# Patient Record
Sex: FEMALE | Race: WHITE | NOT HISPANIC OR LATINO | Employment: OTHER | URBAN - METROPOLITAN AREA
[De-identification: names, ages, dates, MRNs, and addresses within clinical notes are randomized per-mention and may not be internally consistent; named-entity substitution may affect disease eponyms.]

---

## 2018-09-17 ENCOUNTER — TELEPHONE (OUTPATIENT)
Dept: OBSTETRICS AND GYNECOLOGY | Facility: CLINIC | Age: 78
End: 2018-09-17

## 2018-09-17 NOTE — TELEPHONE ENCOUNTER
----- Message from Ana Britt sent at 9/17/2018  8:08 AM CDT -----  Contact: Raquel marks daughter            Name of Who is Calling:Raquel pt daughter      What is the request in detail: patient would like to est care for irregular bleeding    [facility] the [facility]:no    [facility] [phone]

## 2018-09-20 ENCOUNTER — OFFICE VISIT (OUTPATIENT)
Dept: OBSTETRICS AND GYNECOLOGY | Facility: CLINIC | Age: 78
End: 2018-09-20
Payer: MEDICARE

## 2018-09-20 ENCOUNTER — TELEPHONE (OUTPATIENT)
Dept: OBSTETRICS AND GYNECOLOGY | Facility: CLINIC | Age: 78
End: 2018-09-20

## 2018-09-20 ENCOUNTER — HOSPITAL ENCOUNTER (OUTPATIENT)
Dept: RADIOLOGY | Facility: OTHER | Age: 78
Discharge: HOME | End: 2018-09-20
Attending: OBSTETRICS & GYNECOLOGY
Payer: MEDICARE

## 2018-09-20 VITALS — DIASTOLIC BLOOD PRESSURE: 80 MMHG | SYSTOLIC BLOOD PRESSURE: 130 MMHG | WEIGHT: 198.88 LBS

## 2018-09-20 VITALS — BODY MASS INDEX: 33.8 KG/M2 | HEIGHT: 64 IN | WEIGHT: 198 LBS

## 2018-09-20 DIAGNOSIS — Z12.31 ENCOUNTER FOR SCREENING MAMMOGRAM FOR MALIGNANT NEOPLASM OF BREAST: ICD-10-CM

## 2018-09-20 DIAGNOSIS — N95.0 PMB (POSTMENOPAUSAL BLEEDING): ICD-10-CM

## 2018-09-20 DIAGNOSIS — N89.8 VAGINAL DISCHARGE: ICD-10-CM

## 2018-09-20 DIAGNOSIS — N95.0 PMB (POSTMENOPAUSAL BLEEDING): Primary | ICD-10-CM

## 2018-09-20 DIAGNOSIS — Z01.419 ENCOUNTER FOR WELL WOMAN EXAM WITH ROUTINE GYNECOLOGICAL EXAM: ICD-10-CM

## 2018-09-20 LAB
CANDIDA RRNA VAG QL PROBE: NEGATIVE
G VAGINALIS RRNA GENITAL QL PROBE: POSITIVE
T VAGINALIS RRNA GENITAL QL PROBE: NEGATIVE

## 2018-09-20 PROCEDURE — 87660 TRICHOMONAS VAGIN DIR PROBE: CPT

## 2018-09-20 PROCEDURE — 99213 OFFICE O/P EST LOW 20 MIN: CPT | Mod: PBBFAC | Performed by: OBSTETRICS & GYNECOLOGY

## 2018-09-20 PROCEDURE — 99387 INIT PM E/M NEW PAT 65+ YRS: CPT | Mod: S$PBB,,, | Performed by: OBSTETRICS & GYNECOLOGY

## 2018-09-20 PROCEDURE — 77067 SCR MAMMO BI INCL CAD: CPT | Mod: 26,,, | Performed by: RADIOLOGY

## 2018-09-20 PROCEDURE — 88141 CYTOPATH C/V INTERPRET: CPT | Mod: ,,, | Performed by: PATHOLOGY

## 2018-09-20 PROCEDURE — 88175 CYTOPATH C/V AUTO FLUID REDO: CPT | Performed by: PATHOLOGY

## 2018-09-20 PROCEDURE — 77063 BREAST TOMOSYNTHESIS BI: CPT | Mod: 26,,, | Performed by: RADIOLOGY

## 2018-09-20 PROCEDURE — 77067 SCR MAMMO BI INCL CAD: CPT | Mod: TC

## 2018-09-20 PROCEDURE — 99999 PR PBB SHADOW E&M-EST. PATIENT-LVL III: CPT | Mod: PBBFAC,,, | Performed by: OBSTETRICS & GYNECOLOGY

## 2018-09-20 RX ORDER — PEN NEEDLE, DIABETIC 31 GX5/16"
NEEDLE, DISPOSABLE MISCELLANEOUS
COMMUNITY
Start: 2018-09-12 | End: 2019-07-08 | Stop reason: SDUPTHER

## 2018-09-20 RX ORDER — NEOMYCIN SULFATE, POLYMYXIN B SULFATE, AND DEXAMETHASONE 3.5; 10000; 1 MG/G; [USP'U]/G; MG/G
OINTMENT OPHTHALMIC
COMMUNITY
Start: 2018-07-19 | End: 2019-10-24 | Stop reason: CLARIF

## 2018-09-20 RX ORDER — INSULIN GLARGINE 100 [IU]/ML
24 INJECTION, SOLUTION SUBCUTANEOUS
Refills: 6 | COMMUNITY
Start: 2018-07-25 | End: 2019-05-01

## 2018-09-20 RX ORDER — LEVOTHYROXINE SODIUM 112 UG/1
TABLET ORAL
COMMUNITY
Start: 2018-08-24 | End: 2019-06-25

## 2018-09-20 RX ORDER — LOTEPREDNOL ETABONATE 5 MG/ML
SUSPENSION/ DROPS OPHTHALMIC
COMMUNITY
Start: 2018-09-14 | End: 2019-09-05 | Stop reason: SDUPTHER

## 2018-09-20 RX ORDER — BROMFENAC SODIUM 0.7 MG/ML
1 SOLUTION/ DROPS OPHTHALMIC 2 TIMES DAILY
Refills: 5 | COMMUNITY
Start: 2018-08-28 | End: 2020-01-07 | Stop reason: CLARIF

## 2018-09-20 RX ORDER — ERGOCALCIFEROL 1.25 MG/1
50000 CAPSULE ORAL
Refills: 0 | COMMUNITY
Start: 2018-07-10 | End: 2019-12-16 | Stop reason: SDUPTHER

## 2018-09-20 RX ORDER — ROSUVASTATIN CALCIUM 10 MG/1
TABLET, COATED ORAL
COMMUNITY
Start: 2018-08-21 | End: 2019-05-01

## 2018-09-20 RX ORDER — DULOXETIN HYDROCHLORIDE 60 MG/1
60 CAPSULE, DELAYED RELEASE ORAL 2 TIMES DAILY
Refills: 1 | COMMUNITY
Start: 2018-07-25 | End: 2019-05-01

## 2018-09-20 RX ORDER — CYCLOSPORINE 0.5 MG/ML
1 EMULSION OPHTHALMIC 2 TIMES DAILY
Refills: 3 | COMMUNITY
Start: 2018-08-28

## 2018-09-20 RX ORDER — CIPROFLOXACIN HYDROCHLORIDE 3 MG/ML
SOLUTION/ DROPS OPHTHALMIC
COMMUNITY
Start: 2018-07-23 | End: 2019-05-01 | Stop reason: SDUPTHER

## 2018-09-20 RX ORDER — BLOOD SUGAR DIAGNOSTIC
STRIP MISCELLANEOUS
COMMUNITY
Start: 2018-09-14

## 2018-09-20 RX ORDER — BRIMONIDINE TARTRATE, TIMOLOL MALEATE 2; 5 MG/ML; MG/ML
SOLUTION/ DROPS OPHTHALMIC
COMMUNITY
Start: 2018-09-14 | End: 2019-10-24 | Stop reason: CLARIF

## 2018-09-20 RX ORDER — DIAZEPAM 5 MG/1
5 TABLET ORAL EVERY 6 HOURS PRN
COMMUNITY
Start: 2018-07-30 | End: 2023-08-11

## 2018-09-20 RX ORDER — OXYCODONE AND ACETAMINOPHEN 7.5; 325 MG/1; MG/1
TABLET ORAL
Refills: 0 | COMMUNITY
Start: 2018-06-28 | End: 2019-10-09 | Stop reason: ALTCHOICE

## 2018-09-20 RX ORDER — METRONIDAZOLE 500 MG/1
500 TABLET ORAL 2 TIMES DAILY
Qty: 14 TABLET | Refills: 0 | Status: SHIPPED | OUTPATIENT
Start: 2018-09-20 | End: 2018-09-27

## 2018-09-20 RX ORDER — NATEGLINIDE 120 MG/1
TABLET ORAL
COMMUNITY
Start: 2018-08-21 | End: 2019-05-01

## 2018-09-20 NOTE — TELEPHONE ENCOUNTER
----- Message from Nilsa Olmso sent at 9/20/2018  1:45 PM CDT -----  Contact: Raquel/ daughter [phone]  Raquel is needing a call back, regarding her mom's Rx, she can be reached at [phone].

## 2018-09-20 NOTE — PROGRESS NOTES
"History & Physical  Gynecology      SUBJECTIVE:     Chief Complaint: Vaginal Bleeding       History of Present Illness:  Pt is a 77 y/o  who presents with complaints of vaginal bleeding x1 day.  Pt reports that she noticed the bleeding while wiping after her  attempted to insert his finger into her vagina.  Pt reports that he has ED and therefore, they dont have intercourse.  Reports same symptoms a few months back when he attempted to do the same thing.  Has not had bleeding since.  Has not been to the GYN in 36 years.  Has not had pap in that long.  Denies hx of STDs.   used KY jelly.  Not taking HRT.  Pt also reports that she has a vagina odor, which is why she has never wanted to go to the GYN.  Denies vaginal discharge.  Denies itching/irritation of vagina.  No hx of strokes, breast cancer, GYN cancer.  No weight changes.  Other medical problems include diabetes and HTN     ObHX   -  x 4- 3 were breech extractions      Review of patient's allergies indicates:  No Known Allergies    Past Medical History:   Diagnosis Date    Concern about heart attack without diagnosis     silent heart attack    Diabetes mellitus     Eye problems      Past Surgical History:   Procedure Laterality Date    EYE SURGERY      TUBAL LIGATION       OB History      Para Term  AB Living    4         4    SAB TAB Ectopic Multiple Live Births                     Family History   Problem Relation Age of Onset    Breast cancer Daughter     Colon cancer Neg Hx      Social History     Tobacco Use    Smoking status: Never Smoker    Smokeless tobacco: Never Used   Substance Use Topics    Alcohol use: No     Frequency: Never    Drug use: No       Current Outpatient Medications   Medication Sig    BD ULTRA-FINE MINI PEN NEEDLE 31 gauge x 3/16" Ndle     ciprofloxacin HCl (CILOXAN) 0.3 % ophthalmic solution     COMBIGAN 0.2-0.5 % Drop     diazePAM (VALIUM) 5 MG tablet     DULoxetine (CYMBALTA) 60 " MG capsule Take 60 mg by mouth 2 (two) times daily.    LANTUS SOLOSTAR U-100 INSULIN 100 unit/mL (3 mL) InPn pen Inject 30 Units into the skin once daily.    LOTEMAX 0.5 % ophthalmic suspension     oxyCODONE-acetaminophen (PERCOCET) 7.5-325 mg per tablet TAKE 1/2 TABLET BY MOUTH EVERY 8 TO 12 HOURS AS NEEDED    PRODIGY NO CODING Strp     PROLENSA 0.07 % Drop Place 1 drop into both eyes 2 (two) times daily.    RESTASIS 0.05 % ophthalmic emulsion Place 1 drop into both eyes 2 (two) times daily.    SYNTHROID 112 mcg tablet     VITAMIN D2 50,000 unit capsule Take 50,000 Units by mouth every 7 days.    metroNIDAZOLE (FLAGYL) 500 MG tablet Take 1 tablet (500 mg total) by mouth 2 (two) times daily. for 7 days    nateglinide (STARLIX) 120 MG tablet     neomycin-polymyxin-dexamethasone (DEXACINE) 3.5 mg/g-10,000 unit/g-0.1 % Oint     rosuvastatin (CRESTOR) 10 MG tablet      No current facility-administered medications for this visit.          Review of Systems:  Review of Systems   Constitutional: Negative for activity change, appetite change, fatigue, fever and unexpected weight change.   Respiratory: Negative for cough and shortness of breath.    Cardiovascular: Negative for chest pain and leg swelling.   Gastrointestinal: Negative for abdominal pain, blood in stool, constipation, diarrhea, nausea and vomiting.   Endocrine: Negative for diabetes, hair loss and hot flashes.   Genitourinary: Positive for postmenopausal bleeding and vaginal odor. Negative for dysuria, flank pain, pelvic pain, vaginal discharge, vaginal pain and postcoital bleeding.   Musculoskeletal: Negative for back pain.   Skin:  Negative for hair changes.   Psychiatric/Behavioral: Negative for sleep disturbance. The patient is not nervous/anxious.    Breast: Negative for breast mass, breast pain, nipple discharge and skin changes       OBJECTIVE:     Physical Exam:  Physical Exam   Constitutional: She is oriented to person, place, and time.  She appears well-developed and well-nourished.   HENT:   Head: Normocephalic and atraumatic.   Eyes: Conjunctivae are normal. Right eye exhibits no discharge. Left eye exhibits no discharge. No scleral icterus.   Pulmonary/Chest: Effort normal. No stridor. She exhibits no mass, no tenderness and no bony tenderness. Right breast exhibits no inverted nipple, no mass, no nipple discharge, no skin change and no tenderness. Left breast exhibits no inverted nipple, no mass, no nipple discharge, no skin change and no tenderness. Breasts are symmetrical. There is no breast swelling.   Abdominal: Soft. She exhibits no distension. There is no tenderness.   Genitourinary: Vagina normal and uterus normal. No breast tenderness, discharge or bleeding. No labial fusion. There is no rash, tenderness, lesion or injury on the right labia. There is no rash, tenderness, lesion or injury on the left labia. Cervix exhibits no motion tenderness, no discharge and no friability. Right adnexum displays no mass, no tenderness and no fullness. Left adnexum displays no mass, no tenderness and no fullness.   Genitourinary Comments: Normal external genitalia.  Normal hair distribution.  Urethral meatus normal. No cervical lesions or masses.  Vaginal atrophy noted throughout.  Mild spotting noted when performing pap.  No adnexal or uterine tenderness.  Exam limited by habitus but uterus feels small- 6-8 week size.  No palpable adnexal masses.      Musculoskeletal: Normal range of motion.   Neurological: She is alert and oriented to person, place, and time.   Skin: Skin is warm and dry.   Psychiatric: She has a normal mood and affect. Her behavior is normal. Judgment and thought content normal.         ASSESSMENT:       ICD-10-CM ICD-9-CM    1. PMB (postmenopausal bleeding) N95.0 627.1 Mammo Digital Screening Bilat With CAD      Vaginosis Screen by DNA Probe      Liquid-based pap smear, screening   2. Encounter for screening mammogram for malignant  neoplasm of breast  Z12.31 V76.12 Mammo Digital Screening Bilat With CAD   3. Vaginal discharge N89.8 623.5 metroNIDAZOLE (FLAGYL) 500 MG tablet          Plan:      Chelsea was seen today for vaginal bleeding.    Diagnoses and all orders for this visit:    Encounter for well woman exam with routine gynecological exam   - Pt is past the age of recommended pap smears.  However, given that patient hasnt had pap in 36 years and having PMB, will do pap today  - Has not had MMG in over 30 years.  Has daughter with breast cancer.  MMG ordered  - Cscope- pt declined  -     Mammo Digital Screening Bilat With CAD; Future    PMB (postmenopausal bleeding)  - Given history and exam, likely bleeding is 2/2 vaginal atrophy. Not bleeding now.  Do not feel that patient needs EMBx or US  - Pt offered premarin to help with vaginal atrophy.  Counseled about the risks/benefits.  Pt unsure if she wants to try it.  Will let us know because not usually sexually active and vagina is not bothering her  -     Liquid-based pap smear, screening\      Encounter for screening mammogram for malignant neoplasm of breast   -     Mammo Digital Screening Bilat With CAD; Future    Vaginal discharge  -     Vaginosis Screen by DNA Probe  -     metroNIDAZOLE (FLAGYL) 500 MG tablet; Take 1 tablet (500 mg total) by mouth 2 (two) times daily. for 7 days        Orders Placed This Encounter   Procedures    Vaginosis Screen by DNA Probe    Mammo Digital Screening Bilat With CAD       Follow-up in about 1 year (around 9/20/2019).      Marcia Canseco

## 2019-03-15 LAB
CHOLESTEROL, TOTAL: 126
CHOLESTEROL, TOTAL: 126
CHOLESTEROL/HDL RATIO SCREEN: 2.8
CHOLESTEROL/HDL RATIO SCREEN: 2.8
HDLC SERPL-MCNC: 45 MG/DL
HDLC SERPL-MCNC: 45 MG/DL
HEMOGLOBIN A1: 8.9
LDLC SERPL CALC-MCNC: 62 MG/DL
LDLC SERPL CALC-MCNC: 62 MG/DL
NONHDLC SERPL-MCNC: 81 MG/DL
NONHDLC SERPL-MCNC: 81 MG/DL
TRIGL SERPL-MCNC: 111 MG/DL (ref 40–160)
TRIGL SERPL-MCNC: 111 MG/DL (ref 40–160)

## 2019-04-08 ENCOUNTER — CLINICAL SUPPORT (OUTPATIENT)
Dept: DIABETES | Facility: CLINIC | Age: 79
End: 2019-04-08
Payer: MEDICARE

## 2019-04-08 VITALS — WEIGHT: 202.81 LBS | BODY MASS INDEX: 34.81 KG/M2

## 2019-04-08 DIAGNOSIS — E11.3213 TYPE 2 DIABETES MELLITUS WITH BOTH EYES AFFECTED BY MILD NONPROLIFERATIVE RETINOPATHY AND MACULAR EDEMA, WITH LONG-TERM CURRENT USE OF INSULIN: Primary | ICD-10-CM

## 2019-04-08 DIAGNOSIS — Z79.4 TYPE 2 DIABETES MELLITUS WITH BOTH EYES AFFECTED BY MILD NONPROLIFERATIVE RETINOPATHY AND MACULAR EDEMA, WITH LONG-TERM CURRENT USE OF INSULIN: Primary | ICD-10-CM

## 2019-04-08 PROCEDURE — G0108 PR DIAB MANAGE TRN  PER INDIV: ICD-10-PCS | Mod: S$GLB,,, | Performed by: DIETITIAN, REGISTERED

## 2019-04-08 PROCEDURE — G0108 DIAB MANAGE TRN  PER INDIV: HCPCS | Mod: S$GLB,,, | Performed by: DIETITIAN, REGISTERED

## 2019-04-08 NOTE — PROGRESS NOTES
Diabetes Education  Author: Paz James RD  Date: 4/8/2019    Diabetes Care Management Summary  Pt presents with only copy of recent lab work ([facility] labs 3/15/19), a hand written medication list and oral history from her and daughter as to current DM status and treatment; no other information scanned in Media at the time of this visit.  Both ladies voiced displeasure w past DM care at other health care facility and were seeking to establish care with [facility]. Pt was a difficult historian and was not open to any suggestions for change in diet or treating low BG. Provided pt with Diabetes management booklet and attempted to review the information on carb counting, medications, acute care and correct food choices; pt was asked to  return once established w ENDO to continue the diabetes education.  Diabetes Education Record Assessment/Progress: Initial  Current Diabetes Risk Level: Moderate     Last A1c: No results found for: HGBA1C  Last visit with Diabetes Educator: Last Education Visit: Not Found    Diabetes Type  Diabetes Type : Type II    Diabetes History  Diabetes Diagnosis: >10 years  Current Treatment: Diet, Insulin, Oral Medication  Reviewed Problem List with Patient: Yes    Health Maintenance was reviewed today with patient. Discussed with patient importance of routine eye exams, foot exams/foot care, blood work (i.e.: A1c, microalbumin, and lipid), dental visits, yearly flu vaccine, and pneumonia vaccine as indicated by PCP. Patient verbalized understanding.     The patient has no Health Maintenance topics of status Not Due  Health Maintenance Due   Topic Date Due    Lipid Panel  1940    Hemoglobin A1c  1940    Foot Exam  07/23/1952    Eye Exam  07/23/1952    Urine Microalbumin  07/23/1952    TETANUS VACCINE  07/23/1960    DEXA SCAN  07/23/1982    Zoster Vaccine  07/23/2002    Pneumococcal Vaccine (65+ Low/Medium Risk) (1 of 2 - PCV13) 07/23/2007    Influenza Vaccine  08/01/2018        Nutrition  Meal Planning: water, 3 meals per day, diet drinks, artificial sweeteners, eats out often, snacks between meal  What type of sweetener do you use?: Splenda  What type of beverages do you drink?: diet soda/tea, water  Meal Plan 24 Hour Recall - Breakfast: 8-10am: pancake sandwich w sauasge, coffee w splenda and powdered cream  Meal Plan 24 Hour Recall - Lunch: noon-1; [facility] to prepare or  meals; steak, mixed veg, baked potato, broccoli, crystal light iced tea  Meal Plan 24 Hour Recall - Dinner: 5-7pm: hamburger on 1/2 bun, some ffries, diet coke  Meal Plan 24 Hour Recall - Snack: SF ice cream of SF cookies    Monitoring   Monitoring: Other  Self Monitoring : 3x/day  Blood Glucose Logs: Yes  Do you use a personal continuous glucose monitor?: Yes  What kind of glucose monitor do you use?: Freestyle Yoav Flash(used it for 2 weeks but not provided w prescription to purchase)  In the last month, how often have you had a low blood sugar reaction?: more than once a week  What are your symptoms of low blood sugar?: shaky but pt will treat even w BG at 91 due to fear of lower BG  How do you treat low blood sugar?: juice, candy  Can you tell when your blood sugar is too high?: no    Exercise   Exercise Type: (limited due to poor vision)    Current Diabetes Treatment   Current Treatment: Diet, Insulin, Oral Medication    Social History  Preferred Learning Method: Face to Face, Group Education  Primary Support: Self, Daughter  Smoking Status: Never a Smoker  Alcohol Use: Never  DDS-2 Score  ( > 3 = SIGNIFICANT DISTRESS): 2  Barriers to Change  Learning Challenges : Vision  Vision - further explanation: visually impaired    Readiness to Learn   Readiness to Learn : Non Acceptance    Cultural Influences  Cultural Influences: No    Diabetes Education Assessment/Progress  Diabetes Disease Process (diabetes disease process and treatment options): Individual Session, Written Materials Provided,  Instructed, Discussion, Needs Review  Nutrition (Incorporating nutritional management into one's lifestyle): Individual Session, Written Materials Provided, Instructed, Discussion, Needs Review  -Very difficult to get clear understanding of eating habits. Pt states she must depend on  who she states she despises to assist to prepare/ purchade meals due to poor vision thus stating most meals are not the most DM friendly. Pt very combative w her daughter who was present regarding food choices and timing of meals or treatment of low BG.   -pt given copy of Diabetes management guide and some instruction provided re carbs ad meal planning however Careful adherence to diet not anticipated at this time..     Physical Activity (incorporating physical activity into one's lifestyle): Individual Session, Written Materials Provided, Instructed, Discussion, Needs Review  Medications (states correct name, dose, onset, peak, duration, side effects & timing of meds): Individual Session, Written Materials Provided, Instructed, Discussion, Needs Review, Needs Instruction   -pt takes 20 units Novolog prior to meals using sliding scale (150-200: +2 U, 201-250:+4 U, 251-300: +6 U, 301-350: + 8 U, over 350: +10 U), 24 u Lantus at night and 120mg Starlix at meals  -pt states she does not skip insulin unless BG below 90.    Monitoring (monitoring blood glucose/other parameters & using results): Individual Session, Written Materials Provided, Instructed, Discussion, Needs Review  -pt stated she had been given Free Style hair but states she stopped using when BG not same as w glucometer. States no instruction provided to pt on placement of sensors and how to use the data in DM care nor the differences in BG readings. Thus pt returned to checking BG (uses Prodigy) and keeps log ()    Acute Complications (preventing, detecting, and treating acute complications): Individual Session, Written Materials Provided, Instructed,  Discussion, Comprehends Key Points  -pt states she gets scared when BG gets to 90s and start treating for low BG though not yet experiencing any s/s  -discussed s/s of hypo and rule of 15 to follow when Bg less than 70. Rec'd treating BG of  with a 15 g complex carb snack. Pt verbally resisted this suggestion and stated she would continue to treat as she has done in past    Chronic Complications (preventing, detecting, and treating chronic complications): Individual Session, Written Materials Provided, Instructed, Discussion, Needs Review  Clinical (diabetes, other pertinent medical history, and relevant comorbidities reviewed during visit): Written Materials Provided, Discussion, Individual Session, Needs Review  Cognitive (knowledge of self-management skills, functional health literacy): Individual Session, Written Materials Provided, Needs Review, Discussion  Psychosocial (emotional response to diabetes): Discussion  Diabetes Distress and Support Systems: Discussion  Behavioral (readiness for change, lifestyle practices, self-care behaviors): Discussion    Goals  Patient has selected/evaluated goals during today's session: No(pt too upset with prior medical care and martial situation to discuss seting goals for DM control)    Diabetes Care Plan/Intervention  Education Plan/Intervention: Individual Follow-Up DSMT(contact information provided; pt asked to return for diabetes education once she has a new ENDO provider)    Diabetes Meal Plan  Restrictions: Restricted Carbohydrate  Carbohydrate Per Meal: 30-45g  Carbohydrate Per Snack : 15-20g    Today's Self-Management Care Plan was developed with the patient's input and is based on barriers identified during today's assessment.    The long and short-term goals in the care plan were written with the patient/caregiver's input. The patient has agreed to work toward these goals to improve her overall diabetes control.      The patient received a copy of today's  self-management plan and verbalized understanding of the care plan, goals, and all of today's instructions.      The patient was encouraged to communicate with her physician and care team regarding her condition(s) and treatment.  I provided the patient with my contact information today and encouraged her to contact me via phone or patient portal as needed.     Education Units of Time   Time Spent: 60 min

## 2019-04-11 ENCOUNTER — TELEPHONE (OUTPATIENT)
Dept: ENDOCRINOLOGY | Facility: CLINIC | Age: 79
End: 2019-04-11

## 2019-04-11 NOTE — TELEPHONE ENCOUNTER
Spoke to patient daughter and let her know that we don't have any appointments today our fist available is in Aug. She said that was to far out for her mother, I asked if her mother had a PCP to see until she can be seen her in Aug. She said no she has only been seeing the min clinics to do her diabetes when her endo was unable to see her. I told her it is very important for her mother to have a PCP due to they can manage her diabetes while she is waiting to see her Endo. She did not want to schedule any appointment with us today for Aug.

## 2019-04-11 NOTE — TELEPHONE ENCOUNTER
----- Message from Aneta Quintanilla sent at 4/11/2019  1:57 PM CDT -----  Contact: Raquel  / Daughter /  tel:   [phone]  Needs Advice    Reason for call:  Daughter wants pt. Seen asap .     OUT of MEdication.     Needs to be seen asap.     Faxed notes and referral  to your office.           Communication Preference:  Phone     Additional Information:  Wants to be seen TODAY or tomorrow.    Pls call asap.

## 2019-05-01 ENCOUNTER — TELEPHONE (OUTPATIENT)
Dept: FAMILY MEDICINE | Facility: CLINIC | Age: 79
End: 2019-05-01

## 2019-05-01 ENCOUNTER — OFFICE VISIT (OUTPATIENT)
Dept: FAMILY MEDICINE | Facility: CLINIC | Age: 79
End: 2019-05-01
Payer: MEDICARE

## 2019-05-01 VITALS
HEART RATE: 66 BPM | TEMPERATURE: 98 F | SYSTOLIC BLOOD PRESSURE: 136 MMHG | OXYGEN SATURATION: 96 % | BODY MASS INDEX: 35.16 KG/M2 | DIASTOLIC BLOOD PRESSURE: 70 MMHG | WEIGHT: 205.94 LBS | RESPIRATION RATE: 18 BRPM | HEIGHT: 64 IN

## 2019-05-01 DIAGNOSIS — F41.9 ANXIETY AND DEPRESSION: ICD-10-CM

## 2019-05-01 DIAGNOSIS — E78.5 HYPERLIPIDEMIA ASSOCIATED WITH TYPE 2 DIABETES MELLITUS: ICD-10-CM

## 2019-05-01 DIAGNOSIS — Z79.4 TYPE 2 DIABETES MELLITUS WITH HYPERGLYCEMIA, WITH LONG-TERM CURRENT USE OF INSULIN: ICD-10-CM

## 2019-05-01 DIAGNOSIS — E03.9 HYPOTHYROIDISM, UNSPECIFIED TYPE: ICD-10-CM

## 2019-05-01 DIAGNOSIS — F32.A ANXIETY AND DEPRESSION: ICD-10-CM

## 2019-05-01 DIAGNOSIS — E11.65 TYPE 2 DIABETES MELLITUS WITH HYPERGLYCEMIA, WITH LONG-TERM CURRENT USE OF INSULIN: ICD-10-CM

## 2019-05-01 DIAGNOSIS — M89.9 BONE DISORDER: ICD-10-CM

## 2019-05-01 DIAGNOSIS — E11.69 HYPERLIPIDEMIA ASSOCIATED WITH TYPE 2 DIABETES MELLITUS: ICD-10-CM

## 2019-05-01 DIAGNOSIS — E66.01 SEVERE OBESITY (BMI 35.0-35.9 WITH COMORBIDITY): ICD-10-CM

## 2019-05-01 DIAGNOSIS — L29.9 PRURITUS: Primary | ICD-10-CM

## 2019-05-01 PROCEDURE — 99999 PR PBB SHADOW E&M-EST. PATIENT-LVL III: ICD-10-PCS | Mod: PBBFAC,,, | Performed by: INTERNAL MEDICINE

## 2019-05-01 PROCEDURE — 1101F PT FALLS ASSESS-DOCD LE1/YR: CPT | Mod: CPTII,S$GLB,, | Performed by: INTERNAL MEDICINE

## 2019-05-01 PROCEDURE — 1101F PR PT FALLS ASSESS DOC 0-1 FALLS W/OUT INJ PAST YR: ICD-10-PCS | Mod: CPTII,S$GLB,, | Performed by: INTERNAL MEDICINE

## 2019-05-01 PROCEDURE — 82043 UR ALBUMIN QUANTITATIVE: CPT

## 2019-05-01 PROCEDURE — 99204 PR OFFICE/OUTPT VISIT, NEW, LEVL IV, 45-59 MIN: ICD-10-PCS | Mod: S$GLB,,, | Performed by: INTERNAL MEDICINE

## 2019-05-01 PROCEDURE — 99204 OFFICE O/P NEW MOD 45 MIN: CPT | Mod: S$GLB,,, | Performed by: INTERNAL MEDICINE

## 2019-05-01 PROCEDURE — 99999 PR PBB SHADOW E&M-EST. PATIENT-LVL III: CPT | Mod: PBBFAC,,, | Performed by: INTERNAL MEDICINE

## 2019-05-01 RX ORDER — BLOOD-GLUCOSE METER
KIT MISCELLANEOUS
Status: ON HOLD | COMMUNITY
Start: 2019-03-08 | End: 2020-01-18 | Stop reason: SDUPTHER

## 2019-05-01 RX ORDER — BROMFENAC SODIUM 0.81 MG/ML
1 SOLUTION/ DROPS OPHTHALMIC
COMMUNITY
Start: 2018-08-28

## 2019-05-01 RX ORDER — NATEGLINIDE 120 MG/1
120 TABLET ORAL
COMMUNITY
Start: 2018-08-21 | End: 2019-05-01

## 2019-05-01 RX ORDER — CYCLOSPORINE 0.5 MG/ML
1 EMULSION OPHTHALMIC
COMMUNITY
Start: 2018-08-28

## 2019-05-01 RX ORDER — CIPROFLOXACIN HYDROCHLORIDE 3 MG/ML
SOLUTION/ DROPS OPHTHALMIC
COMMUNITY
Start: 2018-07-23 | End: 2019-09-05

## 2019-05-01 RX ORDER — ASCORBIC ACID 1000 MG
175 TABLET ORAL DAILY
COMMUNITY
End: 2019-09-05

## 2019-05-01 RX ORDER — ROSUVASTATIN CALCIUM 10 MG/1
10 TABLET, COATED ORAL
COMMUNITY
Start: 2018-08-21 | End: 2021-01-19 | Stop reason: SDUPTHER

## 2019-05-01 RX ORDER — ASPIRIN 81 MG/1
81 TABLET ORAL
COMMUNITY
End: 2020-05-04

## 2019-05-01 RX ORDER — LEVOTHYROXINE SODIUM 112 UG/1
112 TABLET ORAL
COMMUNITY
Start: 2018-08-24 | End: 2019-06-25 | Stop reason: SDUPTHER

## 2019-05-01 RX ORDER — DULOXETIN HYDROCHLORIDE 60 MG/1
60 CAPSULE, DELAYED RELEASE ORAL 2 TIMES DAILY
COMMUNITY
Start: 2018-07-25 | End: 2023-08-11

## 2019-05-01 RX ORDER — HYDROXYZINE PAMOATE 25 MG/1
25 CAPSULE ORAL
COMMUNITY
Start: 2019-04-15 | End: 2023-08-11

## 2019-05-01 RX ORDER — ASPIRIN 81 MG/1
81 TABLET ORAL DAILY
COMMUNITY
End: 2019-05-01

## 2019-05-01 RX ORDER — BRIMONIDINE TARTRATE AND TIMOLOL MALEATE 2; 5 MG/ML; MG/ML
SOLUTION OPHTHALMIC
COMMUNITY
Start: 2018-09-14 | End: 2019-10-24 | Stop reason: CLARIF

## 2019-05-01 RX ORDER — LOTEPREDNOL ETABONATE 5 MG/ML
SUSPENSION/ DROPS OPHTHALMIC
COMMUNITY
Start: 2018-09-14

## 2019-05-01 RX ORDER — HYDROXYZINE PAMOATE 25 MG/1
25 CAPSULE ORAL EVERY 8 HOURS PRN
COMMUNITY
End: 2019-05-01

## 2019-05-01 RX ORDER — INSULIN ASPART 100 [IU]/ML
15 INJECTION, SOLUTION INTRAVENOUS; SUBCUTANEOUS
COMMUNITY
End: 2019-07-18

## 2019-05-01 RX ORDER — OXYCODONE HYDROCHLORIDE 10 MG/1
10 TABLET ORAL EVERY 8 HOURS PRN
Refills: 0 | Status: ON HOLD | COMMUNITY
Start: 2019-04-04 | End: 2019-11-26 | Stop reason: HOSPADM

## 2019-05-01 RX ORDER — INSULIN ASPART 100 [IU]/ML
INJECTION, SOLUTION INTRAVENOUS; SUBCUTANEOUS
COMMUNITY
End: 2019-05-01

## 2019-05-01 RX ORDER — INSULIN GLARGINE 100 [IU]/ML
30 INJECTION, SOLUTION SUBCUTANEOUS
COMMUNITY
Start: 2018-07-25 | End: 2019-07-18 | Stop reason: SDUPTHER

## 2019-05-01 NOTE — LETTER
May 7, 2019    DR ANNY Lema - [facility]   May 7, 2019     Patient: Chelsea Gould    YOB: 1940   Date of Visit:        To Whom It May Concern:    We are seeing Chelsea Gould in the clinic at [facility].  Sari Gupta MD is their PCP.  She/He has an outstanding lab/procedure at the time we reviewed their chart.  To help with our Health Maintenance records will you please supply the following report(s)                                                 (XX)  EYE EXAM (DIABETIC)                 Please Fax to [facility] at [phone]     If any questions please contact Bhakti Reynolds at [phone]

## 2019-05-01 NOTE — LETTER
May 1, 2019    DR ANNY Lema - [facility]   May 1, 2019     Patient: Chelsea Gould    YOB: 1940   Date of Visit: 5/1/2019       To Whom It May Concern:    We are seeing Chelsea Gould in the clinic at [facility].  Sari Gupta MD is their PCP.  She/He has an outstanding lab/procedure at the time we reviewed their chart.  To help with our Health Maintenance records will you please supply the following report(s)                                                 (XX)  EYE EXAM (DIABETIC)                 Please Fax to [facility] at [phone]     If any questions please contact Bhakti Reynolds at [phone]

## 2019-05-01 NOTE — PROGRESS NOTES
SUBJECTIVE     Chief Complaint   Patient presents with    itching all over       HPI  Chelsea Gould is a 78 y.o. female with multiple medical diagnoses as listed in the medical history and problem list that presents for evaluation of pruritis x 1 month. Pt reports a full body itch from head to toe. Her only new medication is Nateglinide, which she started in maybe 12/2018 or 1/2018. Pt also recently changed her detergent from Gain to Tide to see if that was the cause. Denies any foods/beverages, soaps, body washes, or lotions. Pt denies any rash, but reports wherever she itches it also hurts. Pt has been taking an apple cidar pill, Benadryl, and Vistaril prn with some improvement in the itch.     PAST MEDICAL HISTORY:  Past Medical History:   Diagnosis Date    Concern about heart attack without diagnosis     silent heart attack    Diabetes mellitus     Eye problems        PAST SURGICAL HISTORY:  Past Surgical History:   Procedure Laterality Date    EYE SURGERY      TUBAL LIGATION         SOCIAL HISTORY:  Social History     Socioeconomic History    Marital status:      Spouse name: Not on file    Number of children: Not on file    Years of education: Not on file    Highest education level: Not on file   Occupational History    Not on file   Social Needs    Financial resource strain: Somewhat hard    Food insecurity:     Worry: Never true     Inability: Never true    Transportation needs:     Medical: Yes     Non-medical: Yes   Tobacco Use    Smoking status: Never Smoker    Smokeless tobacco: Never Used   Substance and Sexual Activity    Alcohol use: No     Frequency: Never     Drinks per session: Patient refused     Binge frequency: Never    Drug use: No    Sexual activity: Yes     Partners: Male   Lifestyle    Physical activity:     Days per week: 1 day     Minutes per session: 20 min    Stress: To some extent   Relationships    Social connections:     Talks on phone: More than three  times a week     Gets together: Three times a week     Attends Mu-ism service: Not on file     Active member of club or organization: No     Attends meetings of clubs or organizations: Patient refused     Relationship status:    Other Topics Concern    Not on file   Social History Narrative    Not on file       FAMILY HISTORY:  Family History   Problem Relation Age of Onset    Breast cancer Daughter 44    Colon cancer Neg Hx        ALLERGIES AND MEDICATIONS: updated and reviewed.  Review of patient's allergies indicates:  No Known Allergies  Current Outpatient Medications   Medication Sig Dispense Refill    brimonidine-timolol (COMBIGAN) 0.2-0.5 % Drop       bromfenac (PROLENSA) 0.07 % Drop Apply 1 drop to eye.      ciprofloxacin HCl (CILOXAN) 0.3 % ophthalmic solution       COMBIGAN 0.2-0.5 % Drop       cycloSPORINE (RESTASIS) 0.05 % ophthalmic emulsion Apply 1 drop to eye.      diazePAM (VALIUM) 5 MG tablet       DULoxetine (CYMBALTA) 60 MG capsule Take 60 mg by mouth 2 (two) times daily.      hydrOXYzine pamoate (VISTARIL) 25 MG Cap Take 25 mg by mouth.      insulin aspart U-100 (NOVOLOG) 100 unit/mL injection Inject 20 Units into the skin with lunch.      insulin glargine 100 units/mL (3mL) SubQ pen Inject 24 Units into the skin.      levothyroxine (SYNTHROID) 112 MCG tablet Take 112 mcg by mouth.      LOTEMAX 0.5 % ophthalmic suspension       loteprednol (LOTEMAX) 0.5 % ophthalmic suspension       milk thistle 175 mg tablet Take 175 mg by mouth once daily.      oxyCODONE-acetaminophen (PERCOCET) 7.5-325 mg per tablet TAKE 1/2 TABLET BY MOUTH EVERY 8 TO 12 HOURS AS NEEDED  0    PROLENSA 0.07 % Drop Place 1 drop into both eyes 2 (two) times daily.  5    RESTASIS 0.05 % ophthalmic emulsion Place 1 drop into both eyes 2 (two) times daily.  3    rosuvastatin (CRESTOR) 10 MG tablet Take 10 mg by mouth.      SYNTHROID 112 mcg tablet       VITAMIN D2 50,000 unit capsule Take 50,000  "Units by mouth every 7 days.  0    aspirin (ECOTRIN) 81 MG EC tablet Take 81 mg by mouth.      BD ULTRA-FINE MINI PEN NEEDLE 31 gauge x 3/16" Nddavid       LANTUS ROWELLAR U-100 INSULIN 100 unit/mL (3 mL) InPn pen Inject 24 Units into the skin before meals as needed.   6    neomycin-polymyxin-dexamethasone (DEXACINE) 3.5 mg/g-10,000 unit/g-0.1 % Oint       oxyCODONE (ROXICODONE) 10 mg Tab immediate release tablet Take 10 mg by mouth every 8 (eight) hours as needed.  0    PRODIGY AUTOCODE METER kit       PRODIGY NO CODING Strp        No current facility-administered medications for this visit.        ROS  Review of Systems   Constitutional: Negative for chills, fatigue and fever.   HENT: Negative for congestion, hearing loss, rhinorrhea and sore throat.    Eyes: Negative for pain and visual disturbance.   Respiratory: Negative for cough and shortness of breath.    Cardiovascular: Negative for chest pain, palpitations and leg swelling.   Gastrointestinal: Negative for abdominal pain, constipation, diarrhea, nausea and vomiting.   Genitourinary: Negative for dysuria, frequency and urgency.   Musculoskeletal: Negative for arthralgias, joint swelling and myalgias.   Skin: Negative for rash and wound.        Full body itch   Neurological: Negative for headaches.   Psychiatric/Behavioral: Negative for agitation and confusion. The patient is not nervous/anxious.          OBJECTIVE     Physical Exam  Vitals:    05/01/19 1002   BP: 136/70   Pulse: 66   Resp: 18   Temp: 98.3 °F (36.8 °C)    Body mass index is 35.34 kg/m².  Weight: 93.4 kg (205 lb 14.6 oz)   Height: 5' 4" (162.6 cm)     Physical Exam   Constitutional: She is oriented to person, place, and time. She appears well-developed and well-nourished. No distress.   HENT:   Head: Normocephalic and atraumatic.   Right Ear: Hearing, tympanic membrane and external ear normal.   Left Ear: Hearing, tympanic membrane and external ear normal.   Nose: Nose normal. No " rhinorrhea.   Mouth/Throat: Oropharynx is clear and moist. No uvula swelling. No posterior oropharyngeal edema or posterior oropharyngeal erythema.   Eyes: Conjunctivae and EOM are normal. Right eye exhibits no discharge. Left eye exhibits no discharge. No scleral icterus.   Neck: Normal range of motion. Neck supple. No JVD present. No tracheal deviation present.   Cardiovascular: Normal rate, regular rhythm and intact distal pulses. Exam reveals no gallop and no friction rub.   No murmur heard.  Pulmonary/Chest: Effort normal and breath sounds normal. No respiratory distress. She has no wheezes.   Abdominal: Soft. Bowel sounds are normal. She exhibits no distension and no mass. There is no tenderness. There is no rebound and no guarding.   Musculoskeletal: Normal range of motion. She exhibits no edema, tenderness or deformity.   Neurological: She is alert and oriented to person, place, and time. She exhibits normal muscle tone. Coordination normal.   Skin: Skin is warm and dry. No rash noted. No erythema.   Psychiatric: She has a normal mood and affect. Her behavior is normal. Judgment and thought content normal.         Health Maintenance       Date Due Completion Date    Lipid Panel 1942 ---    Hemoglobin A1c 1942 ---    Foot Exam 07/23/1952 ---    Eye Exam 07/23/1952 ---    Urine Microalbumin 07/23/1952 ---    TETANUS VACCINE 07/23/1960 ---    DEXA SCAN 07/23/1982 ---    Zoster Vaccine 07/23/2002 ---    Pneumococcal Vaccine (65+ Low/Medium Risk) (1 of 2 - PCV13) 07/23/2007 ---    Influenza Vaccine 08/01/2019 ---            ASSESSMENT     78 y.o. female with     1. Pruritus    2. Hypothyroidism, unspecified type    3. Type 2 diabetes mellitus with hyperglycemia, with long-term current use of insulin    4. Hyperlipidemia associated with type 2 diabetes mellitus    5. Anxiety and depression    6. Severe obesity (BMI 35.0-35.9 with comorbidity)    7. Bone disorder        PLAN:     1. Pruritus  - Likely  2/2 Starlix, which pt has been advised to discontinue    2. Hypothyroidism, unspecified type  - Stable; no acute issues  - The current medical regimen is effective;  continue present plan and medications.    3. Type 2 diabetes mellitus with hyperglycemia, with long-term current use of insulin  - Not well controlled; HgbA1C 8.9 in 3/2019  - Discontinue Starlixa and continue other meds for now  - MICROALBUMIN / CREATININE RATIO URINE    4. Hyperlipidemia associated with type 2 diabetes mellitus  - Stable; no acute issues  - The current medical regimen is effective;  continue present plan and medications.  - Outside labs to be scanned into chart    5. Anxiety and depression  - Stable; no acute issues  - The current medical regimen is effective;  continue present plan and medications.    6. Severe obesity (BMI 35.0-35.9 with comorbidity)  - Pt aware of importance of eating a prudent diet and exercising    7. Bone disorder  - DXA Bone Density Spine And Hip; Future        RTC in 3 months     Sari Gupta MD  05/01/2019 10:30 AM        No follow-ups on file.

## 2019-05-02 LAB
ALBUMIN/CREAT UR: 9.1 UG/MG (ref 0–30)
CREAT UR-MCNC: 187 MG/DL (ref 15–325)
MICROALBUMIN UR DL<=1MG/L-MCNC: 17 UG/ML

## 2019-06-11 NOTE — PROGRESS NOTES
Subjective:      Patient ID: Chelsea Gould is a 78 y.o. female.    Chief Complaint:  Consult    History of Present Illness  Chelsea Gould is here for evaluation and management of hypothyroidism and T2DM.  This is their first visit with me.      Outside Labs 3/15/19  GFR 47  A1c 8.9  TSH 2.55  Free T4 1.2    With regards to hypothyroidism:  Unsure when she was diagnosed.    Current Medication:  Levothyroxine 112mcg     Takes thyroid medication properly without food first thing in the morning.    Current Symptoms:   + Weight gain  + Fatigue   - Constipation   - Hair loss  - Brittle nails  - Mental fog   - cp, palpations or sob  - Heat intolerance  - Cold intolerance    Last BMD dated: Upcoming July 2019.     With regards to diabetes:  Followed with Dr. Davidson and Joleen.   Diagnosed around 50y.o.    Current Regimen:  Novolog 15units TIDWM - decreased a month ago from 26units   Lantus 26units at night     Does not miss any doses.     Other med's tried:  Metformin - GI side effects     Glucose Monitor: Freestyle Yoav  4-6 times a day testing  Log reviewed: yes logs provided and scanned into chart, reviewed.     Eats 3 meals a day.  Snacks: after dinner  Drinks: water, tea, sugar free crystal light, diet soda     Hypoglycemia:  None   Knows how to correct with 15 grams of carbs- juice, coke, or a peppermint.      Diabetes Management Status  Statin: Taking  ACE/ARB: Not taking  Screening or Prevention Patient's value Goal Complete/Controlled?   HgA1C Testing and Control   No results found for: HGBA1C   Annually/Less than 8% No   Lipid profile : 03/15/2019 Annually Yes   LDL control Lab Results   Component Value Date    LDLCALC 62 03/15/2019    LDLCALC 62 03/15/2019    Annually/Less than 100 mg/dl  Yes   Nephropathy screening Lab Results   Component Value Date    LABMICR 17.0 05/01/2019     No results found for: PROTEINUA Annually Yes   Blood pressure BP Readings from Last 1 Encounters:   06/13/19 138/80    Less than  140/90 Yes   Dilated retinal exam Most Recent Eye Exam Date: Not Found Annually No   Foot exam   Most Recent Foot Exam Date: Not Found Annually No     Review of Systems   Constitutional: Positive for fatigue and unexpected weight change (weight gain).   Eyes: Negative for visual disturbance.   Respiratory: Negative for cough and shortness of breath.    Cardiovascular: Negative for chest pain.   Gastrointestinal: Negative for abdominal pain.   Endocrine: Negative for cold intolerance, heat intolerance, polydipsia, polyphagia and polyuria.   Musculoskeletal: Negative for arthralgias.   Skin: Negative for wound.   Neurological: Negative for headaches.   Hematological: Does not bruise/bleed easily.   Psychiatric/Behavioral: Negative for sleep disturbance.     Objective:   Physical Exam   Constitutional: She appears well-developed.   HENT:   Right Ear: External ear normal.   Left Ear: External ear normal.   Nose: Nose normal.   Hearing Normal     Neck: No tracheal deviation present. No thyromegaly present.   Cardiovascular: Normal rate.   No murmur heard.  No edema present   Pulmonary/Chest: Effort normal and breath sounds normal.   Abdominal: Soft. She exhibits no mass. No hernia.   Neurological: She is alert. No cranial nerve deficit or sensory deficit.   Skin: No rash noted.   No nodules.   Psychiatric: She has a normal mood and affect. Judgment normal.   Vitals reviewed.    -- Feet have no wounds or ulcers.  -- Shoes are appropriate  -- Sensation is intact to vibration and monofilament in the left foot  -- Decreased sensation in right foot.  Injection sites are okay. No lipo hypertropthy or atrophy.    Body mass index is 35.19 kg/m².    Lab Review:   No results found for: HGBA1C  Lab Results   Component Value Date    HDL 45 03/15/2019    HDL 45 03/15/2019    LDLCALC 62 03/15/2019    LDLCALC 62 03/15/2019    TRIG 111 03/15/2019    TRIG 111 03/15/2019     No results found for: NA, K, CL, CO2, GLU, BUN, CREATININE,  CALCIUM, PROT, ALBUMIN, BILITOT, ALKPHOS, AST, ALT, ANIONGAP, ESTGFRAFRICA, EGFRNONAA, TSH    Assessment and Plan     1. Hypothyroidism, unspecified type  TSH   2. Type 2 diabetes mellitus with hyperglycemia, with long-term current use of insulin  Comprehensive metabolic panel    Hemoglobin A1c    CBC auto differential    Ambulatory referral to Podiatry    Ambulatory Referral to Diabetes Education   3. Severe obesity (BMI 35.0-35.9 with comorbidity)     4. Hyperlipidemia associated with type 2 diabetes mellitus       Hypothyroidism  -- Labs prior to next appt  -- Medication Changes:   Continue LT4 112 mcg daily  -- Clinically and biochemically euthyroid  -- Goal is a normal TSH  -- Avoid exogenous hyperthyroidism as this can accelerate bone loss and increase risk of CV complications.  -- Advised to take LT4 on an empty stomach with water and to wait 30-45 minutes before eating or taking other medications   -- Reviewed usual times of thyroid hormone changes  -- Reviewed that symptoms of hypothyroidism may not correlate with tsh, and a normal TSH is the goal of therapy. Symptoms are not a justification for over treatment.    Type 2 diabetes mellitus with hyperglycemia, with long-term current use of insulin  -- Labs prior to next appt  -- Medication Changes:   Continue novolog 15units TIDWM  Increase lantus 30units nightly  Start Ozempic 0.25 mg weekly for 4 weeks & then 0.5 mg weekly thereafter. Discussed MOA & SE.   -- Diabetes education referral  -- Podiatry referral  -- Discussed starting Vgo once blood sugars under control.    -- Continue with Freestyle Yoav  -- Reviewed goals of therapy are to get the best control we can without hypoglycemia  -- Reviewed patient's current insulin regimen. Clarified proper insulin dose and timing in relation to meals, etc. Insulin injection sites and proper rotation instructed.    -- Advised frequent self blood glucose monitoring.  Patient encouraged to document glucose results  and bring them to every clinic visit.  -- Hypoglycemia precautions discussed. Instructed on precautions before driving.    -- Call for Bg repeatedly < 90 or > 180.   -- Close adherence to lifestyle changes recommended.   -- Periodic follow ups for eye evaluations, foot care and dental care suggested.     Severe obesity (BMI 35.0-35.9 with comorbidity)  -- Encouraged dietary and lifestyle modifications.  -- Emphasized weight loss goals.    Hyperlipidemia associated with type 2 diabetes mellitus  -- Controlled.  -- On statin per ADA recommendations.    Follow up in about 3 months (around 9/13/2019).     I have reviewed and concur with the NP's history, physical, assessment, and plan.  I have personally interviewed the patient and all questions were answered.     Will increase Lantus to 30 units qhs, continue Novolog 15 units AC and add Ozempic. Once glucoses are controlled she is interested in starting Vgo in place of MDI.     Rajan Bustamante M.D. Staff Endocrinology

## 2019-06-13 ENCOUNTER — OFFICE VISIT (OUTPATIENT)
Dept: ENDOCRINOLOGY | Facility: CLINIC | Age: 79
End: 2019-06-13
Payer: MEDICARE

## 2019-06-13 ENCOUNTER — TELEPHONE (OUTPATIENT)
Dept: ENDOCRINOLOGY | Facility: CLINIC | Age: 79
End: 2019-06-13

## 2019-06-13 VITALS
DIASTOLIC BLOOD PRESSURE: 80 MMHG | BODY MASS INDEX: 35 KG/M2 | SYSTOLIC BLOOD PRESSURE: 138 MMHG | HEART RATE: 84 BPM | WEIGHT: 205 LBS | HEIGHT: 64 IN

## 2019-06-13 DIAGNOSIS — E78.5 HYPERLIPIDEMIA ASSOCIATED WITH TYPE 2 DIABETES MELLITUS: ICD-10-CM

## 2019-06-13 DIAGNOSIS — E66.01 SEVERE OBESITY (BMI 35.0-35.9 WITH COMORBIDITY): ICD-10-CM

## 2019-06-13 DIAGNOSIS — E11.69 HYPERLIPIDEMIA ASSOCIATED WITH TYPE 2 DIABETES MELLITUS: ICD-10-CM

## 2019-06-13 DIAGNOSIS — Z79.4 TYPE 2 DIABETES MELLITUS WITH HYPERGLYCEMIA, WITH LONG-TERM CURRENT USE OF INSULIN: ICD-10-CM

## 2019-06-13 DIAGNOSIS — E11.65 TYPE 2 DIABETES MELLITUS WITH HYPERGLYCEMIA, WITH LONG-TERM CURRENT USE OF INSULIN: ICD-10-CM

## 2019-06-13 DIAGNOSIS — E03.9 HYPOTHYROIDISM, UNSPECIFIED TYPE: Primary | ICD-10-CM

## 2019-06-13 PROCEDURE — 1101F PR PT FALLS ASSESS DOC 0-1 FALLS W/OUT INJ PAST YR: ICD-10-PCS | Mod: HCNC,CPTII,S$GLB, | Performed by: INTERNAL MEDICINE

## 2019-06-13 PROCEDURE — 99999 PR PBB SHADOW E&M-EST. PATIENT-LVL IV: ICD-10-PCS | Mod: PBBFAC,HCNC,, | Performed by: INTERNAL MEDICINE

## 2019-06-13 PROCEDURE — 1101F PT FALLS ASSESS-DOCD LE1/YR: CPT | Mod: HCNC,CPTII,S$GLB, | Performed by: INTERNAL MEDICINE

## 2019-06-13 PROCEDURE — 99204 PR OFFICE/OUTPT VISIT, NEW, LEVL IV, 45-59 MIN: ICD-10-PCS | Mod: HCNC,S$GLB,, | Performed by: INTERNAL MEDICINE

## 2019-06-13 PROCEDURE — 99999 PR PBB SHADOW E&M-EST. PATIENT-LVL IV: CPT | Mod: PBBFAC,HCNC,, | Performed by: INTERNAL MEDICINE

## 2019-06-13 PROCEDURE — 99204 OFFICE O/P NEW MOD 45 MIN: CPT | Mod: HCNC,S$GLB,, | Performed by: INTERNAL MEDICINE

## 2019-06-13 NOTE — TELEPHONE ENCOUNTER
----- Message from Matt Pulido sent at 6/13/2019  1:25 PM CDT -----  Contact: pt   Pt would like a call back regarding medication directions pt needs clarity on novalog       Pt daughter  can be reached at [phone]

## 2019-06-13 NOTE — ASSESSMENT & PLAN NOTE
-- Labs prior to next appt  -- Medication Changes:   Continue novolog 15units TIDWM  Increase lantus 30units nightly  Start Ozempic 0.25 mg weekly for 4 weeks & then 0.5 mg weekly thereafter. Discussed MOA & SE.   -- Diabetes education referral  -- Podiatry referral  -- Discussed starting Vgo once blood sugars under control.    -- Continue with Freestyle Yoav  -- Reviewed goals of therapy are to get the best control we can without hypoglycemia  -- Reviewed patient's current insulin regimen. Clarified proper insulin dose and timing in relation to meals, etc. Insulin injection sites and proper rotation instructed.    -- Advised frequent self blood glucose monitoring.  Patient encouraged to document glucose results and bring them to every clinic visit.  -- Hypoglycemia precautions discussed. Instructed on precautions before driving.    -- Call for Bg repeatedly < 90 or > 180.   -- Close adherence to lifestyle changes recommended.   -- Periodic follow ups for eye evaluations, foot care and dental care suggested.

## 2019-06-13 NOTE — TELEPHONE ENCOUNTER
Spoke to the patient and her daughter to clarify medication regimen.  Both verbalized understanding and all questions were answered.

## 2019-06-13 NOTE — PATIENT INSTRUCTIONS
Ozempic 0.25 mg weekly for 4 weeks & then 0.5 mg weekly thereafter. Discussed MOA & SE.     INCREASE lantus to 30units nightly  CONTINUE novolog 15units with meals

## 2019-06-13 NOTE — ASSESSMENT & PLAN NOTE
-- Labs prior to next appt  -- Medication Changes:   Continue LT4 112 mcg daily  -- Clinically and biochemically euthyroid  -- Goal is a normal TSH  -- Avoid exogenous hyperthyroidism as this can accelerate bone loss and increase risk of CV complications.  -- Advised to take LT4 on an empty stomach with water and to wait 30-45 minutes before eating or taking other medications   -- Reviewed usual times of thyroid hormone changes  -- Reviewed that symptoms of hypothyroidism may not correlate with tsh, and a normal TSH is the goal of therapy. Symptoms are not a justification for over treatment.

## 2019-06-14 ENCOUNTER — TELEPHONE (OUTPATIENT)
Dept: ENDOCRINOLOGY | Facility: CLINIC | Age: 79
End: 2019-06-14

## 2019-06-14 NOTE — TELEPHONE ENCOUNTER
----- Message from Aneta Quintanilla sent at 6/14/2019  1:16 PM CDT -----  Contact: Raquel / daughter / tel: [phone]  Needs Advice    Reason for call:  Caller says she wants you to go ahead and see if pt. Can get the AFREEBA inhaler to see if this will be approved thru the insurance.   If it is too expensive then, they will try the other medication.    Pls send to [facility].          Communication Preference:  Phone     Additional Information: wants her options instead of using the other medication.   Pls call to discuss this.

## 2019-06-14 NOTE — TELEPHONE ENCOUNTER
Spoke to the patients daughter as she was inquiring about Afrezza.  Discussed with her that at this time, we believe making insulin adjustments and moving forward with a Vgo in the near future is most appropriate at this time.    Afrezza is an option, but we would need to have pulmonary function tests performed first. Patient and her daughter would like to move forward with our plan of medication adjustments made during her last visit and transition to a Vgo once sugars are stable.  All of the patients questions were answered.

## 2019-06-17 ENCOUNTER — PATIENT MESSAGE (OUTPATIENT)
Dept: ENDOCRINOLOGY | Facility: CLINIC | Age: 79
End: 2019-06-17

## 2019-06-25 DIAGNOSIS — E03.9 HYPOTHYROIDISM, UNSPECIFIED TYPE: Primary | ICD-10-CM

## 2019-06-25 RX ORDER — LEVOTHYROXINE SODIUM 112 UG/1
112 TABLET ORAL
Qty: 30 TABLET | Refills: 11 | Status: SHIPPED | OUTPATIENT
Start: 2019-06-25 | End: 2020-07-01 | Stop reason: SDUPTHER

## 2019-06-25 RX ORDER — LEVOTHYROXINE SODIUM 112 UG/1
112 TABLET ORAL
Qty: 30 TABLET | Refills: 0 | Status: SHIPPED | OUTPATIENT
Start: 2019-06-25 | End: 2019-06-25

## 2019-06-25 NOTE — TELEPHONE ENCOUNTER
----- Message from Yani Martins sent at 6/25/2019 11:09 AM CDT -----  Contact: Daughter-Upton  Type: RX Refill Request    Who Called: Fidencio  Refill or New Rx: refill    RX Name and Strength: levothyroxine (SYNTHROID) 112 MCG tablet    Preferred Pharmacy with phone number: [facility] - [address] [phone] (Phone)  [phone] (Fax)        Local or Mail Order: local    Ordering Provider: Dr. Gupta    Would the patient rather a call back or a response via [facility]? Call    Best Call Back Number: [phone]

## 2019-06-25 NOTE — TELEPHONE ENCOUNTER
----- Message from Myla Chow sent at 6/25/2019 10:54 AM CDT -----  Contact:        Alexsandra       Needs Advice  /  Patient  Daughter     Reason for call:        Communication Preference:  Phone  [phone]    Additional Information:     Pt  Needs an refill  On levothyroxine (SYNTHROID) 112 MCG tablet  Thyroid medication  ..

## 2019-07-08 ENCOUNTER — HOSPITAL ENCOUNTER (OUTPATIENT)
Dept: RADIOLOGY | Facility: CLINIC | Age: 79
Discharge: HOME | End: 2019-07-08
Attending: INTERNAL MEDICINE
Payer: MEDICARE

## 2019-07-08 DIAGNOSIS — M89.9 BONE DISORDER: ICD-10-CM

## 2019-07-08 PROCEDURE — 77080 DEXA BONE DENSITY SPINE HIP: ICD-10-PCS | Mod: 26,HCNC,, | Performed by: INTERNAL MEDICINE

## 2019-07-08 PROCEDURE — 77080 DXA BONE DENSITY AXIAL: CPT | Mod: 26,HCNC,, | Performed by: INTERNAL MEDICINE

## 2019-07-08 PROCEDURE — 77080 DXA BONE DENSITY AXIAL: CPT | Mod: TC,HCNC,PO

## 2019-07-08 RX ORDER — PEN NEEDLE, DIABETIC 31 GX5/16"
1 NEEDLE, DISPOSABLE MISCELLANEOUS
Qty: 100 EACH | Refills: 11 | Status: SHIPPED | OUTPATIENT
Start: 2019-07-08 | End: 2019-07-08 | Stop reason: SDUPTHER

## 2019-07-08 RX ORDER — PEN NEEDLE, DIABETIC 31 GX5/16"
1 NEEDLE, DISPOSABLE MISCELLANEOUS
Qty: 100 EACH | Refills: 11 | Status: SHIPPED | OUTPATIENT
Start: 2019-07-08 | End: 2019-07-09 | Stop reason: SDUPTHER

## 2019-07-09 ENCOUNTER — PATIENT MESSAGE (OUTPATIENT)
Dept: ENDOCRINOLOGY | Facility: CLINIC | Age: 79
End: 2019-07-09

## 2019-07-09 DIAGNOSIS — E11.65 TYPE 2 DIABETES MELLITUS WITH HYPERGLYCEMIA, WITH LONG-TERM CURRENT USE OF INSULIN: Primary | ICD-10-CM

## 2019-07-09 DIAGNOSIS — M81.0 AGE-RELATED OSTEOPOROSIS WITHOUT CURRENT PATHOLOGICAL FRACTURE: Primary | ICD-10-CM

## 2019-07-09 DIAGNOSIS — Z79.4 TYPE 2 DIABETES MELLITUS WITH HYPERGLYCEMIA, WITH LONG-TERM CURRENT USE OF INSULIN: Primary | ICD-10-CM

## 2019-07-09 RX ORDER — PEN NEEDLE, DIABETIC 31 GX5/16"
1 NEEDLE, DISPOSABLE MISCELLANEOUS
Qty: 100 EACH | Refills: 11 | Status: SHIPPED | OUTPATIENT
Start: 2019-07-09 | End: 2020-07-29 | Stop reason: SDUPTHER

## 2019-07-09 RX ORDER — ALENDRONATE SODIUM 70 MG/1
70 TABLET ORAL
Qty: 4 TABLET | Refills: 11 | Status: SHIPPED | OUTPATIENT
Start: 2019-07-09 | End: 2020-06-01

## 2019-07-17 ENCOUNTER — CLINICAL SUPPORT (OUTPATIENT)
Dept: DIABETES | Facility: CLINIC | Age: 79
End: 2019-07-17
Payer: MEDICARE

## 2019-07-17 DIAGNOSIS — E11.65 TYPE 2 DIABETES MELLITUS WITH HYPERGLYCEMIA, WITH LONG-TERM CURRENT USE OF INSULIN: ICD-10-CM

## 2019-07-17 DIAGNOSIS — Z79.4 TYPE 2 DIABETES MELLITUS WITH HYPERGLYCEMIA, WITH LONG-TERM CURRENT USE OF INSULIN: ICD-10-CM

## 2019-07-17 PROCEDURE — 99999 PR PBB SHADOW E&M-EST. PATIENT-LVL I: CPT | Mod: PBBFAC,HCNC,,

## 2019-07-17 PROCEDURE — G0108 DIAB MANAGE TRN  PER INDIV: HCPCS | Mod: HCNC,S$GLB,, | Performed by: INTERNAL MEDICINE

## 2019-07-17 PROCEDURE — G0108 PR DIAB MANAGE TRN  PER INDIV: ICD-10-PCS | Mod: HCNC,S$GLB,, | Performed by: INTERNAL MEDICINE

## 2019-07-17 PROCEDURE — 99999 PR PBB SHADOW E&M-EST. PATIENT-LVL I: ICD-10-PCS | Mod: PBBFAC,HCNC,,

## 2019-07-17 NOTE — PROGRESS NOTES
"Diabetes Education  Author: Liliam Chow RN, CDE  Date: 7/17/2019    Diabetes Care Management Summary  Diabetes Education Record Assessment/Progress: Post Program/Follow-up  Current Diabetes Risk Level: Moderate     Last A1c: No results found for: HGBA1C  Last visit with Diabetes Educator: : 07/17/2019      Diabetes Type  Diabetes Type : Type II    Diabetes History  Diabetes Diagnosis: >10 years  Current Treatment: Insulin, Injectable    Health Maintenance was reviewed today with patient. Discussed with patient importance of routine eye exams, foot exams/foot care, blood work (i.e.: A1c, microalbumin, and lipid), dental visits, yearly flu vaccine, and pneumonia vaccine as indicated by PCP. Patient verbalized understanding.     Health Maintenance Topics with due status: Not Due       Topic Last Completion Date    Lipid Panel 03/15/2019    Hemoglobin A1c 03/15/2019    Urine Microalbumin 05/01/2019    DEXA SCAN 07/08/2019    Influenza Vaccine Not Due     Health Maintenance Due   Topic Date Due    Foot Exam  07/23/1952    Eye Exam  07/23/1952    TETANUS VACCINE  07/23/1960     Current Diabetes Treatment   Current Treatment: Insulin, Injectable    Social History  Primary Support: Self, Daughter, Spouse    DDS-2 Score  ( > 3 = SIGNIFICANT DISTRESS): 2.5     Barriers to Change  Barriers to Change: Cognitive, Functional limitation(Visually impaired; neuropathy in hands; short term memory issues)  Learning Challenges : Vision  Vision - further explanation: (has difficulty with print)    Readiness to Learn   Readiness to Learn : Non Acceptance    Cultural Influences  Cultural Influences: None    Diabetes Education Assessment/Progress    Patient and her daughter are being seen today for V-go evaluation.  Explained and demonstrated how the V-go work.  Explained that it gives a small amount of insulin as basal and the user presses the button to give "clicks" for meal boluses.  Patient had a difficult time understanding how " the device works. She was given time to practice wearing a demo with saline.  Demonstrated the filling of the V-go and patient stated she does not know if she could do this.  Daughter stated she has a sibling who might be able to assist. Due to decreased sensation of her fingers, she had a difficult time finding and pressing the grey buttons. The V-go was placed with the buttons upside and she still had issues.  Explained that when she consumes food, insulin will nee to be given. Patient expressed reluctance to this device.  Patient was very argumentative with daughter who is insisting that patient can use the V-go. The patient does not have support from spouse and would be on her own since her grown children do not live with her.      Medications : Discussion, Needs Instruction, Written Materials Provided  Currently on glargine 30 units at night, Novolog 15 units with each meal.  Patient did not understand how her insulin works.     Discussed timing and mechanism of action of long vs rapid acting insulin. Reviewed need for rotation of injection sites, appropriate insulin storage, safe disposal of used sharps and insulin pen preparation and use. Discussed possible side effects and how to avoid these.  Emphasized need to take glargine at same time daily and need to take Novolog >4 hours apart. Emphasized need to take Novolog injections 5-10 min prior to eating meals.    Monitoring (monitoring blood glucose/other parameters & using results): Discussion, Comprehends Key Points, Written Materials Provided  Patient is using the Freestyle Yoav. Average glucose 169.  Her readings range from 130's to 290's. A family member applies the Yoav to her upper arm.  See  for full download.     Plan: Will discuss circumstances with Endo.  She may not be a good candidate due to the lack of sensation in her fingers which will make it difficult to push the buttons for bolusing, lack of home support, and confusion.    The  patient was encouraged to communicate with her physician and care team regarding her condition(s) and treatment.  I provided the patient with my contact information today and encouraged her to contact me via phone or patient portal as needed.     Education Units of Time   Time Spent: 75 min

## 2019-07-18 RX ORDER — INSULIN ASPART 100 [IU]/ML
15 INJECTION, SOLUTION INTRAVENOUS; SUBCUTANEOUS
Status: CANCELLED | OUTPATIENT
Start: 2019-07-18

## 2019-07-18 RX ORDER — INSULIN GLARGINE 100 [IU]/ML
30 INJECTION, SOLUTION SUBCUTANEOUS NIGHTLY
Qty: 15 ML | Refills: 11 | Status: SHIPPED | OUTPATIENT
Start: 2019-07-18 | End: 2019-07-23

## 2019-07-18 RX ORDER — INSULIN ASPART 100 [IU]/ML
15 INJECTION, SOLUTION INTRAVENOUS; SUBCUTANEOUS
Qty: 15 ML | Refills: 11 | Status: SHIPPED | OUTPATIENT
Start: 2019-07-18 | End: 2019-07-23

## 2019-07-18 NOTE — TELEPHONE ENCOUNTER
----- Message from Liliam Chow RN, CDE sent at 2019  7:43 AM CDT -----  Regarding: New RX  Pt new to Dr. Bustamante.  Needs new RX for Lantus solostar and Novolog flex pens sent to pharmacy on file.  RX are getting ready to .  Thanks, Liliam

## 2019-07-23 ENCOUNTER — PATIENT MESSAGE (OUTPATIENT)
Dept: ENDOCRINOLOGY | Facility: CLINIC | Age: 79
End: 2019-07-23

## 2019-07-23 RX ORDER — INSULIN ASPART 100 [IU]/ML
INJECTION, SOLUTION INTRAVENOUS; SUBCUTANEOUS
Qty: 15 ML | Refills: 11 | Status: SHIPPED | OUTPATIENT
Start: 2019-07-23 | End: 2019-09-18

## 2019-07-23 RX ORDER — INSULIN GLARGINE 100 [IU]/ML
32 INJECTION, SOLUTION SUBCUTANEOUS NIGHTLY
Qty: 15 ML | Refills: 11 | Status: SHIPPED | OUTPATIENT
Start: 2019-07-23 | End: 2020-01-07 | Stop reason: CLARIF

## 2019-09-04 ENCOUNTER — TELEPHONE (OUTPATIENT)
Dept: FAMILY MEDICINE | Facility: CLINIC | Age: 79
End: 2019-09-04

## 2019-09-04 NOTE — TELEPHONE ENCOUNTER
----- Message from Rachel Aguirre sent at 9/4/2019  9:03 AM CDT -----  Contact: Self   Type: Patient Call Back    What is the request in detail: Pt calling to speak to Doctor regarding her kidney.     Can the clinic reply by [facility]? No    Would the patient rather a call back or a response via [facility]? Call back     Best call back number: [phone] or [phone]

## 2019-09-05 ENCOUNTER — OFFICE VISIT (OUTPATIENT)
Dept: FAMILY MEDICINE | Facility: CLINIC | Age: 79
End: 2019-09-05
Payer: MEDICARE

## 2019-09-05 VITALS
WEIGHT: 199.5 LBS | BODY MASS INDEX: 34.06 KG/M2 | DIASTOLIC BLOOD PRESSURE: 60 MMHG | HEART RATE: 69 BPM | OXYGEN SATURATION: 97 % | SYSTOLIC BLOOD PRESSURE: 106 MMHG | TEMPERATURE: 98 F | HEIGHT: 64 IN | RESPIRATION RATE: 16 BRPM

## 2019-09-05 DIAGNOSIS — R21 RASH AND NONSPECIFIC SKIN ERUPTION: Primary | ICD-10-CM

## 2019-09-05 PROCEDURE — 99999 PR PBB SHADOW E&M-EST. PATIENT-LVL V: CPT | Mod: PBBFAC,HCNC,, | Performed by: PHYSICIAN ASSISTANT

## 2019-09-05 PROCEDURE — 1101F PT FALLS ASSESS-DOCD LE1/YR: CPT | Mod: HCNC,CPTII,S$GLB, | Performed by: PHYSICIAN ASSISTANT

## 2019-09-05 PROCEDURE — 1101F PR PT FALLS ASSESS DOC 0-1 FALLS W/OUT INJ PAST YR: ICD-10-PCS | Mod: HCNC,CPTII,S$GLB, | Performed by: PHYSICIAN ASSISTANT

## 2019-09-05 PROCEDURE — 99213 PR OFFICE/OUTPT VISIT, EST, LEVL III, 20-29 MIN: ICD-10-PCS | Mod: HCNC,S$GLB,, | Performed by: PHYSICIAN ASSISTANT

## 2019-09-05 PROCEDURE — 99999 PR PBB SHADOW E&M-EST. PATIENT-LVL V: ICD-10-PCS | Mod: PBBFAC,HCNC,, | Performed by: PHYSICIAN ASSISTANT

## 2019-09-05 PROCEDURE — 99213 OFFICE O/P EST LOW 20 MIN: CPT | Mod: HCNC,S$GLB,, | Performed by: PHYSICIAN ASSISTANT

## 2019-09-05 RX ORDER — TRIAMCINOLONE ACETONIDE 1 MG/G
CREAM TOPICAL
Refills: 3 | COMMUNITY
Start: 2019-08-16

## 2019-09-05 RX ORDER — FLASH GLUCOSE SENSOR
KIT MISCELLANEOUS
Refills: 3 | COMMUNITY
Start: 2019-08-16 | End: 2020-04-01 | Stop reason: SDUPTHER

## 2019-09-05 RX ORDER — OFLOXACIN 3 MG/ML
SOLUTION/ DROPS OPHTHALMIC
Refills: 4 | COMMUNITY
Start: 2019-07-29

## 2019-09-05 NOTE — PROGRESS NOTES
Subjective:       Patient ID: Chelsea Gould is a 79 y.o. female with multiple medical diagnoses as listed in the medical history and problem list that presents for Results  .    Chief Complaint: Results      HPI   Pt has been dealing with an intermittent rash for months. She was seen by Derm. Had CMP, CBC done. Took benadryl here and there for the rash. No rash currently.   She was told to see her PCP for a stool test. She was not given the direct name of the test.   She presents with her son today. She usually goes with her daughter who knows all of her medication history.     Review of Systems   Constitutional: Negative for chills, fatigue and fever.   HENT: Negative for congestion, mouth sores and nosebleeds.    Gastrointestinal: Negative for abdominal pain, blood in stool, constipation, diarrhea, nausea and vomiting.   Skin: Positive for rash.   Neurological: Negative for headaches.         PAST MEDICAL HISTORY:  Past Medical History:   Diagnosis Date    Concern about heart attack without diagnosis     silent heart attack    Diabetes mellitus     Eye problems        SOCIAL HISTORY:  Social History     Socioeconomic History    Marital status:      Spouse name: Not on file    Number of children: Not on file    Years of education: Not on file    Highest education level: Not on file   Occupational History    Not on file   Social Needs    Financial resource strain: Somewhat hard    Food insecurity:     Worry: Never true     Inability: Never true    Transportation needs:     Medical: Yes     Non-medical: Yes   Tobacco Use    Smoking status: Never Smoker    Smokeless tobacco: Never Used   Substance and Sexual Activity    Alcohol use: No     Frequency: Never     Drinks per session: Patient refused     Binge frequency: Never    Drug use: No    Sexual activity: Yes     Partners: Male   Lifestyle    Physical activity:     Days per week: 1 day     Minutes per session: 20 min    Stress: To some  "extent   Relationships    Social connections:     Talks on phone: More than three times a week     Gets together: Three times a week     Attends Yazidi service: Not on file     Active member of club or organization: No     Attends meetings of clubs or organizations: Patient refused     Relationship status:    Other Topics Concern    Not on file   Social History Narrative    Not on file       ALLERGIES AND MEDICATIONS: updated and reviewed.  Review of patient's allergies indicates:  No Known Allergies  Current Outpatient Medications   Medication Sig Dispense Refill    alendronate (FOSAMAX) 70 MG tablet Take 1 tablet (70 mg total) by mouth every 7 days. 4 tablet 11    aspirin (ECOTRIN) 81 MG EC tablet Take 81 mg by mouth.      BD ULTRA-FINE MINI PEN NEEDLE 31 gauge x 3/16" Ndle Inject 1 each into the skin 4 (four) times daily with meals and nightly. 100 each 11    brimonidine-timolol (COMBIGAN) 0.2-0.5 % Drop       bromfenac (PROLENSA) 0.07 % Drop Apply 1 drop to eye.      COMBIGAN 0.2-0.5 % Drop       cycloSPORINE (RESTASIS) 0.05 % ophthalmic emulsion Apply 1 drop to eye.      diazePAM (VALIUM) 5 MG tablet       DULoxetine (CYMBALTA) 60 MG capsule Take 60 mg by mouth 2 (two) times daily.      FREESTYLE OSMAN 14 DAY SENSOR Kit U UTD WITH READER FOR GLUCOSE  3    hydrOXYzine pamoate (VISTARIL) 25 MG Cap Take 25 mg by mouth.      insulin aspart U-100 (NOVOLOG FLEXPEN U-100 INSULIN) 100 unit/mL (3 mL) InPn pen Take 17 units with breakfast, and 15 units with lunch and dinner, Plus correction scale 15 mL 11    insulin glargine 100 units/mL (3mL) SubQ pen Inject 32 Units into the skin every evening. 15 mL 11    levothyroxine (SYNTHROID) 112 MCG tablet Take 1 tablet (112 mcg total) by mouth before breakfast. 30 tablet 11    loteprednol (LOTEMAX) 0.5 % ophthalmic suspension       neomycin-polymyxin-dexamethasone (DEXACINE) 3.5 mg/g-10,000 unit/g-0.1 % Oint       ofloxacin (OCUFLOX) 0.3 % " "ophthalmic solution INSTILL 1 DROP INTO AFFECTED EYE 4 TIMES A DAY  4    oxyCODONE-acetaminophen (PERCOCET) 7.5-325 mg per tablet TAKE 1/2 TABLET BY MOUTH EVERY 8 TO 12 HOURS AS NEEDED  0    PRODIGY AUTOCODE METER kit       PRODIGY NO CODING Strp       PROLENSA 0.07 % Drop Place 1 drop into both eyes 2 (two) times daily.  5    RESTASIS 0.05 % ophthalmic emulsion Place 1 drop into both eyes 2 (two) times daily.  3    rosuvastatin (CRESTOR) 10 MG tablet Take 10 mg by mouth.      triamcinolone acetonide 0.1% (KENALOG) 0.1 % cream APPLY TWICE DAILY TO ITCHY SKIN UP TO 2 WEEKS/MONTH AS NEEDED  3    VITAMIN D2 50,000 unit capsule Take 50,000 Units by mouth every 7 days.  0    oxyCODONE (ROXICODONE) 10 mg Tab immediate release tablet Take 10 mg by mouth every 8 (eight) hours as needed.  0     No current facility-administered medications for this visit.          Objective:   /60   Pulse 69   Temp 98.3 °F (36.8 °C) (Oral)   Resp 16   Ht 5' 4" (1.626 m)   Wt 90.5 kg (199 lb 8.3 oz)   SpO2 97%   BMI 34.25 kg/m²      Physical Exam   Psychiatric: Her mood appears anxious. Her speech is rapid and/or pressured. She is aggressive and hyperactive.           Assessment:       1. Rash and nonspecific skin eruption        Plan:       Rash and nonspecific skin eruption  -     H. pylori antigen, stool; Future; Expected date: 09/05/2019    -will contact with results               No follow-ups on file.  "

## 2019-09-05 NOTE — TELEPHONE ENCOUNTER
Spoke with patient/ states that she has some results that needs to be reviewed by provider for further tests needed and back pain/ Patient wanted to see Dr. Gupta but did not want to wait until Tuesday/ I scheduled her to see Destinee RANGEL today for 12:15pm

## 2019-09-06 ENCOUNTER — TELEPHONE (OUTPATIENT)
Dept: FAMILY MEDICINE | Facility: CLINIC | Age: 79
End: 2019-09-06

## 2019-09-06 ENCOUNTER — LAB VISIT (OUTPATIENT)
Dept: LAB | Facility: HOSPITAL | Age: 79
End: 2019-09-06
Attending: PHYSICIAN ASSISTANT
Payer: MEDICARE

## 2019-09-06 DIAGNOSIS — R21 RASH AND NONSPECIFIC SKIN ERUPTION: ICD-10-CM

## 2019-09-06 PROCEDURE — 87338 HPYLORI STOOL AG IA: CPT | Mod: HCNC

## 2019-09-06 PROCEDURE — 36415 COLL VENOUS BLD VENIPUNCTURE: CPT | Mod: HCNC

## 2019-09-06 NOTE — TELEPHONE ENCOUNTER
----- Message from Yani Martins sent at 9/6/2019  4:55 PM CDT -----  Contact: Self  Type: Patient Call Back    Who called:self    What is the request in detail: Patient trying to return her fitkit and would like to know what to do    Can the clinic reply by [facility]? no  Would the patient rather a call back or a response via [facility]? call    Best call back number: [phone]

## 2019-09-06 NOTE — TELEPHONE ENCOUNTER
Return call to Pt, and she was asking about her Stool Specimens. Informed her that she can bring it to [facility] on [facility] to the lab department. She acknowledged understanding.

## 2019-09-11 ENCOUNTER — TELEPHONE (OUTPATIENT)
Dept: FAMILY MEDICINE | Facility: CLINIC | Age: 79
End: 2019-09-11

## 2019-09-11 NOTE — TELEPHONE ENCOUNTER
Return call to Pt, and informed that her lab was still pending. Informed her to try back at the end of the week, and the results should be in. She acknowledged understanding.

## 2019-09-11 NOTE — TELEPHONE ENCOUNTER
----- Message from Cheri Dawson sent at 9/11/2019  1:55 PM CDT -----  Contact: Self  Type:  Test Results    Who Called: Self    Name of Test (Lab/Mammo/Etc): Specimen    Date of Test: 09/06/2019    Ordering Provider: Dr. SUSIE Flores    Where the test was performed: [facility]    Would the patient rather a call back or a response via [facility]? Callback    Best Call Back Number: [phone] or [phone]

## 2019-09-13 LAB — H PYLORI AG STL QL IA: NOT DETECTED

## 2019-09-16 ENCOUNTER — PATIENT OUTREACH (OUTPATIENT)
Dept: ADMINISTRATIVE | Facility: OTHER | Age: 79
End: 2019-09-16

## 2019-09-16 DIAGNOSIS — E11.65 TYPE 2 DIABETES MELLITUS WITH HYPERGLYCEMIA, WITH LONG-TERM CURRENT USE OF INSULIN: Primary | ICD-10-CM

## 2019-09-16 DIAGNOSIS — Z79.4 TYPE 2 DIABETES MELLITUS WITH HYPERGLYCEMIA, WITH LONG-TERM CURRENT USE OF INSULIN: Primary | ICD-10-CM

## 2019-09-18 ENCOUNTER — OFFICE VISIT (OUTPATIENT)
Dept: ENDOCRINOLOGY | Facility: CLINIC | Age: 79
End: 2019-09-18
Payer: MEDICARE

## 2019-09-18 ENCOUNTER — LAB VISIT (OUTPATIENT)
Dept: LAB | Facility: HOSPITAL | Age: 79
End: 2019-09-18
Attending: NURSE PRACTITIONER
Payer: MEDICARE

## 2019-09-18 VITALS
BODY MASS INDEX: 34.43 KG/M2 | WEIGHT: 200.63 LBS | DIASTOLIC BLOOD PRESSURE: 53 MMHG | SYSTOLIC BLOOD PRESSURE: 109 MMHG | HEART RATE: 67 BPM

## 2019-09-18 DIAGNOSIS — Z79.4 TYPE 2 DIABETES MELLITUS WITH HYPERGLYCEMIA, WITH LONG-TERM CURRENT USE OF INSULIN: ICD-10-CM

## 2019-09-18 DIAGNOSIS — E11.65 TYPE 2 DIABETES MELLITUS WITH HYPERGLYCEMIA, WITH LONG-TERM CURRENT USE OF INSULIN: Primary | ICD-10-CM

## 2019-09-18 DIAGNOSIS — E11.649 HYPOGLYCEMIA ASSOCIATED WITH DIABETES: ICD-10-CM

## 2019-09-18 DIAGNOSIS — Z79.4 TYPE 2 DIABETES MELLITUS WITH HYPERGLYCEMIA, WITH LONG-TERM CURRENT USE OF INSULIN: Primary | ICD-10-CM

## 2019-09-18 DIAGNOSIS — E11.65 TYPE 2 DIABETES MELLITUS WITH HYPERGLYCEMIA, WITH LONG-TERM CURRENT USE OF INSULIN: ICD-10-CM

## 2019-09-18 DIAGNOSIS — E11.69 HYPERLIPIDEMIA ASSOCIATED WITH TYPE 2 DIABETES MELLITUS: ICD-10-CM

## 2019-09-18 DIAGNOSIS — E78.5 HYPERLIPIDEMIA ASSOCIATED WITH TYPE 2 DIABETES MELLITUS: ICD-10-CM

## 2019-09-18 LAB
ANION GAP SERPL CALC-SCNC: 9 MMOL/L (ref 8–16)
BUN SERPL-MCNC: 26 MG/DL (ref 8–23)
CALCIUM SERPL-MCNC: 9.8 MG/DL (ref 8.7–10.5)
CHLORIDE SERPL-SCNC: 106 MMOL/L (ref 95–110)
CO2 SERPL-SCNC: 28 MMOL/L (ref 23–29)
CREAT SERPL-MCNC: 1.3 MG/DL (ref 0.5–1.4)
EST. GFR  (AFRICAN AMERICAN): 46 ML/MIN/1.73 M^2
EST. GFR  (NON AFRICAN AMERICAN): 40 ML/MIN/1.73 M^2
ESTIMATED AVG GLUCOSE: 171 MG/DL (ref 68–131)
GLUCOSE SERPL-MCNC: 106 MG/DL (ref 70–110)
HBA1C MFR BLD HPLC: 7.6 % (ref 4–5.6)
POTASSIUM SERPL-SCNC: 5.4 MMOL/L (ref 3.5–5.1)
SODIUM SERPL-SCNC: 143 MMOL/L (ref 136–145)

## 2019-09-18 PROCEDURE — 83036 HEMOGLOBIN GLYCOSYLATED A1C: CPT | Mod: HCNC

## 2019-09-18 PROCEDURE — 95251 PR GLUCOSE MONITOR, 72 HOUR, PHYS INTERP: ICD-10-PCS | Mod: HCNC,S$GLB,, | Performed by: NURSE PRACTITIONER

## 2019-09-18 PROCEDURE — 99499 UNLISTED E&M SERVICE: CPT | Mod: HCNC,S$GLB,, | Performed by: NURSE PRACTITIONER

## 2019-09-18 PROCEDURE — 99499 RISK ADDL DX/OHS AUDIT: ICD-10-PCS | Mod: HCNC,S$GLB,, | Performed by: NURSE PRACTITIONER

## 2019-09-18 PROCEDURE — 95251 CONT GLUC MNTR ANALYSIS I&R: CPT | Mod: HCNC,S$GLB,, | Performed by: NURSE PRACTITIONER

## 2019-09-18 PROCEDURE — 99215 OFFICE O/P EST HI 40 MIN: CPT | Mod: HCNC,S$GLB,, | Performed by: NURSE PRACTITIONER

## 2019-09-18 PROCEDURE — 1101F PR PT FALLS ASSESS DOC 0-1 FALLS W/OUT INJ PAST YR: ICD-10-PCS | Mod: HCNC,CPTII,S$GLB, | Performed by: NURSE PRACTITIONER

## 2019-09-18 PROCEDURE — 99215 PR OFFICE/OUTPT VISIT, EST, LEVL V, 40-54 MIN: ICD-10-PCS | Mod: HCNC,S$GLB,, | Performed by: NURSE PRACTITIONER

## 2019-09-18 PROCEDURE — 99999 PR PBB SHADOW E&M-EST. PATIENT-LVL V: CPT | Mod: PBBFAC,HCNC,, | Performed by: NURSE PRACTITIONER

## 2019-09-18 PROCEDURE — 99999 PR PBB SHADOW E&M-EST. PATIENT-LVL V: ICD-10-PCS | Mod: PBBFAC,HCNC,, | Performed by: NURSE PRACTITIONER

## 2019-09-18 PROCEDURE — 80048 BASIC METABOLIC PNL TOTAL CA: CPT | Mod: HCNC

## 2019-09-18 PROCEDURE — 36415 COLL VENOUS BLD VENIPUNCTURE: CPT | Mod: HCNC,PN

## 2019-09-18 PROCEDURE — 1101F PT FALLS ASSESS-DOCD LE1/YR: CPT | Mod: HCNC,CPTII,S$GLB, | Performed by: NURSE PRACTITIONER

## 2019-09-18 RX ORDER — INSULIN ASPART 100 [IU]/ML
INJECTION, SOLUTION INTRAVENOUS; SUBCUTANEOUS
Qty: 15 ML | Refills: 11
Start: 2019-09-18 | End: 2020-07-08

## 2019-09-18 NOTE — PROGRESS NOTES
"CC: This 79 y.o. White female  is here for evaluation of  T2DM along with comorbidities indicated in the Visit Diagnosis section of this encounter.    HPI: Chelsea Gould was diagnosed with T2DM at age 44.     Last seen by Dr. Bustamante in 6/2019. New to me.   She was advised by Dr. Bustamante to increase Lantus to 32 units and Novolog from 15 to 17 units before breakfast (so 17-15-15 units ac). She did not increase Lantus to 32 units because she has vaginal irritation when she urinates and believes this is because of Lantus. She stopped taking Ozempic after the 3rd dose of 0.5 mg because she was having generalized pruritis at night. However, she is still having itching. She has seen dermatologist and has been rx'd a cream. Has also been on Trulicity in the past but stopped this as well bc she believed it gave her a yeast infection. She does not want to try either Ozempic nor Trulicity again.     She is a difficult historian. Rambles. Very focused on pruritis and familial issues.     LAST DIABETES EDUCATION: 7/17/19 - Liliam Chow RN, CDE   It was determined that VGo was not a good option for the patient "due to the lack of sensation in her fingers which will make it difficult to push the buttons for bolusing, lack of home support, and confusion."       DIABETES MEDICATIONS: Lantus Solostar 30 units every night, Novolog Flexpen 17-15-15 units ac     Misses medication doses - No    DM COMPLICATIONS: nephropathy    SIGNIFICANT DIABETES MED HISTORY:   Diarrhea on metformin    SELF MONITORING BLOOD GLUCOSE: monitors glucose 4-10x/day. See Media for Freestyle Yoav report.   CGM interpretation: glucoses are best controlled overnight with no overnight lows. Postprandial excursions noted usually in the mid 200s during the day but she has been having BGs 50-70s daily for the last few days after lunch and dinner. Suspect hypoglycemia related to low carb diet but patient unable to recall meals or precipitating events on those days. "     Average glucose 169 SD 57  Above 180 - 37%   - 62%   < 70 - 1%     HYPOGLYCEMIC EPISODES: as above; patient corrects with soda.      CURRENT DIET: eats 3 meals/day.   Bedtime snack pancake with sausage, diet tea. Breakfast was biscuit with a little jelly and sausage. Dinner was chicken, Chilean onion soup, a piece of pineapple.           BP (!) 109/53 (BP Location: Left arm, Patient Position: Sitting, BP Method: X-Large (Automatic))   Pulse 67   Wt 91 kg (200 lb 9.6 oz)   BMI 34.43 kg/m²       ROS:   CONSTITUTIONAL: Appetite good, denies fatigue  SKIN: +  pruritis   : No dysuria       PHYSICAL EXAM:  GENERAL: Well developed, well nourished. No acute distress.   PSYCH: AAOx3, conversant, well-groomed. Judgement and insight good. + anxiety   NEURO: Cranial nerves grossly intact. Speech clear, no tremor.   CHEST: Respirations even and unlabored.     No results found for: HGBA1C        Chemistry    No results found for: NA, K, CL, CO2, BUN, CREATININE, GLU No results found for: CALCIUM, ALKPHOS, AST, ALT, BILITOT, ESTGFRAFRICA, EGFRNONAA       Lab Results   Component Value Date    LDLCALC 62 03/15/2019    LDLCALC 62 03/15/2019        Ref. Range 3/15/2019 00:00   Chol/HDL Ratio Unknown 2.8   Cholesterol, Total Unknown 126   HDL Latest Units: mg/dL 45   LDL Cholesterol External Latest Units: MG/DL 62   Non-HDL Cholesterol Unknown 81   Triglycerides Latest Ref Range: 40 - 160 mg/dL 111     Lab Results   Component Value Date    MICALBCREAT 9.1 05/01/2019           ASSESSMENT and PLAN:    A1C GOAL:       1. Type 2 diabetes mellitus with hyperglycemia, with long-term current use of insulin  Patient is paying about $80 a month for [employer]. Offered to send orders to mail order company to possibly reduce price to $50 but patient declined because she is nervous about mail delivery.     Continue Lantus 30 units every night.   Continue Novolog 17 units before breakfast . Reduce Novolog to 12 units before lunch  and dinner to avoid low blood sugars.   Start Tradjenta 5 mg once daily in the morning to help with high blood sugars.   Monitor blood sugar 4x/day before meals and bedtime with Freestyle hair.     Return to clinic in 6 weeks. Please call if still having low blood sugars less than 80.     Labs today - a1c and bmp           Hemoglobin A1c    Basic metabolic panel   2. Hyperlipidemia associated with type 2 diabetes mellitus  Continue crestor   3. Hypoglycemia associated with diabetes  Reduce insulin as above.        Orders Placed This Encounter   Procedures    Hemoglobin A1c     Standing Status:   Future     Number of Occurrences:   1     Standing Expiration Date:   11/16/2020    Basic metabolic panel     Standing Status:   Future     Number of Occurrences:   1     Standing Expiration Date:   11/16/2020        Follow up in about 6 weeks (around 10/30/2019).

## 2019-09-18 NOTE — PATIENT INSTRUCTIONS
Continue Lantus 30 units every night.   Continue Novolog 17 units before breakfast . Reduce Novolog to 12 units before lunch and dinner to avoid low blood sugars.   Start Tradjenta 5 mg once daily in the morning to help with high blood sugars.   Monitor blood sugar 4x/day before meals and bedtime with Freestyle hair.     Return to clinic in 6 weeks. Please call if still having low blood sugars less than 80.     Labs today - a1c and bmp

## 2019-09-27 ENCOUNTER — OFFICE VISIT (OUTPATIENT)
Dept: OBSTETRICS AND GYNECOLOGY | Facility: CLINIC | Age: 79
End: 2019-09-27
Payer: MEDICARE

## 2019-09-27 VITALS
DIASTOLIC BLOOD PRESSURE: 68 MMHG | SYSTOLIC BLOOD PRESSURE: 142 MMHG | BODY MASS INDEX: 34.62 KG/M2 | HEIGHT: 64 IN | WEIGHT: 202.81 LBS

## 2019-09-27 DIAGNOSIS — N95.0 POSTMENOPAUSAL BLEEDING: Primary | ICD-10-CM

## 2019-09-27 PROCEDURE — 88305 TISSUE SPECIMEN TO PATHOLOGY, OBSTETRICS/GYNECOLOGY: ICD-10-PCS | Mod: 26,HCNC,, | Performed by: PATHOLOGY

## 2019-09-27 PROCEDURE — 1101F PR PT FALLS ASSESS DOC 0-1 FALLS W/OUT INJ PAST YR: ICD-10-PCS | Mod: HCNC,CPTII,S$GLB, | Performed by: OBSTETRICS & GYNECOLOGY

## 2019-09-27 PROCEDURE — 88305 TISSUE EXAM BY PATHOLOGIST: CPT | Mod: HCNC | Performed by: PATHOLOGY

## 2019-09-27 PROCEDURE — 58100 PR BIOPSY OF UTERUS LINING: ICD-10-PCS | Mod: HCNC,S$GLB,, | Performed by: OBSTETRICS & GYNECOLOGY

## 2019-09-27 PROCEDURE — 99999 PR PBB SHADOW E&M-EST. PATIENT-LVL III: ICD-10-PCS | Mod: PBBFAC,HCNC,, | Performed by: OBSTETRICS & GYNECOLOGY

## 2019-09-27 PROCEDURE — 99214 OFFICE O/P EST MOD 30 MIN: CPT | Mod: 25,HCNC,S$GLB, | Performed by: OBSTETRICS & GYNECOLOGY

## 2019-09-27 PROCEDURE — 58100 BIOPSY OF UTERUS LINING: CPT | Mod: HCNC,S$GLB,, | Performed by: OBSTETRICS & GYNECOLOGY

## 2019-09-27 PROCEDURE — 99999 PR PBB SHADOW E&M-EST. PATIENT-LVL III: CPT | Mod: PBBFAC,HCNC,, | Performed by: OBSTETRICS & GYNECOLOGY

## 2019-09-27 PROCEDURE — 99214 PR OFFICE/OUTPT VISIT, EST, LEVL IV, 30-39 MIN: ICD-10-PCS | Mod: 25,HCNC,S$GLB, | Performed by: OBSTETRICS & GYNECOLOGY

## 2019-09-27 PROCEDURE — 1101F PT FALLS ASSESS-DOCD LE1/YR: CPT | Mod: HCNC,CPTII,S$GLB, | Performed by: OBSTETRICS & GYNECOLOGY

## 2019-09-27 RX ORDER — LOTEPREDNOL ETABONATE 5 MG/ML
SUSPENSION/ DROPS OPHTHALMIC
COMMUNITY
Start: 2018-09-14 | End: 2019-10-24 | Stop reason: CLARIF

## 2019-09-27 RX ORDER — INSULIN ASPART 100 [IU]/ML
INJECTION, SOLUTION INTRAVENOUS; SUBCUTANEOUS
COMMUNITY
End: 2019-10-24 | Stop reason: CLARIF

## 2019-09-27 RX ORDER — BRIMONIDINE TARTRATE AND TIMOLOL MALEATE 2; 5 MG/ML; MG/ML
SOLUTION OPHTHALMIC
COMMUNITY
Start: 2018-09-14

## 2019-09-30 NOTE — PROGRESS NOTES
"History & Physical  Gynecology      SUBJECTIVE:     Chief Complaint: Metrorrhagia ( bleeding)       History of Present Illness:  Pt is a 78 y/o who presents with complaints of PMB.  Pt was seen for a previous episode of PMB in 2018 and was assumed to have vaginal atrophy.  With the last event, bleeding was spotting after  attempted to insert his finger into her vagina.  After that episode, pt did not have any bleeding until 2 weeks ago.  Notes that since then, she has had light spotting whenever she wipes.  Notes a small amount of spotting as well.  Nothing heavy like a period.  Pt is concerned that she has cancer.      Review of patient's allergies indicates:  No Known Allergies    Past Medical History:   Diagnosis Date    Concern about heart attack without diagnosis     silent heart attack    Diabetes mellitus     Eye problems      Past Surgical History:   Procedure Laterality Date    EYE SURGERY      TUBAL LIGATION       OB History        5    Para   4    Term   4            AB   1    Living   4       SAB        TAB        Ectopic        Multiple        Live Births                   Family History   Problem Relation Age of Onset    Breast cancer Daughter 44    Colon cancer Neg Hx      Social History     Tobacco Use    Smoking status: Never Smoker    Smokeless tobacco: Never Used   Substance Use Topics    Alcohol use: No     Frequency: Never     Drinks per session: Patient refused     Binge frequency: Never    Drug use: No       Current Outpatient Medications   Medication Sig    alendronate (FOSAMAX) 70 MG tablet Take 1 tablet (70 mg total) by mouth every 7 days.    aspirin (ECOTRIN) 81 MG EC tablet Take 81 mg by mouth.    BD ULTRA-FINE MINI PEN NEEDLE 31 gauge x 3/16" Ndle Inject 1 each into the skin 4 (four) times daily with meals and nightly.    brimonidine-timolol (COMBIGAN) 0.2-0.5 % Drop     brimonidine-timolol (COMBIGAN) 0.2-0.5 % Drop     bromfenac (PROLENSA) 0.07 " % Drop Apply 1 drop to eye.    COMBIGAN 0.2-0.5 % Drop     cycloSPORINE (RESTASIS) 0.05 % ophthalmic emulsion Apply 1 drop to eye.    diazePAM (VALIUM) 5 MG tablet     DULoxetine (CYMBALTA) 60 MG capsule Take 60 mg by mouth 2 (two) times daily.    FREESTYLE OSMAN 14 DAY SENSOR Kit U UTD WITH READER FOR GLUCOSE    hydrOXYzine pamoate (VISTARIL) 25 MG Cap Take 25 mg by mouth.    insulin aspart U-100 (NOVOLOG FLEXPEN U-100 INSULIN) 100 unit/mL (3 mL) InPn pen Take 17 units with breakfast, and 12 units with lunch and dinner, Plus correction scale    insulin aspart U-100 (NOVOLOG) 100 unit/mL injection Inject into the skin.    insulin glargine 100 units/mL (3mL) SubQ pen Inject 32 Units into the skin every evening.    levothyroxine (SYNTHROID) 112 MCG tablet Take 1 tablet (112 mcg total) by mouth before breakfast.    linaGLIPtin (TRADJENTA) 5 mg Tab tablet Take 1 tablet (5 mg total) by mouth once daily.    loteprednol (LOTEMAX) 0.5 % ophthalmic suspension     loteprednol (LOTEMAX) 0.5 % ophthalmic suspension     neomycin-polymyxin-dexamethasone (DEXACINE) 3.5 mg/g-10,000 unit/g-0.1 % Oint     ofloxacin (OCUFLOX) 0.3 % ophthalmic solution INSTILL 1 DROP INTO AFFECTED EYE 4 TIMES A DAY    oxyCODONE (ROXICODONE) 10 mg Tab immediate release tablet Take 10 mg by mouth every 8 (eight) hours as needed.    PRODIGY AUTOCODE METER kit     PRODIGY NO CODING Strp     PROLENSA 0.07 % Drop Place 1 drop into both eyes 2 (two) times daily.    RESTASIS 0.05 % ophthalmic emulsion Place 1 drop into both eyes 2 (two) times daily.    rosuvastatin (CRESTOR) 10 MG tablet Take 10 mg by mouth.    triamcinolone acetonide 0.1% (KENALOG) 0.1 % cream APPLY TWICE DAILY TO ITCHY SKIN UP TO 2 WEEKS/MONTH AS NEEDED    VITAMIN D2 50,000 unit capsule Take 50,000 Units by mouth every 7 days.    oxyCODONE-acetaminophen (PERCOCET) 7.5-325 mg per tablet TAKE 1/2 TABLET BY MOUTH EVERY 8 TO 12 HOURS AS NEEDED     No current  facility-administered medications for this visit.          Review of Systems:  Review of Systems   Constitutional: Negative for activity change, appetite change, chills, fatigue, fever and unexpected weight change.   Respiratory: Negative for cough, shortness of breath and wheezing.    Cardiovascular: Negative for chest pain and leg swelling.   Gastrointestinal: Negative for abdominal pain, constipation, diarrhea, nausea and vomiting.   Endocrine: Negative for hair loss and hot flashes.   Genitourinary: Positive for postmenopausal bleeding. Negative for decreased libido, dyspareunia, dysuria, frequency, menstrual problem, pelvic pain, vaginal bleeding, vaginal discharge and vaginal pain.   Integumentary:  Negative for acne, hair changes, nipple discharge and breast skin changes.   Neurological: Negative for headaches.   Psychiatric/Behavioral: Negative for sleep disturbance.   Breast: Negative for mastodynia, nipple discharge and skin changes       OBJECTIVE:     Physical Exam:  Physical Exam   Constitutional: She is oriented to person, place, and time. She appears well-developed and well-nourished.   HENT:   Head: Normocephalic and atraumatic.   Eyes: Conjunctivae are normal. Right eye exhibits no discharge. Left eye exhibits no discharge. No scleral icterus.   Neck: No thyromegaly present.   Cardiovascular: Normal rate and intact distal pulses.   Pulmonary/Chest: Effort normal. No stridor.   Abdominal: Soft. She exhibits no distension. There is no tenderness.   Genitourinary: Vagina normal and uterus normal. No labial fusion. There is no rash, tenderness, lesion or injury on the right labia. There is no rash, tenderness, lesion or injury on the left labia. Uterus is not enlarged and not tender. Cervix exhibits no motion tenderness, no discharge and no friability. Right adnexum displays no mass, no tenderness and no fullness. Left adnexum displays no mass, no tenderness and no fullness. No bleeding in the vagina.  No vaginal discharge found.   Genitourinary Comments: Normal external genitalia.  Normal hair distribution.  Urethral meatus normal. No cervical lesions or masses.  No vaginal bleeding noted.  Vaginal tissues appears atrophic.  No abrasions noted.     Musculoskeletal: Normal range of motion.   Neurological: She is alert and oriented to person, place, and time.   Skin: Skin is warm and dry.   Psychiatric: She has a normal mood and affect. Her behavior is normal. Judgment and thought content normal.         ASSESSMENT:       ICD-10-CM ICD-9-CM    1. Postmenopausal bleeding N95.0 627.1 Tissue Specimen To Pathology, Obstetrics/Gynecology          Plan:      Chelsea was seen today for metrorrhagia.    Diagnoses and all orders for this visit:    Postmenopausal bleeding  -Patient was counseled today on the problem of PMB and the value of a transvaginal ultrasound to measure the thickness of the uterine endometrial lining, the value of obtaining endometrial tissue to rule out endometrial cancer.  The pros,cons, risks and benefits of an endometrial biopsy vs an office hysteroscopy vs a hospital hysteroscopy, D&C were discussed.  The potential of atypical endometrial hyperlasia or endometrial cancer and the need for a hysterectomy for this pathology was discussed.  During the counseling session, all of the patient's questions were answered.  - Despite this being light bleeding, given that patient has not had any vaginal penetration (like the last event), would recommend workup  - Discussed options of TVUS vs EMBx.  Pt would like to proceed with the EMBx  - EMBx done (see procedure note below)  -     Tissue Specimen To Pathology, Obstetrics/Gynecology        No orders of the defined types were placed in this encounter.      No follow-ups on file.     Counseling time: 45 minutes    Marcia Canseco           DATE: 9/27/19    TIME: 1500 PM    PROCEDURE: Endometrial biopsy    INDICATION: postmenopausal bleeding    PATIENT CONSENT:      PRE ENDOMETRIAL BIOPSY COUNSELING:  The patient was informed of the risk of bleeding, infection, uterine perforation and  pain and that the test will rule-out endometrial cancer with accuracy greater than 95%. She was counseled on the alternatives to endometrial biopsy.  All of her questions were answered.  Patient gives written consent    ANESTHESIA: None    Labs: UPT performed prior to the procedure was negative.    PROCEDURE NOTE:  The cervix was visualized with a speculum and swabbed with Betadinex3.  The anterior lip of the cervix was grasped with a single toothed tenaculum, and a sterile endometrial pipelle was inserted into the uterus to a sound length of 8 cm. 3 passes were made with the pipelle and scant amount of tissue was obtained. The specimen was placed in formalin and sent to Pathology for evaluation.     COMPLICATIONS: None    DISPOSITION: The patient tolerated the above procedure well.      POST ENDOMETRIAL BIOPSY COUNSELING:  - Manage post biopsy cramping with NSAIDs or Tylenol.  - Expect spotting or light bleeding for a few days.  - Report bleeding heavier than a period, fever > 101.0 F, worsening pain or a foul  smelling vaginal discharge.      Marcia Canseco MD 09/30/2019 3:18 PM

## 2019-10-08 ENCOUNTER — PATIENT OUTREACH (OUTPATIENT)
Dept: ADMINISTRATIVE | Facility: OTHER | Age: 79
End: 2019-10-08

## 2019-10-09 ENCOUNTER — HOSPITAL ENCOUNTER (OUTPATIENT)
Dept: RADIOLOGY | Facility: OTHER | Age: 79
Discharge: HOME | End: 2019-10-09
Attending: STUDENT IN AN ORGANIZED HEALTH CARE EDUCATION/TRAINING PROGRAM
Payer: MEDICARE

## 2019-10-09 ENCOUNTER — OFFICE VISIT (OUTPATIENT)
Dept: OBSTETRICS AND GYNECOLOGY | Facility: CLINIC | Age: 79
End: 2019-10-09
Payer: MEDICARE

## 2019-10-09 VITALS — WEIGHT: 197.56 LBS | HEIGHT: 64 IN | BODY MASS INDEX: 33.73 KG/M2

## 2019-10-09 DIAGNOSIS — N95.0 POSTMENOPAUSAL BLEEDING: Primary | ICD-10-CM

## 2019-10-09 DIAGNOSIS — N95.0 POSTMENOPAUSAL BLEEDING: ICD-10-CM

## 2019-10-09 PROCEDURE — 99999 PR PBB SHADOW E&M-EST. PATIENT-LVL IV: CPT | Mod: PBBFAC,HCNC,, | Performed by: STUDENT IN AN ORGANIZED HEALTH CARE EDUCATION/TRAINING PROGRAM

## 2019-10-09 PROCEDURE — 1101F PT FALLS ASSESS-DOCD LE1/YR: CPT | Mod: HCNC,CPTII,S$GLB, | Performed by: STUDENT IN AN ORGANIZED HEALTH CARE EDUCATION/TRAINING PROGRAM

## 2019-10-09 PROCEDURE — 1101F PR PT FALLS ASSESS DOC 0-1 FALLS W/OUT INJ PAST YR: ICD-10-PCS | Mod: HCNC,CPTII,S$GLB, | Performed by: STUDENT IN AN ORGANIZED HEALTH CARE EDUCATION/TRAINING PROGRAM

## 2019-10-09 PROCEDURE — 76830 TRANSVAGINAL US NON-OB: CPT | Mod: TC,HCNC

## 2019-10-09 PROCEDURE — 76830 TRANSVAGINAL US NON-OB: CPT | Mod: 26,HCNC,, | Performed by: RADIOLOGY

## 2019-10-09 PROCEDURE — 99213 OFFICE O/P EST LOW 20 MIN: CPT | Mod: HCNC,S$GLB,, | Performed by: STUDENT IN AN ORGANIZED HEALTH CARE EDUCATION/TRAINING PROGRAM

## 2019-10-09 PROCEDURE — 76856 US PELVIS COMP WITH TRANSVAG NON-OB (XPD): ICD-10-PCS | Mod: 26,HCNC,, | Performed by: RADIOLOGY

## 2019-10-09 PROCEDURE — 99999 PR PBB SHADOW E&M-EST. PATIENT-LVL IV: ICD-10-PCS | Mod: PBBFAC,HCNC,, | Performed by: STUDENT IN AN ORGANIZED HEALTH CARE EDUCATION/TRAINING PROGRAM

## 2019-10-09 PROCEDURE — 76856 US EXAM PELVIC COMPLETE: CPT | Mod: 26,HCNC,, | Performed by: RADIOLOGY

## 2019-10-09 PROCEDURE — 99213 PR OFFICE/OUTPT VISIT, EST, LEVL III, 20-29 MIN: ICD-10-PCS | Mod: HCNC,S$GLB,, | Performed by: STUDENT IN AN ORGANIZED HEALTH CARE EDUCATION/TRAINING PROGRAM

## 2019-10-09 PROCEDURE — 76830 US PELVIS COMP WITH TRANSVAG NON-OB (XPD): ICD-10-PCS | Mod: 26,HCNC,, | Performed by: RADIOLOGY

## 2019-10-09 RX ORDER — KETOROLAC TROMETHAMINE 5 MG/ML
SOLUTION OPHTHALMIC
COMMUNITY
Start: 2019-09-20

## 2019-10-09 RX ORDER — CIPROFLOXACIN HYDROCHLORIDE 3 MG/ML
SOLUTION/ DROPS OPHTHALMIC
COMMUNITY
Start: 2018-07-23 | End: 2023-08-11

## 2019-10-09 NOTE — PROGRESS NOTES
History & Physical  Gynecology      SUBJECTIVE:     Chief Complaint: Follow-up     Pt here today for embx results and to discuss management. See HPI below from her previous visit with Dr. Canseco. Since that time, patient underwent EmBx which showed scant cells, but no malignancy. Pt has not had any further bleeding issues, has not used vaginal estrogen, but is worried.    FINAL PATHOLOGIC DIAGNOSIS  Endometrium, curettage:  Scant strips of inactive endometrial glands and lower uterine segment glands in a background of mucus and blood.  Negative for atypia or malignancy.  Comment: No significant stromal component is present to evaluate for endometrial hyperplasia. The findings may  reflect an atrophic process. However, re-sampling may be considered in a highly suspicious clinical setting.      History of Present Illness from previous visit  Pt is a 78 y/o who presents with complaints of PMB.  Pt was seen for a previous episode of PMB in 2018 and was assumed to have vaginal atrophy.  With the last event, bleeding was spotting after  attempted to insert his finger into her vagina.  After that episode, pt did not have any bleeding until 2 weeks ago.  Notes that since then, she has had light spotting whenever she wipes.  Notes a small amount of spotting as well.  Nothing heavy like a period.  Pt is concerned that she has cancer.      Review of patient's allergies indicates:  No Known Allergies    Past Medical History:   Diagnosis Date    Concern about heart attack without diagnosis     silent heart attack    Diabetes mellitus     Eye problems      Past Surgical History:   Procedure Laterality Date    EYE SURGERY      TUBAL LIGATION       OB History        5    Para   4    Term   4            AB   1    Living   4       SAB        TAB        Ectopic        Multiple        Live Births                   Family History   Problem Relation Age of Onset    Breast cancer Daughter 44    Colon cancer  "Neg Hx      Social History     Tobacco Use    Smoking status: Never Smoker    Smokeless tobacco: Never Used   Substance Use Topics    Alcohol use: No     Frequency: Never     Drinks per session: Patient refused     Binge frequency: Never    Drug use: No       Current Outpatient Medications   Medication Sig    alendronate (FOSAMAX) 70 MG tablet Take 1 tablet (70 mg total) by mouth every 7 days.    aspirin (ECOTRIN) 81 MG EC tablet Take 81 mg by mouth.    BD ULTRA-FINE MINI PEN NEEDLE 31 gauge x 3/16" Ndle Inject 1 each into the skin 4 (four) times daily with meals and nightly.    brimonidine-timolol (COMBIGAN) 0.2-0.5 % Drop     brimonidine-timolol (COMBIGAN) 0.2-0.5 % Drop     bromfenac (PROLENSA) 0.07 % Drop Apply 1 drop to eye.    ciprofloxacin HCl (CILOXAN) 0.3 % ophthalmic solution     COMBIGAN 0.2-0.5 % Drop     cycloSPORINE (RESTASIS) 0.05 % ophthalmic emulsion Apply 1 drop to eye.    diazePAM (VALIUM) 5 MG tablet     DULoxetine (CYMBALTA) 60 MG capsule Take 60 mg by mouth 2 (two) times daily.    FREESTYLE OSMAN 14 DAY SENSOR Kit U UTD WITH READER FOR GLUCOSE    hydrOXYzine pamoate (VISTARIL) 25 MG Cap Take 25 mg by mouth.    insulin aspart U-100 (NOVOLOG FLEXPEN U-100 INSULIN) 100 unit/mL (3 mL) InPn pen Take 17 units with breakfast, and 12 units with lunch and dinner, Plus correction scale    insulin aspart U-100 (NOVOLOG) 100 unit/mL injection Inject into the skin.    insulin glargine 100 units/mL (3mL) SubQ pen Inject 32 Units into the skin every evening.    ketorolac 0.5% (ACULAR) 0.5 % Drop     levothyroxine (SYNTHROID) 112 MCG tablet Take 1 tablet (112 mcg total) by mouth before breakfast.    linaGLIPtin (TRADJENTA) 5 mg Tab tablet Take 1 tablet (5 mg total) by mouth once daily.    loteprednol (LOTEMAX) 0.5 % ophthalmic suspension     loteprednol (LOTEMAX) 0.5 % ophthalmic suspension     neomycin-polymyxin-dexamethasone (DEXACINE) 3.5 mg/g-10,000 unit/g-0.1 % Oint     " ofloxacin (OCUFLOX) 0.3 % ophthalmic solution INSTILL 1 DROP INTO AFFECTED EYE 4 TIMES A DAY    oxyCODONE (ROXICODONE) 10 mg Tab immediate release tablet Take 10 mg by mouth every 8 (eight) hours as needed.    PRODIGY AUTOCODE METER kit     PRODIGY NO CODING Strp     PROLENSA 0.07 % Drop Place 1 drop into both eyes 2 (two) times daily.    RESTASIS 0.05 % ophthalmic emulsion Place 1 drop into both eyes 2 (two) times daily.    rosuvastatin (CRESTOR) 10 MG tablet Take 10 mg by mouth.    triamcinolone acetonide 0.1% (KENALOG) 0.1 % cream APPLY TWICE DAILY TO ITCHY SKIN UP TO 2 WEEKS/MONTH AS NEEDED    VITAMIN D2 50,000 unit capsule Take 50,000 Units by mouth every 7 days.     No current facility-administered medications for this visit.          Review of Systems:  Review of Systems   Constitutional: Negative for activity change, appetite change, chills, fatigue, fever and unexpected weight change.   Respiratory: Negative for shortness of breath.    Cardiovascular: Negative for chest pain.   Gastrointestinal: Negative for abdominal pain, blood in stool, constipation, diarrhea, nausea and vomiting.   Endocrine: Negative for hot flashes.   Genitourinary: Positive for postmenopausal bleeding. Negative for dyspareunia, dysuria, frequency, menstrual problem, pelvic pain, vaginal bleeding, vaginal discharge and vaginal pain.        OBJECTIVE:     Physical Exam:  Physical Exam   Constitutional: She is oriented to person, place, and time. She appears well-developed and well-nourished.   HENT:   Head: Normocephalic and atraumatic.   Eyes: Conjunctivae are normal. Right eye exhibits no discharge. Left eye exhibits no discharge. No scleral icterus.   Cardiovascular: Normal rate.   Pulmonary/Chest: Effort normal.   Genitourinary:   Genitourinary Comments: Normal external genitalia.  Normal hair distribution.  Urethral meatus normal. No cervical lesions or masses.  No vaginal bleeding noted.  Vaginal tissues appears atrophic.   No abrasions noted.     Musculoskeletal: Normal range of motion.   Neurological: She is alert and oriented to person, place, and time.   Skin: Skin is warm and dry.   Psychiatric: She has a normal mood and affect. Her behavior is normal. Judgment and thought content normal.         ASSESSMENT:       ICD-10-CM ICD-9-CM    1. Postmenopausal bleeding N95.0 627.1 US Pelvis Comp with Transvag NON-OB (xpd          Plan:      Chelsea was seen today for metrorrhagia.    Diagnoses and all orders for this visit:    Postmenopausal bleeding  -Patient was counseled today on the problem of PMB and the value of a transvaginal ultrasound to measure the thickness of the uterine endometrial lining, the value of obtaining endometrial tissue to rule out endometrial cancer.  The pros,cons, risks and benefits of an endometrial biopsy vs an office hysteroscopy vs a hospital hysteroscopy, D&C were discussed.  The potential of atypical endometrial hyperlasia or endometrial cancer and the need for a hysterectomy for this pathology was discussed.  During the counseling session, all of the patient's questions were answered.  - EmBx negative, but minimal cells. Pt would like to have TVUS done to confirm thin stripe as suspected atrophy is etiology at this time. TVUS ordered.  - F/U with Dr. Cnaseco pending results.     Counseling time: 15 min    Louise Gonsalez

## 2019-10-21 ENCOUNTER — TELEPHONE (OUTPATIENT)
Dept: OBSTETRICS AND GYNECOLOGY | Facility: CLINIC | Age: 79
End: 2019-10-21

## 2019-10-21 DIAGNOSIS — N95.0 PMB (POSTMENOPAUSAL BLEEDING): Primary | ICD-10-CM

## 2019-10-21 NOTE — TELEPHONE ENCOUNTER
Called to discuss ultrasound report with patient.  Pts EMS is 5mm.  Discussed that this is thickened and patient needs D&C/hysteroscopy.  EMBx was insufficient so need better tissue sample.  Offered hysteroscopy/D&C as early as Friday.  Pt calling friend to see if has transportation on Friday.  Pt to call back clinic    Marcia Canseco MD  Obstetrics & Gynecology

## 2019-10-22 ENCOUNTER — PATIENT OUTREACH (OUTPATIENT)
Dept: ADMINISTRATIVE | Facility: OTHER | Age: 79
End: 2019-10-22

## 2019-10-22 NOTE — TELEPHONE ENCOUNTER
Called patient to discuss OR date for hysteroscopy.  Spoke with pt's friend, Becca, as requested by patient.  Situation and reasoning for the surgery discussed with Becca.  Discussed that this is not emergent and can wait if she is unable to do the procedure on Friday.  Pt would like to have procedure done Friday if possible, as she is concerned about waiting and is worried that she has cancer.  Attempted to alleviate patient's concern and discussed that this procedure does not mean that she definitely has cancer.  Discussed needing to come in to see anesthesia and to sign consents.  Pt is available Wednesday.  Will check preop slot times and get back with patient.     Marcia Cnaseco MD  Obstetrics & Gynecology

## 2019-10-24 ENCOUNTER — ANESTHESIA EVENT (OUTPATIENT)
Dept: SURGERY | Facility: OTHER | Age: 79
End: 2019-10-24
Payer: MEDICARE

## 2019-10-24 ENCOUNTER — OFFICE VISIT (OUTPATIENT)
Dept: OBSTETRICS AND GYNECOLOGY | Facility: CLINIC | Age: 79
End: 2019-10-24
Payer: MEDICARE

## 2019-10-24 ENCOUNTER — HOSPITAL ENCOUNTER (OUTPATIENT)
Dept: PREADMISSION TESTING | Facility: OTHER | Age: 79
Discharge: HOME | End: 2019-10-24
Attending: OBSTETRICS & GYNECOLOGY
Payer: MEDICARE

## 2019-10-24 VITALS
DIASTOLIC BLOOD PRESSURE: 66 MMHG | WEIGHT: 200 LBS | HEIGHT: 64 IN | HEART RATE: 80 BPM | TEMPERATURE: 98 F | OXYGEN SATURATION: 97 % | BODY MASS INDEX: 34.15 KG/M2 | SYSTOLIC BLOOD PRESSURE: 136 MMHG

## 2019-10-24 VITALS
DIASTOLIC BLOOD PRESSURE: 64 MMHG | HEIGHT: 64 IN | SYSTOLIC BLOOD PRESSURE: 124 MMHG | BODY MASS INDEX: 34.33 KG/M2 | WEIGHT: 201.06 LBS

## 2019-10-24 DIAGNOSIS — Z01.818 PRE-OP TESTING: Primary | ICD-10-CM

## 2019-10-24 DIAGNOSIS — N95.0 PMB (POSTMENOPAUSAL BLEEDING): Primary | ICD-10-CM

## 2019-10-24 LAB
BASOPHILS # BLD AUTO: 0.08 K/UL (ref 0–0.2)
BASOPHILS NFR BLD: 0.8 % (ref 0–1.9)
DIFFERENTIAL METHOD: ABNORMAL
EOSINOPHIL # BLD AUTO: 0.6 K/UL (ref 0–0.5)
EOSINOPHIL NFR BLD: 6 % (ref 0–8)
ERYTHROCYTE [DISTWIDTH] IN BLOOD BY AUTOMATED COUNT: 12.5 % (ref 11.5–14.5)
HCT VFR BLD AUTO: 42.3 % (ref 37–48.5)
HGB BLD-MCNC: 13.7 G/DL (ref 12–16)
IMM GRANULOCYTES # BLD AUTO: 0.02 K/UL (ref 0–0.04)
IMM GRANULOCYTES NFR BLD AUTO: 0.2 % (ref 0–0.5)
LYMPHOCYTES # BLD AUTO: 2.6 K/UL (ref 1–4.8)
LYMPHOCYTES NFR BLD: 26.2 % (ref 18–48)
MCH RBC QN AUTO: 29.7 PG (ref 27–31)
MCHC RBC AUTO-ENTMCNC: 32.4 G/DL (ref 32–36)
MCV RBC AUTO: 92 FL (ref 82–98)
MONOCYTES # BLD AUTO: 0.9 K/UL (ref 0.3–1)
MONOCYTES NFR BLD: 8.6 % (ref 4–15)
NEUTROPHILS # BLD AUTO: 5.8 K/UL (ref 1.8–7.7)
NEUTROPHILS NFR BLD: 58.2 % (ref 38–73)
NRBC BLD-RTO: 0 /100 WBC
PLATELET # BLD AUTO: 270 K/UL (ref 150–350)
PMV BLD AUTO: 10.6 FL (ref 9.2–12.9)
RBC # BLD AUTO: 4.62 M/UL (ref 4–5.4)
WBC # BLD AUTO: 9.96 K/UL (ref 3.9–12.7)

## 2019-10-24 PROCEDURE — 99999 PR PBB SHADOW E&M-EST. PATIENT-LVL II: CPT | Mod: PBBFAC,HCNC,, | Performed by: OBSTETRICS & GYNECOLOGY

## 2019-10-24 PROCEDURE — 99999 PR PBB SHADOW E&M-EST. PATIENT-LVL II: ICD-10-PCS | Mod: PBBFAC,HCNC,, | Performed by: OBSTETRICS & GYNECOLOGY

## 2019-10-24 PROCEDURE — 1101F PR PT FALLS ASSESS DOC 0-1 FALLS W/OUT INJ PAST YR: ICD-10-PCS | Mod: HCNC,CPTII,S$GLB, | Performed by: OBSTETRICS & GYNECOLOGY

## 2019-10-24 PROCEDURE — 99214 PR OFFICE/OUTPT VISIT, EST, LEVL IV, 30-39 MIN: ICD-10-PCS | Mod: HCNC,S$GLB,, | Performed by: OBSTETRICS & GYNECOLOGY

## 2019-10-24 PROCEDURE — 36415 COLL VENOUS BLD VENIPUNCTURE: CPT | Mod: HCNC

## 2019-10-24 PROCEDURE — 99214 OFFICE O/P EST MOD 30 MIN: CPT | Mod: HCNC,S$GLB,, | Performed by: OBSTETRICS & GYNECOLOGY

## 2019-10-24 PROCEDURE — 85025 COMPLETE CBC W/AUTO DIFF WBC: CPT | Mod: HCNC

## 2019-10-24 PROCEDURE — 1101F PT FALLS ASSESS-DOCD LE1/YR: CPT | Mod: HCNC,CPTII,S$GLB, | Performed by: OBSTETRICS & GYNECOLOGY

## 2019-10-24 RX ORDER — LIDOCAINE HYDROCHLORIDE 10 MG/ML
0.5 INJECTION, SOLUTION EPIDURAL; INFILTRATION; INTRACAUDAL; PERINEURAL ONCE
Status: CANCELLED | OUTPATIENT
Start: 2019-10-24 | End: 2019-10-24

## 2019-10-24 RX ORDER — MUPIROCIN 20 MG/G
OINTMENT TOPICAL
Status: CANCELLED | OUTPATIENT
Start: 2019-10-24

## 2019-10-24 RX ORDER — CELECOXIB 200 MG/1
400 CAPSULE ORAL
Status: CANCELLED | OUTPATIENT
Start: 2019-10-24 | End: 2019-10-24

## 2019-10-24 RX ORDER — FAMOTIDINE 20 MG/1
20 TABLET, FILM COATED ORAL
Status: CANCELLED | OUTPATIENT
Start: 2019-10-24 | End: 2019-10-24

## 2019-10-24 RX ORDER — PREGABALIN 75 MG/1
75 CAPSULE ORAL
Status: CANCELLED | OUTPATIENT
Start: 2019-10-24 | End: 2019-10-24

## 2019-10-24 RX ORDER — SODIUM CHLORIDE, SODIUM LACTATE, POTASSIUM CHLORIDE, CALCIUM CHLORIDE 600; 310; 30; 20 MG/100ML; MG/100ML; MG/100ML; MG/100ML
INJECTION, SOLUTION INTRAVENOUS CONTINUOUS
Status: CANCELLED | OUTPATIENT
Start: 2019-10-24

## 2019-10-24 RX ORDER — ACETAMINOPHEN 500 MG
1000 TABLET ORAL
Status: CANCELLED | OUTPATIENT
Start: 2019-10-24 | End: 2019-10-24

## 2019-10-24 NOTE — DISCHARGE INSTRUCTIONS
PRE-ADMIT TESTING -  [phone]    [address]  [place] [facility]          Your surgery has been scheduled at [facility]. We are pleased to have the opportunity to serve you. For Further Information please call [phone].    On the day of surgery please report to the Information Desk on the 1st floor.    · CONTACT YOUR PHYSICIAN'S OFFICE THE DAY PRIOR TO YOUR SURGERY TO OBTAIN YOUR ARRIVAL TIME.     · The evening before surgery do not eat anything after 9 p.m. ( this includes hard candy, chewing gum and mints).  You may only have GATORADE, POWERADE AND WATER  from 9 p.m. until you leave your home.   DO NOT DRINK ANY LIQUIDS ON THE WAY TO THE HOSPITAL.      SPECIAL MEDICATION INSTRUCTIONS: TAKE medications checked off by the Anesthesiologist on your Medication List.    Angiogram Patients: Take medications as instructed by your physician, including aspirin.     Surgery Patients:    If you take ASPIRIN - Your PHYSICIAN/SURGEON will need to inform you IF/OR when you need to stop taking aspirin prior to your surgery.     Do Not take any medications containing IBUPROFEN.  Do Not Wear any make-up or dark nail polish   (especially eye make-up) to surgery. If you come to surgery with makeup on you will be required to remove the makeup or nail polish.    Do not shave your surgical area at least 5 days prior to your surgery. The surgical prep will be performed at the hospital according to Infection Control regulations.    Leave all valuables at home.   Do Not wear any jewelry or watches, including any metal in body piercings. Jewelry must be removed prior to coming to the hospital.  There is a possibility that rings that are unable to be removed may be cut off if they are on the surgical extremity.    Contact Lens must be removed before surgery. Either do not wear the contact lens or bring a case and solution for storage.  Please bring a container for eyeglasses or dentures as required.  Bring  any paperwork your physician has provided, such as consent forms,  history and physicals, doctor's orders, etc.   Bring comfortable clothes that are loose fitting to wear upon discharge. Take into consideration the type of surgery being performed.  Maintain your diet as advised per your physician the day prior to surgery.      Adequate rest the night before surgery is advised.   Park in the Parking lot behind the hospital or in the [facility] across the street from the parking lot. Parking is complimentary.  If you will be discharged the same day as your procedure, please arrange for a responsible adult to drive you home or to accompany you if traveling by taxi.   YOU WILL NOT BE PERMITTED TO DRIVE OR TO LEAVE THE HOSPITAL ALONE AFTER SURGERY.   It is strongly recommended that you arrange for someone to remain with you for the first 24 hrs following your surgery.    The Surgeon will speak to your family/visitor after your surgery regarding the outcome of your surgery and post op care.  The Surgeon may speak to you after your surgery, but there is a possibility you may not remember the details.  Please check with your family members regarding the conversation with the Surgeon.    We strongly recommend whoever is bringing you home be present for discharge instructions.  This will ensure a thorough understanding for your post op home care.      Thank you for your cooperation.  The Staff of [facility].                Bathing Instructions with Hibiclens     Shower the evening before and morning of your procedure with Hibiclens:   Wash your face with water and your regular face wash/soap   Apply Hibiclens directly on your skin or on a wet washcloth and wash gently. When showering: Move away from the shower stream when applying Hibiclens to avoid rinsing off too soon.   Rinse thoroughly with warm water   Do not dilute Hibiclens         Dry off as usual, do not use any deodorant,  powder, body lotions, perfume, after shave or cologne.

## 2019-10-24 NOTE — ANESTHESIA PREPROCEDURE EVALUATION
10/24/2019  Chelsea Gould is a 79 y.o., female.    Anesthesia Evaluation    I have reviewed the Patient Summary Reports.    I have reviewed the Nursing Notes.   I have reviewed the Medications.     Review of Systems  Anesthesia Hx:  No problems with previous Anesthesia    Social:  Non-Smoker, No Alcohol Use    Hematology/Oncology:  Hematology Normal   Oncology Normal     EENT/Dental:EENT/Dental Normal   Cardiovascular:   Exercise tolerance: good hyperlipidemia    Pulmonary:  Pulmonary Normal    Renal/:  Renal/ Normal     Hepatic/GI:  Hepatic/GI Normal    Musculoskeletal:  Musculoskeletal Normal    Neurological:  Neurology Normal    Endocrine:   Diabetes, well controlled, type 2, using insulin Hypothyroidism    Psych:   Psychiatric History anxiety depression          Physical Exam  General:  Obesity    Airway/Jaw/Neck:  Airway Findings: Mouth Opening: Normal Tongue: Normal  General Airway Assessment: Adult, Average  Mallampati: II  TM Distance: Normal, at least 6 cm  Jaw/Neck Findings:  Neck ROM: Normal ROM      Dental:  Dental Findings: In tact        Mental Status:  Mental Status Findings:  Cooperative, Alert and Oriented         Anesthesia Plan  Type of Anesthesia, risks & benefits discussed:  Anesthesia Type:  general  Patient's Preference:   Intra-op Monitoring Plan: standard ASA monitors  Intra-op Monitoring Plan Comments:   Post Op Pain Control Plan: per primary service following discharge from PACU  Post Op Pain Control Plan Comments:   Induction:    Beta Blocker:         Informed Consent: Patient understands risks and agrees with Anesthesia plan.  Questions answered. Anesthesia consent signed with patient.  ASA Score: 2     Day of Surgery Review of History & Physical:    H&P update referred to the surgeon.     Anesthesia Plan Notes: CBC and BMP today.    Day of surgery update: K 5.4, eGFR  40        Ready For Surgery From Anesthesia Perspective.

## 2019-10-24 NOTE — H&P (VIEW-ONLY)
History & Physical  Gynecology      SUBJECTIVE:     Chief Complaint: Postmenopausal bleeding       History of Present Illness:  Pt is a 80 y/o here for preop visit for hysteroscopy/ D&C for PMB.  Previous HPI:   Pt is a 80 y/o who presents with complaints of PMB.  Pt was seen for a previous episode of PMB in 2018 and was assumed to have vaginal atrophy.  With the last event, bleeding was spotting after  attempted to insert his finger into her vagina.  After that episode, pt did not have any bleeding until 2 weeks ago.  Notes that since then, she has had light spotting whenever she wipes.  Notes a small amount of spotting as well.  Nothing heavy like a period.  Pt is concerned that she has cancer.    Pt has had EMBX that was insufficient and TVUS that showed EMS of 5mm.  Discussed results with patient and the need to proceed with hysteroscopy/ D&C for better tissue sample.      Review of patient's allergies indicates:  No Known Allergies    Past Medical History:   Diagnosis Date    Concern about heart attack without diagnosis     silent heart attack    Diabetes mellitus     Eye problems      Past Surgical History:   Procedure Laterality Date    EYE SURGERY      JOINT REPLACEMENT Right     hip    TUBAL LIGATION       OB History        5    Para   4    Term   4            AB   1    Living   4       SAB        TAB        Ectopic        Multiple        Live Births                   Family History   Problem Relation Age of Onset    Breast cancer Daughter 44    Colon cancer Neg Hx      Social History     Tobacco Use    Smoking status: Never Smoker    Smokeless tobacco: Never Used   Substance Use Topics    Alcohol use: No     Frequency: Never     Drinks per session: Patient refused     Binge frequency: Never    Drug use: No       Current Outpatient Medications   Medication Sig    alendronate (FOSAMAX) 70 MG tablet Take 1 tablet (70 mg total) by mouth every 7 days.    aspirin (ECOTRIN) 81  "MG EC tablet Take 81 mg by mouth.    BD ULTRA-FINE MINI PEN NEEDLE 31 gauge x 3/16" Ndle Inject 1 each into the skin 4 (four) times daily with meals and nightly.    brimonidine-timolol (COMBIGAN) 0.2-0.5 % Drop     bromfenac (PROLENSA) 0.07 % Drop Apply 1 drop to eye.    ciprofloxacin HCl (CILOXAN) 0.3 % ophthalmic solution     cycloSPORINE (RESTASIS) 0.05 % ophthalmic emulsion Apply 1 drop to eye.    diazePAM (VALIUM) 5 MG tablet     DULoxetine (CYMBALTA) 60 MG capsule Take 60 mg by mouth 2 (two) times daily.    FREESTYLE OSMAN 14 DAY SENSOR Kit U UTD WITH READER FOR GLUCOSE    hydrOXYzine pamoate (VISTARIL) 25 MG Cap Take 25 mg by mouth.    insulin aspart U-100 (NOVOLOG FLEXPEN U-100 INSULIN) 100 unit/mL (3 mL) InPn pen Take 17 units with breakfast, and 12 units with lunch and dinner, Plus correction scale    insulin glargine 100 units/mL (3mL) SubQ pen Inject 32 Units into the skin every evening.    ketorolac 0.5% (ACULAR) 0.5 % Drop     levothyroxine (SYNTHROID) 112 MCG tablet Take 1 tablet (112 mcg total) by mouth before breakfast.    linaGLIPtin (TRADJENTA) 5 mg Tab tablet Take 1 tablet (5 mg total) by mouth once daily.    loteprednol (LOTEMAX) 0.5 % ophthalmic suspension     ofloxacin (OCUFLOX) 0.3 % ophthalmic solution INSTILL 1 DROP INTO AFFECTED EYE 4 TIMES A DAY    oxyCODONE (ROXICODONE) 10 mg Tab immediate release tablet Take 10 mg by mouth every 8 (eight) hours as needed.    PRODIGY AUTOCODE METER kit     PRODIGY NO CODING Strp     PROLENSA 0.07 % Drop Place 1 drop into both eyes 2 (two) times daily.    RESTASIS 0.05 % ophthalmic emulsion Place 1 drop into both eyes 2 (two) times daily.    rosuvastatin (CRESTOR) 10 MG tablet Take 10 mg by mouth.    triamcinolone acetonide 0.1% (KENALOG) 0.1 % cream APPLY TWICE DAILY TO ITCHY SKIN UP TO 2 WEEKS/MONTH AS NEEDED    VITAMIN D2 50,000 unit capsule Take 50,000 Units by mouth every 7 days.     No current facility-administered " medications for this visit.          Review of Systems:  Review of Systems   Constitutional: Negative for activity change, appetite change, fatigue, fever and unexpected weight change.   Respiratory: Negative for cough and shortness of breath.    Cardiovascular: Negative for chest pain and leg swelling.   Gastrointestinal: Negative for abdominal pain, blood in stool, constipation, diarrhea, nausea and vomiting.   Endocrine: Negative for diabetes, hair loss and hot flashes.   Genitourinary: Positive for postmenopausal bleeding. Negative for pelvic pain and postcoital bleeding.   Musculoskeletal: Negative for back pain.   Integumentary:  Negative for hair changes, breast mass, nipple discharge and breast skin changes.   Psychiatric/Behavioral: Negative for sleep disturbance. The patient is not nervous/anxious.    Breast: Negative for mass, mastodynia, nipple discharge and skin changes       OBJECTIVE:     Physical Exam:  Physical Exam   Constitutional: She is oriented to person, place, and time. She appears well-developed and well-nourished.   HENT:   Head: Normocephalic and atraumatic.   Eyes: Conjunctivae are normal. Right eye exhibits no discharge. Left eye exhibits no discharge. No scleral icterus.   Pulmonary/Chest: Effort normal. No stridor.   Abdominal: Soft. She exhibits no distension. There is no tenderness.   Musculoskeletal: Normal range of motion.   Neurological: She is alert and oriented to person, place, and time.   Skin: Skin is warm and dry.   Psychiatric: She has a normal mood and affect. Her behavior is normal. Judgment and thought content normal.         ASSESSMENT:       ICD-10-CM ICD-9-CM    1. PMB (postmenopausal bleeding) N95.0 627.1 Vital signs      POCT glucose      Notify physician if BS > 180 for hysterectomy patients      Chlorhexidine (CHG) 2% Wipes      Notify Physician/Vital Signs Parameters Urine output less than 0.5mL/kg/hr (with indwelling catheter) or 30 mL/hr (without indwelling  catheter) or blood glucose greater than 200 mg/dL      Notify physician      Notify Physician - Potential Need of Opioid Reversal      Diet NPO      Chlorohexidine Gluconate Bath      Full code      Place in Outpatient      Place JAMSHID hose      Place sequential compression device      Notify Physician - Potential Need of Opioid Reversal      Diet NPO      Full code          Plan:      Patient is to have hysteroscopy/D&C on 10/24/19    - To preop appointment today  - Case request placed & pre-op orders completed  - Counseled about the risks and benefits of the procedure.  Steps of procedure were discussed in detail.  Discussed the reasoning for the procedure based on the results of her EMBx (insufficient) and TVUS (EMS 5mm)  - Pt NPO at midnight.  To preop area at 630 for 830 case    Counseling time: 45 minutes    Marcia Canseco

## 2019-10-25 ENCOUNTER — HOSPITAL ENCOUNTER (OUTPATIENT)
Facility: OTHER | Age: 79
Discharge: HOME | End: 2019-10-25
Attending: OBSTETRICS & GYNECOLOGY | Admitting: OBSTETRICS & GYNECOLOGY
Payer: MEDICARE

## 2019-10-25 ENCOUNTER — ANESTHESIA (OUTPATIENT)
Dept: SURGERY | Facility: OTHER | Age: 79
End: 2019-10-25
Payer: MEDICARE

## 2019-10-25 VITALS
HEART RATE: 73 BPM | OXYGEN SATURATION: 97 % | SYSTOLIC BLOOD PRESSURE: 179 MMHG | BODY MASS INDEX: 34.15 KG/M2 | RESPIRATION RATE: 16 BRPM | TEMPERATURE: 98 F | HEIGHT: 64 IN | WEIGHT: 200 LBS | DIASTOLIC BLOOD PRESSURE: 81 MMHG

## 2019-10-25 DIAGNOSIS — N95.0 PMB (POSTMENOPAUSAL BLEEDING): ICD-10-CM

## 2019-10-25 DIAGNOSIS — Z98.890 STATUS POST D&C: Primary | ICD-10-CM

## 2019-10-25 PROCEDURE — 63600175 PHARM REV CODE 636 W HCPCS: Mod: HCNC | Performed by: NURSE ANESTHETIST, CERTIFIED REGISTERED

## 2019-10-25 PROCEDURE — 00952 ANES VAG PX HYSTSC&/HSG: CPT | Mod: HCNC | Performed by: OBSTETRICS & GYNECOLOGY

## 2019-10-25 PROCEDURE — 63600175 PHARM REV CODE 636 W HCPCS: Mod: HCNC | Performed by: SPECIALIST

## 2019-10-25 PROCEDURE — 25000003 PHARM REV CODE 250: Mod: HCNC | Performed by: OBSTETRICS & GYNECOLOGY

## 2019-10-25 PROCEDURE — 37000009 HC ANESTHESIA EA ADD 15 MINS: Mod: HCNC | Performed by: OBSTETRICS & GYNECOLOGY

## 2019-10-25 PROCEDURE — 58558 HYSTEROSCOPY BIOPSY: CPT | Mod: HCNC,,, | Performed by: OBSTETRICS & GYNECOLOGY

## 2019-10-25 PROCEDURE — 36000706: Mod: HCNC | Performed by: OBSTETRICS & GYNECOLOGY

## 2019-10-25 PROCEDURE — 88305 TISSUE EXAM BY PATHOLOGIST: CPT | Mod: HCNC | Performed by: PATHOLOGY

## 2019-10-25 PROCEDURE — 71000016 HC POSTOP RECOV ADDL HR: Mod: HCNC | Performed by: OBSTETRICS & GYNECOLOGY

## 2019-10-25 PROCEDURE — 25000003 PHARM REV CODE 250: Mod: HCNC | Performed by: SPECIALIST

## 2019-10-25 PROCEDURE — 71000033 HC RECOVERY, INTIAL HOUR: Mod: HCNC | Performed by: OBSTETRICS & GYNECOLOGY

## 2019-10-25 PROCEDURE — 36000707: Mod: HCNC | Performed by: OBSTETRICS & GYNECOLOGY

## 2019-10-25 PROCEDURE — 88305 TISSUE EXAM BY PATHOLOGIST: CPT | Mod: 26,HCNC,, | Performed by: PATHOLOGY

## 2019-10-25 PROCEDURE — 37000008 HC ANESTHESIA 1ST 15 MINUTES: Mod: HCNC | Performed by: OBSTETRICS & GYNECOLOGY

## 2019-10-25 PROCEDURE — 88305 TISSUE SPECIMEN TO PATHOLOGY - SURGERY: ICD-10-PCS | Mod: 26,HCNC,, | Performed by: PATHOLOGY

## 2019-10-25 PROCEDURE — 58558 PR HYSTEROSCOPY,W/ENDO BX: ICD-10-PCS | Mod: HCNC,,, | Performed by: OBSTETRICS & GYNECOLOGY

## 2019-10-25 PROCEDURE — 71000015 HC POSTOP RECOV 1ST HR: Mod: HCNC | Performed by: OBSTETRICS & GYNECOLOGY

## 2019-10-25 PROCEDURE — 25000003 PHARM REV CODE 250: Mod: HCNC | Performed by: NURSE ANESTHETIST, CERTIFIED REGISTERED

## 2019-10-25 RX ORDER — MIDAZOLAM HYDROCHLORIDE 1 MG/ML
INJECTION, SOLUTION INTRAMUSCULAR; INTRAVENOUS
Status: DISCONTINUED | OUTPATIENT
Start: 2019-10-25 | End: 2019-10-25

## 2019-10-25 RX ORDER — FENTANYL CITRATE 50 UG/ML
INJECTION, SOLUTION INTRAMUSCULAR; INTRAVENOUS
Status: DISCONTINUED | OUTPATIENT
Start: 2019-10-25 | End: 2019-10-25

## 2019-10-25 RX ORDER — LIDOCAINE HCL/PF 100 MG/5ML
SYRINGE (ML) INTRAVENOUS
Status: DISCONTINUED | OUTPATIENT
Start: 2019-10-25 | End: 2019-10-25

## 2019-10-25 RX ORDER — MEPERIDINE HYDROCHLORIDE 25 MG/ML
12.5 INJECTION INTRAMUSCULAR; INTRAVENOUS; SUBCUTANEOUS ONCE AS NEEDED
Status: DISCONTINUED | OUTPATIENT
Start: 2019-10-25 | End: 2019-10-25 | Stop reason: HOSPADM

## 2019-10-25 RX ORDER — SODIUM CHLORIDE, SODIUM LACTATE, POTASSIUM CHLORIDE, CALCIUM CHLORIDE 600; 310; 30; 20 MG/100ML; MG/100ML; MG/100ML; MG/100ML
INJECTION, SOLUTION INTRAVENOUS CONTINUOUS
Status: DISCONTINUED | OUTPATIENT
Start: 2019-10-25 | End: 2019-10-25 | Stop reason: HOSPADM

## 2019-10-25 RX ORDER — GLYCOPYRROLATE 0.2 MG/ML
INJECTION INTRAMUSCULAR; INTRAVENOUS
Status: DISCONTINUED | OUTPATIENT
Start: 2019-10-25 | End: 2019-10-25

## 2019-10-25 RX ORDER — HYDROMORPHONE HYDROCHLORIDE 2 MG/ML
0.4 INJECTION, SOLUTION INTRAMUSCULAR; INTRAVENOUS; SUBCUTANEOUS EVERY 5 MIN PRN
Status: DISCONTINUED | OUTPATIENT
Start: 2019-10-25 | End: 2019-10-25 | Stop reason: HOSPADM

## 2019-10-25 RX ORDER — ONDANSETRON 2 MG/ML
INJECTION INTRAMUSCULAR; INTRAVENOUS
Status: DISCONTINUED | OUTPATIENT
Start: 2019-10-25 | End: 2019-10-25

## 2019-10-25 RX ORDER — DEXAMETHASONE SODIUM PHOSPHATE 4 MG/ML
INJECTION, SOLUTION INTRA-ARTICULAR; INTRALESIONAL; INTRAMUSCULAR; INTRAVENOUS; SOFT TISSUE
Status: DISCONTINUED | OUTPATIENT
Start: 2019-10-25 | End: 2019-10-25

## 2019-10-25 RX ORDER — SODIUM CHLORIDE 0.9 % (FLUSH) 0.9 %
3 SYRINGE (ML) INJECTION
Status: DISCONTINUED | OUTPATIENT
Start: 2019-10-25 | End: 2019-10-25 | Stop reason: HOSPADM

## 2019-10-25 RX ORDER — ONDANSETRON 2 MG/ML
4 INJECTION INTRAMUSCULAR; INTRAVENOUS DAILY PRN
Status: DISCONTINUED | OUTPATIENT
Start: 2019-10-25 | End: 2019-10-25 | Stop reason: HOSPADM

## 2019-10-25 RX ORDER — ACETAMINOPHEN 500 MG
1000 TABLET ORAL
Status: COMPLETED | OUTPATIENT
Start: 2019-10-25 | End: 2019-10-25

## 2019-10-25 RX ORDER — OXYCODONE HYDROCHLORIDE 5 MG/1
5 TABLET ORAL
Status: DISCONTINUED | OUTPATIENT
Start: 2019-10-25 | End: 2019-10-25 | Stop reason: HOSPADM

## 2019-10-25 RX ORDER — LIDOCAINE HYDROCHLORIDE 10 MG/ML
0.5 INJECTION, SOLUTION EPIDURAL; INFILTRATION; INTRACAUDAL; PERINEURAL ONCE
Status: DISCONTINUED | OUTPATIENT
Start: 2019-10-25 | End: 2019-10-25 | Stop reason: HOSPADM

## 2019-10-25 RX ORDER — IBUPROFEN 600 MG/1
600 TABLET ORAL EVERY 6 HOURS PRN
Qty: 40 TABLET | Refills: 0 | Status: SHIPPED | OUTPATIENT
Start: 2019-10-25 | End: 2020-10-18

## 2019-10-25 RX ORDER — FAMOTIDINE 20 MG/1
20 TABLET, FILM COATED ORAL
Status: COMPLETED | OUTPATIENT
Start: 2019-10-25 | End: 2019-10-25

## 2019-10-25 RX ORDER — CELECOXIB 200 MG/1
400 CAPSULE ORAL
Status: COMPLETED | OUTPATIENT
Start: 2019-10-25 | End: 2019-10-25

## 2019-10-25 RX ORDER — PREGABALIN 75 MG/1
75 CAPSULE ORAL
Status: COMPLETED | OUTPATIENT
Start: 2019-10-25 | End: 2019-10-25

## 2019-10-25 RX ORDER — PHENYLEPHRINE HYDROCHLORIDE 10 MG/ML
INJECTION INTRAVENOUS
Status: DISCONTINUED | OUTPATIENT
Start: 2019-10-25 | End: 2019-10-25

## 2019-10-25 RX ORDER — PROPOFOL 10 MG/ML
VIAL (ML) INTRAVENOUS
Status: DISCONTINUED | OUTPATIENT
Start: 2019-10-25 | End: 2019-10-25

## 2019-10-25 RX ORDER — MUPIROCIN 20 MG/G
OINTMENT TOPICAL
Status: DISCONTINUED | OUTPATIENT
Start: 2019-10-25 | End: 2019-10-25 | Stop reason: HOSPADM

## 2019-10-25 RX ADMIN — ONDANSETRON 4 MG: 2 INJECTION INTRAMUSCULAR; INTRAVENOUS at 08:10

## 2019-10-25 RX ADMIN — PROPOFOL 140 MG: 10 INJECTION, EMULSION INTRAVENOUS at 08:10

## 2019-10-25 RX ADMIN — CELECOXIB 400 MG: 200 CAPSULE ORAL at 07:10

## 2019-10-25 RX ADMIN — CARBOXYMETHYLCELLULOSE SODIUM 2 DROP: 2.5 SOLUTION/ DROPS OPHTHALMIC at 08:10

## 2019-10-25 RX ADMIN — FAMOTIDINE 20 MG: 20 TABLET ORAL at 07:10

## 2019-10-25 RX ADMIN — LIDOCAINE HYDROCHLORIDE 50 MG: 20 INJECTION, SOLUTION INTRAVENOUS at 08:10

## 2019-10-25 RX ADMIN — FENTANYL CITRATE 25 MCG: 50 INJECTION, SOLUTION INTRAMUSCULAR; INTRAVENOUS at 08:10

## 2019-10-25 RX ADMIN — MUPIROCIN: 20 OINTMENT TOPICAL at 07:10

## 2019-10-25 RX ADMIN — PHENYLEPHRINE HYDROCHLORIDE 100 MCG: 10 INJECTION INTRAVENOUS at 08:10

## 2019-10-25 RX ADMIN — GLYCOPYRROLATE 0.2 MG: 0.2 INJECTION, SOLUTION INTRAMUSCULAR; INTRAVENOUS at 08:10

## 2019-10-25 RX ADMIN — DEXAMETHASONE SODIUM PHOSPHATE 8 MG: 4 INJECTION, SOLUTION INTRAMUSCULAR; INTRAVENOUS at 08:10

## 2019-10-25 RX ADMIN — SODIUM CHLORIDE, SODIUM LACTATE, POTASSIUM CHLORIDE, AND CALCIUM CHLORIDE: 600; 310; 30; 20 INJECTION, SOLUTION INTRAVENOUS at 08:10

## 2019-10-25 RX ADMIN — FENTANYL CITRATE 25 MCG: 50 INJECTION, SOLUTION INTRAMUSCULAR; INTRAVENOUS at 09:10

## 2019-10-25 RX ADMIN — MIDAZOLAM 2 MG: 1 INJECTION INTRAMUSCULAR; INTRAVENOUS at 08:10

## 2019-10-25 RX ADMIN — PREGABALIN 75 MG: 75 CAPSULE ORAL at 07:10

## 2019-10-25 RX ADMIN — ACETAMINOPHEN 1000 MG: 500 TABLET, FILM COATED ORAL at 07:10

## 2019-10-25 NOTE — ANESTHESIA POSTPROCEDURE EVALUATION
Anesthesia Post Evaluation    Patient: Chelsea Gould    Procedure(s) Performed: Procedure(s) (LRB):  HYSTEROSCOPY, WITH DILATION AND CURETTAGE OF UTERUS (N/A)    Final Anesthesia Type: general  Patient location during evaluation: PACU  Patient participation: Yes- Able to Participate  Level of consciousness: awake and alert  Post-procedure vital signs: reviewed and stable  Pain management: adequate  Airway patency: patent  PONV status at discharge: No PONV  Anesthetic complications: no      Cardiovascular status: blood pressure returned to baseline  Respiratory status: unassisted, spontaneous ventilation and room air  Hydration status: euvolemic  Follow-up not needed.          Vitals Value Taken Time   /68 10/25/2019  9:57 AM   Temp 36.4 °C (97.6 °F) 10/25/2019  9:57 AM   Pulse 69 10/25/2019  9:57 AM   Resp 16 10/25/2019  9:57 AM   SpO2 95 % 10/25/2019  9:57 AM         Event Time     Out of Recovery 09:50:00          Pain/Karthikeyan Score: Pain Rating Prior to Med Admin: 0 (10/25/2019  7:20 AM)  Karthikeyan Score: 10 (10/25/2019  9:57 AM)

## 2019-10-25 NOTE — TRANSFER OF CARE
"Anesthesia Transfer of Care Note    Patient: Chelsea Gould    Procedure(s) Performed: Procedure(s) (LRB):  HYSTEROSCOPY, WITH DILATION AND CURETTAGE OF UTERUS (N/A)    Patient location: PACU    Anesthesia Type: general    Transport from OR: Transported from OR on 2-3 L/min O2 by NC with adequate spontaneous ventilation    Post pain: adequate analgesia    Post assessment: no apparent anesthetic complications    Post vital signs: stable    Level of consciousness: awake and alert    Nausea/Vomiting: no nausea/vomiting    Complications: none    Transfer of care protocol was followed      Last vitals:   Visit Vitals  /61 (BP Location: Left arm, Patient Position: Lying)   Pulse 75   Temp 36.7 °C (98.1 °F) (Oral)   Resp 16   Ht 5' 4" (1.626 m)   Wt 90.7 kg (200 lb)   SpO2 97%   Breastfeeding? No   BMI 34.33 kg/m²     "

## 2019-10-25 NOTE — ANESTHESIA POSTPROCEDURE EVALUATION
Anesthesia Post Evaluation    Patient: Chelsea Gould    Procedure(s) Performed: Procedure(s) (LRB):  HYSTEROSCOPY, WITH DILATION AND CURETTAGE OF UTERUS (N/A)    Final Anesthesia Type: general  Patient location during evaluation: PACU  Patient participation: Yes- Able to Participate  Level of consciousness: awake and alert  Post-procedure vital signs: reviewed and stable  Pain management: adequate  Airway patency: patent  PONV status at discharge: No PONV  Anesthetic complications: no      Cardiovascular status: blood pressure returned to baseline and stable  Respiratory status: unassisted, spontaneous ventilation and room air  Hydration status: euvolemic  Follow-up not needed.          Vitals Value Taken Time   /81 10/25/2019 10:27 AM   Temp 36.4 °C (97.6 °F) 10/25/2019  9:57 AM   Pulse 73 10/25/2019 10:27 AM   Resp 16 10/25/2019 10:27 AM   SpO2 97 % 10/25/2019 10:27 AM         Event Time     Out of Recovery 09:50:00          Pain/Karthikeyan Score: Pain Rating Prior to Med Admin: 0 (10/25/2019  7:20 AM)  Karthikeyan Score: 10 (10/25/2019 10:27 AM)

## 2019-10-25 NOTE — PLAN OF CARE
Chelsea Gould has met all discharge criteria from Phase II. Vital Signs are stable, ambulating  without difficulty. Discharge instructions given, patient verbalized understanding. Discharged from facility via wheelchair in stable condition.

## 2019-10-25 NOTE — DISCHARGE SUMMARY
[facility]  Brief Op Note/Discharge Note  Short Stay    Admit Date: 10/25/2019    Discharge Date: 10/25/2019    Attending Physician: Marcia Canseco MD     Surgery Date: 10/25/2019     Surgeon(s) and Role:     * Marcia Canseco MD - Primary    Assisting Surgeon: None    Pre-op Diagnosis:  PMB (postmenopausal bleeding) [N95.0]    Post-op Diagnosis:  Post-Op Diagnosis Codes:     * PMB (postmenopausal bleeding) [N95.0]    Procedure(s) (LRB):  HYSTEROSCOPY, WITH DILATION AND CURETTAGE OF UTERUS (N/A)    Anesthesia: Choice    Findings/Key Components:   1. Normal externalia genitalia and cervix   2. Uterus sounded to 7 cm  3. 6 mm hysteroscope inserted with no difficulty   4. Endometrial curettage performed with scrapings sent for pathology   5. Hysteroscopic deficit 60 cc    Estimated Blood Loss: < 50 cc       Specimens:   Specimen (12h ago, onward)     Start     Ordered    10/25/19 0901  Specimen to Pathology - Surgery  Once     Comments:  Pre-op Diagnosis: PMB (postmenopausal bleeding) [N95.0]Procedure(s):HYSTEROSCOPY, WITH DILATION AND CURETTAGE OF UTERUS Number of specimens: 1Name of specimens: 1. ENDOMETRIAL CURETTAGE      10/25/19 0900                Discharge Provider: María Gutierrez    Diagnoses:  Active Hospital Problems    Diagnosis  POA    *Status post D&C [Z98.890]  Not Applicable    PMB (postmenopausal bleeding) [N95.0]  Yes      Resolved Hospital Problems   No resolved problems to display.       Discharged Condition: good    Hospital Course:   Patient was admitted for outpatient procedure as above, and tolerated the procedure well with no complications. Please see operative report for further details. Following the procedure, the patient was awakened from anesthesia and transferred to the recovery area in stable condition. She was discharged to home once ambulating, voiding, tolerating PO intake, and pain was well-controlled. Patient was given routine post-op instructions and prescriptions  "for pain medication to take as needed. Patient instructed to follow up with Dr. Canseco in 6 weeks.    Final Diagnoses: Same as principal problem.    Disposition: Home or Self Care    Follow up/Patient Instructions:    Medications:  Reconciled Home Medications:      Medication List      START taking these medications    ibuprofen 600 MG tablet  Commonly known as:  ADVIL,MOTRIN  Take 1 tablet (600 mg total) by mouth every 6 (six) hours as needed for Pain.        CONTINUE taking these medications    alendronate 70 MG tablet  Commonly known as:  FOSAMAX  Take 1 tablet (70 mg total) by mouth every 7 days.     aspirin 81 MG EC tablet  Commonly known as:  ECOTRIN  Take 81 mg by mouth.     BD ULTRA-FINE MINI PEN NEEDLE 31 gauge x 3/16" Ndle  Generic drug:  pen needle, diabetic  Inject 1 each into the skin 4 (four) times daily with meals and nightly.     brimonidine-timolol 0.2-0.5 % Drop  Commonly known as:  COMBIGAN     ciprofloxacin HCl 0.3 % ophthalmic solution  Commonly known as:  CILOXAN     diazePAM 5 MG tablet  Commonly known as:  VALIUM     DULoxetine 60 MG capsule  Commonly known as:  CYMBALTA  Take 60 mg by mouth 2 (two) times daily.     FREESTYLE OSMAN 14 DAY SENSOR Kit  Generic drug:  flash glucose sensor  U UTD WITH READER FOR GLUCOSE     hydrOXYzine pamoate 25 MG Cap  Commonly known as:  VISTARIL  Take 25 mg by mouth.     insulin aspart U-100 100 unit/mL (3 mL) Inpn pen  Commonly known as:  NovoLOG Flexpen U-100 Insulin  Take 17 units with breakfast, and 12 units with lunch and dinner, Plus correction scale     insulin glargine 100 units/mL (3mL) SubQ pen  Inject 32 Units into the skin every evening.     ketorolac 0.5% 0.5 % Drop  Commonly known as:  ACULAR     levothyroxine 112 MCG tablet  Commonly known as:  SYNTHROID  Take 1 tablet (112 mcg total) by mouth before breakfast.     linaGLIPtin 5 mg Tab tablet  Commonly known as:  TRADJENTA  Take 1 tablet (5 mg total) by mouth once daily.     loteprednol 0.5 % " ophthalmic suspension  Commonly known as:  LOTEMAX     ofloxacin 0.3 % ophthalmic solution  Commonly known as:  OCUFLOX  INSTILL 1 DROP INTO AFFECTED EYE 4 TIMES A DAY     oxyCODONE 10 mg Tab immediate release tablet  Commonly known as:  ROXICODONE  Take 10 mg by mouth every 8 (eight) hours as needed.     PRODIGY AUTOCODE METER kit  Generic drug:  blood-glucose meter     PRODIGY NO CODING Strp  Generic drug:  blood sugar diagnostic     * PROLENSA 0.07 % Drop  Generic drug:  bromfenac  Place 1 drop into both eyes 2 (two) times daily.     * bromfenac 0.07 % Drop  Commonly known as:  PROLENSA  Apply 1 drop to eye.     * RESTASIS 0.05 % ophthalmic emulsion  Generic drug:  cycloSPORINE  Place 1 drop into both eyes 2 (two) times daily.     * cycloSPORINE 0.05 % ophthalmic emulsion  Commonly known as:  RESTASIS  Apply 1 drop to eye.     rosuvastatin 10 MG tablet  Commonly known as:  CRESTOR  Take 10 mg by mouth.     triamcinolone acetonide 0.1% 0.1 % cream  Commonly known as:  KENALOG  APPLY TWICE DAILY TO ITCHY SKIN UP TO 2 WEEKS/MONTH AS NEEDED     VITAMIN D2 50,000 unit Cap  Generic drug:  ergocalciferol  Take 50,000 Units by mouth every 7 days.         * This list has 4 medication(s) that are the same as other medications prescribed for you. Read the directions carefully, and ask your doctor or other care provider to review them with you.              Discharge Procedure Orders   Diet Adult Regular     Notify your health care provider if you experience any of the following:  temperature >100.4     Notify your health care provider if you experience any of the following:  persistent nausea and vomiting or diarrhea     Notify your health care provider if you experience any of the following:  severe uncontrolled pain     Notify your health care provider if you experience any of the following:  redness, tenderness, or signs of infection (pain, swelling, redness, odor or green/yellow discharge around incision site)      Notify your health care provider if you experience any of the following:  difficulty breathing or increased cough     Notify your health care provider if you experience any of the following:  severe persistent headache     Notify your health care provider if you experience any of the following:  worsening rash     Notify your health care provider if you experience any of the following:  persistent dizziness, light-headedness, or visual disturbances     Notify your health care provider if you experience any of the following:  increased confusion or weakness     Notify your health care provider if you experience any of the following:   Order Comments: If you have vaginal bleeding greater than 1 pad per hour for 2 hours     Activity as tolerated     Follow-up Information     Marcia Canseco MD. Schedule an appointment as soon as possible for a visit in 6 weeks.    Specialty:  Obstetrics and Gynecology  Why:  Post op  Contact information:  [address] [address]  [phone]                   Monet Gutierrez MD  OBGYN PGY-1

## 2019-10-25 NOTE — DISCHARGE INSTRUCTIONS
Anesthesia: After Your Surgery  Youve just had surgery. During surgery, you received medication called anesthesia to keep you comfortable and pain-free. After surgery, you may experience some pain or nausea. This is common. Here are some tips for feeling better and recovering after surgery.    Going home  Your doctor or nurse will show you how to take care of yourself when you go home. He or she will also answer your questions. Have an adult family member or friend drive you home. For the first 24 hours after your surgery:  · Do not drive or use heavy equipment.  · Do not make important decisions or sign legal documents.  · Avoid alcohol.  · Have someone stay with you, if needed. He or she can watch for problems and help keep you safe.  · Take your time getting up from a seated or lying position. You may experience dizziness for 24 hours  Be sure to keep all follow-up appointments with your doctor. And rest after your procedure for as long as your doctor tells you to.    Coping with pain  If you have pain after surgery, pain medication will help you feel better. Take it as directed, before pain becomes severe. Also, ask your doctor or pharmacist about other ways to control pain, such as with heat, ice, and relaxation. And follow any other instructions your surgeon or nurse gives you.    URINARY RETENTION  Should you experience a decrease in your urine output or are unable to urinate following surgery, this can be due to the medications given during surgery.  We recommend you going to the nearest Emergency Department.    Tips for taking pain medication  To get the best relief possible, remember these points:  · Pain medications can upset your stomach. Taking them with a little food may help.  · Most pain relievers taken by mouth need at least 20 to 30 minutes to take effect.  · Taking medication on a schedule can help you remember to take it. Try to time your medication so that you can take it before beginning an  activity, such as dressing, walking, or sitting down for dinner.  · Constipation is a common side effect of pain medications. Contact your doctor before taking any medications like laxatives or stool softeners to help relieve constipation. Also ask about any dietary restrictions, because drinking lots of fluids and eating foods like fruits and vegetables that are high in fiber can also help. Remember, dont take laxatives unless your surgeon has prescribed them.  · Mixing alcohol and pain medication can cause dizziness and slow your breathing. It can even be fatal. Dont drink alcohol while taking pain medication.  · Pain medication can slow your reflexes. Dont drive or operate machinery while taking pain medication.  If your health care provider tells you to take acetaminophen to help relieve your pain, ask him or her how much you are supposed to take each day. (Acetaminophen is the generic name for Tylenol and other brand-name pain relievers.) Acetaminophen or other pain relievers may interact with your prescription medicines or other over-the-counter (OTC) drugs. Some prescription medications contain acetaminophen along with other active ingredients. Using both prescription and OTC acetaminophen for pain can cause you to overdose. The FDA recommends that you read the labels on your OTC medications carefully. This will help you to clearly understand the list of active ingredients, dosing instructions, and any warnings. It may also help you avoid taking too much acetaminophen. If you have questions or don't understand the information, ask your pharmacist or health care provider to explain it to you before you take the OTC medication.    Managing nausea  Some people have an upset stomach after surgery. This is often due to anesthesia, pain, pain medications, or the stress of surgery. The following tips will help you manage nausea and get good nutrition as you recover. If you were on a special diet before surgery,  ask your doctor if you should follow it during recovery. These tips may help:  · Dont push yourself to eat. Your body will tell you when to eat and how much.  · Start off with clear liquids and soup. They are easier to digest.  · Progress to semi-solid foods (mashed potatoes, applesauce, and gelatin) as you feel ready.  · Slowly move to solid foods. Dont eat fatty, rich, or spicy foods at first.  · Dont force yourself to have three large meals a day. Instead, eat smaller amounts more often.  · Take pain medications with a small amount of solid food, such as crackers or toast to avoid nausea.      Call your surgeon if    · You feel too sleepy, dizzy, or groggy (medication may be too strong).  · You have side effects like nausea, vomiting, or skin changes (rash, itching, or hives).   © 5469-8429 [employer]. [address] [place] [address]. All rights reserved. This information is not intended as a substitute for professional medical care. Always follow your healthcare professional's instructions.      Home Care Instruction D&C Hysteroscopy             ACTIVITY LEVEL:  If you received sedation and/or an anesthetic, you may feel sleepy for several hours. Rest until you feel more  awake. Gradually resume your normal activities.    DIET:  At home, continue with liquids. If there is no nausea, you may eat a soft diet and gradually resume a regular diet.    BATHING:  You may shower  as desired in one day.  You should avoid tub baths, hot tubs and swimming pools until seen by your physician for a post-op follow up.    CARE:  You can expect watery or bloody vaginal discharge for several days. Do not use tampons, please only use pads. Sexual activity is restricted as advised by your doctor.    MEDICATIONS:  You will receive instructions for any pain and/or antibiotic prescriptions. Pain medication should be taken only if needed and as directed. Antibiotics, if ordered, should be taken as directed  until the entire prescription is completed.    ADDITIONAL INFORMATION:  __________________________________________________________________________________________  WHEN TO CALL THE DOCTOR:   For any heavy vaginal bleeding   Fever over 101°F (38.4°C)   Any lower abdominal pain not relieved by the pain mediation      FOLLOW ANY OTHER INSTRUCTIONS GIVEN TO YOU BY DR PAL

## 2019-10-25 NOTE — INTERVAL H&P NOTE
The patient has been examined and the H&P has been reviewed:    I concur with the findings and no changes have occurred since H&P was written.    Anesthesia/Surgery risks, benefits and alternative options discussed and understood by patient/family.    Monet Gutierrez MD  OBGYN PGY-1        Active Hospital Problems    Diagnosis  POA    PMB (postmenopausal bleeding) [N95.0]  Yes      Resolved Hospital Problems   No resolved problems to display.

## 2019-10-25 NOTE — OP NOTE
OPERATIVE REPORT    PREOPERATIVE DIAGNOSIS  1. Postmenopausal bleeding    POSTOPERATIVE DIAGNOSIS  1. Same  2. S/p hysteroscopy and D&C    PROCEDURE:  1. Hysteroscopy  2. Dilation and curettage     SURGEON: Marcia Canseco MD    ASSISTANT: Monet Gutierrez MD-PGY1    ANESTHESIA: General    COMPLICATIONS: None    EBL: <50 ml    IV FLUIDS: 30  cc    URINE OUTPUT: 25  Cc    Hysteroscopy fluid deficit: 60 cc    FINDINGS:   1. Normal externalia genitalia and cervix   2. Uterus sounded to 7 cm  3. 6 mm hysteroscope inserted with no difficulty   4. Endometrial curettage performed with scrapings sent for pathology   5. Hysteroscopic deficit 60 cc    PROCEDURE: Patient was taking to the operating room where general anesthesia was administered and found to be adequate.  She was prepped and draped in the dorsal lithotomy position.  A weighted sterile speculum was placed in the vagina.  The anterior lip of the cervix was grasped with a single tooth tenaculum.  The uterus was sounded to approximately 7 cm.  The 6 mm hysteroscope was advanced through the cervical os and the uterus was distended with normal saline.  The endometrium was inspected and found to be smooth.  The tubal ostia were identified without difficulty, and appeared normal. The hysteroscope was withdrawn without difficulty.     The uterus was curetted in a clockwise fashion until gritty feeling was noted in all aspects of the uterus. The endometrial scrapings were sent to pathology. The tenaculum was removed and hemostasis was noted at the puncture sites in the cervix.     Sponge, lap, needle counts were correct x 2. The patient was taken to the recovery room in stable condition.    Monet Gutierrez MD  OBGYN PGY-1

## 2019-10-29 ENCOUNTER — PATIENT OUTREACH (OUTPATIENT)
Dept: ADMINISTRATIVE | Facility: OTHER | Age: 79
End: 2019-10-29

## 2019-10-31 ENCOUNTER — OFFICE VISIT (OUTPATIENT)
Dept: ENDOCRINOLOGY | Facility: CLINIC | Age: 79
End: 2019-10-31
Payer: MEDICARE

## 2019-10-31 VITALS
BODY MASS INDEX: 34.66 KG/M2 | DIASTOLIC BLOOD PRESSURE: 65 MMHG | WEIGHT: 201.88 LBS | HEART RATE: 81 BPM | SYSTOLIC BLOOD PRESSURE: 120 MMHG

## 2019-10-31 DIAGNOSIS — Z79.4 TYPE 2 DIABETES MELLITUS WITH HYPERGLYCEMIA, WITH LONG-TERM CURRENT USE OF INSULIN: Primary | ICD-10-CM

## 2019-10-31 DIAGNOSIS — N18.30 CKD (CHRONIC KIDNEY DISEASE) STAGE 3, GFR 30-59 ML/MIN: ICD-10-CM

## 2019-10-31 DIAGNOSIS — E87.5 HYPERKALEMIA: ICD-10-CM

## 2019-10-31 DIAGNOSIS — E11.65 TYPE 2 DIABETES MELLITUS WITH HYPERGLYCEMIA, WITH LONG-TERM CURRENT USE OF INSULIN: Primary | ICD-10-CM

## 2019-10-31 PROCEDURE — 99499 RISK ADDL DX/OHS AUDIT: ICD-10-PCS | Mod: HCNC,S$GLB,, | Performed by: NURSE PRACTITIONER

## 2019-10-31 PROCEDURE — 99999 PR PBB SHADOW E&M-EST. PATIENT-LVL V: ICD-10-PCS | Mod: PBBFAC,HCNC,, | Performed by: NURSE PRACTITIONER

## 2019-10-31 PROCEDURE — 99214 OFFICE O/P EST MOD 30 MIN: CPT | Mod: HCNC,S$GLB,, | Performed by: NURSE PRACTITIONER

## 2019-10-31 PROCEDURE — 1101F PT FALLS ASSESS-DOCD LE1/YR: CPT | Mod: HCNC,CPTII,S$GLB, | Performed by: NURSE PRACTITIONER

## 2019-10-31 PROCEDURE — 99999 PR PBB SHADOW E&M-EST. PATIENT-LVL V: CPT | Mod: PBBFAC,HCNC,, | Performed by: NURSE PRACTITIONER

## 2019-10-31 PROCEDURE — 99499 UNLISTED E&M SERVICE: CPT | Mod: HCNC,S$GLB,, | Performed by: NURSE PRACTITIONER

## 2019-10-31 PROCEDURE — 99214 PR OFFICE/OUTPT VISIT, EST, LEVL IV, 30-39 MIN: ICD-10-PCS | Mod: HCNC,S$GLB,, | Performed by: NURSE PRACTITIONER

## 2019-10-31 PROCEDURE — 1101F PR PT FALLS ASSESS DOC 0-1 FALLS W/OUT INJ PAST YR: ICD-10-PCS | Mod: HCNC,CPTII,S$GLB, | Performed by: NURSE PRACTITIONER

## 2019-10-31 NOTE — PROGRESS NOTES
CC: This 79 y.o. White female  is here for evaluation of  T2DM along with comorbidities indicated in the Visit Diagnosis section of this encounter.    HPI: Chelsea Gould was diagnosed with T2DM at age 44.     Initial visit 9/18/19  Last seen by Dr. Bustamante in 6/2019. New to me.   She was advised by Dr. Bustamante to increase Lantus to 32 units and Novolog from 15 to 17 units before breakfast (so 17-15-15 units ac). She did not increase Lantus to 32 units because she has vaginal irritation when she urinates and believes this is because of Lantus. She stopped taking Ozempic after the 3rd dose of 0.5 mg because she was having generalized pruritis at night. However, she is still having itching. She has seen dermatologist and has been rx'd a cream. Has also been on Trulicity in the past but stopped this as well bc she believed it gave her a yeast infection. She does not want to try either Ozempic nor Trulicity again.   She is a difficult historian. Rambles. Very focused on pruritis and familial issues.   Plan Continue Lantus 30 units every night.   Continue Novolog 17 units before breakfast . Reduce Novolog to 12 units before lunch and dinner to avoid low blood sugars.   Start Tradjenta 5 mg once daily in the morning to help with high blood sugars.   Monitor blood sugar 4x/day before meals and bedtime with Freestyle yoav.   Return to clinic in 6 weeks. Please call if still having low blood sugars less than 80. Labs today - a1c and bmp       Interval history arrives with her friend Becca, who is very supportive.   She has adjusted her insulin doses advised and started Tradjenta. She is a difficult historian and unable to recall recent diet. Wants to do exercise but afraid that her BG will drop. Continues to test her BG > 10x/day with the Freestyle Yoav CGM.     CGM interpretation: Glucoses continues to fluctuate a lot but hypoglycemia much improved with only 1 episode in the last 2 weeks at BG of 76. BGs did sabina to 300-400s  "around the time she had her surgery, a D&C last week.         LAST DIABETES EDUCATION: 7/17/19 - Liliam Chow RN, CDE   It was determined that VGo was not a good option for the patient "due to the lack of sensation in her fingers which will make it difficult to push the buttons for bolusing, lack of home support, and confusion."       DIABETES MEDICATIONS: Lantus Solostar 30 units every night, Novolog Flexpen 17-12-12 units ac, Tradjenta 5 mg once daily      Misses medication doses - No    DM COMPLICATIONS: nephropathy    SIGNIFICANT DIABETES MED HISTORY:   Diarrhea on metformin    SELF MONITORING BLOOD GLUCOSE: monitors glucose at least 10x/day. See Media for Freestyle Yoav report.   As above.     Average glucose 209 SD 69  Above 180 - 61%   - 39%     HYPOGLYCEMIC EPISODES: as above; patient corrects with soda.      CURRENT DIET: eats 3 meals/day. She understands she must eat some type of starch with meals in order to avoid low BG from Novolog.           /65 (BP Location: Left arm, Patient Position: Sitting, BP Method: Large (Automatic))   Pulse 81   Wt 91.6 kg (201 lb 14.4 oz)   BMI 34.66 kg/m²       ROS:   CONSTITUTIONAL: Appetite good, denies fatigue  CV: No chest pain         PHYSICAL EXAM:  GENERAL: Well developed, well nourished. No acute distress.   PSYCH: AAOx3, conversant, well-groomed. Judgement and insight good. + anxiety   NEURO: Cranial nerves grossly intact. Speech clear, no tremor.   CHEST: Respirations even and unlabored.     Hemoglobin A1C   Date Value Ref Range Status   09/18/2019 7.6 (H) 4.0 - 5.6 % Final     Comment:     ADA Screening Guidelines:  5.7-6.4%  Consistent with prediabetes  >or=6.5%  Consistent with diabetes  High levels of fetal hemoglobin interfere with the HbA1C  assay. Heterozygous hemoglobin variants (HbS, HgC, etc)do  not significantly interfere with this assay.   However, presence of multiple variants may affect accuracy.             Chemistry      "   Component Value Date/Time     09/18/2019 0932    K 5.4 (H) 09/18/2019 0932     09/18/2019 0932    CO2 28 09/18/2019 0932    BUN 26 (H) 09/18/2019 0932    CREATININE 1.3 09/18/2019 0932     09/18/2019 0932        Component Value Date/Time    CALCIUM 9.8 09/18/2019 0932    ESTGFRAFRICA 46 (A) 09/18/2019 0932    EGFRNONAA 40 (A) 09/18/2019 0932          Lab Results   Component Value Date    LDLCALC 62 03/15/2019    LDLCALC 62 03/15/2019        Ref. Range 3/15/2019 00:00   Chol/HDL Ratio Unknown 2.8   Cholesterol, Total Unknown 126   HDL Latest Units: mg/dL 45   LDL Cholesterol External Latest Units: MG/DL 62   Non-HDL Cholesterol Unknown 81   Triglycerides Latest Ref Range: 40 - 160 mg/dL 111     Lab Results   Component Value Date    MICALBCREAT 9.1 05/01/2019           ASSESSMENT and PLAN:    A1C GOAL:     1. Type 2 diabetes mellitus with hyperglycemia, with long-term current use of insulin  Continue current treatment.   Monitor BG at least 5x/day.   rtc in 3 mo with labs prior.     Hemoglobin A1c   2. CKD (chronic kidney disease) stage 3, GFR 30-59 ml/min  Basic metabolic panel    Basic metabolic panel   3. Hyperkalemia  Bmp today.        Orders Placed This Encounter   Procedures    Hemoglobin A1c     Standing Status:   Future     Standing Expiration Date:   12/29/2020    Basic metabolic panel     Standing Status:   Future     Standing Expiration Date:   12/29/2020    Basic metabolic panel     Standing Status:   Future     Number of Occurrences:   1     Standing Expiration Date:   12/29/2020        Follow up in about 3 months (around 1/31/2020).

## 2019-11-05 ENCOUNTER — TELEPHONE (OUTPATIENT)
Dept: ENDOCRINOLOGY | Facility: CLINIC | Age: 79
End: 2019-11-05

## 2019-11-05 DIAGNOSIS — N18.30 CKD (CHRONIC KIDNEY DISEASE) STAGE 3, GFR 30-59 ML/MIN: Primary | ICD-10-CM

## 2019-11-05 NOTE — TELEPHONE ENCOUNTER
----- Message from Esperanza Gonzalez NP sent at 11/5/2019  8:49 AM CST -----  Kidney function continues to be low and potassium still high. Please call to schedule an appt with a nephrologist, kidney doctor, to make sure you are on the right medications to help preserve kidney function and monitor your labs.

## 2019-11-05 NOTE — TELEPHONE ENCOUNTER
----- Message from Bernarda Sellers sent at 11/5/2019 10:11 AM CST -----  Contact: becca   Name of Who is Calling:Becca pt caregiver       What is the request in detail: Per Becca the nephrologists she was referred to can't see her until march and she needs to know what should she do       Can the clinic reply by [facility]: no    What Number to Call Back if not in [facility]: [phone]

## 2019-11-05 NOTE — TELEPHONE ENCOUNTER
Called pt to speak with her about her kidney function and potassium lab results: kidney function continues to be low (44) and potassium still high (5.3). Pt advise to establish care with nephrologist per Esperanza. Pt asked about a referral for a nephrologist. Pt asked to speak with Esperanza. Pt had furthered questions. Call transferred to Esperanza.

## 2019-11-05 NOTE — TELEPHONE ENCOUNTER
Called Becca back but was unable to reach her. Called the pt to speak with her about establishing care with a nephrologist. Pt stated that her caregiver Becca called and was told that the earliest appt available was in March. Pt stated she and Becca talked to the 3 nephrologists referred to them by Esperanza. Pt advised to call and set up an appt and to ask to be put on the waiting list per Esperanza's advice. Pt stated she will call and set up an appointment and asked to be put on a waiting list. No further questions.

## 2019-11-05 NOTE — TELEPHONE ENCOUNTER
Provided patient with names of nephrologists and a phone number to call to schedule. She voiced understanding.

## 2019-11-06 ENCOUNTER — TELEPHONE (OUTPATIENT)
Dept: OBSTETRICS AND GYNECOLOGY | Facility: CLINIC | Age: 79
End: 2019-11-06

## 2019-11-06 NOTE — TELEPHONE ENCOUNTER
Called patient to discuss pathology report.  No answer.  Left voicemail for patient to call clinic.  Also called Becca (pt's caregiver) and no answer.  Left voicemail with her as well.  Amys number ([phone])    Marcia Canseco MD  Obstetrics & Gynecology

## 2019-11-07 ENCOUNTER — TELEPHONE (OUTPATIENT)
Dept: GYNECOLOGIC ONCOLOGY | Facility: CLINIC | Age: 79
End: 2019-11-07

## 2019-11-07 ENCOUNTER — TELEPHONE (OUTPATIENT)
Dept: OBSTETRICS AND GYNECOLOGY | Facility: CLINIC | Age: 79
End: 2019-11-07

## 2019-11-07 NOTE — TELEPHONE ENCOUNTER
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 Thank you!  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 Vieira,     This is the patient I was talking to you about.  Spoke with her this afternoon and let her know that your office would be contacting her to set up an appointment.  Thank you for your help.     Harleen

## 2019-11-07 NOTE — TELEPHONE ENCOUNTER
Called patient to discuss pathology report from D&C.  Discussed that she needs CKC with gyn onc to assess for cervical cancer vs dysplasia.  Will message gyn onc to have them schedule patient.  Pt would like me to call and talk to daughter about what is going on.      Marcia Canseco MD  Obstetrics & Gynecology

## 2019-11-07 NOTE — TELEPHONE ENCOUNTER
----- Message from Elroy Watt sent at 11/7/2019  9:14 AM CST -----  PT RETURNING YOUR CALL [phone]

## 2019-11-08 ENCOUNTER — TELEPHONE (OUTPATIENT)
Dept: OBSTETRICS AND GYNECOLOGY | Facility: CLINIC | Age: 79
End: 2019-11-08

## 2019-11-08 NOTE — TELEPHONE ENCOUNTER
----- Message from Marcia Canseco MD sent at 11/8/2019 11:31 AM CST -----  Hey, its likely that Gyn onc was calling her, as I didn't call her back yesterday.  I would like gyn onc know that she was trying to call back so they can reach her.     Harleen    ----- Message -----  From: Camelia Hamilton MA  Sent: 11/7/2019   4:55 PM CST  To: Marcia Canseco MD    Pt returning your phone call  ----- Message -----  From: Elroy Watt  Sent: 11/7/2019   4:36 PM CST  To: Harleen Kennedy Staff    Please call pt she is returning your call [phone]

## 2019-11-08 NOTE — TELEPHONE ENCOUNTER
Called number in pts chart. Was informed that pt went to eye doctor and will return the call later.

## 2019-11-12 ENCOUNTER — TELEPHONE (OUTPATIENT)
Dept: ENDOCRINOLOGY | Facility: CLINIC | Age: 79
End: 2019-11-12

## 2019-11-12 NOTE — TELEPHONE ENCOUNTER
----- Message from Deepa Calixto sent at 11/12/2019 10:47 AM CST -----  Contact: Alfredo ziegler/[facility] [phone]  Type: RX Refill Request    Who Called: Alfredo ziegler/[facility]    Have you contacted your pharmacy: Yes. Pharmacy calling.    Refill or New Rx: Refill    RX Name and Strength: linaGLIPtin (TRADJENTA) 5 mg Tab tablet    Is this a 30 day or 90 day RX: 90 day    Preferred Pharmacy with phone number: .  [facility] [place] [address]  Phone: [phone] Fax: [phone]    Local or Mail Order: Local    Would the patient rather a call back or a response via [facility]? Call back

## 2019-11-19 ENCOUNTER — TELEPHONE (OUTPATIENT)
Dept: GYNECOLOGIC ONCOLOGY | Facility: CLINIC | Age: 79
End: 2019-11-19

## 2019-11-20 ENCOUNTER — TELEPHONE (OUTPATIENT)
Dept: GYNECOLOGIC ONCOLOGY | Facility: CLINIC | Age: 79
End: 2019-11-20

## 2019-11-20 ENCOUNTER — DOCUMENTATION ONLY (OUTPATIENT)
Dept: GYNECOLOGIC ONCOLOGY | Facility: CLINIC | Age: 79
End: 2019-11-20
Payer: MEDICARE

## 2019-11-20 ENCOUNTER — INITIAL CONSULT (OUTPATIENT)
Dept: GYNECOLOGIC ONCOLOGY | Facility: CLINIC | Age: 79
End: 2019-11-20
Payer: MEDICARE

## 2019-11-20 VITALS
SYSTOLIC BLOOD PRESSURE: 116 MMHG | HEIGHT: 64 IN | DIASTOLIC BLOOD PRESSURE: 58 MMHG | HEART RATE: 64 BPM | WEIGHT: 199.31 LBS | BODY MASS INDEX: 34.03 KG/M2

## 2019-11-20 DIAGNOSIS — N87.9 CERVICAL DYSPLASIA: Primary | ICD-10-CM

## 2019-11-20 DIAGNOSIS — N95.0 PMB (POSTMENOPAUSAL BLEEDING): Primary | ICD-10-CM

## 2019-11-20 DIAGNOSIS — D06.0 CARCINOMA IN SITU OF ENDOCERVIX: ICD-10-CM

## 2019-11-20 PROCEDURE — 99999 PR PBB SHADOW E&M-EST. PATIENT-LVL III: CPT | Mod: PBBFAC,HCNC,, | Performed by: OBSTETRICS & GYNECOLOGY

## 2019-11-20 PROCEDURE — 99204 OFFICE O/P NEW MOD 45 MIN: CPT | Mod: HCNC,S$GLB,, | Performed by: OBSTETRICS & GYNECOLOGY

## 2019-11-20 PROCEDURE — 1126F PR PAIN SEVERITY QUANTIFIED, NO PAIN PRESENT: ICD-10-PCS | Mod: HCNC,S$GLB,, | Performed by: OBSTETRICS & GYNECOLOGY

## 2019-11-20 PROCEDURE — 99499 UNLISTED E&M SERVICE: CPT | Mod: S$PBB,,, | Performed by: OBSTETRICS & GYNECOLOGY

## 2019-11-20 PROCEDURE — 99499 UNLISTED E&M SERVICE: CPT | Mod: HCNC,S$GLB,, | Performed by: OBSTETRICS & GYNECOLOGY

## 2019-11-20 PROCEDURE — 1126F AMNT PAIN NOTED NONE PRSNT: CPT | Mod: HCNC,S$GLB,, | Performed by: OBSTETRICS & GYNECOLOGY

## 2019-11-20 PROCEDURE — 1159F PR MEDICATION LIST DOCUMENTED IN MEDICAL RECORD: ICD-10-PCS | Mod: HCNC,S$GLB,, | Performed by: OBSTETRICS & GYNECOLOGY

## 2019-11-20 PROCEDURE — 99999 PR PBB SHADOW E&M-EST. PATIENT-LVL III: ICD-10-PCS | Mod: PBBFAC,HCNC,, | Performed by: OBSTETRICS & GYNECOLOGY

## 2019-11-20 PROCEDURE — 1101F PR PT FALLS ASSESS DOC 0-1 FALLS W/OUT INJ PAST YR: ICD-10-PCS | Mod: HCNC,CPTII,S$GLB, | Performed by: OBSTETRICS & GYNECOLOGY

## 2019-11-20 PROCEDURE — 99499 RISK ADDL DX/OHS AUDIT: ICD-10-PCS | Mod: S$PBB,,, | Performed by: OBSTETRICS & GYNECOLOGY

## 2019-11-20 PROCEDURE — 1101F PT FALLS ASSESS-DOCD LE1/YR: CPT | Mod: HCNC,CPTII,S$GLB, | Performed by: OBSTETRICS & GYNECOLOGY

## 2019-11-20 PROCEDURE — 99499 RISK ADDL DX/OHS AUDIT: ICD-10-PCS | Mod: HCNC,S$GLB,, | Performed by: OBSTETRICS & GYNECOLOGY

## 2019-11-20 PROCEDURE — 99204 PR OFFICE/OUTPT VISIT, NEW, LEVL IV, 45-59 MIN: ICD-10-PCS | Mod: HCNC,S$GLB,, | Performed by: OBSTETRICS & GYNECOLOGY

## 2019-11-20 PROCEDURE — 1159F MED LIST DOCD IN RCRD: CPT | Mod: HCNC,S$GLB,, | Performed by: OBSTETRICS & GYNECOLOGY

## 2019-11-20 NOTE — PROGRESS NOTES
Referred by Dr. Marcia Canseco for newly diagnosed endocervical carcinoma in situ.     Evaluated by Dr. Canseco for post menopausal bleeding.  Pap 9/20/18 negative   EMB 9/27/19   FINAL PATHOLOGIC DIAGNOSIS  Endometrium, curettage:  Scant strips of inactive endometrial glands and lower uterine segment glands in a background of mucus and blood.  Negative for atypia or malignancy.  Comment: No significant stromal component is present to evaluate for endometrial hyperplasia. The findings may  reflect an atrophic process. However, re-sampling may be considered in a highly suspicious clinical setting.    Underwent further evaluation with D&C hysteroscopy 10/25/19.   FINAL PATHOLOGIC DIAGNOSIS  ENDOMETRIAL CURETTAGE:  - Multiple fragments of squamous epithelium with severe squamous dysplasia/carcinoma in situ with involvement of  the endocervix (see comment).  - Rare small fragments of atrophic endometrium.  - Strips of benign endocervical epithelium and fragments of benign squamous epithelium intermixed with blood and  mucus.  - No evidence of endometrial hyperplasia, endometrial intraepithelial neoplasia or endometrial adenocarcinoma.  COMMENT: The fragments of squamous epithelium represent at least severe squamous dysplasia/carcinoma in  situ and appear to arise from the cervix. The fragments are cut in a tangential fashion and the interface of the  epithelium with the stroma cannot be accurately evaluated. The possibility of an invasive squamous cell carcinoma  cannot be excluded. There is no evidence of endometrial intraepithelial neoplasia or endometrial adenocarcinoma.  However, the possibility of a squamous cell carcinoma arising within the endometrium, although unlikely, cannot be  excluded.     Pelvic US 10/9/19  Uterus:  Size: 6.8 x 3.6 x 3.8 cm  Masses: There are multiple small partially calcified uterine fibroids present with the largest measuring 1.4 cm in greatest dimension. Multiple nabothian cysts are  present.  Endometrium: Upper normal in this post menopausal patient, measuring 5 mm.  There is a trace amount of free fluid within the endometrial cavity as well.  Right ovary: Not visualized.  Left ovary: Not visualized.    Medical comorbidities include DM (last hgb A1c 7.6, Cr 1.2), HLD, hypothyroid.

## 2019-11-20 NOTE — LETTER
November 25, 2019      Marcia Canseco MD  [address]  [address]  [place] [place] [address]           [facility] [address] [facility] [address]  [address], [address]  [facility] [address]  Phone: [phone]  Fax: [phone]          Patient: Chelsea Gould   MR Number: [id number]   YOB: 1940   Date of Visit: 11/20/2019       Dear Dr. Marcia Canseco:    Thank you for referring Chelsea Gould to me for evaluation. Attached you will find relevant portions of my assessment and plan of care.    If you have questions, please do not hesitate to call me. I look forward to following Chelsea Gould along with you.    Sincerely,    Fani Vieira MD    Enclosure  CC:  No Recipients    If you would like to receive this communication electronically, please contact [email or web address] or [phone] to request more information on [employer] access.    For providers and/or their staff who would like to refer a patient to [facility], please contact us through our one-stop-shop provider referral line, [facility], at [phone].    If you feel you have received this communication in error or would no longer like to receive these types of communications, please e-mail [email or web address]

## 2019-11-20 NOTE — PROGRESS NOTES
Subjective:      Patient ID: Chelsea Gould is a 79 y.o. female.    Chief Complaint: New Consult      HPI  Referred by Dr. Marcia Canseco for newly diagnosed endocervical carcinoma in situ.      Evaluated by Dr. Canseco for post menopausal bleeding.  Pap 9/20/18 negative   EMB 9/27/19   FINAL PATHOLOGIC DIAGNOSIS  Endometrium, curettage:  Scant strips of inactive endometrial glands and lower uterine segment glands in a background of mucus and blood.  Negative for atypia or malignancy.  Comment: No significant stromal component is present to evaluate for endometrial hyperplasia. The findings may  reflect an atrophic process. However, re-sampling may be considered in a highly suspicious clinical setting.     Underwent further evaluation with D&C hysteroscopy 10/25/19.   FINAL PATHOLOGIC DIAGNOSIS  ENDOMETRIAL CURETTAGE:  - Multiple fragments of squamous epithelium with severe squamous dysplasia/carcinoma in situ with involvement of  the endocervix (see comment).  - Rare small fragments of atrophic endometrium.  - Strips of benign endocervical epithelium and fragments of benign squamous epithelium intermixed with blood and  mucus.  - No evidence of endometrial hyperplasia, endometrial intraepithelial neoplasia or endometrial adenocarcinoma.  COMMENT: The fragments of squamous epithelium represent at least severe squamous dysplasia/carcinoma in  situ and appear to arise from the cervix. The fragments are cut in a tangential fashion and the interface of the  epithelium with the stroma cannot be accurately evaluated. The possibility of an invasive squamous cell carcinoma  cannot be excluded. There is no evidence of endometrial intraepithelial neoplasia or endometrial adenocarcinoma.  However, the possibility of a squamous cell carcinoma arising within the endometrium, although unlikely, cannot be  excluded.      Pelvic US 10/9/19  Uterus:  Size: 6.8 x 3.6 x 3.8 cm  Masses: There are multiple small partially calcified uterine  fibroids present with the largest measuring 1.4 cm in greatest dimension. Multiple nabothian cysts are present.  Endometrium: Upper normal in this post menopausal patient, measuring 5 mm.  There is a trace amount of free fluid within the endometrial cavity as well.  Right ovary: Not visualized.  Left ovary: Not visualized.     Medical comorbidities include DM (last hgb A1c 7.6, Cr 1.2), HLD, hypothyroid.   Review of Systems   Constitutional: Negative for appetite change, chills, fatigue and fever.   HENT: Negative for mouth sores.    Respiratory: Negative for cough and shortness of breath.    Cardiovascular: Negative for leg swelling.   Gastrointestinal: Negative for abdominal pain, blood in stool, constipation and diarrhea.   Endocrine: Negative for cold intolerance.   Genitourinary: Negative for dysuria and vaginal bleeding.   Musculoskeletal: Negative for myalgias.   Skin: Negative for rash.   Allergic/Immunologic: Negative.    Neurological: Negative for weakness and numbness.   Hematological: Negative for adenopathy. Does not bruise/bleed easily.   Psychiatric/Behavioral: Negative for confusion.       Past Medical History:   Diagnosis Date    Concern about heart attack without diagnosis     silent heart attack    Diabetes mellitus     Eye problems      Past Surgical History:   Procedure Laterality Date    EYE SURGERY      HYSTEROSCOPY WITH DILATION AND CURETTAGE OF UTERUS N/A 10/25/2019    Procedure: HYSTEROSCOPY, WITH DILATION AND CURETTAGE OF UTERUS;  Surgeon: Marcia Canseco MD;  Location: [facility];  Service: OB/GYN;  Laterality: N/A;    JOINT REPLACEMENT Right     hip    TUBAL LIGATION       Family History   Problem Relation Age of Onset    Breast cancer Daughter 44    Colon cancer Neg Hx      Social History     Socioeconomic History    Marital status:      Spouse name: Not on file    Number of children: Not on file    Years of education: Not on file    Highest education level: Not on  "file   Occupational History    Not on file   Social Needs    Financial resource strain: Somewhat hard    Food insecurity:     Worry: Never true     Inability: Never true    Transportation needs:     Medical: Yes     Non-medical: Yes   Tobacco Use    Smoking status: Never Smoker    Smokeless tobacco: Never Used   Substance and Sexual Activity    Alcohol use: No     Frequency: Never     Drinks per session: Patient refused     Binge frequency: Never    Drug use: No    Sexual activity: Not Currently     Partners: Male   Lifestyle    Physical activity:     Days per week: 1 day     Minutes per session: 20 min    Stress: To some extent   Relationships    Social connections:     Talks on phone: More than three times a week     Gets together: Three times a week     Attends [employer] service: Not on file     Active member of club or organization: No     Attends meetings of clubs or organizations: Patient refused     Relationship status:    Other Topics Concern    Not on file   Social History Narrative    Not on file     Current Outpatient Medications   Medication Sig    alendronate (FOSAMAX) 70 MG tablet Take 1 tablet (70 mg total) by mouth every 7 days.    aspirin (ECOTRIN) 81 MG EC tablet Take 81 mg by mouth.    BD ULTRA-FINE MINI PEN NEEDLE 31 gauge x 3/16" Ndle Inject 1 each into the skin 4 (four) times daily with meals and nightly.    brimonidine-timolol (COMBIGAN) 0.2-0.5 % Drop     bromfenac (PROLENSA) 0.07 % Drop Apply 1 drop to eye.    ciprofloxacin HCl (CILOXAN) 0.3 % ophthalmic solution     cycloSPORINE (RESTASIS) 0.05 % ophthalmic emulsion Apply 1 drop to eye.    diazePAM (VALIUM) 5 MG tablet     DULoxetine (CYMBALTA) 60 MG capsule Take 60 mg by mouth 2 (two) times daily.    FREESTYLE OSMAN 14 DAY SENSOR Kit U UTD WITH READER FOR GLUCOSE    hydrOXYzine pamoate (VISTARIL) 25 MG Cap Take 25 mg by mouth.    ibuprofen (ADVIL,MOTRIN) 600 MG tablet Take 1 tablet (600 mg total) by mouth " every 6 (six) hours as needed for Pain.    insulin aspart U-100 (NOVOLOG FLEXPEN U-100 INSULIN) 100 unit/mL (3 mL) InPn pen Take 17 units with breakfast, and 12 units with lunch and dinner, Plus correction scale    insulin glargine 100 units/mL (3mL) SubQ pen Inject 32 Units into the skin every evening.    ketorolac 0.5% (ACULAR) 0.5 % Drop     levothyroxine (SYNTHROID) 112 MCG tablet Take 1 tablet (112 mcg total) by mouth before breakfast.    linaGLIPtin (TRADJENTA) 5 mg Tab tablet Take 1 tablet (5 mg total) by mouth once daily.    loteprednol (LOTEMAX) 0.5 % ophthalmic suspension     ofloxacin (OCUFLOX) 0.3 % ophthalmic solution INSTILL 1 DROP INTO AFFECTED EYE 4 TIMES A DAY    oxyCODONE (ROXICODONE) 10 mg Tab immediate release tablet Take 10 mg by mouth every 8 (eight) hours as needed.    PRODIGY AUTOCODE METER kit     PRODIGY NO CODING Strp     PROLENSA 0.07 % Drop Place 1 drop into both eyes 2 (two) times daily.    RESTASIS 0.05 % ophthalmic emulsion Place 1 drop into both eyes 2 (two) times daily.    rosuvastatin (CRESTOR) 10 MG tablet Take 10 mg by mouth.    triamcinolone acetonide 0.1% (KENALOG) 0.1 % cream APPLY TWICE DAILY TO ITCHY SKIN UP TO 2 WEEKS/MONTH AS NEEDED    VITAMIN D2 50,000 unit capsule Take 50,000 Units by mouth every 7 days.     No current facility-administered medications for this visit.      Review of patient's allergies indicates:  No Known Allergies    Objective:   Physical Exam:   Constitutional: She is oriented to person, place, and time. She appears well-developed and well-nourished.    HENT:   Head: Normocephalic and atraumatic.    Eyes: Pupils are equal, round, and reactive to light. EOM are normal.    Neck: Normal range of motion. Neck supple. No thyromegaly present.    Cardiovascular: Normal rate, regular rhythm and intact distal pulses.     Pulmonary/Chest: Effort normal and breath sounds normal. No respiratory distress. She has no wheezes.        Abdominal: Soft.  Bowel sounds are normal. She exhibits no distension, no ascites and no mass. There is no tenderness.             Musculoskeletal: Normal range of motion and moves all extremeties.      Lymphadenopathy:     She has no cervical adenopathy.        Right: No supraclavicular adenopathy present.        Left: No supraclavicular adenopathy present.    Neurological: She is alert and oriented to person, place, and time.    Skin: Skin is warm and dry. No rash noted.    Psychiatric: She has a normal mood and affect.       Assessment:     1. PMB (postmenopausal bleeding)    2. Carcinoma in situ of endocervix        Plan:   No orders of the defined types were placed in this encounter.      We discussed the findings of severely dysplastic endocervical cells found on evaluation of postmenopausal bleeding and D&C. Reviewed the need for further evaluation with cervical conization for diagnostic purposes. Further treatment planning will depend on dysplastic versus malignant findings which were discussed as differential possibilities. She agrees to proceed.     The risks, benefits, and indications of the procedure were discussed with the patient and her family members if present.  These included bleeding, transfusion, infection, damage to surrounding tissues (bowel, bladder, ureter), wound separation, lymphedema, conversion to laparotomy if laparoscopic, perioperative cardiac events, VTE, pneumonia, and possible death.  She voiced understanding, all questions were answered and consents were signed.    Plan for [facility] 11/26/19 [facility]  Pre op anesthesia.

## 2019-11-20 NOTE — H&P (VIEW-ONLY)
Subjective:      Patient ID: Chelsea Gould is a 79 y.o. female.    Chief Complaint: New Consult      HPI  Referred by Dr. Marcia Canseco for newly diagnosed endocervical carcinoma in situ.      Evaluated by Dr. Canseco for post menopausal bleeding.  Pap 9/20/18 negative   EMB 9/27/19   FINAL PATHOLOGIC DIAGNOSIS  Endometrium, curettage:  Scant strips of inactive endometrial glands and lower uterine segment glands in a background of mucus and blood.  Negative for atypia or malignancy.  Comment: No significant stromal component is present to evaluate for endometrial hyperplasia. The findings may  reflect an atrophic process. However, re-sampling may be considered in a highly suspicious clinical setting.     Underwent further evaluation with D&C hysteroscopy 10/25/19.   FINAL PATHOLOGIC DIAGNOSIS  ENDOMETRIAL CURETTAGE:  - Multiple fragments of squamous epithelium with severe squamous dysplasia/carcinoma in situ with involvement of  the endocervix (see comment).  - Rare small fragments of atrophic endometrium.  - Strips of benign endocervical epithelium and fragments of benign squamous epithelium intermixed with blood and  mucus.  - No evidence of endometrial hyperplasia, endometrial intraepithelial neoplasia or endometrial adenocarcinoma.  COMMENT: The fragments of squamous epithelium represent at least severe squamous dysplasia/carcinoma in  situ and appear to arise from the cervix. The fragments are cut in a tangential fashion and the interface of the  epithelium with the stroma cannot be accurately evaluated. The possibility of an invasive squamous cell carcinoma  cannot be excluded. There is no evidence of endometrial intraepithelial neoplasia or endometrial adenocarcinoma.  However, the possibility of a squamous cell carcinoma arising within the endometrium, although unlikely, cannot be  excluded.      Pelvic US 10/9/19  Uterus:  Size: 6.8 x 3.6 x 3.8 cm  Masses: There are multiple small partially calcified uterine  fibroids present with the largest measuring 1.4 cm in greatest dimension. Multiple nabothian cysts are present.  Endometrium: Upper normal in this post menopausal patient, measuring 5 mm.  There is a trace amount of free fluid within the endometrial cavity as well.  Right ovary: Not visualized.  Left ovary: Not visualized.     Medical comorbidities include DM (last hgb A1c 7.6, Cr 1.2), HLD, hypothyroid.   Review of Systems   Constitutional: Negative for appetite change, chills, fatigue and fever.   HENT: Negative for mouth sores.    Respiratory: Negative for cough and shortness of breath.    Cardiovascular: Negative for leg swelling.   Gastrointestinal: Negative for abdominal pain, blood in stool, constipation and diarrhea.   Endocrine: Negative for cold intolerance.   Genitourinary: Negative for dysuria and vaginal bleeding.   Musculoskeletal: Negative for myalgias.   Skin: Negative for rash.   Allergic/Immunologic: Negative.    Neurological: Negative for weakness and numbness.   Hematological: Negative for adenopathy. Does not bruise/bleed easily.   Psychiatric/Behavioral: Negative for confusion.       Past Medical History:   Diagnosis Date    Concern about heart attack without diagnosis     silent heart attack    Diabetes mellitus     Eye problems      Past Surgical History:   Procedure Laterality Date    EYE SURGERY      HYSTEROSCOPY WITH DILATION AND CURETTAGE OF UTERUS N/A 10/25/2019    Procedure: HYSTEROSCOPY, WITH DILATION AND CURETTAGE OF UTERUS;  Surgeon: Marcia Canseco MD;  Location: [facility];  Service: OB/GYN;  Laterality: N/A;    JOINT REPLACEMENT Right     hip    TUBAL LIGATION       Family History   Problem Relation Age of Onset    Breast cancer Daughter 44    Colon cancer Neg Hx      Social History     Socioeconomic History    Marital status:      Spouse name: Not on file    Number of children: Not on file    Years of education: Not on file    Highest education level: Not on  "file   Occupational History    Not on file   Social Needs    Financial resource strain: Somewhat hard    Food insecurity:     Worry: Never true     Inability: Never true    Transportation needs:     Medical: Yes     Non-medical: Yes   Tobacco Use    Smoking status: Never Smoker    Smokeless tobacco: Never Used   Substance and Sexual Activity    Alcohol use: No     Frequency: Never     Drinks per session: Patient refused     Binge frequency: Never    Drug use: No    Sexual activity: Not Currently     Partners: Male   Lifestyle    Physical activity:     Days per week: 1 day     Minutes per session: 20 min    Stress: To some extent   Relationships    Social connections:     Talks on phone: More than three times a week     Gets together: Three times a week     Attends Congregational service: Not on file     Active member of club or organization: No     Attends meetings of clubs or organizations: Patient refused     Relationship status:    Other Topics Concern    Not on file   Social History Narrative    Not on file     Current Outpatient Medications   Medication Sig    alendronate (FOSAMAX) 70 MG tablet Take 1 tablet (70 mg total) by mouth every 7 days.    aspirin (ECOTRIN) 81 MG EC tablet Take 81 mg by mouth.    BD ULTRA-FINE MINI PEN NEEDLE 31 gauge x 3/16" Ndle Inject 1 each into the skin 4 (four) times daily with meals and nightly.    brimonidine-timolol (COMBIGAN) 0.2-0.5 % Drop     bromfenac (PROLENSA) 0.07 % Drop Apply 1 drop to eye.    ciprofloxacin HCl (CILOXAN) 0.3 % ophthalmic solution     cycloSPORINE (RESTASIS) 0.05 % ophthalmic emulsion Apply 1 drop to eye.    diazePAM (VALIUM) 5 MG tablet     DULoxetine (CYMBALTA) 60 MG capsule Take 60 mg by mouth 2 (two) times daily.    FREESTYLE OSMAN 14 DAY SENSOR Kit U UTD WITH READER FOR GLUCOSE    hydrOXYzine pamoate (VISTARIL) 25 MG Cap Take 25 mg by mouth.    ibuprofen (ADVIL,MOTRIN) 600 MG tablet Take 1 tablet (600 mg total) by mouth " every 6 (six) hours as needed for Pain.    insulin aspart U-100 (NOVOLOG FLEXPEN U-100 INSULIN) 100 unit/mL (3 mL) InPn pen Take 17 units with breakfast, and 12 units with lunch and dinner, Plus correction scale    insulin glargine 100 units/mL (3mL) SubQ pen Inject 32 Units into the skin every evening.    ketorolac 0.5% (ACULAR) 0.5 % Drop     levothyroxine (SYNTHROID) 112 MCG tablet Take 1 tablet (112 mcg total) by mouth before breakfast.    linaGLIPtin (TRADJENTA) 5 mg Tab tablet Take 1 tablet (5 mg total) by mouth once daily.    loteprednol (LOTEMAX) 0.5 % ophthalmic suspension     ofloxacin (OCUFLOX) 0.3 % ophthalmic solution INSTILL 1 DROP INTO AFFECTED EYE 4 TIMES A DAY    oxyCODONE (ROXICODONE) 10 mg Tab immediate release tablet Take 10 mg by mouth every 8 (eight) hours as needed.    PRODIGY AUTOCODE METER kit     PRODIGY NO CODING Strp     PROLENSA 0.07 % Drop Place 1 drop into both eyes 2 (two) times daily.    RESTASIS 0.05 % ophthalmic emulsion Place 1 drop into both eyes 2 (two) times daily.    rosuvastatin (CRESTOR) 10 MG tablet Take 10 mg by mouth.    triamcinolone acetonide 0.1% (KENALOG) 0.1 % cream APPLY TWICE DAILY TO ITCHY SKIN UP TO 2 WEEKS/MONTH AS NEEDED    VITAMIN D2 50,000 unit capsule Take 50,000 Units by mouth every 7 days.     No current facility-administered medications for this visit.      Review of patient's allergies indicates:  No Known Allergies    Objective:   Physical Exam:   Constitutional: She is oriented to person, place, and time. She appears well-developed and well-nourished.    HENT:   Head: Normocephalic and atraumatic.    Eyes: Pupils are equal, round, and reactive to light. EOM are normal.    Neck: Normal range of motion. Neck supple. No thyromegaly present.    Cardiovascular: Normal rate, regular rhythm and intact distal pulses.     Pulmonary/Chest: Effort normal and breath sounds normal. No respiratory distress. She has no wheezes.        Abdominal: Soft.  Bowel sounds are normal. She exhibits no distension, no ascites and no mass. There is no tenderness.             Musculoskeletal: Normal range of motion and moves all extremeties.      Lymphadenopathy:     She has no cervical adenopathy.        Right: No supraclavicular adenopathy present.        Left: No supraclavicular adenopathy present.    Neurological: She is alert and oriented to person, place, and time.    Skin: Skin is warm and dry. No rash noted.    Psychiatric: She has a normal mood and affect.       Assessment:     1. PMB (postmenopausal bleeding)    2. Carcinoma in situ of endocervix        Plan:   No orders of the defined types were placed in this encounter.      We discussed the findings of severely dysplastic endocervical cells found on evaluation of postmenopausal bleeding and D&C. Reviewed the need for further evaluation with cervical conization for diagnostic purposes. Further treatment planning will depend on dysplastic versus malignant findings which were discussed as differential possibilities. She agrees to proceed.     The risks, benefits, and indications of the procedure were discussed with the patient and her family members if present.  These included bleeding, transfusion, infection, damage to surrounding tissues (bowel, bladder, ureter), wound separation, lymphedema, conversion to laparotomy if laparoscopic, perioperative cardiac events, VTE, pneumonia, and possible death.  She voiced understanding, all questions were answered and consents were signed.    Plan for [facility] 11/26/19 [facility]  Pre op anesthesia.

## 2019-11-25 ENCOUNTER — OFFICE VISIT (OUTPATIENT)
Dept: OBSTETRICS AND GYNECOLOGY | Facility: CLINIC | Age: 79
End: 2019-11-25
Payer: MEDICARE

## 2019-11-25 VITALS
SYSTOLIC BLOOD PRESSURE: 140 MMHG | BODY MASS INDEX: 34.44 KG/M2 | WEIGHT: 200.63 LBS | DIASTOLIC BLOOD PRESSURE: 78 MMHG

## 2019-11-25 DIAGNOSIS — Z98.890 STATUS POST D&C: Primary | ICD-10-CM

## 2019-11-25 PROBLEM — D06.0 CARCINOMA IN SITU OF ENDOCERVIX: Status: ACTIVE | Noted: 2019-11-25

## 2019-11-25 PROCEDURE — 99999 PR PBB SHADOW E&M-EST. PATIENT-LVL III: CPT | Mod: PBBFAC,HCNC,, | Performed by: OBSTETRICS & GYNECOLOGY

## 2019-11-25 PROCEDURE — 99024 POSTOP FOLLOW-UP VISIT: CPT | Mod: HCNC,S$GLB,, | Performed by: OBSTETRICS & GYNECOLOGY

## 2019-11-25 PROCEDURE — 99024 PR POST-OP FOLLOW-UP VISIT: ICD-10-PCS | Mod: HCNC,S$GLB,, | Performed by: OBSTETRICS & GYNECOLOGY

## 2019-11-25 PROCEDURE — 99999 PR PBB SHADOW E&M-EST. PATIENT-LVL III: ICD-10-PCS | Mod: PBBFAC,HCNC,, | Performed by: OBSTETRICS & GYNECOLOGY

## 2019-11-25 RX ORDER — LIDOCAINE HYDROCHLORIDE 10 MG/ML
1 INJECTION, SOLUTION EPIDURAL; INFILTRATION; INTRACAUDAL; PERINEURAL ONCE
Status: CANCELLED | OUTPATIENT
Start: 2019-11-25 | End: 2019-11-25

## 2019-11-25 RX ORDER — SODIUM CHLORIDE 9 MG/ML
INJECTION, SOLUTION INTRAVENOUS CONTINUOUS
Status: CANCELLED | OUTPATIENT
Start: 2019-11-25

## 2019-11-25 NOTE — PRE ADMISSION SCREENING
Pre admit phone call completed.    Instructions given to patient about NPO status as follows:     The evening before surgery do not eat anything after 9 p.m. ( this includes hard candy, chewing gum and mints).  You may only have GATORADE, POWERADE AND WATER from 9 p.m. until you leave your home. DO NOT  DRINK ANY LIQUIDS ON THE WAY TO THE HOSPITAL.      Patient was also instructed on the below information:    Park in the Parking lot behind the hospital or in the [facility] across the street from the parking lot.  Parking is complimentary.  If you will be discharged the same day as your procedure, please arrange for a responsible adult to drive you home or  to accompany you if traveling by taxi.  YOU WILL NOT BE PERMITTED TO DRIVE OR TO LEAVE THE HOSPITAL ALONE AFTER SURGERY.  It is strongly recommended that you arrange for someone to remain with you for the first 24 hrs following your surgery.    Patient verbalized understanding of above instructions.

## 2019-11-25 NOTE — PROGRESS NOTES
History & Physical  Gynecology      SUBJECTIVE:     Chief Complaint: Post-op Evaluation       History of Present Illness:  Postoperative Follow-up  Patient presents to the clinic s/p hysteroscopy/ D&C for postmenopausal bleeding. Eating a regular diet without difficulty. Bowel movements are normal. The patient is not having any pain.  Pt reports that she was seen by Gyn onc and is scheduled for her CKC tomorrow.  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  Pt would anxiety and concerns about tomorrow.  Denies any further vaginal bleeding.       FINAL PATHOLOGIC DIAGNOSIS  ENDOMETRIAL CURETTAGE:  - Multiple fragments of squamous epithelium with severe squamous dysplasia/carcinoma in situ with involvement of  the endocervix (see comment).  - Rare small fragments of atrophic endometrium.  - Strips of benign endocervical epithelium and fragments of benign squamous epithelium intermixed with blood and  mucus.  - No evidence of endometrial hyperplasia, endometrial intraepithelial neoplasia or endometrial adenocarcinoma.  COMMENT: The fragments of squamous epithelium represent at least severe squamous dysplasia/carcinoma in  situ and appear to arise from the cervix. The fragments are cut in a tangential fashion and the interface of the  epithelium with the stroma cannot be accurately evaluated. The possibility of an invasive squamous cell carcinoma  cannot be excluded. There is no evidence of endometrial intraepithelial neoplasia or endometrial adenocarcinoma.  However, the possibility of a squamous cell carcinoma arising within the endometrium, although unlikely, cannot be  excluded.        Review of patient's allergies indicates:  No Known Allergies    Past Medical History:   Diagnosis Date    Concern about heart attack without diagnosis     silent heart attack    Diabetes mellitus     Eye problems      Past Surgical History:   Procedure Laterality Date    EYE SURGERY      HYSTEROSCOPY WITH  "DILATION AND CURETTAGE OF UTERUS N/A 10/25/2019    Procedure: HYSTEROSCOPY, WITH DILATION AND CURETTAGE OF UTERUS;  Surgeon: Marcia Canseco MD;  Location: [facility];  Service: OB/GYN;  Laterality: N/A;    JOINT REPLACEMENT Right     hip    TUBAL LIGATION       OB History        5    Para   4    Term   4            AB   1    Living   4       SAB        TAB        Ectopic        Multiple        Live Births                   Family History   Problem Relation Age of Onset    Breast cancer Daughter 44    Colon cancer Neg Hx      Social History     Tobacco Use    Smoking status: Never Smoker    Smokeless tobacco: Never Used   Substance Use Topics    Alcohol use: No     Frequency: Never     Drinks per session: Patient refused     Binge frequency: Never    Drug use: No       Current Outpatient Medications   Medication Sig    alendronate (FOSAMAX) 70 MG tablet Take 1 tablet (70 mg total) by mouth every 7 days.    aspirin (ECOTRIN) 81 MG EC tablet Take 81 mg by mouth.    BD ULTRA-FINE MINI PEN NEEDLE 31 gauge x 3/16" Ndle Inject 1 each into the skin 4 (four) times daily with meals and nightly.    brimonidine-timolol (COMBIGAN) 0.2-0.5 % Drop     bromfenac (PROLENSA) 0.07 % Drop Apply 1 drop to eye.    ciprofloxacin HCl (CILOXAN) 0.3 % ophthalmic solution     cycloSPORINE (RESTASIS) 0.05 % ophthalmic emulsion Apply 1 drop to eye.    diazePAM (VALIUM) 5 MG tablet     DULoxetine (CYMBALTA) 60 MG capsule Take 60 mg by mouth 2 (two) times daily.    FREESTYLE OSMAN 14 DAY SENSOR Kit U UTD WITH READER FOR GLUCOSE    hydrOXYzine pamoate (VISTARIL) 25 MG Cap Take 25 mg by mouth.    ibuprofen (ADVIL,MOTRIN) 600 MG tablet Take 1 tablet (600 mg total) by mouth every 6 (six) hours as needed for Pain.    insulin aspart U-100 (NOVOLOG FLEXPEN U-100 INSULIN) 100 unit/mL (3 mL) InPn pen Take 17 units with breakfast, and 12 units with lunch and dinner, Plus correction scale    insulin glargine 100 units/mL " (3mL) SubQ pen Inject 32 Units into the skin every evening.    ketorolac 0.5% (ACULAR) 0.5 % Drop     levothyroxine (SYNTHROID) 112 MCG tablet Take 1 tablet (112 mcg total) by mouth before breakfast.    linaGLIPtin (TRADJENTA) 5 mg Tab tablet Take 1 tablet (5 mg total) by mouth once daily.    loteprednol (LOTEMAX) 0.5 % ophthalmic suspension     ofloxacin (OCUFLOX) 0.3 % ophthalmic solution INSTILL 1 DROP INTO AFFECTED EYE 4 TIMES A DAY    oxyCODONE (ROXICODONE) 10 mg Tab immediate release tablet Take 10 mg by mouth every 8 (eight) hours as needed.    PRODIGY AUTOCODE METER kit     PRODIGY NO CODING Strp     PROLENSA 0.07 % Drop Place 1 drop into both eyes 2 (two) times daily.    RESTASIS 0.05 % ophthalmic emulsion Place 1 drop into both eyes 2 (two) times daily.    rosuvastatin (CRESTOR) 10 MG tablet Take 10 mg by mouth.    triamcinolone acetonide 0.1% (KENALOG) 0.1 % cream APPLY TWICE DAILY TO ITCHY SKIN UP TO 2 WEEKS/MONTH AS NEEDED    VITAMIN D2 50,000 unit capsule Take 50,000 Units by mouth every 7 days.     No current facility-administered medications for this visit.          Review of Systems:  Review of Systems   Constitutional: Negative for activity change, appetite change, chills, fatigue, fever and unexpected weight change.   Respiratory: Negative for cough, shortness of breath and wheezing.    Cardiovascular: Negative for chest pain and leg swelling.   Gastrointestinal: Negative for abdominal pain, blood in stool, constipation, diarrhea, nausea and vomiting.   Endocrine: Negative for diabetes, hair loss and hot flashes.   Genitourinary: Negative for decreased libido, dyspareunia, dysuria, frequency, menstrual problem, pelvic pain, vaginal bleeding, vaginal discharge, vaginal pain, postcoital bleeding and postmenopausal bleeding.   Musculoskeletal: Negative for back pain.   Integumentary:  Negative for acne, hair changes, breast mass, nipple discharge and breast skin changes.   Neurological:  Negative for headaches.   Psychiatric/Behavioral: Negative for sleep disturbance. The patient is not nervous/anxious.    Breast: Negative for mass, mastodynia, nipple discharge and skin changes       OBJECTIVE:     Physical Exam:  Physical Exam   Constitutional: She is oriented to person, place, and time. She appears well-developed and well-nourished.   HENT:   Head: Normocephalic and atraumatic.   Eyes: Conjunctivae are normal. Right eye exhibits no discharge. Left eye exhibits no discharge. No scleral icterus.   Pulmonary/Chest: Effort normal. No stridor.   Abdominal: Soft. She exhibits no distension. There is no tenderness.   Musculoskeletal: Normal range of motion.   Neurological: She is alert and oriented to person, place, and time.   Skin: Skin is warm and dry.   Psychiatric: She has a normal mood and affect. Her behavior is normal. Judgment and thought content normal.   Pelvic exam deferred    ASSESSMENT:       ICD-10-CM ICD-9-CM    1. Status post D&C Z98.890 V45.89           Plan:      Chelsea was seen today for post-op evaluation.    Diagnoses and all orders for this visit:    Status post D&C  - Doing well post-operatively  - Again discussed the pathology report with the patient.  Pt is being followed by Dr. Vieira with plans for CKC tomorrow  - Pt reports feeling anxious about the procedure.  Otherwise, doing well.   - Will follow up path report and follow along with Dr. Vieira      No orders of the defined types were placed in this encounter.      Follow up if symptoms worsen or fail to improve.     Counseling time: 30 minutes    Marcia Canseco

## 2019-11-26 ENCOUNTER — HOSPITAL ENCOUNTER (OUTPATIENT)
Facility: OTHER | Age: 79
Discharge: HOME | End: 2019-11-26
Attending: OBSTETRICS & GYNECOLOGY | Admitting: OBSTETRICS & GYNECOLOGY
Payer: MEDICARE

## 2019-11-26 ENCOUNTER — ANESTHESIA (OUTPATIENT)
Dept: SURGERY | Facility: OTHER | Age: 79
End: 2019-11-26
Payer: MEDICARE

## 2019-11-26 ENCOUNTER — ANESTHESIA EVENT (OUTPATIENT)
Dept: SURGERY | Facility: OTHER | Age: 79
End: 2019-11-26
Payer: MEDICARE

## 2019-11-26 VITALS
HEIGHT: 64 IN | WEIGHT: 200 LBS | DIASTOLIC BLOOD PRESSURE: 80 MMHG | HEART RATE: 72 BPM | TEMPERATURE: 98 F | BODY MASS INDEX: 34.15 KG/M2 | OXYGEN SATURATION: 95 % | SYSTOLIC BLOOD PRESSURE: 170 MMHG | RESPIRATION RATE: 16 BRPM

## 2019-11-26 DIAGNOSIS — N87.9 CERVICAL DYSPLASIA: Primary | ICD-10-CM

## 2019-11-26 DIAGNOSIS — D06.0 CARCINOMA IN SITU OF ENDOCERVIX: Primary | ICD-10-CM

## 2019-11-26 DIAGNOSIS — D06.0 CARCINOMA IN SITU OF ENDOCERVIX: ICD-10-CM

## 2019-11-26 DIAGNOSIS — I95.9 HYPOTENSION, UNSPECIFIED HYPOTENSION TYPE: ICD-10-CM

## 2019-11-26 PROBLEM — Z98.890 STATUS POST CONIZATION OF CERVIX: Status: ACTIVE | Noted: 2019-11-26

## 2019-11-26 PROCEDURE — 88341 IMHCHEM/IMCYTCHM EA ADD ANTB: CPT | Mod: 26,HCNC,, | Performed by: PATHOLOGY

## 2019-11-26 PROCEDURE — 63600175 PHARM REV CODE 636 W HCPCS: Mod: HCNC | Performed by: NURSE ANESTHETIST, CERTIFIED REGISTERED

## 2019-11-26 PROCEDURE — 63600175 PHARM REV CODE 636 W HCPCS: Mod: HCNC | Performed by: SPECIALIST

## 2019-11-26 PROCEDURE — 25000003 PHARM REV CODE 250: Mod: HCNC | Performed by: SPECIALIST

## 2019-11-26 PROCEDURE — 63600175 PHARM REV CODE 636 W HCPCS: Mod: HCNC | Performed by: ANESTHESIOLOGY

## 2019-11-26 PROCEDURE — 88305 TISSUE EXAM BY PATHOLOGIST: ICD-10-PCS | Mod: 26,HCNC,, | Performed by: PATHOLOGY

## 2019-11-26 PROCEDURE — 25000003 PHARM REV CODE 250: Mod: HCNC | Performed by: OBSTETRICS & GYNECOLOGY

## 2019-11-26 PROCEDURE — 88342 IMHCHEM/IMCYTCHM 1ST ANTB: CPT | Mod: 59,HCNC | Performed by: PATHOLOGY

## 2019-11-26 PROCEDURE — 36000707: Mod: HCNC | Performed by: OBSTETRICS & GYNECOLOGY

## 2019-11-26 PROCEDURE — 88307 TISSUE EXAM BY PATHOLOGIST: CPT | Mod: 26,HCNC,, | Performed by: PATHOLOGY

## 2019-11-26 PROCEDURE — 57520 CONIZATION OF CERVIX: CPT | Mod: HCNC,,, | Performed by: OBSTETRICS & GYNECOLOGY

## 2019-11-26 PROCEDURE — 88342 CHG IMMUNOCYTOCHEMISTRY: ICD-10-PCS | Mod: 26,HCNC,, | Performed by: PATHOLOGY

## 2019-11-26 PROCEDURE — 71000033 HC RECOVERY, INTIAL HOUR: Mod: HCNC | Performed by: OBSTETRICS & GYNECOLOGY

## 2019-11-26 PROCEDURE — 57520 PR CONIZATION CERVIX,KNIFE/LASER: ICD-10-PCS | Mod: HCNC,,, | Performed by: OBSTETRICS & GYNECOLOGY

## 2019-11-26 PROCEDURE — 88305 TISSUE EXAM BY PATHOLOGIST: CPT | Mod: 26,HCNC,, | Performed by: PATHOLOGY

## 2019-11-26 PROCEDURE — 88305 TISSUE EXAM BY PATHOLOGIST: CPT | Mod: HCNC | Performed by: PATHOLOGY

## 2019-11-26 PROCEDURE — 88307 TISSUE EXAM BY PATHOLOGIST: CPT | Mod: HCNC | Performed by: PATHOLOGY

## 2019-11-26 PROCEDURE — 88341 PR IHC OR ICC EACH ADD'L SINGLE ANTIBODY  STAINPR: ICD-10-PCS | Mod: 26,HCNC,, | Performed by: PATHOLOGY

## 2019-11-26 PROCEDURE — 88341 IMHCHEM/IMCYTCHM EA ADD ANTB: CPT | Mod: HCNC | Performed by: PATHOLOGY

## 2019-11-26 PROCEDURE — 37000008 HC ANESTHESIA 1ST 15 MINUTES: Mod: HCNC | Performed by: OBSTETRICS & GYNECOLOGY

## 2019-11-26 PROCEDURE — 37000009 HC ANESTHESIA EA ADD 15 MINS: Mod: HCNC | Performed by: OBSTETRICS & GYNECOLOGY

## 2019-11-26 PROCEDURE — 88307 PR  SURG PATH,LEVEL V: ICD-10-PCS | Mod: 26,HCNC,, | Performed by: PATHOLOGY

## 2019-11-26 PROCEDURE — 25000003 PHARM REV CODE 250: Mod: HCNC | Performed by: NURSE ANESTHETIST, CERTIFIED REGISTERED

## 2019-11-26 PROCEDURE — 88342 IMHCHEM/IMCYTCHM 1ST ANTB: CPT | Mod: 26,HCNC,, | Performed by: PATHOLOGY

## 2019-11-26 PROCEDURE — 36000706: Mod: HCNC | Performed by: OBSTETRICS & GYNECOLOGY

## 2019-11-26 PROCEDURE — 71000016 HC POSTOP RECOV ADDL HR: Mod: HCNC | Performed by: OBSTETRICS & GYNECOLOGY

## 2019-11-26 PROCEDURE — 71000015 HC POSTOP RECOV 1ST HR: Mod: HCNC | Performed by: OBSTETRICS & GYNECOLOGY

## 2019-11-26 RX ORDER — PHENYLEPHRINE HYDROCHLORIDE 10 MG/ML
INJECTION INTRAVENOUS
Status: DISCONTINUED | OUTPATIENT
Start: 2019-11-26 | End: 2019-11-26

## 2019-11-26 RX ORDER — DIPHENHYDRAMINE HYDROCHLORIDE 50 MG/ML
25 INJECTION INTRAMUSCULAR; INTRAVENOUS EVERY 4 HOURS PRN
Status: DISCONTINUED | OUTPATIENT
Start: 2019-11-26 | End: 2019-11-26 | Stop reason: HOSPADM

## 2019-11-26 RX ORDER — LIDOCAINE HYDROCHLORIDE 10 MG/ML
1 INJECTION, SOLUTION EPIDURAL; INFILTRATION; INTRACAUDAL; PERINEURAL ONCE
Status: DISCONTINUED | OUTPATIENT
Start: 2019-11-26 | End: 2019-11-26 | Stop reason: HOSPADM

## 2019-11-26 RX ORDER — INSULIN ASPART 100 [IU]/ML
1-10 INJECTION, SOLUTION INTRAVENOUS; SUBCUTANEOUS EVERY 6 HOURS PRN
Status: DISCONTINUED | OUTPATIENT
Start: 2019-11-26 | End: 2019-11-26 | Stop reason: HOSPADM

## 2019-11-26 RX ORDER — IBUPROFEN 600 MG/1
600 TABLET ORAL EVERY 8 HOURS PRN
Qty: 30 TABLET | Refills: 0 | Status: ON HOLD | OUTPATIENT
Start: 2019-11-26 | End: 2020-01-18 | Stop reason: SDUPTHER

## 2019-11-26 RX ORDER — HYDROMORPHONE HYDROCHLORIDE 2 MG/ML
0.4 INJECTION, SOLUTION INTRAMUSCULAR; INTRAVENOUS; SUBCUTANEOUS EVERY 5 MIN PRN
Status: DISCONTINUED | OUTPATIENT
Start: 2019-11-26 | End: 2019-11-26 | Stop reason: HOSPADM

## 2019-11-26 RX ORDER — GLYCOPYRROLATE 0.2 MG/ML
INJECTION INTRAMUSCULAR; INTRAVENOUS
Status: DISCONTINUED | OUTPATIENT
Start: 2019-11-26 | End: 2019-11-26

## 2019-11-26 RX ORDER — VASOPRESSIN 20 [USP'U]/ML
INJECTION, SOLUTION INTRAMUSCULAR; SUBCUTANEOUS
Status: DISCONTINUED | OUTPATIENT
Start: 2019-11-26 | End: 2019-11-26 | Stop reason: HOSPADM

## 2019-11-26 RX ORDER — SODIUM CHLORIDE, SODIUM LACTATE, POTASSIUM CHLORIDE, CALCIUM CHLORIDE 600; 310; 30; 20 MG/100ML; MG/100ML; MG/100ML; MG/100ML
INJECTION, SOLUTION INTRAVENOUS CONTINUOUS PRN
Status: DISCONTINUED | OUTPATIENT
Start: 2019-11-26 | End: 2019-11-26

## 2019-11-26 RX ORDER — MEPERIDINE HYDROCHLORIDE 25 MG/ML
12.5 INJECTION INTRAMUSCULAR; INTRAVENOUS; SUBCUTANEOUS ONCE AS NEEDED
Status: DISCONTINUED | OUTPATIENT
Start: 2019-11-26 | End: 2019-11-26 | Stop reason: HOSPADM

## 2019-11-26 RX ORDER — IBUPROFEN 600 MG/1
600 TABLET ORAL EVERY 6 HOURS PRN
Status: DISCONTINUED | OUTPATIENT
Start: 2019-11-26 | End: 2019-11-26 | Stop reason: HOSPADM

## 2019-11-26 RX ORDER — SODIUM CHLORIDE 9 MG/ML
INJECTION, SOLUTION INTRAVENOUS CONTINUOUS
Status: DISCONTINUED | OUTPATIENT
Start: 2019-11-26 | End: 2019-11-26 | Stop reason: HOSPADM

## 2019-11-26 RX ORDER — DIPHENHYDRAMINE HCL 25 MG
25 CAPSULE ORAL EVERY 4 HOURS PRN
Status: DISCONTINUED | OUTPATIENT
Start: 2019-11-26 | End: 2019-11-26 | Stop reason: HOSPADM

## 2019-11-26 RX ORDER — GLUCAGON 1 MG
1 KIT INJECTION
Status: DISCONTINUED | OUTPATIENT
Start: 2019-11-26 | End: 2019-11-26 | Stop reason: HOSPADM

## 2019-11-26 RX ORDER — ONDANSETRON 2 MG/ML
4 INJECTION INTRAMUSCULAR; INTRAVENOUS DAILY PRN
Status: DISCONTINUED | OUTPATIENT
Start: 2019-11-26 | End: 2019-11-26 | Stop reason: HOSPADM

## 2019-11-26 RX ORDER — ONDANSETRON 2 MG/ML
INJECTION INTRAMUSCULAR; INTRAVENOUS
Status: DISCONTINUED | OUTPATIENT
Start: 2019-11-26 | End: 2019-11-26

## 2019-11-26 RX ORDER — ONDANSETRON 8 MG/1
8 TABLET, ORALLY DISINTEGRATING ORAL EVERY 8 HOURS PRN
Status: DISCONTINUED | OUTPATIENT
Start: 2019-11-26 | End: 2019-11-26 | Stop reason: HOSPADM

## 2019-11-26 RX ORDER — DIPHENHYDRAMINE HYDROCHLORIDE 50 MG/ML
25 INJECTION INTRAMUSCULAR; INTRAVENOUS EVERY 6 HOURS PRN
Status: DISCONTINUED | OUTPATIENT
Start: 2019-11-26 | End: 2019-11-26 | Stop reason: HOSPADM

## 2019-11-26 RX ORDER — OXYCODONE AND ACETAMINOPHEN 5; 325 MG/1; MG/1
1 TABLET ORAL EVERY 8 HOURS PRN
Qty: 20 TABLET | Refills: 0 | Status: ON HOLD | OUTPATIENT
Start: 2019-11-26 | End: 2020-01-18 | Stop reason: SDUPTHER

## 2019-11-26 RX ORDER — SODIUM CHLORIDE 0.9 % (FLUSH) 0.9 %
3 SYRINGE (ML) INJECTION
Status: DISCONTINUED | OUTPATIENT
Start: 2019-11-26 | End: 2019-11-26 | Stop reason: HOSPADM

## 2019-11-26 RX ORDER — FENTANYL CITRATE 50 UG/ML
INJECTION, SOLUTION INTRAMUSCULAR; INTRAVENOUS
Status: DISCONTINUED | OUTPATIENT
Start: 2019-11-26 | End: 2019-11-26

## 2019-11-26 RX ORDER — OXYCODONE HYDROCHLORIDE 5 MG/1
5 TABLET ORAL
Status: DISCONTINUED | OUTPATIENT
Start: 2019-11-26 | End: 2019-11-26 | Stop reason: HOSPADM

## 2019-11-26 RX ORDER — HYDROCODONE BITARTRATE AND ACETAMINOPHEN 5; 325 MG/1; MG/1
1 TABLET ORAL EVERY 4 HOURS PRN
Status: DISCONTINUED | OUTPATIENT
Start: 2019-11-26 | End: 2019-11-26 | Stop reason: HOSPADM

## 2019-11-26 RX ORDER — PROPOFOL 10 MG/ML
VIAL (ML) INTRAVENOUS
Status: DISCONTINUED | OUTPATIENT
Start: 2019-11-26 | End: 2019-11-26

## 2019-11-26 RX ORDER — LIDOCAINE HCL/PF 100 MG/5ML
SYRINGE (ML) INTRAVENOUS
Status: DISCONTINUED | OUTPATIENT
Start: 2019-11-26 | End: 2019-11-26

## 2019-11-26 RX ADMIN — GLYCOPYRROLATE 0.4 MG: 0.2 INJECTION, SOLUTION INTRAMUSCULAR; INTRAVENOUS at 07:11

## 2019-11-26 RX ADMIN — ONDANSETRON 4 MG: 2 INJECTION INTRAMUSCULAR; INTRAVENOUS at 07:11

## 2019-11-26 RX ADMIN — INSULIN ASPART 4 UNITS: 100 INJECTION, SOLUTION INTRAVENOUS; SUBCUTANEOUS at 06:11

## 2019-11-26 RX ADMIN — PHENYLEPHRINE HYDROCHLORIDE 200 MCG: 10 INJECTION INTRAVENOUS at 07:11

## 2019-11-26 RX ADMIN — SODIUM CHLORIDE, SODIUM LACTATE, POTASSIUM CHLORIDE, AND CALCIUM CHLORIDE: 600; 310; 30; 20 INJECTION, SOLUTION INTRAVENOUS at 06:11

## 2019-11-26 RX ADMIN — LIDOCAINE HYDROCHLORIDE 100 MG: 20 INJECTION, SOLUTION INTRAVENOUS at 07:11

## 2019-11-26 RX ADMIN — SODIUM CHLORIDE, SODIUM LACTATE, POTASSIUM CHLORIDE, AND CALCIUM CHLORIDE: 600; 310; 30; 20 INJECTION, SOLUTION INTRAVENOUS at 07:11

## 2019-11-26 RX ADMIN — PROPOFOL 200 MG: 10 INJECTION, EMULSION INTRAVENOUS at 07:11

## 2019-11-26 RX ADMIN — CARBOXYMETHYLCELLULOSE SODIUM 2 DROP: 2.5 SOLUTION/ DROPS OPHTHALMIC at 07:11

## 2019-11-26 RX ADMIN — FENTANYL CITRATE 100 MCG: 50 INJECTION, SOLUTION INTRAMUSCULAR; INTRAVENOUS at 07:11

## 2019-11-26 RX ADMIN — OXYCODONE HYDROCHLORIDE 5 MG: 5 TABLET ORAL at 08:11

## 2019-11-26 NOTE — OR NURSING
Dr. Vieira notified that medication reconciliation had not been completed  Patient expresses interest in going home as soon as possible

## 2019-11-26 NOTE — DISCHARGE INSTRUCTIONS
After a Cone Biopsy    A cone biopsy is a quick outpatient surgery used to find and treat a problem in the cervix. Your healthcare provider may do a cone biopsy if one or more Pap tests and a microscope (colposcopy) exam showed abnormal cells on your cervix. A cone biopsy takes less than an hour, and youll be able to go home the same day.  During your recovery  After the surgery has been done, youll rest in the recovery area until youre awake and ready to go home. An adult friend or family member will need to drive you home.  · Plan to rest at home for a day or two.  · You may have some bleeding or discharge and mild cramping for a few days after surgery. Use sanitary pads, not tampons, for at least the first month.  · You may be given medicine to relieve any discomfort  · Do not have sexual intercourse or douche for 4 to 6 weeks after your biopsy. If the cervix has not fully healed, the tissue could be injured and then bleed.  · Follow any other instructions your healthcare provider gives you.  Getting your results  Your healthcare provider will get the biopsy results and discuss them with you in about a week. He or she will see you in 3 to 6 weeks to be sure the tissue is healing well.  Call your healthcare provider if you have any of the following after your cone biopsy:  · Heavy bleeding (more than a pad an hour) or blood clots  · Severe stomach pain  · Chills  · Fever over 100.4°F (38°C)   Date Last Reviewed: 4/26/2015  © 8077-0859 [employer]. [address] [place] [address], [place] [address]. All rights reserved. This information is not intended as a substitute for professional medical care. Always follow your healthcare professional's instructions.        Discharge Instructions: After Your Surgery  Youve just had surgery. During surgery, you were given medicine called anesthesia to keep you relaxed and free of pain. After surgery, you may have some pain or nausea. This is common. Here are some  tips for feeling better and getting well after surgery.  Going home  Your healthcare provider will show you how to take care of yourself when you go home. He or she will also answer your questions. Have an adult family member or friend drive you home. For the first 24 hours after your surgery:  · Do not drive or use heavy equipment.  · Do not make important decisions or sign legal papers.  · Do not drink alcohol.  · Have someone stay with you, if needed. He or she can watch for problems and help keep you safe.  Be sure to go to all follow-up visits with your healthcare provider. And rest after your surgery for as long as your healthcare provider tells you to.  Coping with pain  If you have pain after surgery, pain medicine will help you feel better. Take it as told, before pain becomes severe. Also, ask your healthcare provider or pharmacist about other ways to control pain. This might be with heat, ice, or relaxation. And follow any other instructions your surgeon or nurse gives you.  Tips for taking pain medicine  To get the best relief possible, remember these points:  · Pain medicines can upset your stomach. Taking them with a little food may help.  · Most pain relievers taken by mouth need at least 20 to 30 minutes to start to work.  · Taking medicine on a schedule can help you remember to take it. Try to time your medicine so that you can take it before starting an activity. This might be before you get dressed, go for a walk, or sit down for dinner.  · Constipation is a common side effect of pain medicines. Call your healthcare provider before taking any medicines such as laxatives or stool softeners to help ease constipation. Also ask if you should skip any foods. Drinking lots of fluids and eating foods such as fruits and vegetables that are high in fiber can also help. Remember, do not take laxatives unless your surgeon has prescribed them.  · Drinking alcohol and taking pain medicine can cause dizziness  and slow your breathing. It can even be deadly. Do not drink alcohol while taking pain medicine.  · Pain medicine can make you react more slowly to things. Do not drive or run machinery while taking pain medicine.  Your healthcare provider may tell you to take acetaminophen to help ease your pain. Ask him or her how much you are supposed to take each day. Acetaminophen or other pain relievers may interact with your prescription medicines or other over-the-counter (OTC) medicines. Some prescription medicines have acetaminophen and other ingredients. Using both prescription and OTC acetaminophen for pain can cause you to overdose. Read the labels on your OTC medicines with care. This will help you to clearly know the list of ingredients, how much to take, and any warnings. It may also help you not take too much acetaminophen. If you have questions or do not understand the information, ask your pharmacist or healthcare provider to explain it to you before you take the OTC medicine.  Managing nausea  Some people have an upset stomach after surgery. This is often because of anesthesia, pain, or pain medicine, or the stress of surgery. These tips will help you handle nausea and eat healthy foods as you get better. If you were on a special food plan before surgery, ask your healthcare provider if you should follow it while you get better. These tips may help:  · Do not push yourself to eat. Your body will tell you when to eat and how much.  · Start off with clear liquids and soup. They are easier to digest.  · Next try semi-solid foods, such as mashed potatoes, applesauce, and gelatin, as you feel ready.  · Slowly move to solid foods. Dont eat fatty, rich, or spicy foods at first.  · Do not force yourself to have 3 large meals a day. Instead eat smaller amounts more often.  · Take pain medicines with a small amount of solid food, such as crackers or toast, to avoid nausea.     Call your surgeon if  · You still have pain  an hour after taking medicine. The medicine may not be strong enough.  · You feel too sleepy, dizzy, or groggy. The medicine may be too strong.  · You have side effects like nausea, vomiting, or skin changes, such as rash, itching, or hives.       If you have obstructive sleep apnea  You were given anesthesia medicine during surgery to keep you comfortable and free of pain. After surgery, you may have more apnea spells because of this medicine and other medicines you were given. The spells may last longer than usual.   At home:  · Keep using the continuous positive airway pressure (CPAP) device when you sleep. Unless your healthcare provider tells you not to, use it when you sleep, day or night. CPAP is a common device used to treat obstructive sleep apnea.  · Talk with your provider before taking any pain medicine, muscle relaxants, or sedatives. Your provider will tell you about the possible dangers of taking these medicines.  Date Last Reviewed: 12/1/2016  © 9064-7885 [employer]. [address] [place] [address], [place] [address]. All rights reserved. This information is not intended as a substitute for professional medical care. Always follow your healthcare professional's instructions.

## 2019-11-26 NOTE — OR NURSING
Patient and daughter instructed as per Dr. Coronel to have her blood pressure checked by her primary care physician next week.

## 2019-11-26 NOTE — TRANSFER OF CARE
"Anesthesia Transfer of Care Note    Patient: Chelsea Gould    Procedure(s) Performed: Procedure(s) (LRB):  CONE BIOPSY, CERVIX, USING COLD KNIFE (N/A)    Patient location: PACU    Anesthesia Type: general    Transport from OR: Transported from OR on 2-3 L/min O2 by NC with adequate spontaneous ventilation    Post pain: adequate analgesia    Post assessment: no apparent anesthetic complications    Post vital signs: stable    Level of consciousness: awake    Nausea/Vomiting: no nausea/vomiting    Complications: none    Transfer of care protocol was followed      Last vitals:   Visit Vitals  BP (!) 173/76 (BP Location: Left arm, Patient Position: Lying)   Pulse 69   Temp 37.1 °C (98.7 °F) (Oral)   Resp 16   Ht 5' 4" (1.626 m)   Wt 90.7 kg (200 lb)   SpO2 96%   Breastfeeding? No   BMI 34.33 kg/m²     "

## 2019-11-26 NOTE — OP NOTE
Operative Report     Procedure: CKC, ECC     Date of Surgery 11/26/2019     Pre-Op Diagnosis:   1. Endocervical carcinoma in situ   2. Postmenopausal bleeding      Post-op Diagnosis:  Same     Complications: none     EBL: minimal       IVF: 700 LR     Urine Output: 50 cc via in/out cath prior to start of procedure     Specimen: Cervical conization, post [facility] ECC     Findings: Findings/Key Components:   1. Normal appearing external genitalia   2. Cervix with no obvious areas of abnormality   3. Lugol's solution with decreased uptake from the 5 to 9 o'clock position  4. 6 cc of vasopressin injected in clockwise fashion around cervix   5. Sutures placed at 3 o'clock and 9 o'clock respectively   6. Scalpel used to resect cone shaped specimen of cervix; marked with suture at 12 o'clokc   7. Hemostasis achieved with bovie cautery and monsel's solution   8. Patient tolerated procedure well without complication    DESCRIPTION OF PROCEDURE:   The patient was taken to the operating room, where adequate anesthesia was obtained without difficulty. The patient was prepped and draped in the normal sterile fashion in the dorsal lithotomy position using Joey stirrups. Attention was then turned to the patient's vagina where the weighted speculum was placed into the posterior fornix and a right angle was placed anteriorly. Two sutures of chromic were used to ligate the cervical branches of the cervical artery at the 3 and 9 o'clock positions in a figure-of-eight suture. Then, the cervix was injected with 6 cc of diluted Pitressin. Lugol solution was applied to the cervix to see any abnormalities. Cold-knife cone specimen was then obtained. This was handed off for pathologic review. Stitch was placed at the 12 o'clock position. Next, endocervical curetting was performed of the canal. This was handed off also for pathologic review. The base of the cervical cone bed was then cauterized using Bovie cautery. The cone site was noted to be  hemostatic. Monsel's solution was then applied for additional hemostasis of the biopsy bed. The sutures were cut and all the instruments removed from the patient's vagina. All sponge, lap and needle counts were correct x2. The patient tolerated the procedure well, was awakened and transferred to the recovery room.

## 2019-11-26 NOTE — ANESTHESIA POSTPROCEDURE EVALUATION
Anesthesia Post Evaluation    Patient: Chelsea Gould    Procedure(s) Performed: Procedure(s) (LRB):  CONE BIOPSY, CERVIX, USING COLD KNIFE (N/A)    Final Anesthesia Type: general    Patient location during evaluation: PACU  Patient participation: Yes- Able to Participate  Level of consciousness: awake and alert and oriented  Post-procedure vital signs: reviewed and stable  Pain management: adequate  Airway patency: patent    PONV status at discharge: No PONV  Anesthetic complications: no      Cardiovascular status: blood pressure returned to baseline and hemodynamically stable  Respiratory status: unassisted, spontaneous ventilation and room air  Hydration status: euvolemic  Follow-up not needed.          Vitals Value Taken Time   /72 11/26/2019  9:25 AM   Temp 36.5 °C (97.7 °F) 11/26/2019  8:35 AM   Pulse 70 11/26/2019  9:25 AM   Resp 16 11/26/2019  9:25 AM   SpO2 97 % 11/26/2019  9:25 AM         Event Time     Out of Recovery 08:29:43          Pain/Karthikeyan Score: Pain Rating Prior to Med Admin: 4 (11/26/2019  8:12 AM)  Pain Rating Post Med Admin: 1 (11/26/2019  8:27 AM)  Karthikeyan Score: 10 (11/26/2019  8:35 AM)

## 2019-11-26 NOTE — DISCHARGE SUMMARY
[facility]  Obstetrics & Gynecology  Discharge Summary    Patient Name: Chelsea Gould  MRN: [id number]  Admission Date: 11/26/2019  Hospital Length of Stay: 0 days  Discharge Date and Time:  11/26/2019 1:20 PM  Attending Physician: No att. 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     Hospital Course: Patient presented for scheduled procedure. Patient was passed back to OR for CKC. Please see OP note for further details. Tolerated procedure well and patient was taken to recovery in a stable condition. Prior to discharge patient was able to void, ambulate, tolerate PO and pain was well controlled with PO meds. Patient was given routine post-op instructions for which patient voiced understanding. Patient was subsequently discharged home.      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    Specialty:  Gynecologic Oncology  Why:  Post-op Appointment  Contact information:  [address] [place] [address] [phone]             Your PCP. Schedule an appointment as soon as possible for a visit in 1 week.    Why:  BP check               Patient Instructions:   No discharge procedures on file.  Medications:  Reconciled Home Medications:      Medication List      START taking these medications    oxyCODONE-acetaminophen 5-325  "mg per tablet  Commonly known as:  PERCOCET  Take 1 tablet by mouth every 8 (eight) hours as needed for Pain.        CONTINUE taking these medications    alendronate 70 MG tablet  Commonly known as:  FOSAMAX  Take 1 tablet (70 mg total) by mouth every 7 days.     aspirin 81 MG EC tablet  Commonly known as:  ECOTRIN  Take 81 mg by mouth.     BD Ultra-Fine Mini Pen Needle 31 gauge x 3/16" Ndle  Generic drug:  pen needle, diabetic  Inject 1 each into the skin 4 (four) times daily with meals and nightly.     brimonidine-timolol 0.2-0.5 % Drop  Commonly known as:  COMBIGAN     ciprofloxacin HCl 0.3 % ophthalmic solution  Commonly known as:  CILOXAN     CULTURELLE ORAL  Take 1 capsule by mouth once daily.     diazePAM 5 MG tablet  Commonly known as:  VALIUM     DULoxetine 60 MG capsule  Commonly known as:  CYMBALTA  Take 60 mg by mouth 2 (two) times daily.     FreeStyle Yoav 14 Day Sensor Kit  Generic drug:  flash glucose sensor  U UTD WITH READER FOR GLUCOSE     hydrOXYzine pamoate 25 MG Cap  Commonly known as:  VISTARIL  Take 25 mg by mouth.     insulin aspart U-100 100 unit/mL (3 mL) Inpn pen  Commonly known as:  NovoLOG Flexpen U-100 Insulin  Take 17 units with breakfast, and 12 units with lunch and dinner, Plus correction scale     insulin glargine 100 units/mL (3mL) SubQ pen  Inject 32 Units into the skin every evening.     ketorolac 0.5% 0.5 % Drop  Commonly known as:  ACULAR     levothyroxine 112 MCG tablet  Commonly known as:  SYNTHROID  Take 1 tablet (112 mcg total) by mouth before breakfast.     linaGLIPtin 5 mg Tab tablet  Commonly known as:  Tradjenta  Take 1 tablet (5 mg total) by mouth once daily.     loteprednol 0.5 % ophthalmic suspension  Commonly known as:  LOTEMAX     ofloxacin 0.3 % ophthalmic solution  Commonly known as:  OCUFLOX  INSTILL 1 DROP INTO AFFECTED EYE 4 TIMES A DAY     Prodigy Autocode Meter kit  Generic drug:  blood-glucose meter     Prodigy No Coding Strp  Generic drug:  blood sugar " diagnostic     * Prolensa 0.07 % Drop  Generic drug:  bromfenac  Place 1 drop into both eyes 2 (two) times daily.     * bromfenac 0.07 % Drop  Commonly known as:  PROLENSA  Apply 1 drop to eye.     * Restasis 0.05 % ophthalmic emulsion  Generic drug:  cycloSPORINE  Place 1 drop into both eyes 2 (two) times daily.     * cycloSPORINE 0.05 % ophthalmic emulsion  Commonly known as:  RESTASIS  Apply 1 drop to eye.     rosuvastatin 10 MG tablet  Commonly known as:  CRESTOR  Take 10 mg by mouth.     triamcinolone acetonide 0.1% 0.1 % cream  Commonly known as:  KENALOG  APPLY TWICE DAILY TO ITCHY SKIN UP TO 2 WEEKS/MONTH AS NEEDED     Vitamin D2 50,000 unit Cap  Generic drug:  ergocalciferol  Take 50,000 Units by mouth every 7 days.         * This list has 4 medication(s) that are the same as other medications prescribed for you. Read the directions carefully, and ask your doctor or other care provider to review them with you.            STOP taking these medications    oxyCODONE 10 mg Tab immediate release tablet  Commonly known as:  ROXICODONE        ASK your doctor about these medications    * ibuprofen 600 MG tablet  Commonly known as:  ADVIL,MOTRIN  Take 1 tablet (600 mg total) by mouth every 6 (six) hours as needed for Pain.     * ibuprofen 600 MG tablet  Commonly known as:  ADVIL,MOTRIN  Take 1 tablet (600 mg total) by mouth every 8 (eight) hours as needed for Pain.         * This list has 2 medication(s) that are the same as other medications prescribed for you. Read the directions carefully, and ask your doctor or other care provider to review them with you.                Fani Vieira MD  Obstetrics & Gynecology  [facility]

## 2019-11-26 NOTE — INTERVAL H&P NOTE
The patient has been examined and the H&P has been reviewed:    I concur with the findings and no changes have occurred since H&P was written.    Anesthesia/Surgery risks, benefits and alternative options discussed and understood by patient/family.          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

## 2019-11-26 NOTE — ANESTHESIA PROCEDURE NOTES
Intubation  Performed by: Claudio Fong CRNA  Authorized by: Natalio Bush MD     Intubation:     Induction:  Intravenous    Mask Ventilation:  Easy with oral airway    Attempts:  1    Attempted By:  CRNA    Intubation method: LMA placement.    Laryngoscope size: N/A.    Difficult Airway Encountered?: No      Complications:  None    Airway Device Size:  4.0 (LMA Supreme)    Style/Cuff Inflation:  Cuffed    Inflation Amount (mL):  20    Secured at:  The lips    Placement Verified By:  Capnometry    Complicating Factors:  None    Findings Post-Intubation:  BS equal bilateral

## 2019-11-26 NOTE — CARE UPDATE
Resident to the patient's room secondary to reported blood pressure of 206/98 on automatic blood pressure cuff.  Per nursing report patient has been agitated and arguing with family members, and blood pressure has been slowly increasing since surgery.  The patient denies any history of hypertension but does report that she is being seen by Nephrology secondary to chronic kidney disease.  The patient denies any headaches, vision changes, chest pain, or shortness of breath.     Manual recheck of blood pressure: 170/80, more consistent with blood pressures over the course of the day today  CV: RRR  Lungs: CTAB    Pt reports that she feels well and would like to be discharged home.  Patient should plan to follow up with her PCP within the next week for assessment of blood pressure and should continue to follow-up with Nephrology.  Advised to return to ED for any headache, vision changes, chest pain, or shortness of breath.  Patient and family voiced understanding of recommendations and plans for follow-up.      Anjali Coronel M.D.  OB/GYN PGY-1

## 2019-11-26 NOTE — ANESTHESIA PREPROCEDURE EVALUATION
11/26/2019  Chelsea Gould is a 79 y.o., female.    Pre-op Assessment    I have reviewed the Patient Summary Reports.     I have reviewed the Nursing Notes.   I have reviewed the Medications.     Review of Systems  Anesthesia Hx:  No problems with previous Anesthesia    Social:  Non-Smoker, No Alcohol Use    Hematology/Oncology:  Hematology Normal   Oncology Normal     EENT/Dental:EENT/Dental Normal   Cardiovascular:   Exercise tolerance: good hyperlipidemia    Pulmonary:  Pulmonary Normal    Renal/:  Renal/ Normal     Hepatic/GI:  Hepatic/GI Normal    Musculoskeletal:  Musculoskeletal Normal    Neurological:  Neurology Normal    Endocrine:   Diabetes, well controlled, type 2, using insulin Hypothyroidism    Dermatological:  Skin Normal    Psych:   Psychiatric History anxiety depression          Physical Exam  General:  Obesity    Airway/Jaw/Neck:  Airway Findings: Mouth Opening: Normal Tongue: Normal  General Airway Assessment: Adult, Average  Mallampati: II  TM Distance: Normal, at least 6 cm  Jaw/Neck Findings:  Neck ROM: Normal ROM      Dental:  Dental Findings: In tact        Mental Status:  Mental Status Findings:  Cooperative, Alert and Oriented         Anesthesia Plan  Type of Anesthesia, risks & benefits discussed:  Anesthesia Type:  general  Patient's Preference:   Intra-op Monitoring Plan: standard ASA monitors  Intra-op Monitoring Plan Comments:   Post Op Pain Control Plan: per primary service following discharge from PACU  Post Op Pain Control Plan Comments:   Induction:    Beta Blocker:         Informed Consent: Patient understands risks and agrees with Anesthesia plan.  Questions answered. Anesthesia consent signed with patient.  ASA Score: 2     Day of Surgery Review of History & Physical:    H&P update referred to the surgeon.     Anesthesia Plan Notes: Recent visit at [facility] [place], operated by [facility]. Returns  today for cold knife cone with Dr. Vieira.        Ready For Surgery From Anesthesia Perspective.

## 2019-12-02 ENCOUNTER — TELEPHONE (OUTPATIENT)
Dept: GYNECOLOGIC ONCOLOGY | Facility: CLINIC | Age: 79
End: 2019-12-02

## 2019-12-02 NOTE — TELEPHONE ENCOUNTER
Spoke with pt. Informed her that her pathology is still pending and takes about 10-14 business days. Pt verbalized understanding and denies any further needs at this time.  ----- Message from Sammie Wren sent at 12/2/2019  1:10 PM CST -----  Contact: SYMONE JONES [[id number]]  Name of Who is Calling: SYMONE JONES [[id number]]      What is the request in detail:   Patient called requesting her recent test results.  Please give a call back at your earliest convenience and further advise.     THANKS!      Reply by [facility]: NO      Call Back:  SYMONE JONES / PH# [phone] (H)

## 2019-12-06 ENCOUNTER — TELEPHONE (OUTPATIENT)
Dept: GYNECOLOGIC ONCOLOGY | Facility: CLINIC | Age: 79
End: 2019-12-06

## 2019-12-06 LAB
FINAL PATHOLOGIC DIAGNOSIS: NORMAL
GROSS: NORMAL

## 2019-12-06 NOTE — TELEPHONE ENCOUNTER
Called and spoke with patient regarding pathology. 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 Stage IA1, negative LVSI.     She voiced understanding. Post op scheduled for 12/17.

## 2019-12-08 ENCOUNTER — TELEPHONE (OUTPATIENT)
Dept: FAMILY MEDICINE | Facility: CLINIC | Age: 79
End: 2019-12-08

## 2019-12-09 NOTE — TELEPHONE ENCOUNTER
Sorry I am just responding to this message.  I would have patient follow up with Dr. Gupta for further work up.

## 2019-12-09 NOTE — TELEPHONE ENCOUNTER
----- Message from Esperanza Gonzalez NP sent at 11/4/2019  7:34 AM CST -----  Hi Dr. Jarrett,     This patient has high potassium level and not sure where I should go from here. I asked her PCP Dr. Gupta and she suggested RTA workup. To be honest, I don't know how to go about it. .     What do you think I should do?     Thanks, Esperanza     ----- Message -----  From: Sari Gupta MD  Sent: 11/1/2019   4:58 PM CST  To: AARON Alas,    No, I don't know why she'd have the elevation. You may want to consider an ?RTA workup.    Sari  ----- Message -----  From: Esperanza Gonzalez NP  Sent: 11/1/2019   9:00 AM CDT  To: Sari Gupta MD    Hi Dr. Gupta,     I saw this patient yesterday. Her potassium was high at 5.4 a month ago and upon repeat, it's 5.3 with specimen slightly hemolyzed. She's not on any RAAS blockers and she is asymptomatic. Thoughts?     ThanksEsperanza

## 2019-12-12 DIAGNOSIS — E55.9 HYPOVITAMINOSIS D: Primary | ICD-10-CM

## 2019-12-13 ENCOUNTER — LAB VISIT (OUTPATIENT)
Dept: LAB | Facility: HOSPITAL | Age: 79
End: 2019-12-13
Attending: NURSE PRACTITIONER
Payer: MEDICARE

## 2019-12-13 DIAGNOSIS — E55.9 HYPOVITAMINOSIS D: ICD-10-CM

## 2019-12-13 PROCEDURE — 36415 COLL VENOUS BLD VENIPUNCTURE: CPT | Mod: HCNC,PN

## 2019-12-13 PROCEDURE — 82306 VITAMIN D 25 HYDROXY: CPT | Mod: HCNC

## 2019-12-13 RX ORDER — ERGOCALCIFEROL 1.25 MG/1
50000 CAPSULE ORAL
Refills: 0 | OUTPATIENT
Start: 2019-12-13

## 2019-12-13 NOTE — TELEPHONE ENCOUNTER
No vitamin D labs are on file. Who has been prescribing her the Vitamin D rx?     Must get lab drawn before I order the prescription.

## 2019-12-13 NOTE — TELEPHONE ENCOUNTER
Spoke with the pt regarding her vitamin D. Pt stated she was waiting to have an office visit with Dr. Bustamante. In the meantime she seen Dr. Aneta Aguirre (Nephrologist) and  (Diabetes Dr.) at [facility]. Pt stated she really needs to have her Vitamin D.

## 2019-12-13 NOTE — TELEPHONE ENCOUNTER
"Spoke with pt regarding making an appt to have her vitamin D labs drawn. Pt expressed that she was very upset to have to "go through all of this". Appt scheduled for 12/13/19 @11:30am at [facility]. Pt also stated she is experiencing straining when urinating and burning after urinating. Pt stated she just had a procedure done (cervical excision). Pt stated she is still passing blood from the procedure but thinks she has an infection. Advised pt to contact Dr. Vieira to inform her of her complications. Pt stated she will not contact her today. She stated she will wait until next Tuesday to talk to her because she has an appt with her on that day.  "

## 2019-12-14 LAB — 25(OH)D3+25(OH)D2 SERPL-MCNC: 22 NG/ML (ref 30–96)

## 2019-12-16 ENCOUNTER — TELEPHONE (OUTPATIENT)
Dept: GYNECOLOGIC ONCOLOGY | Facility: CLINIC | Age: 79
End: 2019-12-16

## 2019-12-16 ENCOUNTER — PATIENT MESSAGE (OUTPATIENT)
Dept: ENDOCRINOLOGY | Facility: CLINIC | Age: 79
End: 2019-12-16

## 2019-12-16 RX ORDER — ERGOCALCIFEROL 1.25 MG/1
50000 CAPSULE ORAL
Qty: 12 CAPSULE | Refills: 0 | Status: SHIPPED | OUTPATIENT
Start: 2019-12-16 | End: 2020-03-09 | Stop reason: SDUPTHER

## 2019-12-17 ENCOUNTER — TELEPHONE (OUTPATIENT)
Dept: GYNECOLOGIC ONCOLOGY | Facility: CLINIC | Age: 79
End: 2019-12-17

## 2019-12-17 ENCOUNTER — OFFICE VISIT (OUTPATIENT)
Dept: GYNECOLOGIC ONCOLOGY | Facility: CLINIC | Age: 79
End: 2019-12-17
Payer: MEDICARE

## 2019-12-17 VITALS
DIASTOLIC BLOOD PRESSURE: 79 MMHG | WEIGHT: 200.81 LBS | HEIGHT: 64 IN | HEART RATE: 70 BPM | BODY MASS INDEX: 34.28 KG/M2 | SYSTOLIC BLOOD PRESSURE: 174 MMHG

## 2019-12-17 DIAGNOSIS — C53.0 MALIGNANT NEOPLASM OF ENDOCERVIX: Primary | ICD-10-CM

## 2019-12-17 DIAGNOSIS — C53.9 CERVICAL CANCER, FIGO STAGE IA1: ICD-10-CM

## 2019-12-17 DIAGNOSIS — D06.0 CARCINOMA IN SITU OF ENDOCERVIX: Primary | ICD-10-CM

## 2019-12-17 PROCEDURE — 99024 POSTOP FOLLOW-UP VISIT: CPT | Mod: HCNC,S$GLB,, | Performed by: OBSTETRICS & GYNECOLOGY

## 2019-12-17 PROCEDURE — 99499 RISK ADDL DX/OHS AUDIT: ICD-10-PCS | Mod: HCNC,S$GLB,, | Performed by: OBSTETRICS & GYNECOLOGY

## 2019-12-17 PROCEDURE — 99999 PR PBB SHADOW E&M-EST. PATIENT-LVL III: ICD-10-PCS | Mod: PBBFAC,HCNC,, | Performed by: OBSTETRICS & GYNECOLOGY

## 2019-12-17 PROCEDURE — 99499 UNLISTED E&M SERVICE: CPT | Mod: HCNC,S$GLB,, | Performed by: OBSTETRICS & GYNECOLOGY

## 2019-12-17 PROCEDURE — 99999 PR PBB SHADOW E&M-EST. PATIENT-LVL III: CPT | Mod: PBBFAC,HCNC,, | Performed by: OBSTETRICS & GYNECOLOGY

## 2019-12-17 PROCEDURE — 99024 PR POST-OP FOLLOW-UP VISIT: ICD-10-PCS | Mod: HCNC,S$GLB,, | Performed by: OBSTETRICS & GYNECOLOGY

## 2019-12-17 RX ORDER — SODIUM CHLORIDE 9 MG/ML
INJECTION, SOLUTION INTRAVENOUS CONTINUOUS
Status: CANCELLED | OUTPATIENT
Start: 2019-12-17

## 2019-12-17 RX ORDER — MUPIROCIN 20 MG/G
OINTMENT TOPICAL
Status: CANCELLED | OUTPATIENT
Start: 2019-12-17

## 2019-12-17 RX ORDER — LIDOCAINE HYDROCHLORIDE 10 MG/ML
1 INJECTION, SOLUTION EPIDURAL; INFILTRATION; INTRACAUDAL; PERINEURAL ONCE
Status: CANCELLED | OUTPATIENT
Start: 2019-12-17 | End: 2019-12-17

## 2019-12-17 NOTE — LETTER
December 17, 2019        Marcia Canseco MD  [address]  [address]  [facility] [address]             [facility] [address] [facility] [address]  [address], [address]  [facility] [address]  Phone: [phone]  Fax: [phone]   Patient: Chelsea Gould   MR Number: [id number]   YOB: 1940   Date of Visit: 12/17/2019       Dear Dr. Canseco:    Thank you for referring Chelsea Gould to me for evaluation. Below are the relevant portions of my assessment and plan of care.            If you have questions, please do not hesitate to call me. I look forward to following Chelsea along with you.    Sincerely,      Fani Vieira MD           CC  No Recipients

## 2019-12-17 NOTE — PROGRESS NOTES
Subjective:      Patient ID: Chelsea Gould is a 79 y.o. female.    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 Stage IA1, negative LVSI.   1. CERVIX, LEEP:   - Superficial squamous cell carcinoma, less than 1 mm.   - High-grade squamous intraepithelial lesion (DEE DEE III) extending into   endocervical glands.   - Immunohistochemistry with appropriate control for keratin AE1/3 highlights   the carcinoma.   - Immunohistochemistry with appropriate control for p16 is positive in both   the invasive carcinoma and     DEE DEE III.   - See CAP synoptic report.   2.  ENDOCERVICAL CURETTAGE:   - Benign glandular epithelium and blood clot.   - Immunohistochemistry with appropriate control for p16 is noncontributory.   Deep Margin:  Uninvolved by invasive carcinoma or high-grade squamous   intraepithelial lesion (DEE DEE 2-3). Distance of invasive carcinoma from margin (millimeters): 4.5   mm.   Lymphovascular Invasion:  Not identified.     Presents today for post operative visit and to review pathology.   Stage IA1 squamous cell carcinoma, negative LVSI, margins negative for invasive carcinoma.      Referral history:  Referred by Dr. Marcia Canseco for newly diagnosed endocervical carcinoma in situ.   Evaluated by Dr. Canseco for post menopausal bleeding.     Pap 9/20/18 negative   EMB 9/27/19   FINAL PATHOLOGIC DIAGNOSIS   Endometrium, curettage:   Scant strips of inactive endometrial glands and lower uterine segment glands in a background of mucus and blood.   Negative for atypia or malignancy.   Comment: No significant stromal component is present to evaluate for endometrial hyperplasia. The findings may   reflect an atrophic process. However, re-sampling may be considered in a highly suspicious clinical setting.     Underwent further evaluation with D&C hysteroscopy 10/25/19.   FINAL PATHOLOGIC DIAGNOSIS   ENDOMETRIAL CURETTAGE:    - Multiple fragments of squamous epithelium with severe squamous dysplasia/carcinoma in situ with involvement of   the endocervix (see comment).   - Rare small fragments of atrophic endometrium.   - Strips of benign endocervical epithelium and fragments of benign squamous epithelium intermixed with blood and   mucus.   - No evidence of endometrial hyperplasia, endometrial intraepithelial neoplasia or endometrial adenocarcinoma.   COMMENT: The fragments of squamous epithelium represent at least severe squamous dysplasia/carcinoma in   situ and appear to arise from the cervix. The fragments are cut in a tangential fashion and the interface of the   epithelium with the stroma cannot be accurately evaluated. The possibility of an invasive squamous cell carcinoma   cannot be excluded. There is no evidence of endometrial intraepithelial neoplasia or endometrial adenocarcinoma.   However, the possibility of a squamous cell carcinoma arising within the endometrium, although unlikely, cannot be   excluded.     Pelvic US 10/9/19  Uterus:  Size: 6.8 x 3.6 x 3.8 cm  Masses: There are multiple small partially calcified uterine fibroids present with the largest measuring 1.4 cm in greatest dimension. Multiple nabothian cysts are present.  Endometrium: Upper normal in this post menopausal patient, measuring 5 mm. There is a trace amount of free fluid within the endometrial cavity as well.  Right ovary: Not visualized.  Left ovary: Not visualized.     Medical comorbidities include DM (last hgb A1c 7.6, Cr 1.2), HLD, hypothyroid.   Prior abdominal surgeries include BTL.     Review of Systems   Constitutional: Negative for appetite change, chills, fatigue and fever.   HENT: Negative for mouth sores.    Respiratory: Negative for cough and shortness of breath.    Cardiovascular: Negative for leg swelling.   Gastrointestinal: Negative for abdominal pain, blood in stool, constipation and diarrhea.   Endocrine: Negative for cold  intolerance.   Genitourinary: Negative for dysuria and vaginal bleeding.   Musculoskeletal: Negative for myalgias.   Skin: Negative for rash.   Allergic/Immunologic: Negative.    Neurological: Negative for weakness and numbness.   Hematological: Negative for adenopathy. Does not bruise/bleed easily.   Psychiatric/Behavioral: Negative for confusion.       Objective:   Physical Exam:   Constitutional: She is oriented to person, place, and time. She appears well-developed and well-nourished.    HENT:   Head: Normocephalic and atraumatic.    Eyes: Pupils are equal, round, and reactive to light. EOM are normal.    Neck: Normal range of motion. Neck supple. No thyromegaly present.    Cardiovascular: Normal rate, regular rhythm and intact distal pulses.     Pulmonary/Chest: Effort normal and breath sounds normal. No respiratory distress. She has no wheezes.        Abdominal: Soft. Bowel sounds are normal. She exhibits no distension, no ascites and no mass. There is no tenderness.             Musculoskeletal: Normal range of motion and moves all extremeties.      Lymphadenopathy:     She has no cervical adenopathy.        Right: No supraclavicular adenopathy present.        Left: No supraclavicular adenopathy present.    Neurological: She is alert and oriented to person, place, and time.    Skin: Skin is warm and dry. No rash noted.    Psychiatric: She has a normal mood and affect.       Assessment:     1. Malignant neoplasm of endocervix    2. Cervical cancer, FIGO stage IA1        Plan:     Orders Placed This Encounter   Procedures    CT Chest Abdoment Pelvis With Contrast    Creatinine, serum     We discussed the natural history of cervical cancer. Her lesion is very small. Stage IA1 squamous cell carcinoma, negative LVSI, margins negative for invasive carcinoma. Standard management include extrafascial hysterectomy. She is a candidate for minimally invasive approach. Recommend RTLH/BSO. She desires to proceed.     The  risks, benefits, and indications of the procedure were discussed with the patient and her family members if present.  These included bleeding, transfusion, infection, damage to surrounding tissues (bowel, bladder, ureter), wound separation, lymphedema, conversion to laparotomy if laparoscopic, perioperative cardiac events, VTE, pneumonia, and possible death.  She voiced understanding, all questions were answered and consents were signed.    Surgery 1/17/19 [facility]  Pre op anesthesia  Hold ASA periop  Optimal glucose control periop    CT CAP for complete metastatic survey

## 2020-01-07 ENCOUNTER — ANESTHESIA EVENT (OUTPATIENT)
Dept: SURGERY | Facility: OTHER | Age: 80
End: 2020-01-07
Payer: MEDICARE

## 2020-01-07 ENCOUNTER — TELEPHONE (OUTPATIENT)
Dept: GYNECOLOGIC ONCOLOGY | Facility: CLINIC | Age: 80
End: 2020-01-07

## 2020-01-07 ENCOUNTER — HOSPITAL ENCOUNTER (OUTPATIENT)
Dept: PREADMISSION TESTING | Facility: OTHER | Age: 80
Discharge: HOME | End: 2020-01-07
Attending: OBSTETRICS & GYNECOLOGY
Payer: MEDICARE

## 2020-01-07 ENCOUNTER — HOSPITAL ENCOUNTER (OUTPATIENT)
Dept: RADIOLOGY | Facility: OTHER | Age: 80
Discharge: HOME | End: 2020-01-07
Attending: OBSTETRICS & GYNECOLOGY
Payer: MEDICARE

## 2020-01-07 VITALS
HEART RATE: 76 BPM | BODY MASS INDEX: 34.15 KG/M2 | SYSTOLIC BLOOD PRESSURE: 146 MMHG | OXYGEN SATURATION: 98 % | DIASTOLIC BLOOD PRESSURE: 70 MMHG | TEMPERATURE: 98 F | HEIGHT: 64 IN | WEIGHT: 200 LBS

## 2020-01-07 DIAGNOSIS — R91.8 ABNORMAL CT LUNG SCREENING: Primary | ICD-10-CM

## 2020-01-07 DIAGNOSIS — C53.0 MALIGNANT NEOPLASM OF ENDOCERVIX: ICD-10-CM

## 2020-01-07 DIAGNOSIS — C53.9 CERVICAL CANCER, FIGO STAGE IA1: ICD-10-CM

## 2020-01-07 DIAGNOSIS — Z01.818 PREOP TESTING: Primary | ICD-10-CM

## 2020-01-07 LAB
ABO + RH BLD: NORMAL
ANION GAP SERPL CALC-SCNC: 8 MMOL/L (ref 8–16)
BASOPHILS # BLD AUTO: 0.08 K/UL (ref 0–0.2)
BASOPHILS NFR BLD: 0.8 % (ref 0–1.9)
BLD GP AB SCN CELLS X3 SERPL QL: NORMAL
BUN SERPL-MCNC: 23 MG/DL (ref 8–23)
CALCIUM SERPL-MCNC: 9.7 MG/DL (ref 8.7–10.5)
CHLORIDE SERPL-SCNC: 101 MMOL/L (ref 95–110)
CO2 SERPL-SCNC: 28 MMOL/L (ref 23–29)
CREAT SERPL-MCNC: 1.1 MG/DL (ref 0.5–1.4)
DIFFERENTIAL METHOD: NORMAL
EOSINOPHIL # BLD AUTO: 0.4 K/UL (ref 0–0.5)
EOSINOPHIL NFR BLD: 4.1 % (ref 0–8)
ERYTHROCYTE [DISTWIDTH] IN BLOOD BY AUTOMATED COUNT: 12.3 % (ref 11.5–14.5)
EST. GFR  (AFRICAN AMERICAN): 56 ML/MIN/1.73 M^2
EST. GFR  (NON AFRICAN AMERICAN): 49 ML/MIN/1.73 M^2
GLUCOSE SERPL-MCNC: 225 MG/DL (ref 70–110)
HCT VFR BLD AUTO: 43.1 % (ref 37–48.5)
HGB BLD-MCNC: 13.9 G/DL (ref 12–16)
IMM GRANULOCYTES # BLD AUTO: 0.02 K/UL (ref 0–0.04)
IMM GRANULOCYTES NFR BLD AUTO: 0.2 % (ref 0–0.5)
LYMPHOCYTES # BLD AUTO: 2.2 K/UL (ref 1–4.8)
LYMPHOCYTES NFR BLD: 21.2 % (ref 18–48)
MCH RBC QN AUTO: 29.2 PG (ref 27–31)
MCHC RBC AUTO-ENTMCNC: 32.3 G/DL (ref 32–36)
MCV RBC AUTO: 91 FL (ref 82–98)
MONOCYTES # BLD AUTO: 0.8 K/UL (ref 0.3–1)
MONOCYTES NFR BLD: 7.5 % (ref 4–15)
NEUTROPHILS # BLD AUTO: 6.7 K/UL (ref 1.8–7.7)
NEUTROPHILS NFR BLD: 66.2 % (ref 38–73)
NRBC BLD-RTO: 0 /100 WBC
PLATELET # BLD AUTO: 260 K/UL (ref 150–350)
PMV BLD AUTO: 10.3 FL (ref 9.2–12.9)
POTASSIUM SERPL-SCNC: 5.1 MMOL/L (ref 3.5–5.1)
RBC # BLD AUTO: 4.76 M/UL (ref 4–5.4)
SODIUM SERPL-SCNC: 137 MMOL/L (ref 136–145)
WBC # BLD AUTO: 10.14 K/UL (ref 3.9–12.7)

## 2020-01-07 PROCEDURE — 80048 BASIC METABOLIC PNL TOTAL CA: CPT | Mod: HCNC

## 2020-01-07 PROCEDURE — 25500020 PHARM REV CODE 255: Mod: HCNC | Performed by: OBSTETRICS & GYNECOLOGY

## 2020-01-07 PROCEDURE — 93005 ELECTROCARDIOGRAM TRACING: CPT | Mod: HCNC

## 2020-01-07 PROCEDURE — 93010 EKG 12-LEAD: ICD-10-PCS | Mod: HCNC,,, | Performed by: INTERNAL MEDICINE

## 2020-01-07 PROCEDURE — 74177 CT ABD & PELVIS W/CONTRAST: CPT | Mod: 26,HCNC,, | Performed by: RADIOLOGY

## 2020-01-07 PROCEDURE — 71260 CT CHEST ABDOMEN PELVIS WITH CONTRAST (XPD): ICD-10-PCS | Mod: 26,HCNC,, | Performed by: RADIOLOGY

## 2020-01-07 PROCEDURE — 93010 ELECTROCARDIOGRAM REPORT: CPT | Mod: HCNC,,, | Performed by: INTERNAL MEDICINE

## 2020-01-07 PROCEDURE — 36415 COLL VENOUS BLD VENIPUNCTURE: CPT | Mod: HCNC

## 2020-01-07 PROCEDURE — 74177 CT CHEST ABDOMEN PELVIS WITH CONTRAST (XPD): ICD-10-PCS | Mod: 26,HCNC,, | Performed by: RADIOLOGY

## 2020-01-07 PROCEDURE — 85025 COMPLETE CBC W/AUTO DIFF WBC: CPT | Mod: HCNC

## 2020-01-07 PROCEDURE — 86900 BLOOD TYPING SEROLOGIC ABO: CPT | Mod: HCNC

## 2020-01-07 PROCEDURE — 74177 CT ABD & PELVIS W/CONTRAST: CPT | Mod: TC,HCNC

## 2020-01-07 PROCEDURE — 71260 CT THORAX DX C+: CPT | Mod: 26,HCNC,, | Performed by: RADIOLOGY

## 2020-01-07 RX ORDER — PREGABALIN 75 MG/1
75 CAPSULE ORAL
Status: CANCELLED | OUTPATIENT
Start: 2020-01-07 | End: 2020-01-07

## 2020-01-07 RX ORDER — LIDOCAINE HYDROCHLORIDE 10 MG/ML
0.5 INJECTION, SOLUTION EPIDURAL; INFILTRATION; INTRACAUDAL; PERINEURAL ONCE
Status: CANCELLED | OUTPATIENT
Start: 2020-01-07 | End: 2020-01-07

## 2020-01-07 RX ORDER — CELECOXIB 200 MG/1
400 CAPSULE ORAL
Status: CANCELLED | OUTPATIENT
Start: 2020-01-07 | End: 2020-01-07

## 2020-01-07 RX ORDER — SODIUM CHLORIDE, SODIUM LACTATE, POTASSIUM CHLORIDE, CALCIUM CHLORIDE 600; 310; 30; 20 MG/100ML; MG/100ML; MG/100ML; MG/100ML
INJECTION, SOLUTION INTRAVENOUS CONTINUOUS
Status: CANCELLED | OUTPATIENT
Start: 2020-01-07

## 2020-01-07 RX ORDER — ACETAMINOPHEN 500 MG
1000 TABLET ORAL
Status: CANCELLED | OUTPATIENT
Start: 2020-01-07 | End: 2020-01-07

## 2020-01-07 RX ADMIN — IOHEXOL 100 ML: 350 INJECTION, SOLUTION INTRAVENOUS at 10:01

## 2020-01-07 RX ADMIN — IOHEXOL 1000 ML: 12 SOLUTION ORAL at 09:01

## 2020-01-07 NOTE — DISCHARGE INSTRUCTIONS
PRE-ADMIT TESTING -  [phone]    [address]  [place] [facility]          Your surgery has been scheduled at [facility]. We are pleased to have the opportunity to serve you. For Further Information please call [phone].    On the day of surgery please report to the Information Desk on the 1st floor.    · CONTACT YOUR PHYSICIAN'S OFFICE THE DAY PRIOR TO YOUR SURGERY TO OBTAIN YOUR ARRIVAL TIME.     · The evening before surgery do not eat anything after 9 p.m. ( this includes hard candy, chewing gum and mints).  You may only have GATORADE, POWERADE AND WATER  from 9 p.m. until you leave your home.   DO NOT DRINK ANY LIQUIDS ON THE WAY TO THE HOSPITAL.      SPECIAL MEDICATION INSTRUCTIONS: TAKE medications checked off by the Anesthesiologist on your Medication List.    Angiogram Patients: Take medications as instructed by your physician, including aspirin.     Surgery Patients:    If you take ASPIRIN - Your PHYSICIAN/SURGEON will need to inform you IF/OR when you need to stop taking aspirin prior to your surgery.     Do Not take any medications containing IBUPROFEN.  Do Not Wear any make-up or dark nail polish   (especially eye make-up) to surgery. If you come to surgery with makeup on you will be required to remove the makeup or nail polish.    Do not shave your surgical area at least 5 days prior to your surgery. The surgical prep will be performed at the hospital according to Infection Control regulations.    Leave all valuables at home.   Do Not wear any jewelry or watches, including any metal in body piercings. Jewelry must be removed prior to coming to the hospital.  There is a possibility that rings that are unable to be removed may be cut off if they are on the surgical extremity.    Contact Lens must be removed before surgery. Either do not wear the contact lens or bring a case and solution for storage.  Please bring a container for eyeglasses or dentures as required.  Bring  any paperwork your physician has provided, such as consent forms,  history and physicals, doctor's orders, etc.   Bring comfortable clothes that are loose fitting to wear upon discharge. Take into consideration the type of surgery being performed.  Maintain your diet as advised per your physician the day prior to surgery.      Adequate rest the night before surgery is advised.   Park in the Parking lot behind the hospital or in the [facility] across the street from the parking lot. Parking is complimentary.  If you will be discharged the same day as your procedure, please arrange for a responsible adult to drive you home or to accompany you if traveling by taxi.   YOU WILL NOT BE PERMITTED TO DRIVE OR TO LEAVE THE HOSPITAL ALONE AFTER SURGERY.   It is strongly recommended that you arrange for someone to remain with you for the first 24 hrs following your surgery.    The Surgeon will speak to your family/visitor after your surgery regarding the outcome of your surgery and post op care.  The Surgeon may speak to you after your surgery, but there is a possibility you may not remember the details.  Please check with your family members regarding the conversation with the Surgeon.    We strongly recommend whoever is bringing you home be present for discharge instructions.  This will ensure a thorough understanding for your post op home care.    EACH PATIENT IS ALLOWED TWO FAMILY MEMBERS OR VISITORS IN THE ROOM AND IN THE WAITING ROOMS WHILE YOU ARE IN SURGERY. ALL CHILDREN MUST ALWAYS BE ACCOMPANIED BY AN ADULT.    Thank you for your cooperation.  The Staff of [facility].                Bathing Instructions with Hibiclens     Shower the evening before and morning of your procedure with Hibiclens:   Wash your face with water and your regular face wash/soap   Apply Hibiclens directly on your skin or on a wet washcloth and wash gently. When showering: Move away from the shower stream when  applying Hibiclens to avoid rinsing off too soon.   Rinse thoroughly with warm water   Do not dilute Hibiclens         Dry off as usual, do not use any deodorant, powder, body lotions, perfume, after shave or cologne.

## 2020-01-07 NOTE — ANESTHESIA PREPROCEDURE EVALUATION
01/07/2020  Chelsea Gould is a 79 y.o., female.    Anesthesia Evaluation    I have reviewed the Patient Summary Reports.    I have reviewed the Nursing Notes.   I have reviewed the Medications.     Review of Systems  Anesthesia Hx:  No problems with previous Anesthesia  History of prior surgery of interest to airway management or planning: Previous anesthesia: General Recent cervical cone Denominational  with general anesthesia.  Procedure performed at an [facility]. Denies Family Hx of Anesthesia complications.   Denies Personal Hx of Anesthesia complications.   Social:  Non-Smoker, No Alcohol Use    Hematology/Oncology:  Hematology Normal   Oncology Normal     EENT/Dental:EENT/Dental Normal   Cardiovascular:   Exercise tolerance: good hyperlipidemia ECG has been reviewed. NSR w Non sp st twave changes   Pulmonary:  Pulmonary Normal    Renal/:  Renal/ Normal     Hepatic/GI:  Hepatic/GI Normal    Musculoskeletal:  Musculoskeletal Normal    Neurological:  Dementia mild    Endocrine:   Diabetes, well controlled, type 2, using insulin Hypothyroidism    Dermatological:  Skin Normal    Psych:   Psychiatric History anxiety depression          Physical Exam  General:  Obesity    Airway/Jaw/Neck:  Airway Findings: Mouth Opening: Normal Tongue: Normal  General Airway Assessment: Adult  Mallampati: II      Dental:  Dental Findings: Upper front caps, Lower front caps, Molar caps        Mental Status:  Mental Status Findings:  Anxious         Anesthesia Plan  Type of Anesthesia, risks & benefits discussed:  Anesthesia Type:  general  Patient's Preference:   Intra-op Monitoring Plan: standard ASA monitors  Intra-op Monitoring Plan Comments:   Post Op Pain Control Plan: per primary service following discharge from PACU and multimodal analgesia  Post Op Pain Control Plan Comments:   Induction:   IV  Beta Blocker:          Informed Consent: Patient understands risks and agrees with Anesthesia plan.  Questions answered. Anesthesia consent signed with patient.  ASA Score: 3     Day of Surgery Review of History & Physical:    H&P update referred to the surgeon.         Ready For Surgery From Anesthesia Perspective.

## 2020-01-07 NOTE — TELEPHONE ENCOUNTER
Called to review CT. No evidence of metastatic disease. Comment regarding possible interstitial lung disease. I have told her I would send a message to her primary physician regarding this who can maybe facilitate a pulmonology referral if needed.     Will plan to proceed with surgery 1/17 as we discussed. She voiced understanding.

## 2020-01-14 ENCOUNTER — TELEPHONE (OUTPATIENT)
Dept: GYNECOLOGIC ONCOLOGY | Facility: CLINIC | Age: 80
End: 2020-01-14

## 2020-01-14 NOTE — TELEPHONE ENCOUNTER
Spoke with patient . Confirmed procedure for 1/17/2020. Instructed pt to arrive at 0530. Reminded her  she should go to the same spot she went for her pre admit appt and follow all instructions she received at that visit. Verbalized understanding.

## 2020-01-16 ENCOUNTER — TELEPHONE (OUTPATIENT)
Dept: FAMILY MEDICINE | Facility: CLINIC | Age: 80
End: 2020-01-16

## 2020-01-16 PROBLEM — I70.0 ATHEROSCLEROSIS OF AORTA: Status: ACTIVE | Noted: 2020-01-16

## 2020-01-16 NOTE — TELEPHONE ENCOUNTER
Called pt and informed her of abnormal CT chest that requires further workup with Pulm. She states she is going for surgery tomorrow to start her cancer treatment and then she has to have a procedure on her eyes. Pt states she is not having any problems with her lungs. I encouraged her to call us back at the referrals dept to get scheduled for the next available appt at her earliest convenience and she stated she would. All questions/concerns addressed and she voiced understanding.

## 2020-01-17 ENCOUNTER — ANESTHESIA (OUTPATIENT)
Dept: SURGERY | Facility: OTHER | Age: 80
End: 2020-01-17
Payer: MEDICARE

## 2020-01-17 ENCOUNTER — HOSPITAL ENCOUNTER (OUTPATIENT)
Facility: OTHER | Age: 80
Discharge: HOME | End: 2020-01-18
Attending: OBSTETRICS & GYNECOLOGY | Admitting: OBSTETRICS & GYNECOLOGY
Payer: MEDICARE

## 2020-01-17 DIAGNOSIS — E11.65 TYPE 2 DIABETES MELLITUS WITH HYPERGLYCEMIA, WITH LONG-TERM CURRENT USE OF INSULIN: Primary | ICD-10-CM

## 2020-01-17 DIAGNOSIS — C53.9 CERVICAL CANCER, FIGO STAGE IA1: ICD-10-CM

## 2020-01-17 DIAGNOSIS — Z90.722 S/P TOTAL HYSTERECTOMY AND BILATERAL SALPINGO-OOPHORECTOMY: ICD-10-CM

## 2020-01-17 DIAGNOSIS — E66.01 SEVERE OBESITY (BMI 35.0-35.9 WITH COMORBIDITY): ICD-10-CM

## 2020-01-17 DIAGNOSIS — F32.A ANXIETY AND DEPRESSION: ICD-10-CM

## 2020-01-17 DIAGNOSIS — Z90.710 S/P TOTAL HYSTERECTOMY AND BILATERAL SALPINGO-OOPHORECTOMY: ICD-10-CM

## 2020-01-17 DIAGNOSIS — Z79.4 TYPE 2 DIABETES MELLITUS WITH HYPERGLYCEMIA, WITH LONG-TERM CURRENT USE OF INSULIN: Primary | ICD-10-CM

## 2020-01-17 DIAGNOSIS — C53.0 MALIGNANT NEOPLASM OF ENDOCERVIX: ICD-10-CM

## 2020-01-17 DIAGNOSIS — E78.5 HYPERLIPIDEMIA ASSOCIATED WITH TYPE 2 DIABETES MELLITUS: ICD-10-CM

## 2020-01-17 DIAGNOSIS — D06.0 CARCINOMA IN SITU OF ENDOCERVIX: ICD-10-CM

## 2020-01-17 DIAGNOSIS — E11.69 HYPERLIPIDEMIA ASSOCIATED WITH TYPE 2 DIABETES MELLITUS: ICD-10-CM

## 2020-01-17 DIAGNOSIS — Z90.79 S/P TOTAL HYSTERECTOMY AND BILATERAL SALPINGO-OOPHORECTOMY: ICD-10-CM

## 2020-01-17 DIAGNOSIS — F41.9 ANXIETY AND DEPRESSION: ICD-10-CM

## 2020-01-17 LAB — POCT GLUCOSE: 217 MG/DL (ref 70–110)

## 2020-01-17 PROCEDURE — 36000713 HC OR TIME LEV V EA ADD 15 MIN: Mod: HCNC | Performed by: OBSTETRICS & GYNECOLOGY

## 2020-01-17 PROCEDURE — 88307 TISSUE EXAM BY PATHOLOGIST: CPT | Mod: 26,HCNC,, | Performed by: PATHOLOGY

## 2020-01-17 PROCEDURE — 88307 PR  SURG PATH,LEVEL V: ICD-10-PCS | Mod: 26,HCNC,, | Performed by: PATHOLOGY

## 2020-01-17 PROCEDURE — 25000003 PHARM REV CODE 250: Mod: HCNC | Performed by: ANESTHESIOLOGY

## 2020-01-17 PROCEDURE — 25000003 PHARM REV CODE 250: Mod: HCNC | Performed by: NURSE ANESTHETIST, CERTIFIED REGISTERED

## 2020-01-17 PROCEDURE — C9399 UNCLASSIFIED DRUGS OR BIOLOG: HCPCS | Mod: HCNC | Performed by: STUDENT IN AN ORGANIZED HEALTH CARE EDUCATION/TRAINING PROGRAM

## 2020-01-17 PROCEDURE — 58571 PR LAPAROSCOPY W TOT HYSTERECTUTERUS <=250 GRAM  W TUBE/OVARY: ICD-10-PCS | Mod: HCNC,,, | Performed by: OBSTETRICS & GYNECOLOGY

## 2020-01-17 PROCEDURE — 58571 TLH W/T/O 250 G OR LESS: CPT | Mod: HCNC,,, | Performed by: OBSTETRICS & GYNECOLOGY

## 2020-01-17 PROCEDURE — 25000003 PHARM REV CODE 250: Mod: HCNC | Performed by: STUDENT IN AN ORGANIZED HEALTH CARE EDUCATION/TRAINING PROGRAM

## 2020-01-17 PROCEDURE — 63600175 PHARM REV CODE 636 W HCPCS: Mod: HCNC | Performed by: STUDENT IN AN ORGANIZED HEALTH CARE EDUCATION/TRAINING PROGRAM

## 2020-01-17 PROCEDURE — 58571 PR LAPAROSCOPY W TOT HYSTERECTUTERUS <=250 GRAM  W TUBE/OVARY: ICD-10-PCS | Mod: AS,HCNC,, | Performed by: NURSE PRACTITIONER

## 2020-01-17 PROCEDURE — 37000008 HC ANESTHESIA 1ST 15 MINUTES: Mod: HCNC | Performed by: OBSTETRICS & GYNECOLOGY

## 2020-01-17 PROCEDURE — 63600175 PHARM REV CODE 636 W HCPCS: Mod: HCNC | Performed by: OBSTETRICS & GYNECOLOGY

## 2020-01-17 PROCEDURE — 58571 TLH W/T/O 250 G OR LESS: CPT | Mod: AS,HCNC,, | Performed by: NURSE PRACTITIONER

## 2020-01-17 PROCEDURE — 00840 ANES IPER PX LOWER ABD NOS: CPT | Mod: HCNC | Performed by: OBSTETRICS & GYNECOLOGY

## 2020-01-17 PROCEDURE — 71000039 HC RECOVERY, EACH ADD'L HOUR: Mod: HCNC | Performed by: OBSTETRICS & GYNECOLOGY

## 2020-01-17 PROCEDURE — 94799 UNLISTED PULMONARY SVC/PX: CPT | Mod: HCNC

## 2020-01-17 PROCEDURE — 63600175 PHARM REV CODE 636 W HCPCS: Mod: HCNC | Performed by: ANESTHESIOLOGY

## 2020-01-17 PROCEDURE — 63600175 PHARM REV CODE 636 W HCPCS: Mod: HCNC | Performed by: NURSE ANESTHETIST, CERTIFIED REGISTERED

## 2020-01-17 PROCEDURE — 71000033 HC RECOVERY, INTIAL HOUR: Mod: HCNC | Performed by: OBSTETRICS & GYNECOLOGY

## 2020-01-17 PROCEDURE — 88307 TISSUE EXAM BY PATHOLOGIST: CPT | Mod: HCNC | Performed by: PATHOLOGY

## 2020-01-17 PROCEDURE — 25000003 PHARM REV CODE 250: Mod: HCNC | Performed by: OBSTETRICS & GYNECOLOGY

## 2020-01-17 PROCEDURE — 27201423 OPTIME MED/SURG SUP & DEVICES STERILE SUPPLY: Mod: HCNC | Performed by: OBSTETRICS & GYNECOLOGY

## 2020-01-17 PROCEDURE — 37000009 HC ANESTHESIA EA ADD 15 MINS: Mod: HCNC | Performed by: OBSTETRICS & GYNECOLOGY

## 2020-01-17 PROCEDURE — 94761 N-INVAS EAR/PLS OXIMETRY MLT: CPT | Mod: HCNC

## 2020-01-17 PROCEDURE — 36000712 HC OR TIME LEV V 1ST 15 MIN: Mod: HCNC | Performed by: OBSTETRICS & GYNECOLOGY

## 2020-01-17 RX ORDER — FENTANYL CITRATE 50 UG/ML
INJECTION, SOLUTION INTRAMUSCULAR; INTRAVENOUS
Status: DISCONTINUED | OUTPATIENT
Start: 2020-01-17 | End: 2020-01-17

## 2020-01-17 RX ORDER — SODIUM CHLORIDE 9 MG/ML
INJECTION, SOLUTION INTRAVENOUS CONTINUOUS
Status: DISCONTINUED | OUTPATIENT
Start: 2020-01-17 | End: 2020-01-18 | Stop reason: HOSPADM

## 2020-01-17 RX ORDER — OXYCODONE HYDROCHLORIDE 5 MG/1
5 TABLET ORAL
Status: DISCONTINUED | OUTPATIENT
Start: 2020-01-17 | End: 2020-01-17 | Stop reason: HOSPADM

## 2020-01-17 RX ORDER — LEVOTHYROXINE SODIUM 112 UG/1
112 TABLET ORAL
Status: DISCONTINUED | OUTPATIENT
Start: 2020-01-18 | End: 2020-01-18 | Stop reason: HOSPADM

## 2020-01-17 RX ORDER — GLYCOPYRROLATE 0.2 MG/ML
INJECTION INTRAMUSCULAR; INTRAVENOUS
Status: DISCONTINUED | OUTPATIENT
Start: 2020-01-17 | End: 2020-01-17

## 2020-01-17 RX ORDER — ROCURONIUM BROMIDE 10 MG/ML
INJECTION, SOLUTION INTRAVENOUS
Status: DISCONTINUED | OUTPATIENT
Start: 2020-01-17 | End: 2020-01-17

## 2020-01-17 RX ORDER — KETOROLAC TROMETHAMINE 30 MG/ML
30 INJECTION, SOLUTION INTRAMUSCULAR; INTRAVENOUS EVERY 8 HOURS
Status: COMPLETED | OUTPATIENT
Start: 2020-01-17 | End: 2020-01-18

## 2020-01-17 RX ORDER — SIMETHICONE 80 MG
1 TABLET,CHEWABLE ORAL 3 TIMES DAILY PRN
Status: DISCONTINUED | OUTPATIENT
Start: 2020-01-17 | End: 2020-01-18 | Stop reason: HOSPADM

## 2020-01-17 RX ORDER — CEFAZOLIN SODIUM 1 G/3ML
2 INJECTION, POWDER, FOR SOLUTION INTRAMUSCULAR; INTRAVENOUS
Status: COMPLETED | OUTPATIENT
Start: 2020-01-17 | End: 2020-01-17

## 2020-01-17 RX ORDER — LIDOCAINE HCL/PF 100 MG/5ML
SYRINGE (ML) INTRAVENOUS
Status: DISCONTINUED | OUTPATIENT
Start: 2020-01-17 | End: 2020-01-17

## 2020-01-17 RX ORDER — INSULIN ASPART 100 [IU]/ML
12 INJECTION, SOLUTION INTRAVENOUS; SUBCUTANEOUS
Status: DISCONTINUED | OUTPATIENT
Start: 2020-01-18 | End: 2020-01-18 | Stop reason: HOSPADM

## 2020-01-17 RX ORDER — DEXAMETHASONE SODIUM PHOSPHATE 4 MG/ML
INJECTION, SOLUTION INTRA-ARTICULAR; INTRALESIONAL; INTRAMUSCULAR; INTRAVENOUS; SOFT TISSUE
Status: DISCONTINUED | OUTPATIENT
Start: 2020-01-17 | End: 2020-01-17

## 2020-01-17 RX ORDER — DIPHENHYDRAMINE HCL 25 MG
25 CAPSULE ORAL EVERY 4 HOURS PRN
Status: DISCONTINUED | OUTPATIENT
Start: 2020-01-17 | End: 2020-01-18 | Stop reason: HOSPADM

## 2020-01-17 RX ORDER — INSULIN ASPART 100 [IU]/ML
5 INJECTION, SOLUTION INTRAVENOUS; SUBCUTANEOUS ONCE
Status: COMPLETED | OUTPATIENT
Start: 2020-01-17 | End: 2020-01-17

## 2020-01-17 RX ORDER — LIDOCAINE HYDROCHLORIDE 10 MG/ML
0.5 INJECTION, SOLUTION EPIDURAL; INFILTRATION; INTRACAUDAL; PERINEURAL ONCE
Status: DISCONTINUED | OUTPATIENT
Start: 2020-01-17 | End: 2020-01-17 | Stop reason: HOSPADM

## 2020-01-17 RX ORDER — HYDROCODONE BITARTRATE AND ACETAMINOPHEN 5; 325 MG/1; MG/1
1 TABLET ORAL EVERY 4 HOURS PRN
Status: DISCONTINUED | OUTPATIENT
Start: 2020-01-17 | End: 2020-01-18 | Stop reason: HOSPADM

## 2020-01-17 RX ORDER — GLUCAGON 1 MG
1 KIT INJECTION
Status: DISCONTINUED | OUTPATIENT
Start: 2020-01-17 | End: 2020-01-18 | Stop reason: HOSPADM

## 2020-01-17 RX ORDER — SODIUM CHLORIDE 9 MG/ML
INJECTION, SOLUTION INTRAVENOUS CONTINUOUS
Status: DISCONTINUED | OUTPATIENT
Start: 2020-01-17 | End: 2020-01-17

## 2020-01-17 RX ORDER — BISACODYL 10 MG
10 SUPPOSITORY, RECTAL RECTAL DAILY PRN
Status: DISCONTINUED | OUTPATIENT
Start: 2020-01-17 | End: 2020-01-18 | Stop reason: HOSPADM

## 2020-01-17 RX ORDER — INSULIN ASPART 100 [IU]/ML
17 INJECTION, SOLUTION INTRAVENOUS; SUBCUTANEOUS
Status: DISCONTINUED | OUTPATIENT
Start: 2020-01-18 | End: 2020-01-18 | Stop reason: HOSPADM

## 2020-01-17 RX ORDER — LABETALOL HYDROCHLORIDE 5 MG/ML
10 INJECTION, SOLUTION INTRAVENOUS ONCE
Status: COMPLETED | OUTPATIENT
Start: 2020-01-17 | End: 2020-01-17

## 2020-01-17 RX ORDER — ONDANSETRON 2 MG/ML
4 INJECTION INTRAMUSCULAR; INTRAVENOUS DAILY PRN
Status: DISCONTINUED | OUTPATIENT
Start: 2020-01-17 | End: 2020-01-17 | Stop reason: HOSPADM

## 2020-01-17 RX ORDER — MUPIROCIN 20 MG/G
OINTMENT TOPICAL 2 TIMES DAILY
Status: DISCONTINUED | OUTPATIENT
Start: 2020-01-17 | End: 2020-01-18 | Stop reason: HOSPADM

## 2020-01-17 RX ORDER — INSULIN ASPART 100 [IU]/ML
0-5 INJECTION, SOLUTION INTRAVENOUS; SUBCUTANEOUS
Status: DISCONTINUED | OUTPATIENT
Start: 2020-01-17 | End: 2020-01-18 | Stop reason: HOSPADM

## 2020-01-17 RX ORDER — DIPHENHYDRAMINE HYDROCHLORIDE 50 MG/ML
25 INJECTION INTRAMUSCULAR; INTRAVENOUS EVERY 4 HOURS PRN
Status: DISCONTINUED | OUTPATIENT
Start: 2020-01-17 | End: 2020-01-18 | Stop reason: HOSPADM

## 2020-01-17 RX ORDER — CELECOXIB 200 MG/1
400 CAPSULE ORAL
Status: COMPLETED | OUTPATIENT
Start: 2020-01-17 | End: 2020-01-17

## 2020-01-17 RX ORDER — HYDROCODONE BITARTRATE AND ACETAMINOPHEN 10; 325 MG/1; MG/1
1 TABLET ORAL EVERY 4 HOURS PRN
Status: DISCONTINUED | OUTPATIENT
Start: 2020-01-17 | End: 2020-01-18 | Stop reason: HOSPADM

## 2020-01-17 RX ORDER — IBUPROFEN 200 MG
24 TABLET ORAL
Status: DISCONTINUED | OUTPATIENT
Start: 2020-01-17 | End: 2020-01-18 | Stop reason: HOSPADM

## 2020-01-17 RX ORDER — HYDROMORPHONE HYDROCHLORIDE 2 MG/ML
0.4 INJECTION, SOLUTION INTRAMUSCULAR; INTRAVENOUS; SUBCUTANEOUS EVERY 5 MIN PRN
Status: DISCONTINUED | OUTPATIENT
Start: 2020-01-17 | End: 2020-01-17 | Stop reason: HOSPADM

## 2020-01-17 RX ORDER — NEOSTIGMINE METHYLSULFATE 1 MG/ML
INJECTION, SOLUTION INTRAVENOUS
Status: DISCONTINUED | OUTPATIENT
Start: 2020-01-17 | End: 2020-01-17

## 2020-01-17 RX ORDER — HYDRALAZINE HYDROCHLORIDE 20 MG/ML
5 INJECTION INTRAMUSCULAR; INTRAVENOUS EVERY 4 HOURS PRN
Status: DISCONTINUED | OUTPATIENT
Start: 2020-01-17 | End: 2020-01-18 | Stop reason: HOSPADM

## 2020-01-17 RX ORDER — LIDOCAINE HYDROCHLORIDE 10 MG/ML
1 INJECTION, SOLUTION EPIDURAL; INFILTRATION; INTRACAUDAL; PERINEURAL ONCE
Status: DISCONTINUED | OUTPATIENT
Start: 2020-01-17 | End: 2020-01-17 | Stop reason: HOSPADM

## 2020-01-17 RX ORDER — SODIUM CHLORIDE 0.9 % (FLUSH) 0.9 %
3 SYRINGE (ML) INJECTION
Status: DISCONTINUED | OUTPATIENT
Start: 2020-01-17 | End: 2020-01-18 | Stop reason: HOSPADM

## 2020-01-17 RX ORDER — PREGABALIN 75 MG/1
75 CAPSULE ORAL
Status: COMPLETED | OUTPATIENT
Start: 2020-01-17 | End: 2020-01-17

## 2020-01-17 RX ORDER — IBUPROFEN 600 MG/1
600 TABLET ORAL EVERY 6 HOURS
Status: DISCONTINUED | OUTPATIENT
Start: 2020-01-18 | End: 2020-01-18 | Stop reason: HOSPADM

## 2020-01-17 RX ORDER — PROPOFOL 10 MG/ML
VIAL (ML) INTRAVENOUS
Status: DISCONTINUED | OUTPATIENT
Start: 2020-01-17 | End: 2020-01-17

## 2020-01-17 RX ORDER — MUPIROCIN 20 MG/G
OINTMENT TOPICAL
Status: DISCONTINUED | OUTPATIENT
Start: 2020-01-17 | End: 2020-01-17 | Stop reason: HOSPADM

## 2020-01-17 RX ORDER — ROSUVASTATIN CALCIUM 10 MG/1
10 TABLET, COATED ORAL DAILY
Status: DISCONTINUED | OUTPATIENT
Start: 2020-01-17 | End: 2020-01-18 | Stop reason: HOSPADM

## 2020-01-17 RX ORDER — DULOXETIN HYDROCHLORIDE 30 MG/1
60 CAPSULE, DELAYED RELEASE ORAL 2 TIMES DAILY
Status: DISCONTINUED | OUTPATIENT
Start: 2020-01-17 | End: 2020-01-18 | Stop reason: HOSPADM

## 2020-01-17 RX ORDER — ONDANSETRON 2 MG/ML
INJECTION INTRAMUSCULAR; INTRAVENOUS
Status: DISCONTINUED | OUTPATIENT
Start: 2020-01-17 | End: 2020-01-17

## 2020-01-17 RX ORDER — EPHEDRINE SULFATE 50 MG/ML
INJECTION, SOLUTION INTRAVENOUS
Status: DISCONTINUED | OUTPATIENT
Start: 2020-01-17 | End: 2020-01-17

## 2020-01-17 RX ORDER — ACETAMINOPHEN 500 MG
1000 TABLET ORAL
Status: COMPLETED | OUTPATIENT
Start: 2020-01-17 | End: 2020-01-17

## 2020-01-17 RX ORDER — IBUPROFEN 200 MG
16 TABLET ORAL
Status: DISCONTINUED | OUTPATIENT
Start: 2020-01-17 | End: 2020-01-18 | Stop reason: HOSPADM

## 2020-01-17 RX ORDER — MEPERIDINE HYDROCHLORIDE 25 MG/ML
12.5 INJECTION INTRAMUSCULAR; INTRAVENOUS; SUBCUTANEOUS ONCE AS NEEDED
Status: DISCONTINUED | OUTPATIENT
Start: 2020-01-17 | End: 2020-01-17 | Stop reason: HOSPADM

## 2020-01-17 RX ORDER — ONDANSETRON 8 MG/1
8 TABLET, ORALLY DISINTEGRATING ORAL EVERY 8 HOURS PRN
Status: DISCONTINUED | OUTPATIENT
Start: 2020-01-17 | End: 2020-01-18 | Stop reason: HOSPADM

## 2020-01-17 RX ORDER — DIAZEPAM 5 MG/1
5 TABLET ORAL ONCE
Status: COMPLETED | OUTPATIENT
Start: 2020-01-17 | End: 2020-01-17

## 2020-01-17 RX ORDER — SODIUM CHLORIDE, SODIUM LACTATE, POTASSIUM CHLORIDE, CALCIUM CHLORIDE 600; 310; 30; 20 MG/100ML; MG/100ML; MG/100ML; MG/100ML
INJECTION, SOLUTION INTRAVENOUS CONTINUOUS
Status: DISCONTINUED | OUTPATIENT
Start: 2020-01-17 | End: 2020-01-17

## 2020-01-17 RX ORDER — HYDRALAZINE HYDROCHLORIDE 20 MG/ML
10 INJECTION INTRAMUSCULAR; INTRAVENOUS EVERY 4 HOURS PRN
Status: DISCONTINUED | OUTPATIENT
Start: 2020-01-17 | End: 2020-01-17

## 2020-01-17 RX ADMIN — INSULIN ASPART 2 UNITS: 100 INJECTION, SOLUTION INTRAVENOUS; SUBCUTANEOUS at 12:01

## 2020-01-17 RX ADMIN — INSULIN ASPART 4 UNITS: 100 INJECTION, SOLUTION INTRAVENOUS; SUBCUTANEOUS at 05:01

## 2020-01-17 RX ADMIN — CEFAZOLIN 2 G: 330 INJECTION, POWDER, FOR SOLUTION INTRAMUSCULAR; INTRAVENOUS at 07:01

## 2020-01-17 RX ADMIN — CELECOXIB 400 MG: 200 CAPSULE ORAL at 05:01

## 2020-01-17 RX ADMIN — HYDROCODONE BITARTRATE AND ACETAMINOPHEN 1 TABLET: 10; 325 TABLET ORAL at 08:01

## 2020-01-17 RX ADMIN — INSULIN ASPART 5 UNITS: 100 INJECTION, SOLUTION INTRAVENOUS; SUBCUTANEOUS at 09:01

## 2020-01-17 RX ADMIN — SODIUM CHLORIDE, SODIUM LACTATE, POTASSIUM CHLORIDE, AND CALCIUM CHLORIDE: 600; 310; 30; 20 INJECTION, SOLUTION INTRAVENOUS at 06:01

## 2020-01-17 RX ADMIN — CARBOXYMETHYLCELLULOSE SODIUM 2 DROP: 2.5 SOLUTION/ DROPS OPHTHALMIC at 07:01

## 2020-01-17 RX ADMIN — EPHEDRINE SULFATE 10 MG: 50 INJECTION INTRAMUSCULAR; INTRAVENOUS; SUBCUTANEOUS at 07:01

## 2020-01-17 RX ADMIN — KETOROLAC TROMETHAMINE 30 MG: 30 INJECTION, SOLUTION INTRAMUSCULAR at 01:01

## 2020-01-17 RX ADMIN — DULOXETINE 60 MG: 30 CAPSULE, DELAYED RELEASE ORAL at 08:01

## 2020-01-17 RX ADMIN — DIAZEPAM 5 MG: 5 TABLET ORAL at 09:01

## 2020-01-17 RX ADMIN — DEXAMETHASONE SODIUM PHOSPHATE 8 MG: 4 INJECTION, SOLUTION INTRAMUSCULAR; INTRAVENOUS at 07:01

## 2020-01-17 RX ADMIN — GLYCOPYRROLATE 0.2 MG: 0.2 INJECTION, SOLUTION INTRAMUSCULAR; INTRAVENOUS at 07:01

## 2020-01-17 RX ADMIN — SODIUM CHLORIDE: 0.9 INJECTION, SOLUTION INTRAVENOUS at 11:01

## 2020-01-17 RX ADMIN — OXYCODONE HYDROCHLORIDE 5 MG: 5 TABLET ORAL at 09:01

## 2020-01-17 RX ADMIN — HYDROMORPHONE HYDROCHLORIDE 0.4 MG: 2 INJECTION, SOLUTION INTRAMUSCULAR; INTRAVENOUS; SUBCUTANEOUS at 09:01

## 2020-01-17 RX ADMIN — ACETAMINOPHEN 1000 MG: 500 TABLET, FILM COATED ORAL at 05:01

## 2020-01-17 RX ADMIN — DULOXETINE 60 MG: 30 CAPSULE, DELAYED RELEASE ORAL at 12:01

## 2020-01-17 RX ADMIN — MUPIROCIN: 20 OINTMENT TOPICAL at 08:01

## 2020-01-17 RX ADMIN — PROPOFOL 200 MG: 10 INJECTION, EMULSION INTRAVENOUS at 07:01

## 2020-01-17 RX ADMIN — ONDANSETRON 4 MG: 2 INJECTION INTRAMUSCULAR; INTRAVENOUS at 08:01

## 2020-01-17 RX ADMIN — LIDOCAINE HYDROCHLORIDE 40 MG: 20 INJECTION, SOLUTION INTRAVENOUS at 07:01

## 2020-01-17 RX ADMIN — FENTANYL CITRATE 100 MCG: 50 INJECTION, SOLUTION INTRAMUSCULAR; INTRAVENOUS at 07:01

## 2020-01-17 RX ADMIN — INSULIN DETEMIR 30 UNITS: 100 INJECTION, SOLUTION SUBCUTANEOUS at 09:01

## 2020-01-17 RX ADMIN — ROCURONIUM BROMIDE 40 MG: 10 INJECTION, SOLUTION INTRAVENOUS at 07:01

## 2020-01-17 RX ADMIN — NEOSTIGMINE METHYLSULFATE 5 MG: 1 INJECTION INTRAVENOUS at 08:01

## 2020-01-17 RX ADMIN — LABETALOL HYDROCHLORIDE 10 MG: 5 INJECTION, SOLUTION INTRAVENOUS at 09:01

## 2020-01-17 RX ADMIN — INSULIN ASPART 5 UNITS: 100 INJECTION, SOLUTION INTRAVENOUS; SUBCUTANEOUS at 08:01

## 2020-01-17 RX ADMIN — PREGABALIN 75 MG: 75 CAPSULE ORAL at 05:01

## 2020-01-17 RX ADMIN — HYDROCODONE BITARTRATE AND ACETAMINOPHEN 1 TABLET: 10; 325 TABLET ORAL at 04:01

## 2020-01-17 RX ADMIN — GLYCOPYRROLATE 0.6 MG: 0.2 INJECTION, SOLUTION INTRAMUSCULAR; INTRAVENOUS at 08:01

## 2020-01-17 RX ADMIN — KETOROLAC TROMETHAMINE 30 MG: 30 INJECTION, SOLUTION INTRAMUSCULAR at 09:01

## 2020-01-17 RX ADMIN — ROSUVASTATIN CALCIUM 10 MG: 10 TABLET, COATED ORAL at 12:01

## 2020-01-17 RX ADMIN — SODIUM CHLORIDE: 0.9 INJECTION, SOLUTION INTRAVENOUS at 05:01

## 2020-01-17 RX ADMIN — MUPIROCIN: 20 OINTMENT TOPICAL at 05:01

## 2020-01-17 RX ADMIN — HYPROMELLOSE 2910 1 DROP: 5 SOLUTION OPHTHALMIC at 09:01

## 2020-01-17 RX ADMIN — SIMETHICONE CHEW TAB 80 MG 80 MG: 80 TABLET ORAL at 09:01

## 2020-01-17 RX ADMIN — HYDROMORPHONE HYDROCHLORIDE 0.4 MG: 2 INJECTION, SOLUTION INTRAMUSCULAR; INTRAVENOUS; SUBCUTANEOUS at 08:01

## 2020-01-17 RX ADMIN — MUPIROCIN: 20 OINTMENT TOPICAL at 11:01

## 2020-01-17 RX ADMIN — ROCURONIUM BROMIDE 20 MG: 10 INJECTION, SOLUTION INTRAVENOUS at 07:01

## 2020-01-17 NOTE — H&P
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 Stage IA1, negative LVSI.   1. CERVIX, LEEP:   - Superficial squamous cell carcinoma, less than 1 mm.   - High-grade squamous intraepithelial lesion (DEE DEE III) extending into   endocervical glands.   - Immunohistochemistry with appropriate control for keratin AE1/3 highlights   the carcinoma.   - Immunohistochemistry with appropriate control for p16 is positive in both   the invasive carcinoma and     DEE DEE III.   - See CAP synoptic report.   2.  ENDOCERVICAL CURETTAGE:   - Benign glandular epithelium and blood clot.   - Immunohistochemistry with appropriate control for p16 is noncontributory.   Deep Margin:  Uninvolved by invasive carcinoma or high-grade squamous   intraepithelial lesion (DEE DEE 2-3). Distance of invasive carcinoma from margin (millimeters): 4.5   mm.   Lymphovascular Invasion:  Not identified.      Presents today for post operative visit and to review pathology.   Stage IA1 squamous cell carcinoma, negative LVSI, margins negative for invasive carcinoma.       Referral history:  Referred by Dr. Marcia Canseco for newly diagnosed endocervical carcinoma in situ.   Evaluated by Dr. Canseco for post menopausal bleeding.      Pap 9/20/18 negative   EMB 9/27/19   FINAL PATHOLOGIC DIAGNOSIS   Endometrium, curettage:   Scant strips of inactive endometrial glands and lower uterine segment glands in a background of mucus and blood.   Negative for atypia or malignancy.   Comment: No significant stromal component is present to evaluate for endometrial hyperplasia. The findings may   reflect an atrophic process. However, re-sampling may be considered in a highly suspicious clinical setting.      Underwent further evaluation with D&C hysteroscopy 10/25/19.   FINAL PATHOLOGIC DIAGNOSIS   ENDOMETRIAL CURETTAGE:   - Multiple fragments of squamous epithelium with severe squamous dysplasia/carcinoma in situ with  involvement of   the endocervix (see comment).   - Rare small fragments of atrophic endometrium.   - Strips of benign endocervical epithelium and fragments of benign squamous epithelium intermixed with blood and   mucus.   - No evidence of endometrial hyperplasia, endometrial intraepithelial neoplasia or endometrial adenocarcinoma.   COMMENT: The fragments of squamous epithelium represent at least severe squamous dysplasia/carcinoma in   situ and appear to arise from the cervix. The fragments are cut in a tangential fashion and the interface of the   epithelium with the stroma cannot be accurately evaluated. The possibility of an invasive squamous cell carcinoma   cannot be excluded. There is no evidence of endometrial intraepithelial neoplasia or endometrial adenocarcinoma.   However, the possibility of a squamous cell carcinoma arising within the endometrium, although unlikely, cannot be   excluded.      Pelvic US 10/9/19  Uterus:  Size: 6.8 x 3.6 x 3.8 cm  Masses: There are multiple small partially calcified uterine fibroids present with the largest measuring 1.4 cm in greatest dimension. Multiple nabothian cysts are present.  Endometrium: Upper normal in this post menopausal patient, measuring 5 mm. There is a trace amount of free fluid within the endometrial cavity as well.  Right ovary: Not visualized.  Left ovary: Not visualized.      Medical comorbidities include DM (last hgb A1c 7.6, Cr 1.2), HLD, hypothyroid.   Prior abdominal surgeries include BTL.      Review of Systems   Constitutional: Negative for appetite change, chills, fatigue and fever.   HENT: Negative for mouth sores.    Respiratory: Negative for cough and shortness of breath.    Cardiovascular: Negative for leg swelling.   Gastrointestinal: Negative for abdominal pain, blood in stool, constipation and diarrhea.   Endocrine: Negative for cold intolerance.   Genitourinary: Negative for dysuria and vaginal bleeding.   Musculoskeletal: Negative for  myalgias.   Skin: Negative for rash.   Allergic/Immunologic: Negative.    Neurological: Negative for weakness and numbness.   Hematological: Negative for adenopathy. Does not bruise/bleed easily.   Psychiatric/Behavioral: Negative for confusion.         Objective:   Physical Exam:   Constitutional: She is oriented to person, place, and time. She appears well-developed and well-nourished.    HENT:   Head: Normocephalic and atraumatic.    Eyes: Pupils are equal, round, and reactive to light. EOM are normal.    Neck: Normal range of motion. Neck supple. No thyromegaly present.    Cardiovascular: Normal rate, regular rhythm and intact distal pulses.     Pulmonary/Chest: Effort normal and breath sounds normal. No respiratory distress. She has no wheezes.         Abdominal: Soft. Bowel sounds are normal. She exhibits no distension, no ascites and no mass. There is no tenderness.             Musculoskeletal: Normal range of motion and moves all extremeties.      Lymphadenopathy:     She has no cervical adenopathy.        Right: No supraclavicular adenopathy present.        Left: No supraclavicular adenopathy present.    Neurological: She is alert and oriented to person, place, and time.    Skin: Skin is warm and dry. No rash noted.    Psychiatric: She has a normal mood and affect.         Assessment:      1. Malignant neoplasm of endocervix    2. Cervical cancer, FIGO stage IA1          Plan:          Orders Placed This Encounter   Procedures    CT Chest Abdoment Pelvis With Contrast    Creatinine, serum      We discussed the natural history of cervical cancer. Her lesion is very small. Stage IA1 squamous cell carcinoma, negative LVSI, margins negative for invasive carcinoma. Standard management include extrafascial hysterectomy. She is a candidate for minimally invasive approach. Recommend RTLH/BSO. She desires to proceed.      The risks, benefits, and indications of the procedure were discussed with the patient and her  family members if present.  These included bleeding, transfusion, infection, damage to surrounding tissues (bowel, bladder, ureter), wound separation, lymphedema, conversion to laparotomy if laparoscopic, perioperative cardiac events, VTE, pneumonia, and possible death.  She voiced understanding, all questions were answered and consents were signed.     Surgery 1/17/19 [facility]  Pre op anesthesia  Hold ASA periop  Optimal glucose control periop     CT CAP for complete metastatic survey

## 2020-01-17 NOTE — ANESTHESIA POSTPROCEDURE EVALUATION
Anesthesia Post Evaluation    Patient: Chelsea Gould    Procedure(s) Performed: Procedure(s) (LRB):  XI ROBOTIC HYSTERECTOMY (N/A)  XI ROBOTIC SALPINGO-OOPHORECTOMY (Bilateral)    Final Anesthesia Type: general    Patient location during evaluation: PACU  Patient participation: Yes- Able to Participate  Level of consciousness: awake and alert and oriented  Post-procedure vital signs: reviewed and stable  Pain management: adequate  Airway patency: patent    PONV status at discharge: No PONV  Anesthetic complications: no      Cardiovascular status: blood pressure returned to baseline and hemodynamically stable  Respiratory status: unassisted, spontaneous ventilation and room air  Hydration status: euvolemic  Follow-up not needed.          Vitals Value Taken Time   /64 1/17/2020  9:50 AM   Temp 36.7 °C (98 °F) 1/17/2020  8:50 AM   Pulse 59 1/17/2020  9:55 AM   Resp 18 1/17/2020  9:35 AM   SpO2 96 % 1/17/2020  9:55 AM   Vitals shown include unvalidated device data.      No case tracking events are documented in the log.      Pain/Karthikeyan Score: Pain Rating Prior to Med Admin: 7 (1/17/2020  9:55 AM)  Pain Rating Post Med Admin: 10 (1/17/2020  9:05 AM)  Karthikeyan Score: 10 (1/17/2020  9:35 AM)

## 2020-01-17 NOTE — OPERATIVE NOTE ADDENDUM
Certification of Assistant at Surgery       Surgery Date: 1/17/2020     Participating Surgeons:  Surgeon(s) and Role:     * Fani Vieira MD - Primary    Procedures:  Procedure(s) (LRB):  XI ROBOTIC HYSTERECTOMY (N/A)  XI ROBOTIC SALPINGO-OOPHORECTOMY (Bilateral)    Assistant Surgeon's Certification of Necessity:  I understand that section 1842 (b) (6) (d) of the Social Security Act generally prohibits Medicare Part B reasonable charge payment for the services of assistants at surgery in teaching hospitals when qualified residents are available to furnish such services. I certify that the services for which payment is claimed were medically necessary, and that no qualified resident was available to perform the services. I further understand that these services are subject to post-payment review by the Medicare carrier.      Stephanie Fam NP    01/17/2020  8:56 AM

## 2020-01-17 NOTE — OR NURSING
Holding nurse, FELICITA Prince notified of pts glucose monitor being sent to holding with pt.  States she will notify anesthesia of pts monitor.

## 2020-01-17 NOTE — PLAN OF CARE
Pt medicated for pain. Family at bedside. Abdomen soft. Ordoñez intact. IVF infusing.Keaton SCD's on. Tolerating po well  c no c/o of nausea. Bed low. Call light in reach. Incision sites intact c dermabond x 5 sites. Report received from Radha RIOS.

## 2020-01-17 NOTE — NURSING
Received patient to room 380 from Recovery. Pt AAO x 4 with slight drowsiness noted. Able to be aroused verbally. Resp. Even and unlabored. LAp sites x 5 with dermabond CDI. F/C patent and draining clear yellow urine to g/u bag. Oriented to room. Pt states pain level is @ 4 within comfort range. Pillows provided for splinting the abdomen. VSS. SCD's in place bilateral. Family @ bedside. Safety maintained. Bed in lowest position and locked. Call light in reach.

## 2020-01-17 NOTE — ANESTHESIA PROCEDURE NOTES
Intubation  Performed by: Esmer Mitchell CRNA  Authorized by: Natalio Bush MD     Intubation:     Induction:  Intravenous    Intubated:  Postinduction    Mask Ventilation:  Easy with oral airway    Attempts:  1    Attempted By:  CRNA    Method of Intubation:  Video laryngoscopy    Blade:  Bright 3    Laryngeal View Grade: Grade IIA - cords partially seen      Difficult Airway Encountered?: No      Complications:  None    Airway Device:  Oral endotracheal tube    Airway Device Size:  7.5    Style/Cuff Inflation:  Cuffed (inflated to minimal occlusive pressure)    Inflation Amount (mL):  4    Tube secured:  21    Secured at:  The lips    Placement Verified By:  Capnometry    Complicating Factors:  None    Findings Post-Intubation:  BS equal bilateral and atraumatic/condition of teeth unchanged

## 2020-01-17 NOTE — PROGRESS NOTES
0901 spoke with Dr Camarena via person. Notified that patient SBP is greater than 200. HR is greater than 70  Order received for labetolol 10 mg IVP, may go up to 50 mg

## 2020-01-17 NOTE — OR NURSING
Pt has [employer] glucose monitor attached to left upper arm.  Blood sugar 143 at 530.   Will send pts meter to OR with her and notify anesthesia

## 2020-01-17 NOTE — TRANSFER OF CARE
"Anesthesia Transfer of Care Note    Patient: Chelsea Gould    Procedure(s) Performed: Procedure(s) (LRB):  XI ROBOTIC HYSTERECTOMY (N/A)  XI ROBOTIC SALPINGO-OOPHORECTOMY (Bilateral)    Patient location: PACU    Anesthesia Type: general    Transport from OR: Transported from OR on 2-3 L/min O2 by NC with adequate spontaneous ventilation    Post pain: adequate analgesia    Post assessment: no apparent anesthetic complications    Post vital signs: stable    Level of consciousness: awake and alert    Nausea/Vomiting: no nausea/vomiting    Complications: none    Transfer of care protocol was followed      Last vitals:   Visit Vitals  BP (!) 170/83   Pulse 71   Temp 36.6 °C (97.8 °F)   Resp 18   Ht 5' 4" (1.626 m)   Wt 90.7 kg (200 lb)   SpO2 97%   Breastfeeding? No   BMI 34.33 kg/m²     "

## 2020-01-17 NOTE — INTERVAL H&P NOTE
The patient has been examined and the H&P has been reviewed:    I concur with the findings and no changes have occurred since H&P was written.    Anesthesia/Surgery risks, benefits and alternative options discussed and understood by patient/family.    Proceed to OR for Davinci assisted laparoscopic hysterectomy with BSO.    Stephanie Fam, NP-C, [facility]  GYN Oncology          Active Hospital Problems    Diagnosis  POA    Malignant neoplasm of endocervix [C53.0]  Yes      Resolved Hospital Problems   No resolved problems to display.

## 2020-01-17 NOTE — OP NOTE
DATE OF PROCEDURE:  01/17/2020     SURGEON:  Fani Vieira M.D.     ASSISTANTS: MARINE Torres, First Assist-- No qualified resident was available for the procedure.      PREOPERATIVE DIAGNOSIS:   1. Stage IA1 squamous cell carcinoma of the cervix  2. S/p CKC     POSTOPERATIVE DIAGNOSES:    1. Stage IA1 squamous cell carcinoma of the cervix   2. S/p CKC     PROCEDURE PERFORMED:  Robotic-assisted total laparoscopic hysterectomy,   bilateral salpingo-oophorectomy     ANESTHESIA:  General endotracheal anesthesia.     SPECIMENS REMOVED:  1.  Uterus and cervix.  2.  Bilateral fallopian tubes and ovaries.     ESTIMATED BLOOD LOSS:  <20 mL.     COMPLICATIONS:  None.     FINDINGS: 6 week size uterus. Normal fallopian tubes and ovaries bilaterally. No gross cervical lesions.       PROCEDURE IN DETAIL:  The patient was taken to the Operating Room.  Informed consent had been obtained.  She underwent general endotracheal anesthesia without difficulty, was prepped and draped in the normal sterile fashion in a dorsal lithotomy position.  Timeout was performed.  All parties agreed to the planned procedure. Perioperative antibiotics were administered.  Ordoñez catheter was placed under sterile conditions.  The Secure[employer] uterine manipulator was secured in place in a standard fashion for uterine manipulation.  Gloves were changed and attention was turned to the patient's abdomen.       The umbilicus was inverted. Veress needle was gently inserted.  Intra-abdominal placement was confirmed with low CO2 pressure and water drop test.  Abdomen was insufflated and pneumoperitoneum was obtained.  Skin incision was made superior to the umbilicus.  Robotic trocar was introduced.  Intra-abdominal placement was confirmed.  Additional trocars were placed, 2 robotic trocars to the left of the camera, 1 robotic trocar to the right of the camera and an additional 8 mm assist port to the right of the camera.  The patient was placed in steep  Trendelenburg. Robot was docked and operating surgeon reported to the console.       Survey of the abdomen and pelvis revealed the above findings. Bilateral round ligaments were identified.  These were cauterized and transected.  The posterior leaf of the broad ligament was then opened bilaterally facilitating access to the retroperitoneum.  The infundibulopelvic ligaments were then skeletonized with good visualization of the ureter beneath.  These were cauterized and transected.  The anterior leaf of the broad ligament was then opened circumferentially.  Proper plane for the bladder flap was identified and the bladder was gently reflected off of the anterior aspect of the uterus and cervix to the level of the cervicovaginal junction. Bilateral uterine arteries were then skeletonized.  These were cauterized and transected.  The remainder of the uterosacral and cardinal ligaments were then also serially cauterized and transected.  Posterior colpotomy was initiated in the 6 o'clock position and carried around circumferentially.  The uterus, cervix, bilateral fallopian tubes and ovaries were then removed through the vagina.        We then closed the vaginal cuff with a V-Loc suture in a running fashion. Bilateral ureters were identified and noted to vermiculate both equally and briskly. The robotic trocars were then removed under direct visualization and the robot was undocked. Skin incisions were then rendered hemostatic.  These were closed with 4-0 Monocryl in a subcuticular fashion and topped with sterile Dermabond.  The patient tolerated the procedure well.  Sponge, lap, needle and instrument counts were correct x2 as reported by the circulating nurse.  She was awakened from anesthesia and taken to recovery in stable condition.

## 2020-01-18 VITALS
SYSTOLIC BLOOD PRESSURE: 158 MMHG | BODY MASS INDEX: 34.62 KG/M2 | RESPIRATION RATE: 17 BRPM | OXYGEN SATURATION: 95 % | DIASTOLIC BLOOD PRESSURE: 62 MMHG | WEIGHT: 202.81 LBS | HEART RATE: 71 BPM | TEMPERATURE: 98 F | HEIGHT: 64 IN

## 2020-01-18 DIAGNOSIS — E11.65 TYPE 2 DIABETES MELLITUS WITH HYPERGLYCEMIA, WITH LONG-TERM CURRENT USE OF INSULIN: Primary | ICD-10-CM

## 2020-01-18 DIAGNOSIS — Z79.4 TYPE 2 DIABETES MELLITUS WITH HYPERGLYCEMIA, WITH LONG-TERM CURRENT USE OF INSULIN: Primary | ICD-10-CM

## 2020-01-18 LAB
BASOPHILS # BLD AUTO: 0.03 K/UL (ref 0–0.2)
BASOPHILS NFR BLD: 0.3 % (ref 0–1.9)
DIFFERENTIAL METHOD: ABNORMAL
EOSINOPHIL # BLD AUTO: 0 K/UL (ref 0–0.5)
EOSINOPHIL NFR BLD: 0.2 % (ref 0–8)
ERYTHROCYTE [DISTWIDTH] IN BLOOD BY AUTOMATED COUNT: 12.6 % (ref 11.5–14.5)
HCT VFR BLD AUTO: 39.4 % (ref 37–48.5)
HGB BLD-MCNC: 12.1 G/DL (ref 12–16)
IMM GRANULOCYTES # BLD AUTO: 0.04 K/UL (ref 0–0.04)
IMM GRANULOCYTES NFR BLD AUTO: 0.3 % (ref 0–0.5)
LYMPHOCYTES # BLD AUTO: 1.7 K/UL (ref 1–4.8)
LYMPHOCYTES NFR BLD: 14.9 % (ref 18–48)
MCH RBC QN AUTO: 28.4 PG (ref 27–31)
MCHC RBC AUTO-ENTMCNC: 30.7 G/DL (ref 32–36)
MCV RBC AUTO: 93 FL (ref 82–98)
MONOCYTES # BLD AUTO: 1 K/UL (ref 0.3–1)
MONOCYTES NFR BLD: 8.9 % (ref 4–15)
NEUTROPHILS # BLD AUTO: 8.6 K/UL (ref 1.8–7.7)
NEUTROPHILS NFR BLD: 75.4 % (ref 38–73)
NRBC BLD-RTO: 0 /100 WBC
PLATELET # BLD AUTO: 243 K/UL (ref 150–350)
PMV BLD AUTO: 10.7 FL (ref 9.2–12.9)
POCT GLUCOSE: 233 MG/DL (ref 70–110)
RBC # BLD AUTO: 4.26 M/UL (ref 4–5.4)
WBC # BLD AUTO: 11.44 K/UL (ref 3.9–12.7)

## 2020-01-18 PROCEDURE — 63600175 PHARM REV CODE 636 W HCPCS: Mod: HCNC | Performed by: STUDENT IN AN ORGANIZED HEALTH CARE EDUCATION/TRAINING PROGRAM

## 2020-01-18 PROCEDURE — 85025 COMPLETE CBC W/AUTO DIFF WBC: CPT | Mod: HCNC

## 2020-01-18 PROCEDURE — 25000003 PHARM REV CODE 250: Mod: HCNC | Performed by: STUDENT IN AN ORGANIZED HEALTH CARE EDUCATION/TRAINING PROGRAM

## 2020-01-18 PROCEDURE — 36415 COLL VENOUS BLD VENIPUNCTURE: CPT | Mod: HCNC

## 2020-01-18 RX ORDER — BLOOD-GLUCOSE METER
KIT MISCELLANEOUS
Qty: 1 EACH | Refills: 0 | Status: SHIPPED | OUTPATIENT
Start: 2020-01-18 | End: 2021-11-02

## 2020-01-18 RX ORDER — IBUPROFEN 600 MG/1
600 TABLET ORAL EVERY 8 HOURS PRN
Qty: 30 TABLET | Refills: 1 | Status: SHIPPED | OUTPATIENT
Start: 2020-01-18 | End: 2020-10-18

## 2020-01-18 RX ORDER — DEXTROSE 4 G
1 TABLET,CHEWABLE ORAL 4 TIMES DAILY
Qty: 1 EACH | Refills: 0 | Status: SHIPPED | OUTPATIENT
Start: 2020-01-18 | End: 2020-01-19

## 2020-01-18 RX ORDER — OXYCODONE AND ACETAMINOPHEN 5; 325 MG/1; MG/1
1 TABLET ORAL EVERY 8 HOURS PRN
Qty: 20 TABLET | Refills: 0 | Status: SHIPPED | OUTPATIENT
Start: 2020-01-18 | End: 2020-03-10

## 2020-01-18 RX ORDER — DEXTROSE 4 G
1 TABLET,CHEWABLE ORAL 4 TIMES DAILY
Qty: 1 EACH | Refills: 0 | Status: SHIPPED | OUTPATIENT
Start: 2020-01-18 | End: 2020-01-18 | Stop reason: SDUPTHER

## 2020-01-18 RX ADMIN — INSULIN ASPART 17 UNITS: 100 INJECTION, SOLUTION INTRAVENOUS; SUBCUTANEOUS at 08:01

## 2020-01-18 RX ADMIN — HYPROMELLOSE 2910 1 DROP: 5 SOLUTION OPHTHALMIC at 04:01

## 2020-01-18 RX ADMIN — KETOROLAC TROMETHAMINE 30 MG: 30 INJECTION, SOLUTION INTRAMUSCULAR at 05:01

## 2020-01-18 RX ADMIN — MUPIROCIN: 20 OINTMENT TOPICAL at 09:01

## 2020-01-18 RX ADMIN — INSULIN ASPART 2 UNITS: 100 INJECTION, SOLUTION INTRAVENOUS; SUBCUTANEOUS at 08:01

## 2020-01-18 RX ADMIN — SIMETHICONE CHEW TAB 80 MG 80 MG: 80 TABLET ORAL at 05:01

## 2020-01-18 RX ADMIN — ROSUVASTATIN CALCIUM 10 MG: 10 TABLET, COATED ORAL at 09:01

## 2020-01-18 RX ADMIN — LEVOTHYROXINE SODIUM 112 MCG: 112 TABLET ORAL at 05:01

## 2020-01-18 RX ADMIN — HYDRALAZINE HYDROCHLORIDE 5 MG: 20 INJECTION INTRAMUSCULAR; INTRAVENOUS at 04:01

## 2020-01-18 RX ADMIN — SODIUM CHLORIDE: 0.9 INJECTION, SOLUTION INTRAVENOUS at 02:01

## 2020-01-18 RX ADMIN — HYDROCODONE BITARTRATE AND ACETAMINOPHEN 1 TABLET: 5; 325 TABLET ORAL at 05:01

## 2020-01-18 RX ADMIN — DULOXETINE 60 MG: 30 CAPSULE, DELAYED RELEASE ORAL at 09:01

## 2020-01-18 RX ADMIN — HYDROCODONE BITARTRATE AND ACETAMINOPHEN 1 TABLET: 5; 325 TABLET ORAL at 09:01

## 2020-01-18 NOTE — ASSESSMENT & PLAN NOTE
- continue routine post-op management  - Pain: scheduled ibuprofen, norco PRN  - Nausea: Zofran, phenergan PRN  - Stool: colace, simethicone PRN   - encourage ambulation  - Resp: IS to bedside  - Urinary: renteria to be dced this AM  - Diet: Diabetic  - DVT PPx: teds, SCDs  - Heme: Pre-op H/H 13/43, AM CBC 12/39, stable

## 2020-01-18 NOTE — NURSING
Pt AAOx4, VSS on RA and afebrile. Discharge paperwork given, pt verbalizes understanding. Prescriptions called to pharm per MD. Surgical site CDI. Voiding spontaneously without difficulty. Ambulating independently without difficulty. Tolerating regular diet without nausea.  IV removed w/ cath tip intact, WNL. To be DCd home w/ family-- will be escorted downstairs via  transport team once dressed, ready & ride arrives. Free from falls, injury, or skin breakdown this hospital admission.

## 2020-01-18 NOTE — PLAN OF CARE
01/18/20 0900   Final Note   Assessment Type Final Discharge Note   Anticipated Discharge Disposition Home   Right Care Referral Info   Post Acute Recommendation No Care

## 2020-01-18 NOTE — PROGRESS NOTES
[facility]  Obstetrics & Gynecology  Progress Note    Patient Name: Chelsea Gould  MRN: [id number]  Admission Date: 1/17/2020  Primary Care Provider: Sari Gupta MD  Principal Problem: Malignant neoplasm of endocervix    Subjective:     HPI:  Chelsea Gould is a 79 y.o. yo female who presents for surgical management of stage 1A1 cervical cancer.    Interval History: BG 300s yesterday, long acting insulin given and prandial insulin restarted. BG this AM improve to low 200s. Pt is tolerating PO, and pain is well controlled. Ordoñez removed this morning, excellent UOP overnight. Will monitor for ambulation and spontaneous void.     Scheduled Meds:   DULoxetine  60 mg Oral BID    ibuprofen  600 mg Oral Q6H    insulin aspart U-100  12 Units Subcutaneous with lunch    insulin aspart U-100  12 Units Subcutaneous with dinner    insulin aspart U-100  17 Units Subcutaneous with breakfast    insulin detemir U-100  30 Units Subcutaneous QHS    levothyroxine  112 mcg Oral Before breakfast    mupirocin   Nasal BID    rosuvastatin  10 mg Oral Daily     Continuous Infusions:   sodium chloride 0.9% 125 mL/hr at 01/18/20 0200     PRN Meds:artificial tears, bisacodyl, dextrose 50%, dextrose 50%, diphenhydrAMINE, diphenhydrAMINE, glucagon (human recombinant), glucose, glucose, hydrALAZINE, HYDROcodone-acetaminophen, HYDROcodone-acetaminophen, insulin aspart U-100, ondansetron, promethazine (PHENERGAN) IVPB, simethicone, sodium chloride 0.9%    Review of patient's allergies indicates:  No Known Allergies    Objective:     Vital Signs (Most Recent):  Temp: 97.6 °F (36.4 °C) (01/18/20 0741)  Pulse: 71 (01/18/20 0741)  Resp: 17 (01/18/20 0741)  BP: (!) 158/62 (01/18/20 0741)  SpO2: 95 % (01/18/20 0741) Vital Signs (24h Range):  Temp:  [96.2 °F (35.7 °C)-98.2 °F (36.8 °C)] 97.6 °F (36.4 °C)  Pulse:  [59-77] 71  Resp:  [16-19] 17  SpO2:  [92 %-100 %] 95 %  BP: (129-217)/(55-92) 158/62     Weight: 92 kg (202 lb  13.2 oz)  Body mass index is 34.81 kg/m².  No LMP recorded. Patient is postmenopausal.    I&O (Last 24H):    Intake/Output Summary (Last 24 hours) at 1/18/2020 0811  Last data filed at 1/18/2020 0600  Gross per 24 hour   Intake 4279.17 ml   Output 4050 ml   Net 229.17 ml       Physical Exam:   Constitutional: She is oriented to person, place, and time. She appears well-developed and well-nourished. No distress.    HENT:   Head: Normocephalic and atraumatic.    Eyes: EOM are normal.    Neck: Normal range of motion.    Cardiovascular: Normal rate and regular rhythm.     Pulmonary/Chest: Effort normal and breath sounds normal. No respiratory distress.        Abdominal: Soft. Bowel sounds are normal. She exhibits abdominal incision (laparoscopic incision sites c/d/i, dermabond in place). There is no tenderness. There is no rebound and no guarding.                 Neurological: She is alert and oriented to person, place, and time.    Skin: Skin is warm and dry. She is not diaphoretic.    Psychiatric: She has a normal mood and affect.       Laboratory:  CBC:   Recent Labs   Lab 01/18/20  0358   WBC 11.44   RBC 4.26   HGB 12.1   HCT 39.4      MCV 93   MCH 28.4   MCHC 30.7*     Assessment/Plan:     * Malignant neoplasm of endocervix  - s/p TLH/BSO 1/17/20    S/P total hysterectomy and bilateral salpingo-oophorectomy  - continue routine post-op management  - Pain: scheduled ibuprofen, norco PRN  - Nausea: Zofran, phenergan PRN  - Stool: colace, simethicone PRN   - encourage ambulation  - Resp: IS to bedside  - Urinary: renteria to be dced this AM  - Diet: Diabetic  - DVT PPx: teds, SCDs  - Heme: Pre-op H/H 13/43, AM CBC 12/39, stable    Anxiety and depression  - continue home cymbalta    Hyperlipidemia associated with type 2 diabetes mellitus  - continue home statin    Type 2 diabetes mellitus with hyperglycemia, with long-term current use of insulin  - on lantus 30 QHS and aspart 17/12/12 at home  - long acting insulin  started yesterday (detemir 30)  - prandial insulin ordered, received 10u total with sliding scale yesterday evening but tolerated full diet.  - will need glucometer as her CGM is not working. Strips, needles, and glucometer ordered to home pharmacy on d/c.    Hypothyroidism  - continue home synthroid        Sushma K Reddy, MD  Obstetrics & Gynecology  [facility]

## 2020-01-18 NOTE — NURSING
R. Sushma called back to check pt's current CGM reading, it was 335.  She was happy that it was trending downward.  She asked that we recheck it in an hour and document the results.  We also verified with the patient her correct home insulin orders.  New orders for 17 units of Novolog with breakfast ordered, & 12 units of Novolog with lunch and dinner ordered.

## 2020-01-18 NOTE — DISCHARGE SUMMARY
[facility]  Obstetrics & Gynecology  Discharge Summary    Patient Name: Chelsea Gould  MRN: [id number]  Admission Date: 1/17/2020  Hospital Length of Stay: 0 days  Discharge Date and Time:  01/18/2020 8:24 AM  Attending Physician: Fani Vieira MD   Discharging Provider: Sushma K Reddy, MD  Primary Care Provider: Sari Gupta MD    HPI:  Chelsea Gould is a 79 y.o. yo female who presents for surgical management of stage 1A1 cervical cancer.    Hospital Course:  01/17/2020 - patient presented for scheduled surgery, underwent without complications. Admitted for observation overnight.  01/18/2020 - BG 300s yesterday, long acting insulin given and prandial insulin restarted. BG this AM improve to low 200s. Pt is tolerating PO, and pain is well controlled. Ordoñez removed this morning, excellent UOP overnight.  Patient discharged this afternoon. Prior to discharge patient was able to void, ambulate, tolerate PO and pain was well controlled with PO meds. Patient was given routine post-op instructions for which patient voiced understanding. Patient was subsequently discharged home.    Procedure(s) (LRB):  XI ROBOTIC HYSTERECTOMY (N/A)  XI ROBOTIC SALPINGO-OOPHORECTOMY (Bilateral)         Significant Diagnostic Studies: Labs:   CBC   Recent Labs   Lab 01/18/20  0358   WBC 11.44   HGB 12.1   HCT 39.4          Pending Diagnostic Studies:     Procedure Component Value Units Date/Time    Specimen to Pathology, Surgery Gynecology and Obstetrics [[id number]] Collected:  01/17/20 0831    Order Status:  Sent Lab Status:  In process Updated:  01/17/20 1113        Final Active Diagnoses:    Diagnosis Date Noted POA    PRINCIPAL PROBLEM:  Malignant neoplasm of endocervix [C53.0] 01/17/2020 Yes    S/P total hysterectomy and bilateral salpingo-oophorectomy [Z90.710, Z90.79, Z90.722] 01/17/2020 Not Applicable    Hypothyroidism [E03.9] 05/01/2019 Yes    Hyperlipidemia associated with type 2 diabetes mellitus  [E11.69, E78.5] 05/01/2019 Yes    Anxiety and depression [F41.9, F32.9] 05/01/2019 Yes    Type 2 diabetes mellitus with hyperglycemia, with long-term current use of insulin [E11.65, Z79.4] 05/01/2019 Not Applicable      Problems Resolved During this Admission:        Discharged Condition: good    Disposition: Home or Self Care    Follow Up:  Follow-up Information     Schedule an appointment as soon as possible for a visit with Fani Vieira MD.    Specialty:  Gynecologic Oncology  Why:  Follow up visit  Contact information:  [address]  [facility] [address]  [phone]                 Patient Instructions:      Other restrictions (specify):   Order Comments: Pelvic rest for 8 weeks. Nothing in vagina -no sex, tampons, douching, etc.    No swimming, and no baths (showers only) for 8 weeks.    No driving while taking narcotic pain medication.    No lifting heavier than 5-10 lbs, or approximately the weight of a gallon of milk.     Notify your health care provider if you experience any of the following:  temperature >100.4     Notify your health care provider if you experience any of the following:  persistent nausea and vomiting or diarrhea     Notify your health care provider if you experience any of the following:  severe uncontrolled pain     Notify your health care provider if you experience any of the following:  redness, tenderness, or signs of infection (pain, swelling, redness, odor or green/yellow discharge around incision site)     Notify your health care provider if you experience any of the following:  difficulty breathing or increased cough     Notify your health care provider if you experience any of the following:  severe persistent headache     Notify your health care provider if you experience any of the following:  worsening rash     Notify your health care provider if you experience any of the following:  persistent dizziness, light-headedness, or visual disturbances     Notify your  "health care provider if you experience any of the following:  increased confusion or weakness     Notify your health care provider if you experience any of the following:   Order Comments: Vaginal bleeding more than spotting that lasts for more than one hour     Medications:  Reconciled Home Medications:      Medication List      CHANGE how you take these medications    * Prodigy Autocode Meter kit  Generic drug:  blood-glucose meter  What changed:  Another medication with the same name was added. Make sure you understand how and when to take each.     * blood-glucose meter Misc  1 Units by Misc.(Non-Drug; Combo Route) route 4 (four) times daily. for 1 day  What changed:  You were already taking a medication with the same name, and this prescription was added. Make sure you understand how and when to take each.     * Prodigy No Coding Strp  Generic drug:  blood sugar diagnostic  What changed:  Another medication with the same name was added. Make sure you understand how and when to take each.     * blood sugar diagnostic Strp  1 strip by Misc.(Non-Drug; Combo Route) route 4 (four) times daily with meals and nightly.  What changed:  You were already taking a medication with the same name, and this prescription was added. Make sure you understand how and when to take each.         * This list has 4 medication(s) that are the same as other medications prescribed for you. Read the directions carefully, and ask your doctor or other care provider to review them with you.            CONTINUE taking these medications    alendronate 70 MG tablet  Commonly known as:  FOSAMAX  Take 1 tablet (70 mg total) by mouth every 7 days.     aspirin 81 MG EC tablet  Commonly known as:  ECOTRIN  Take 81 mg by mouth.     BD Ultra-Fine Mini Pen Needle 31 gauge x 3/16" Ndle  Generic drug:  pen needle, diabetic  Inject 1 each into the skin 4 (four) times daily with meals and nightly.     brimonidine-timolol 0.2-0.5 % Drop  Commonly known as:  " COMBIGAN     bromfenac 0.07 % Drop  Commonly known as:  PROLENSA  Apply 1 drop to eye.     ciprofloxacin HCl 0.3 % ophthalmic solution  Commonly known as:  CILOXAN     CULTURELLE ORAL  Take 1 capsule by mouth once daily.     diazePAM 5 MG tablet  Commonly known as:  VALIUM     DULoxetine 60 MG capsule  Commonly known as:  CYMBALTA  Take 60 mg by mouth 2 (two) times daily.     FreeStyle Yoav 14 Day Sensor Kit  Generic drug:  flash glucose sensor  U UTD WITH READER FOR GLUCOSE     hydrOXYzine pamoate 25 MG Cap  Commonly known as:  VISTARIL  Take 25 mg by mouth.     * ibuprofen 600 MG tablet  Commonly known as:  ADVIL,MOTRIN  Take 1 tablet (600 mg total) by mouth every 6 (six) hours as needed for Pain.     * ibuprofen 600 MG tablet  Commonly known as:  ADVIL,MOTRIN  Take 1 tablet (600 mg total) by mouth every 8 (eight) hours as needed for Pain.     insulin aspart U-100 100 unit/mL (3 mL) Inpn pen  Commonly known as:  NovoLOG Flexpen U-100 Insulin  Take 17 units with breakfast, and 12 units with lunch and dinner, Plus correction scale     ketorolac 0.5% 0.5 % Drop  Commonly known as:  ACULAR     levothyroxine 112 MCG tablet  Commonly known as:  SYNTHROID  Take 1 tablet (112 mcg total) by mouth before breakfast.     linaGLIPtin 5 mg Tab tablet  Commonly known as:  Tradjenta  Take 1 tablet (5 mg total) by mouth once daily.     loteprednol 0.5 % ophthalmic suspension  Commonly known as:  LOTEMAX     ofloxacin 0.3 % ophthalmic solution  Commonly known as:  OCUFLOX  INSTILL 1 DROP INTO AFFECTED EYE 4 TIMES A DAY     oxyCODONE-acetaminophen 5-325 mg per tablet  Commonly known as:  PERCOCET  Take 1 tablet by mouth every 8 (eight) hours as needed for Pain.     * Restasis 0.05 % ophthalmic emulsion  Generic drug:  cycloSPORINE  Place 1 drop into both eyes 2 (two) times daily.     * cycloSPORINE 0.05 % ophthalmic emulsion  Commonly known as:  RESTASIS  Apply 1 drop to eye.     rosuvastatin 10 MG tablet  Commonly known as:   CRESTOR  Take 10 mg by mouth.     triamcinolone acetonide 0.1% 0.1 % cream  Commonly known as:  KENALOG  APPLY TWICE DAILY TO ITCHY SKIN UP TO 2 WEEKS/MONTH AS NEEDED     Vitamin D2 50,000 unit Cap  Generic drug:  ergocalciferol  Take 1 capsule (50,000 Units total) by mouth every 7 days.         * This list has 4 medication(s) that are the same as other medications prescribed for you. Read the directions carefully, and ask your doctor or other care provider to review them with you.                Sushma K Reddy, MD  Obstetrics & Gynecology  [facility]

## 2020-01-18 NOTE — HOSPITAL COURSE
01/17/2020 - patient presented for scheduled surgery, underwent without complications. Admitted for observation overnight.  01/18/2020 - BG 300s yesterday, long acting insulin given and prandial insulin restarted. BG this AM improve to low 200s. Pt is tolerating PO, and pain is well controlled. Ordoñez removed this morning, excellent UOP overnight.  Patient discharged this afternoon. Prior to discharge patient was able to void, ambulate, tolerate PO and pain was well controlled with PO meds. Patient was given routine post-op instructions for which patient voiced understanding. Patient was subsequently discharged home.

## 2020-01-18 NOTE — SUBJECTIVE & OBJECTIVE
Interval History: BG 300s yesterday, long acting insulin given and prandial insulin restarted. BG this AM improve to low 200s. Pt is tolerating PO, and pain is well controlled. Ordoñez removed this morning, excellent UOP overnight. Will monitor for ambulation and spontaneous void.     Scheduled Meds:   DULoxetine  60 mg Oral BID    ibuprofen  600 mg Oral Q6H    insulin aspart U-100  12 Units Subcutaneous with lunch    insulin aspart U-100  12 Units Subcutaneous with dinner    insulin aspart U-100  17 Units Subcutaneous with breakfast    insulin detemir U-100  30 Units Subcutaneous QHS    levothyroxine  112 mcg Oral Before breakfast    mupirocin   Nasal BID    rosuvastatin  10 mg Oral Daily     Continuous Infusions:   sodium chloride 0.9% 125 mL/hr at 01/18/20 0200     PRN Meds:artificial tears, bisacodyl, dextrose 50%, dextrose 50%, diphenhydrAMINE, diphenhydrAMINE, glucagon (human recombinant), glucose, glucose, hydrALAZINE, HYDROcodone-acetaminophen, HYDROcodone-acetaminophen, insulin aspart U-100, ondansetron, promethazine (PHENERGAN) IVPB, simethicone, sodium chloride 0.9%    Review of patient's allergies indicates:  No Known Allergies    Objective:     Vital Signs (Most Recent):  Temp: 97.6 °F (36.4 °C) (01/18/20 0741)  Pulse: 71 (01/18/20 0741)  Resp: 17 (01/18/20 0741)  BP: (!) 158/62 (01/18/20 0741)  SpO2: 95 % (01/18/20 0741) Vital Signs (24h Range):  Temp:  [96.2 °F (35.7 °C)-98.2 °F (36.8 °C)] 97.6 °F (36.4 °C)  Pulse:  [59-77] 71  Resp:  [16-19] 17  SpO2:  [92 %-100 %] 95 %  BP: (129-217)/(55-92) 158/62     Weight: 92 kg (202 lb 13.2 oz)  Body mass index is 34.81 kg/m².  No LMP recorded. Patient is postmenopausal.    I&O (Last 24H):    Intake/Output Summary (Last 24 hours) at 1/18/2020 0811  Last data filed at 1/18/2020 0600  Gross per 24 hour   Intake 4279.17 ml   Output 4050 ml   Net 229.17 ml       Physical Exam:   Constitutional: She is oriented to person, place, and time. She appears  well-developed and well-nourished. No distress.    HENT:   Head: Normocephalic and atraumatic.    Eyes: EOM are normal.    Neck: Normal range of motion.    Cardiovascular: Normal rate and regular rhythm.     Pulmonary/Chest: Effort normal and breath sounds normal. No respiratory distress.        Abdominal: Soft. Bowel sounds are normal. She exhibits abdominal incision (laparoscopic incision sites c/d/i, dermabond in place). There is no tenderness. There is no rebound and no guarding.                 Neurological: She is alert and oriented to person, place, and time.    Skin: Skin is warm and dry. She is not diaphoretic.    Psychiatric: She has a normal mood and affect.       Laboratory:  CBC:   Recent Labs   Lab 01/18/20  0358   WBC 11.44   RBC 4.26   HGB 12.1   HCT 39.4      MCV 93   MCH 28.4   MCHC 30.7*

## 2020-01-18 NOTE — ASSESSMENT & PLAN NOTE
- on lantus 30 QHS and aspart 17/12/12 at home  - long acting insulin started yesterday (detemir 30)  - prandial insulin ordered, received 10u total with sliding scale yesterday evening but tolerated full diet.  - will need glucometer as her CGM is not working. Strips, needles, and glucometer ordered to home pharmacy on d/c.

## 2020-01-18 NOTE — HPI
Chelsea Gould is a 79 y.o. yo female who presents for surgical management of stage 1A1 cervical cancer.

## 2020-01-18 NOTE — NURSING
Patient spilled mouthwash on her freestyle hair handheld device, unable to get a blood glucose reading from her CGM.

## 2020-01-23 ENCOUNTER — TELEPHONE (OUTPATIENT)
Dept: HEMATOLOGY/ONCOLOGY | Facility: CLINIC | Age: 80
End: 2020-01-23

## 2020-01-23 LAB
FINAL PATHOLOGIC DIAGNOSIS: NORMAL
GROSS: NORMAL

## 2020-01-23 NOTE — TELEPHONE ENCOUNTER
Called to check on patient after surgery and review final benign pathology. 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 She reports doing fine at home. No concerns. Will plan for follow up post op visit as scheduled.

## 2020-01-24 ENCOUNTER — LAB VISIT (OUTPATIENT)
Dept: LAB | Facility: HOSPITAL | Age: 80
End: 2020-01-24
Payer: MEDICARE

## 2020-01-24 DIAGNOSIS — E11.65 TYPE 2 DIABETES MELLITUS WITH HYPERGLYCEMIA, WITH LONG-TERM CURRENT USE OF INSULIN: ICD-10-CM

## 2020-01-24 DIAGNOSIS — N18.30 CKD (CHRONIC KIDNEY DISEASE) STAGE 3, GFR 30-59 ML/MIN: ICD-10-CM

## 2020-01-24 DIAGNOSIS — Z79.4 TYPE 2 DIABETES MELLITUS WITH HYPERGLYCEMIA, WITH LONG-TERM CURRENT USE OF INSULIN: ICD-10-CM

## 2020-01-24 LAB
ESTIMATED AVG GLUCOSE: 189 MG/DL (ref 68–131)
HBA1C MFR BLD HPLC: 8.2 % (ref 4–5.6)

## 2020-01-24 PROCEDURE — 83036 HEMOGLOBIN GLYCOSYLATED A1C: CPT | Mod: HCNC

## 2020-01-24 PROCEDURE — 36415 COLL VENOUS BLD VENIPUNCTURE: CPT | Mod: HCNC,PO

## 2020-01-29 ENCOUNTER — PATIENT OUTREACH (OUTPATIENT)
Dept: ADMINISTRATIVE | Facility: OTHER | Age: 80
End: 2020-01-29

## 2020-01-29 NOTE — PROGRESS NOTES
Chart reviewed. Care Everywhere updates requested. Immunizations reconciled. HM updated.    Patient is not in [facility]

## 2020-01-30 NOTE — PROGRESS NOTES
"CC: This 79 y.o. White female  is here for evaluation of  T2DM along with comorbidities indicated in the Visit Diagnosis section of this encounter.    HPI: Chelsea Gould was diagnosed with T2DM at age 44.         Prior visit 10/31/19 arrives with her friend Becca, who is very supportive.   She has adjusted her insulin doses advised and started Tradjenta. She is a difficult historian and unable to recall recent diet. Wants to do exercise but afraid that her BG will drop. Continues to test her BG > 10x/day with the Junarstyle Yoav CGM.   CGM interpretation: Glucoses continues to fluctuate a lot but hypoglycemia much improved with only 1 episode in the last 2 weeks at BG of 76. BGs did rise to 300-400s around the time she had her surgery, a D&C last week.   Plan Continue current treatment.   Monitor BG at least 5x/day.   rtc in 3 mo with labs prior.       Interval history  arrives with her friend Becca, who is very supportive.   a1c up from 7.6 to 8.2%. Admits she probably she ate more during the holidays.   Patient had a hysterectomy 2 weeks ago.   She is a difficult historian and unable to recall recent diet. Does not like to inject Novolog in public so will miss her doses if she's eating out.     She continues to have some itching but better than before. Ozempic previously dc'd last year because she though it could be causing the pruritis.       LAST DIABETES EDUCATION: 7/17/19 - Liliam Chow RN, CDE   It was determined that VGo was not a good option for the patient "due to the lack of sensation in her fingers which will make it difficult to push the buttons for bolusing, lack of home support, and confusion."       DIABETES MEDICATIONS: Lantus Solostar 30 units every night, Novolog Flexpen 17-12-12 units ac, Tradjenta 5 mg once daily      Misses medication doses - No  She does carry Novolog pen with her.     DM COMPLICATIONS: nephropathy    SIGNIFICANT DIABETES MED HISTORY:   Diarrhea on metformin    SELF MONITORING " BLOOD GLUCOSE: monitors glucose at least 10x/day. See Media for Freestyle Yoav report.   CGM interpretation: many postprandial excursions presumably related to missed meal doses or marked carb to insulin mismatch. BG of 40 noted because she ate a very small meal and injected Novolog 17 units for it.     Average glucose 203 SD 62  Above 180 - 60%   - 40%     HYPOGLYCEMIC EPISODES: as above; patient corrects with soda.      CURRENT DIET: eats 3 meals/day. She understands she must eat some type of starch with meals in order to avoid low BG from Novolog.           BP (!) 149/74 (BP Location: Left arm, Patient Position: Sitting, BP Method: Large (Automatic))   Pulse 74   Wt 88 kg (194 lb 1.6 oz)   BMI 33.32 kg/m²       ROS:   CONSTITUTIONAL: Appetite good, denies fatigue  CV: No chest pain         PHYSICAL EXAM:  GENERAL: Well developed, well nourished. No acute distress.   PSYCH: AAOx3, conversant, well-groomed. Judgement and insight good. + anxiety   NEURO: Cranial nerves grossly intact. Speech clear, no tremor.   CHEST: Respirations even and unlabored.       Hemoglobin A1C   Date Value Ref Range Status   01/24/2020 8.2 (H) 4.0 - 5.6 % Final     Comment:     ADA Screening Guidelines:  5.7-6.4%  Consistent with prediabetes  >or=6.5%  Consistent with diabetes  High levels of fetal hemoglobin interfere with the HbA1C  assay. Heterozygous hemoglobin variants (HbS, HgC, etc)do  not significantly interfere with this assay.   However, presence of multiple variants may affect accuracy.     09/18/2019 7.6 (H) 4.0 - 5.6 % Final     Comment:     ADA Screening Guidelines:  5.7-6.4%  Consistent with prediabetes  >or=6.5%  Consistent with diabetes  High levels of fetal hemoglobin interfere with the HbA1C  assay. Heterozygous hemoglobin variants (HbS, HgC, etc)do  not significantly interfere with this assay.   However, presence of multiple variants may affect accuracy.             Chemistry        Component Value Date/Time      01/07/2020 1208    K 5.1 01/07/2020 1208     01/07/2020 1208    CO2 28 01/07/2020 1208    BUN 23 01/07/2020 1208    CREATININE 1.1 01/07/2020 1208     (H) 01/07/2020 1208        Component Value Date/Time    CALCIUM 9.7 01/07/2020 1208    ESTGFRAFRICA 56 (A) 01/07/2020 1208    EGFRNONAA 49 (A) 01/07/2020 1208          Lab Results   Component Value Date    LDLCALC 62 03/15/2019    LDLCALC 62 03/15/2019        Ref. Range 3/15/2019 00:00   Chol/HDL Ratio Unknown 2.8   Cholesterol, Total Unknown 126   HDL Latest Units: mg/dL 45   LDL Cholesterol External Latest Units: MG/DL 62   Non-HDL Cholesterol Unknown 81   Triglycerides Latest Ref Range: 40 - 160 mg/dL 111     Lab Results   Component Value Date    MICALBCREAT 9.1 05/01/2019           ASSESSMENT and PLAN:    A1C GOAL:     1. Type 2 diabetes mellitus with hyperglycemia, with long-term current use of insulin  Work on taking Novolog before each meal even when you go out to eat.   Restart Ozempic at 0.25 mg once weekly for a month. Then increase Ozempic to 0.5 mg once weekly.   Stop Tradjenta when you start the Ozempic.   Monitor glucose 4x/day.   Return to clinic in 3 months with labs prior.       Glucose, random    C-peptide    Hemoglobin A1c   2. Hyperlipidemia associated with type 2 diabetes mellitus  Lipid panel   3. CKD (chronic kidney disease) stage 3, GFR 30-59 ml/min  Improve glycemic control.   Sees Nephrology          Orders Placed This Encounter   Procedures    Glucose, random     Standing Status:   Future     Standing Expiration Date:   3/31/2021    C-peptide     Standing Status:   Future     Standing Expiration Date:   3/31/2021    Hemoglobin A1c     Standing Status:   Future     Standing Expiration Date:   3/31/2021    Lipid panel     Standing Status:   Future     Standing Expiration Date:   1/30/2021        Follow up in about 3 months (around 4/30/2020).

## 2020-01-31 ENCOUNTER — OFFICE VISIT (OUTPATIENT)
Dept: ENDOCRINOLOGY | Facility: CLINIC | Age: 80
End: 2020-01-31
Payer: MEDICARE

## 2020-01-31 ENCOUNTER — OFFICE VISIT (OUTPATIENT)
Dept: FAMILY MEDICINE | Facility: CLINIC | Age: 80
End: 2020-01-31
Payer: MEDICARE

## 2020-01-31 VITALS
BODY MASS INDEX: 33.32 KG/M2 | SYSTOLIC BLOOD PRESSURE: 149 MMHG | WEIGHT: 194.13 LBS | HEART RATE: 74 BPM | DIASTOLIC BLOOD PRESSURE: 74 MMHG

## 2020-01-31 VITALS
OXYGEN SATURATION: 98 % | TEMPERATURE: 98 F | BODY MASS INDEX: 33.31 KG/M2 | DIASTOLIC BLOOD PRESSURE: 80 MMHG | HEART RATE: 79 BPM | HEIGHT: 64 IN | SYSTOLIC BLOOD PRESSURE: 126 MMHG | WEIGHT: 195.13 LBS | RESPIRATION RATE: 16 BRPM

## 2020-01-31 DIAGNOSIS — E78.5 HYPERLIPIDEMIA ASSOCIATED WITH TYPE 2 DIABETES MELLITUS: ICD-10-CM

## 2020-01-31 DIAGNOSIS — Z79.4 TYPE 2 DIABETES MELLITUS WITH HYPERGLYCEMIA, WITH LONG-TERM CURRENT USE OF INSULIN: ICD-10-CM

## 2020-01-31 DIAGNOSIS — E11.69 HYPERLIPIDEMIA ASSOCIATED WITH TYPE 2 DIABETES MELLITUS: ICD-10-CM

## 2020-01-31 DIAGNOSIS — Z79.4 TYPE 2 DIABETES MELLITUS WITH HYPERGLYCEMIA, WITH LONG-TERM CURRENT USE OF INSULIN: Primary | ICD-10-CM

## 2020-01-31 DIAGNOSIS — E11.65 TYPE 2 DIABETES MELLITUS WITH HYPERGLYCEMIA, WITH LONG-TERM CURRENT USE OF INSULIN: Primary | ICD-10-CM

## 2020-01-31 DIAGNOSIS — N18.30 CKD (CHRONIC KIDNEY DISEASE) STAGE 3, GFR 30-59 ML/MIN: ICD-10-CM

## 2020-01-31 DIAGNOSIS — N18.30 CKD (CHRONIC KIDNEY DISEASE), STAGE III: ICD-10-CM

## 2020-01-31 DIAGNOSIS — J30.9 ALLERGIC RHINITIS, UNSPECIFIED SEASONALITY, UNSPECIFIED TRIGGER: Primary | ICD-10-CM

## 2020-01-31 DIAGNOSIS — E11.65 TYPE 2 DIABETES MELLITUS WITH HYPERGLYCEMIA, WITH LONG-TERM CURRENT USE OF INSULIN: ICD-10-CM

## 2020-01-31 PROCEDURE — 99213 OFFICE O/P EST LOW 20 MIN: CPT | Mod: HCNC,S$GLB,, | Performed by: PHYSICIAN ASSISTANT

## 2020-01-31 PROCEDURE — 1126F AMNT PAIN NOTED NONE PRSNT: CPT | Mod: HCNC,S$GLB,, | Performed by: PHYSICIAN ASSISTANT

## 2020-01-31 PROCEDURE — 99214 OFFICE O/P EST MOD 30 MIN: CPT | Mod: HCNC,S$GLB,, | Performed by: NURSE PRACTITIONER

## 2020-01-31 PROCEDURE — 1125F PR PAIN SEVERITY QUANTIFIED, PAIN PRESENT: ICD-10-PCS | Mod: HCNC,S$GLB,, | Performed by: NURSE PRACTITIONER

## 2020-01-31 PROCEDURE — 1159F MED LIST DOCD IN RCRD: CPT | Mod: HCNC,S$GLB,, | Performed by: PHYSICIAN ASSISTANT

## 2020-01-31 PROCEDURE — 99999 PR PBB SHADOW E&M-EST. PATIENT-LVL V: CPT | Mod: PBBFAC,HCNC,, | Performed by: NURSE PRACTITIONER

## 2020-01-31 PROCEDURE — 1159F PR MEDICATION LIST DOCUMENTED IN MEDICAL RECORD: ICD-10-PCS | Mod: HCNC,S$GLB,, | Performed by: PHYSICIAN ASSISTANT

## 2020-01-31 PROCEDURE — 99999 PR PBB SHADOW E&M-EST. PATIENT-LVL V: CPT | Mod: PBBFAC,HCNC,, | Performed by: PHYSICIAN ASSISTANT

## 2020-01-31 PROCEDURE — 1101F PT FALLS ASSESS-DOCD LE1/YR: CPT | Mod: HCNC,CPTII,S$GLB, | Performed by: NURSE PRACTITIONER

## 2020-01-31 PROCEDURE — 1126F PR PAIN SEVERITY QUANTIFIED, NO PAIN PRESENT: ICD-10-PCS | Mod: HCNC,S$GLB,, | Performed by: PHYSICIAN ASSISTANT

## 2020-01-31 PROCEDURE — 1159F MED LIST DOCD IN RCRD: CPT | Mod: HCNC,S$GLB,, | Performed by: NURSE PRACTITIONER

## 2020-01-31 PROCEDURE — 1125F AMNT PAIN NOTED PAIN PRSNT: CPT | Mod: HCNC,S$GLB,, | Performed by: NURSE PRACTITIONER

## 2020-01-31 PROCEDURE — 1159F PR MEDICATION LIST DOCUMENTED IN MEDICAL RECORD: ICD-10-PCS | Mod: HCNC,S$GLB,, | Performed by: NURSE PRACTITIONER

## 2020-01-31 PROCEDURE — 1101F PR PT FALLS ASSESS DOC 0-1 FALLS W/OUT INJ PAST YR: ICD-10-PCS | Mod: HCNC,CPTII,S$GLB, | Performed by: NURSE PRACTITIONER

## 2020-01-31 PROCEDURE — 99214 PR OFFICE/OUTPT VISIT, EST, LEVL IV, 30-39 MIN: ICD-10-PCS | Mod: HCNC,S$GLB,, | Performed by: NURSE PRACTITIONER

## 2020-01-31 PROCEDURE — 99999 PR PBB SHADOW E&M-EST. PATIENT-LVL V: ICD-10-PCS | Mod: PBBFAC,HCNC,, | Performed by: PHYSICIAN ASSISTANT

## 2020-01-31 PROCEDURE — 99999 PR PBB SHADOW E&M-EST. PATIENT-LVL V: ICD-10-PCS | Mod: PBBFAC,HCNC,, | Performed by: NURSE PRACTITIONER

## 2020-01-31 PROCEDURE — 1101F PT FALLS ASSESS-DOCD LE1/YR: CPT | Mod: HCNC,CPTII,S$GLB, | Performed by: PHYSICIAN ASSISTANT

## 2020-01-31 PROCEDURE — 99213 PR OFFICE/OUTPT VISIT, EST, LEVL III, 20-29 MIN: ICD-10-PCS | Mod: HCNC,S$GLB,, | Performed by: PHYSICIAN ASSISTANT

## 2020-01-31 PROCEDURE — 1101F PR PT FALLS ASSESS DOC 0-1 FALLS W/OUT INJ PAST YR: ICD-10-PCS | Mod: HCNC,CPTII,S$GLB, | Performed by: PHYSICIAN ASSISTANT

## 2020-01-31 RX ORDER — LEVOCETIRIZINE DIHYDROCHLORIDE 5 MG/1
2.5 TABLET, FILM COATED ORAL EVERY OTHER DAY
Qty: 8 TABLET | Refills: 11 | Status: SHIPPED | OUTPATIENT
Start: 2020-01-31 | End: 2020-10-18

## 2020-01-31 RX ORDER — OXYCODONE HYDROCHLORIDE 10 MG/1
TABLET ORAL
COMMUNITY
Start: 2020-01-09 | End: 2023-08-11

## 2020-01-31 RX ORDER — AZELASTINE 1 MG/ML
1 SPRAY, METERED NASAL 2 TIMES DAILY PRN
Qty: 30 ML | Refills: 0 | Status: SHIPPED | OUTPATIENT
Start: 2020-01-31 | End: 2020-10-20

## 2020-01-31 NOTE — PATIENT INSTRUCTIONS
Work on taking Novolog before each meal even when you go out to eat.   Restart Ozempic at 0.25 mg once weekly for a month. Then increase Ozempic to 0.5 mg once weekly.   Stop Tradjenta when you start the Ozempic.   Monitor glucose 4x/day.   Return to clinic in 3 months with labs prior.

## 2020-01-31 NOTE — PROGRESS NOTES
Subjective:       Patient ID: Chelsea Gould is a 79 y.o. female with multiple medical diagnoses as listed in the medical history and problem list that presents for URI (1 week)  .    Chief Complaint: URI (1 week)      URI    This is a new problem. The current episode started in the past 7 days. Associated symptoms include congestion, coughing (phlegm x2 days ), ear pain and a sore throat (not currently ). Pertinent negatives include no abdominal pain, diarrhea, headaches, nausea, rhinorrhea, sinus pain, sneezing or vomiting. Treatments tried: cordicin HBP but has not taken it in 2-3 days      Review of Systems   Constitutional: Negative for chills, fatigue and fever.   HENT: Positive for congestion, ear pain, postnasal drip (resolved ), sore throat (not currently ) and voice change. Negative for rhinorrhea, sinus pressure, sinus pain and sneezing.    Eyes: Negative for photophobia, pain, discharge, redness and itching.   Respiratory: Positive for cough (phlegm x2 days ).    Gastrointestinal: Negative for abdominal pain, diarrhea, nausea and vomiting.   Neurological: Negative for headaches.         PAST MEDICAL HISTORY:  Past Medical History:   Diagnosis Date    Cervical cancer, FIGO stage IA1 12/17/2019    Concern about heart attack without diagnosis     silent heart attack    Diabetes mellitus     Eye problems        SOCIAL HISTORY:  Social History     Socioeconomic History    Marital status:      Spouse name: Not on file    Number of children: Not on file    Years of education: Not on file    Highest education level: Not on file   Occupational History    Not on file   Social Needs    Financial resource strain: Somewhat hard    Food insecurity:     Worry: Never true     Inability: Never true    Transportation needs:     Medical: Yes     Non-medical: Yes   Tobacco Use    Smoking status: Never Smoker    Smokeless tobacco: Never Used   Substance and Sexual Activity    Alcohol use: No      "Frequency: Never     Drinks per session: Patient refused     Binge frequency: Never    Drug use: No    Sexual activity: Not Currently     Partners: Male   Lifestyle    Physical activity:     Days per week: 1 day     Minutes per session: 20 min    Stress: To some extent   Relationships    Social connections:     Talks on phone: More than three times a week     Gets together: Three times a week     Attends Anabaptism service: Not on file     Active member of club or organization: No     Attends meetings of clubs or organizations: Patient refused     Relationship status:    Other Topics Concern    Not on file   Social History Narrative    Not on file       ALLERGIES AND MEDICATIONS: updated and reviewed.  Review of patient's allergies indicates:  No Known Allergies  Current Outpatient Medications   Medication Sig Dispense Refill    alendronate (FOSAMAX) 70 MG tablet Take 1 tablet (70 mg total) by mouth every 7 days. 4 tablet 11    BD ULTRA-FINE MINI PEN NEEDLE 31 gauge x 3/16" Ndle Inject 1 each into the skin 4 (four) times daily with meals and nightly. 100 each 11    blood sugar diagnostic Strp 1 strip by Misc.(Non-Drug; Combo Route) route 4 (four) times daily with meals and nightly. 100 strip 6    brimonidine-timolol (COMBIGAN) 0.2-0.5 % Drop       ciprofloxacin HCl (CILOXAN) 0.3 % ophthalmic solution       cycloSPORINE (RESTASIS) 0.05 % ophthalmic emulsion Apply 1 drop to eye.      diazePAM (VALIUM) 5 MG tablet       DULoxetine (CYMBALTA) 60 MG capsule Take 60 mg by mouth 2 (two) times daily.      FREESTYLE OSMAN 14 DAY SENSOR Kit U UTD WITH READER FOR GLUCOSE  3    hydrOXYzine pamoate (VISTARIL) 25 MG Cap Take 25 mg by mouth.      ibuprofen (ADVIL,MOTRIN) 600 MG tablet Take 1 tablet (600 mg total) by mouth every 8 (eight) hours as needed for Pain. 30 tablet 1    insulin aspart U-100 (NOVOLOG FLEXPEN U-100 INSULIN) 100 unit/mL (3 mL) InPn pen Take 17 units with breakfast, and 12 units with " lunch and dinner, Plus correction scale 15 mL 11    ketorolac 0.5% (ACULAR) 0.5 % Drop       Lactobacillus rhamnosus GG (CULTURELLE ORAL) Take 1 capsule by mouth once daily.      levothyroxine (SYNTHROID) 112 MCG tablet Take 1 tablet (112 mcg total) by mouth before breakfast. 30 tablet 11    loteprednol (LOTEMAX) 0.5 % ophthalmic suspension       ofloxacin (OCUFLOX) 0.3 % ophthalmic solution INSTILL 1 DROP INTO AFFECTED EYE 4 TIMES A DAY  4    PRODIGY AUTOCODE METER kit Use as directed 1 each 0    PRODIGY NO CODING Strp       RESTASIS 0.05 % ophthalmic emulsion Place 1 drop into both eyes 2 (two) times daily.  3    rosuvastatin (CRESTOR) 10 MG tablet Take 10 mg by mouth.      triamcinolone acetonide 0.1% (KENALOG) 0.1 % cream APPLY TWICE DAILY TO ITCHY SKIN UP TO 2 WEEKS/MONTH AS NEEDED  3    VITAMIN D2 50,000 unit capsule Take 1 capsule (50,000 Units total) by mouth every 7 days. 12 capsule 0    aspirin (ECOTRIN) 81 MG EC tablet Take 81 mg by mouth.      azelastine (ASTELIN) 137 mcg (0.1 %) nasal spray 1 spray (137 mcg total) by Nasal route 2 (two) times daily as needed for Rhinitis. 30 mL 0    bromfenac (PROLENSA) 0.07 % Drop Apply 1 drop to eye.      ibuprofen (ADVIL,MOTRIN) 600 MG tablet Take 1 tablet (600 mg total) by mouth every 6 (six) hours as needed for Pain. 40 tablet 0    levocetirizine (XYZAL) 5 MG tablet Take 0.5 tablets (2.5 mg total) by mouth every other day. 8 tablet 11    oxyCODONE (ROXICODONE) 10 mg Tab immediate release tablet       oxyCODONE-acetaminophen (PERCOCET) 5-325 mg per tablet Take 1 tablet by mouth every 8 (eight) hours as needed for Pain. (Patient not taking: Reported on 1/31/2020) 20 tablet 0    semaglutide (OZEMPIC) 0.25 mg or 0.5 mg(2 mg/1.5 mL) PnIj Inject 0.5 mg into the skin every 7 days. (Patient not taking: Reported on 1/31/2020) 1 Syringe 2     No current facility-administered medications for this visit.          Objective:   /80   Pulse 79   Temp  "98.2 °F (36.8 °C) (Oral)   Resp 16   Ht 5' 4" (1.626 m)   Wt 88.5 kg (195 lb 1.7 oz)   SpO2 98%   BMI 33.49 kg/m²      Physical Exam   Constitutional: She is oriented to person, place, and time. No distress.   HENT:   Head: Normocephalic and atraumatic.   Right Ear: External ear and ear canal normal. No middle ear effusion.   Left Ear: External ear and ear canal normal.  No middle ear effusion.   Nose: No mucosal edema or rhinorrhea. Right sinus exhibits no maxillary sinus tenderness and no frontal sinus tenderness. Left sinus exhibits no maxillary sinus tenderness and no frontal sinus tenderness.   Mouth/Throat: Uvula is midline and mucous membranes are normal. No oropharyngeal exudate or posterior oropharyngeal erythema.   PND   Eyes: Conjunctivae and EOM are normal.   Cardiovascular: Normal rate and regular rhythm.   Pulmonary/Chest: Effort normal and breath sounds normal. She has no wheezes.   Neg egophony    Lymphadenopathy:     She has no cervical adenopathy.   Neurological: She is alert and oriented to person, place, and time.   Skin: Skin is warm.           Assessment:       1. Allergic rhinitis, unspecified seasonality, unspecified trigger        Plan:       Allergic rhinitis, unspecified seasonality, unspecified trigger  -     levocetirizine (XYZAL) 5 MG tablet; Take 0.5 tablets (2.5 mg total) by mouth every other day.  Dispense: 8 tablet; Refill: 11  -     azelastine (ASTELIN) 137 mcg (0.1 %) nasal spray; 1 spray (137 mcg total) by Nasal route 2 (two) times daily as needed for Rhinitis.  Dispense: 30 mL; Refill: 0        Call for fever 100.4 or higher, change in discharge color, no improvement in 7-10 days.       No follow-ups on file.  "

## 2020-02-03 ENCOUNTER — TELEPHONE (OUTPATIENT)
Dept: GYNECOLOGIC ONCOLOGY | Facility: CLINIC | Age: 80
End: 2020-02-03

## 2020-02-04 ENCOUNTER — OFFICE VISIT (OUTPATIENT)
Dept: GYNECOLOGIC ONCOLOGY | Facility: CLINIC | Age: 80
End: 2020-02-04
Payer: MEDICARE

## 2020-02-04 VITALS
WEIGHT: 193.13 LBS | HEIGHT: 64 IN | BODY MASS INDEX: 32.97 KG/M2 | DIASTOLIC BLOOD PRESSURE: 67 MMHG | SYSTOLIC BLOOD PRESSURE: 160 MMHG | HEART RATE: 76 BPM

## 2020-02-04 DIAGNOSIS — C53.9 CERVICAL CANCER, FIGO STAGE IA1: Primary | ICD-10-CM

## 2020-02-04 PROCEDURE — 99499 UNLISTED E&M SERVICE: CPT | Mod: HCNC,S$GLB,, | Performed by: OBSTETRICS & GYNECOLOGY

## 2020-02-04 PROCEDURE — 99499 RISK ADDL DX/OHS AUDIT: ICD-10-PCS | Mod: HCNC,S$GLB,, | Performed by: OBSTETRICS & GYNECOLOGY

## 2020-02-04 PROCEDURE — 99999 PR PBB SHADOW E&M-EST. PATIENT-LVL III: ICD-10-PCS | Mod: PBBFAC,HCNC,, | Performed by: OBSTETRICS & GYNECOLOGY

## 2020-02-04 PROCEDURE — 99024 PR POST-OP FOLLOW-UP VISIT: ICD-10-PCS | Mod: HCNC,S$GLB,, | Performed by: OBSTETRICS & GYNECOLOGY

## 2020-02-04 PROCEDURE — 99024 POSTOP FOLLOW-UP VISIT: CPT | Mod: HCNC,S$GLB,, | Performed by: OBSTETRICS & GYNECOLOGY

## 2020-02-04 PROCEDURE — 99999 PR PBB SHADOW E&M-EST. PATIENT-LVL III: CPT | Mod: PBBFAC,HCNC,, | Performed by: OBSTETRICS & GYNECOLOGY

## 2020-02-04 NOTE — PROGRESS NOTES
Subjective:      Patient ID: Chelsea Gould is a 79 y.o. female.    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    Now s/p RTLH/BSO 1/17/2020  Uncomplicated post op course.   Pathology reviewed showing no residual malignancy, does show areas of residual severe cervical dysplasia.   No adjuvant therapy is indicated.     Presents today for post operative visit. Overall recovering well. Up and about, eating, +BM.      History:  Referred by Dr. Marcia Canseco for newly diagnosed endocervical carcinoma in situ.   Evaluated by Dr. Canseco for post menopausal bleeding.      Pap 9/20/18 negative   EMB 9/27/19   FINAL PATHOLOGIC DIAGNOSIS   Endometrium, curettage:   Scant strips of inactive endometrial glands and lower uterine segment glands in a background of mucus and blood.   Negative for atypia or malignancy.   Comment: No significant stromal component is present to evaluate for endometrial hyperplasia. The findings may   reflect an atrophic process. However, re-sampling may be considered in a highly suspicious clinical setting.      Underwent further evaluation with D&C hysteroscopy 10/25/19.   FINAL PATHOLOGIC DIAGNOSIS   ENDOMETRIAL CURETTAGE:   - Multiple fragments of squamous epithelium with severe squamous dysplasia/carcinoma in situ with involvement of   the endocervix (see comment).   - Rare small fragments of atrophic endometrium.   - Strips of benign endocervical epithelium and fragments of benign squamous epithelium intermixed with blood and   mucus.   - No evidence of endometrial hyperplasia, endometrial intraepithelial neoplasia or endometrial adenocarcinoma.   COMMENT: The fragments of squamous epithelium represent at least severe squamous dysplasia/carcinoma in   situ and appear to arise from the cervix. The fragments are cut in a tangential fashion and the interface of the   epithelium with the stroma cannot be  accurately evaluated. The possibility of an invasive squamous cell carcinoma   cannot be excluded. There is no evidence of endometrial intraepithelial neoplasia or endometrial adenocarcinoma.   However, the possibility of a squamous cell carcinoma arising within the endometrium, although unlikely, cannot be   excluded.      Pelvic US 10/9/19  Uterus:  Size: 6.8 x 3.6 x 3.8 cm  Masses: There are multiple small partially calcified uterine fibroids present with the largest measuring 1.4 cm in greatest dimension. Multiple nabothian cysts are present.  Endometrium: Upper normal in this post menopausal patient, measuring 5 mm. There is a trace amount of free fluid within the endometrial cavity as well.  Right ovary: Not visualized.  Left ovary: Not visualized.      Medical comorbidities include DM (last hgb A1c 7.6, Cr 1.2), HLD, hypothyroid.   Prior abdominal surgeries include BTL.     S/p CKC 11/26/19  Cervical excision shows small microinvasive squamous cell carcinoma of the cervix, 1mm. Stage IA1, negative LVSI.   1. CERVIX, LEEP:   - Superficial squamous cell carcinoma, less than 1 mm.   - High-grade squamous intraepithelial lesion (DEE DEE III) extending into   endocervical glands.   - Immunohistochemistry with appropriate control for keratin AE1/3 highlights   the carcinoma.   - Immunohistochemistry with appropriate control for p16 is positive in both   the invasive carcinoma and     DEE DEE III.   - See CAP synoptic report.   2.  ENDOCERVICAL CURETTAGE:   - Benign glandular epithelium and blood clot.   - Immunohistochemistry with appropriate control for p16 is noncontributory.   Deep Margin:  Uninvolved by invasive carcinoma or high-grade squamous   intraepithelial lesion (DEE DEE 2-3). Distance of invasive carcinoma from margin (millimeters): 4.5   mm.   Lymphovascular Invasion:  Not identified.        Review of Systems   Constitutional: Negative for appetite change, chills, fatigue and fever.   HENT: Negative for mouth  sores.    Respiratory: Negative for cough and shortness of breath.    Cardiovascular: Negative for leg swelling.   Gastrointestinal: Negative for abdominal pain, blood in stool, constipation and diarrhea.   Endocrine: Negative for cold intolerance.   Genitourinary: Negative for dysuria and vaginal bleeding.   Musculoskeletal: Negative for myalgias.   Skin: Negative for rash.   Allergic/Immunologic: Negative.    Neurological: Negative for weakness and numbness.   Hematological: Negative for adenopathy. Does not bruise/bleed easily.   Psychiatric/Behavioral: Negative for confusion.       Objective:   Physical Exam:   Constitutional: She is oriented to person, place, and time. She appears well-developed and well-nourished.    HENT:   Head: Normocephalic and atraumatic.    Eyes: Pupils are equal, round, and reactive to light. EOM are normal.    Neck: Normal range of motion. Neck supple. No thyromegaly present.    Cardiovascular: Normal rate, regular rhythm and intact distal pulses.     Pulmonary/Chest: Effort normal and breath sounds normal. No respiratory distress. She has no wheezes.        Abdominal: Soft. Bowel sounds are normal. She exhibits abdominal incision. She exhibits no distension, no ascites and no mass. There is no tenderness.             Musculoskeletal: Normal range of motion and moves all extremeties.      Lymphadenopathy:     She has no cervical adenopathy.        Right: No supraclavicular adenopathy present.        Left: No supraclavicular adenopathy present.    Neurological: She is alert and oriented to person, place, and time.    Skin: Skin is warm and dry. No rash noted.    Psychiatric: She has a normal mood and affect.       Assessment:     1. Cervical cancer, FIGO stage IA1        Plan:   No orders of the defined types were placed in this encounter.    Recovering appropriately from surgery.   Low risk, early stage cervical cancer. No adjuvant therapy is indicated.   Will plan for surveillance.    RTC 4-5 weeks for follow up post operative visit or sooner if needed.

## 2020-03-09 ENCOUNTER — TELEPHONE (OUTPATIENT)
Dept: GYNECOLOGIC ONCOLOGY | Facility: CLINIC | Age: 80
End: 2020-03-09

## 2020-03-09 DIAGNOSIS — E55.9 HYPOVITAMINOSIS D: Primary | ICD-10-CM

## 2020-03-09 NOTE — TELEPHONE ENCOUNTER
----- Message from Marisabel Palacios sent at 3/9/2020  4:51 PM CDT -----  Contact: [facility] pharmacy [phone]  Type: RX Refill Request    Who Called: [facility] pharmacy [phone]    Have you contacted your pharmacy:    Refill or New Rx:vitamin d     RX Name and Strength:    How is the patient currently taking it? (ex. 1XDay):    Is this a 30 day or 90 day RX:    Preferred Pharmacy with phone number:    Local or Mail Order:    Ordering Provider:    Would the patient rather a call back or a response via [facility]?     Best Call Back Number:    Additional Information:

## 2020-03-09 NOTE — TELEPHONE ENCOUNTER
Orders pended to provider for refill.     Last fill Dec with labs. Do you want labs before refill?

## 2020-03-09 NOTE — TELEPHONE ENCOUNTER
Spoke with pt. Informed her that Dr Vieira will do a vaginal exam at her visit. Denies any other needs at this time.   ----- Message from Cathryn Paul sent at 3/9/2020  4:21 PM CDT -----  Contact: SYMONE JONES [[id number]]  Name of Who is Calling : SYMONE JONES [[id number]]    Patient is returning a call from staff in regards to finding out whether or not doctor will be checking her vaginally    .....Please contact to further discuss and advise.    Can the clinic reply by [facility] : No    What Number to Call Back :  [phone]

## 2020-03-10 ENCOUNTER — TELEPHONE (OUTPATIENT)
Dept: GYNECOLOGIC ONCOLOGY | Facility: CLINIC | Age: 80
End: 2020-03-10

## 2020-03-10 ENCOUNTER — OFFICE VISIT (OUTPATIENT)
Dept: GYNECOLOGIC ONCOLOGY | Facility: CLINIC | Age: 80
End: 2020-03-10
Payer: MEDICARE

## 2020-03-10 VITALS
SYSTOLIC BLOOD PRESSURE: 163 MMHG | WEIGHT: 193.13 LBS | HEIGHT: 64 IN | DIASTOLIC BLOOD PRESSURE: 67 MMHG | HEART RATE: 71 BPM | BODY MASS INDEX: 32.97 KG/M2

## 2020-03-10 DIAGNOSIS — Z90.79 S/P TOTAL HYSTERECTOMY AND BILATERAL SALPINGO-OOPHORECTOMY: Primary | ICD-10-CM

## 2020-03-10 DIAGNOSIS — Z90.710 S/P TOTAL HYSTERECTOMY AND BILATERAL SALPINGO-OOPHORECTOMY: Primary | ICD-10-CM

## 2020-03-10 DIAGNOSIS — C53.0 MALIGNANT NEOPLASM OF ENDOCERVIX: ICD-10-CM

## 2020-03-10 DIAGNOSIS — Z90.722 S/P TOTAL HYSTERECTOMY AND BILATERAL SALPINGO-OOPHORECTOMY: Primary | ICD-10-CM

## 2020-03-10 PROCEDURE — 99499 RISK ADDL DX/OHS AUDIT: ICD-10-PCS | Mod: HCNC,S$GLB,, | Performed by: OBSTETRICS & GYNECOLOGY

## 2020-03-10 PROCEDURE — 99024 PR POST-OP FOLLOW-UP VISIT: ICD-10-PCS | Mod: HCNC,S$GLB,, | Performed by: OBSTETRICS & GYNECOLOGY

## 2020-03-10 PROCEDURE — 99499 UNLISTED E&M SERVICE: CPT | Mod: HCNC,S$GLB,, | Performed by: OBSTETRICS & GYNECOLOGY

## 2020-03-10 PROCEDURE — 99999 PR PBB SHADOW E&M-EST. PATIENT-LVL III: CPT | Mod: PBBFAC,HCNC,, | Performed by: OBSTETRICS & GYNECOLOGY

## 2020-03-10 PROCEDURE — 99999 PR PBB SHADOW E&M-EST. PATIENT-LVL III: ICD-10-PCS | Mod: PBBFAC,HCNC,, | Performed by: OBSTETRICS & GYNECOLOGY

## 2020-03-10 PROCEDURE — 99024 POSTOP FOLLOW-UP VISIT: CPT | Mod: HCNC,S$GLB,, | Performed by: OBSTETRICS & GYNECOLOGY

## 2020-03-10 RX ORDER — ERGOCALCIFEROL 1.25 MG/1
50000 CAPSULE ORAL
Qty: 12 CAPSULE | Refills: 0 | Status: SHIPPED | OUTPATIENT
Start: 2020-03-10 | End: 2020-06-01 | Stop reason: SDUPTHER

## 2020-03-10 RX ORDER — INSULIN GLARGINE 100 [IU]/ML
INJECTION, SOLUTION SUBCUTANEOUS
COMMUNITY
Start: 2020-02-11 | End: 2020-07-08 | Stop reason: SDUPTHER

## 2020-03-10 NOTE — LETTER
March 14, 2020        Marcia Canseco MD  [address]  [address]  [place] [address]             [facility] [address]  [address], [address]  [place] [address]  Phone: [phone]  Fax: [phone]   Patient: Chelsea Gould   MR Number: [id number]   YOB: 1940   Date of Visit: 3/10/2020       Dear Dr. Canseco:    Thank you for referring Chelsea Gould to me for evaluation. Below are the relevant portions of my assessment and plan of care.            If you have questions, please do not hesitate to call me. I look forward to following Chelsea along with you.    Sincerely,      Fani Vieira MD           CC  No Recipients

## 2020-03-10 NOTE — PROGRESS NOTES
Subjective:      Patient ID: Chelsea Gould is a 79 y.o. female.    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    Now s/p RTLH/BSO 1/17/2020  Uncomplicated post op course.   Pathology reviewed showing no residual malignancy, does show areas of residual severe cervical dysplasia.   No adjuvant therapy is indicated.     Presents today for follow up post operative visit. Continues to recover appropriately from surgery.      History:  Referred by Dr. Marcia Canseco for newly diagnosed endocervical carcinoma in situ.   Evaluated by Dr. Canseco for post menopausal bleeding.      Pap 9/20/18 negative   EMB 9/27/19   FINAL PATHOLOGIC DIAGNOSIS   Endometrium, curettage:   Scant strips of inactive endometrial glands and lower uterine segment glands in a background of mucus and blood.   Negative for atypia or malignancy.   Comment: No significant stromal component is present to evaluate for endometrial hyperplasia. The findings may   reflect an atrophic process. However, re-sampling may be considered in a highly suspicious clinical setting.      Underwent further evaluation with D&C hysteroscopy 10/25/19.   FINAL PATHOLOGIC DIAGNOSIS   ENDOMETRIAL CURETTAGE:   - Multiple fragments of squamous epithelium with severe squamous dysplasia/carcinoma in situ with involvement of   the endocervix (see comment).   - Rare small fragments of atrophic endometrium.   - Strips of benign endocervical epithelium and fragments of benign squamous epithelium intermixed with blood and   mucus.   - No evidence of endometrial hyperplasia, endometrial intraepithelial neoplasia or endometrial adenocarcinoma.   COMMENT: The fragments of squamous epithelium represent at least severe squamous dysplasia/carcinoma in   situ and appear to arise from the cervix. The fragments are cut in a tangential fashion and the interface of the   epithelium with the stroma cannot be  accurately evaluated. The possibility of an invasive squamous cell carcinoma   cannot be excluded. There is no evidence of endometrial intraepithelial neoplasia or endometrial adenocarcinoma.   However, the possibility of a squamous cell carcinoma arising within the endometrium, although unlikely, cannot be   excluded.      Pelvic US 10/9/19  Uterus:  Size: 6.8 x 3.6 x 3.8 cm  Masses: There are multiple small partially calcified uterine fibroids present with the largest measuring 1.4 cm in greatest dimension. Multiple nabothian cysts are present.  Endometrium: Upper normal in this post menopausal patient, measuring 5 mm. There is a trace amount of free fluid within the endometrial cavity as well.  Right ovary: Not visualized.  Left ovary: Not visualized.      Medical comorbidities include DM (last hgb A1c 7.6, Cr 1.2), HLD, hypothyroid.   Prior abdominal surgeries include BTL.     S/p CKC 11/26/19  Cervical excision shows small microinvasive squamous cell carcinoma of the cervix, 1mm. Stage IA1, negative LVSI.   1. CERVIX, LEEP:   - Superficial squamous cell carcinoma, less than 1 mm.   - High-grade squamous intraepithelial lesion (DEE DEE III) extending into   endocervical glands.   - Immunohistochemistry with appropriate control for keratin AE1/3 highlights   the carcinoma.   - Immunohistochemistry with appropriate control for p16 is positive in both   the invasive carcinoma and     DEE DEE III.   - See CAP synoptic report.   2.  ENDOCERVICAL CURETTAGE:   - Benign glandular epithelium and blood clot.   - Immunohistochemistry with appropriate control for p16 is noncontributory.   Deep Margin:  Uninvolved by invasive carcinoma or high-grade squamous   intraepithelial lesion (DEE DEE 2-3). Distance of invasive carcinoma from margin (millimeters): 4.5   mm.   Lymphovascular Invasion:  Not identified.        Review of Systems   Constitutional: Negative for appetite change, chills, fatigue and fever.   HENT: Negative for mouth  sores.    Respiratory: Negative for cough and shortness of breath.    Cardiovascular: Negative for leg swelling.   Gastrointestinal: Negative for abdominal pain, blood in stool, constipation and diarrhea.   Endocrine: Negative for cold intolerance.   Genitourinary: Negative for dysuria and vaginal bleeding.   Musculoskeletal: Negative for myalgias.   Skin: Negative for rash.   Allergic/Immunologic: Negative.    Neurological: Negative for weakness and numbness.   Hematological: Negative for adenopathy. Does not bruise/bleed easily.   Psychiatric/Behavioral: Negative for confusion.       Objective:   Physical Exam:   Constitutional: She is oriented to person, place, and time. She appears well-developed and well-nourished.    HENT:   Head: Normocephalic and atraumatic.    Eyes: Pupils are equal, round, and reactive to light. EOM are normal.    Neck: Normal range of motion. Neck supple. No thyromegaly present.    Cardiovascular: Normal rate, regular rhythm and intact distal pulses.     Pulmonary/Chest: Effort normal and breath sounds normal. No respiratory distress. She has no wheezes.        Abdominal: Soft. Bowel sounds are normal. She exhibits abdominal incision. She exhibits no distension, no ascites and no mass. There is no tenderness.     Genitourinary: Vagina normal. Pelvic exam was performed with patient supine. There is no lesion on the right labia. There is no lesion on the left labia. Uterus is absent. There is an absent adnexa. Vaginal cuff normal.Cervix exhibits absence.   Genitourinary Comments: Vaginal cuff healing well.            Musculoskeletal: Normal range of motion and moves all extremeties.      Lymphadenopathy:     She has no cervical adenopathy.        Right: No supraclavicular adenopathy present.        Left: No supraclavicular adenopathy present.    Neurological: She is alert and oriented to person, place, and time.    Skin: Skin is warm and dry. No rash noted.    Psychiatric: She has a normal  mood and affect.       Assessment:     1. S/P total hysterectomy and bilateral salpingo-oophorectomy    2. Malignant neoplasm of endocervix        Plan:   No orders of the defined types were placed in this encounter.    Recovering appropriately from surgery.   Low risk, early stage cervical cancer. No adjuvant therapy is indicated.  Recommend surveillance. We discussed survivorship and surveillance and reviewed signs and symptoms or recurrence.      RTC 3 months or sooner if needed.

## 2020-03-10 NOTE — TELEPHONE ENCOUNTER
Spoke with our patient about her reschedule appointment in gyn oncology she agreed she voiced understanding of the date, time and location. All questions answered  MA/PAR /Preceptor Satnam VELÁZQUEZ

## 2020-03-14 ENCOUNTER — PATIENT OUTREACH (OUTPATIENT)
Dept: ADMINISTRATIVE | Facility: OTHER | Age: 80
End: 2020-03-14

## 2020-03-16 ENCOUNTER — LAB VISIT (OUTPATIENT)
Dept: LAB | Facility: HOSPITAL | Age: 80
End: 2020-03-16
Attending: NURSE PRACTITIONER
Payer: MEDICARE

## 2020-03-16 DIAGNOSIS — R89.9 ABNORMAL LABORATORY TEST: Primary | ICD-10-CM

## 2020-03-16 DIAGNOSIS — R89.9 ABNORMAL LABORATORY TEST: ICD-10-CM

## 2020-03-16 LAB
ANION GAP SERPL CALC-SCNC: 8 MMOL/L (ref 8–16)
BASOPHILS # BLD AUTO: 0.07 K/UL (ref 0–0.2)
BASOPHILS NFR BLD: 0.8 % (ref 0–1.9)
BUN SERPL-MCNC: 29 MG/DL (ref 8–23)
CALCIUM SERPL-MCNC: 9.6 MG/DL (ref 8.7–10.5)
CHLORIDE SERPL-SCNC: 104 MMOL/L (ref 95–110)
CO2 SERPL-SCNC: 28 MMOL/L (ref 23–29)
CREAT SERPL-MCNC: 1.2 MG/DL (ref 0.5–1.4)
DIFFERENTIAL METHOD: ABNORMAL
EOSINOPHIL # BLD AUTO: 0.3 K/UL (ref 0–0.5)
EOSINOPHIL NFR BLD: 3.4 % (ref 0–8)
ERYTHROCYTE [DISTWIDTH] IN BLOOD BY AUTOMATED COUNT: 13 % (ref 11.5–14.5)
EST. GFR  (AFRICAN AMERICAN): 50.4 ML/MIN/1.73 M^2
EST. GFR  (NON AFRICAN AMERICAN): 43.7 ML/MIN/1.73 M^2
GLUCOSE SERPL-MCNC: 121 MG/DL (ref 70–110)
HCT VFR BLD AUTO: 44.8 % (ref 37–48.5)
HGB BLD-MCNC: 14 G/DL (ref 12–16)
IMM GRANULOCYTES # BLD AUTO: 0.02 K/UL (ref 0–0.04)
IMM GRANULOCYTES NFR BLD AUTO: 0.2 % (ref 0–0.5)
LYMPHOCYTES # BLD AUTO: 2.1 K/UL (ref 1–4.8)
LYMPHOCYTES NFR BLD: 22.3 % (ref 18–48)
MCH RBC QN AUTO: 29.6 PG (ref 27–31)
MCHC RBC AUTO-ENTMCNC: 31.3 G/DL (ref 32–36)
MCV RBC AUTO: 95 FL (ref 82–98)
MONOCYTES # BLD AUTO: 0.8 K/UL (ref 0.3–1)
MONOCYTES NFR BLD: 8.6 % (ref 4–15)
NEUTROPHILS # BLD AUTO: 6 K/UL (ref 1.8–7.7)
NEUTROPHILS NFR BLD: 64.7 % (ref 38–73)
NRBC BLD-RTO: 0 /100 WBC
PLATELET # BLD AUTO: 294 K/UL (ref 150–350)
PMV BLD AUTO: 11 FL (ref 9.2–12.9)
POTASSIUM SERPL-SCNC: 5.3 MMOL/L (ref 3.5–5.1)
RBC # BLD AUTO: 4.73 M/UL (ref 4–5.4)
SODIUM SERPL-SCNC: 140 MMOL/L (ref 136–145)
WBC # BLD AUTO: 9.29 K/UL (ref 3.9–12.7)

## 2020-03-16 PROCEDURE — 80048 BASIC METABOLIC PNL TOTAL CA: CPT | Mod: HCNC

## 2020-03-16 PROCEDURE — 36415 COLL VENOUS BLD VENIPUNCTURE: CPT | Mod: HCNC,PO

## 2020-03-16 PROCEDURE — 85025 COMPLETE CBC W/AUTO DIFF WBC: CPT | Mod: HCNC

## 2020-03-17 ENCOUNTER — TELEPHONE (OUTPATIENT)
Dept: NEPHROLOGY | Facility: CLINIC | Age: 80
End: 2020-03-17

## 2020-03-17 DIAGNOSIS — E87.5 HYPERKALEMIA: Primary | ICD-10-CM

## 2020-03-17 NOTE — PROGRESS NOTES
Nurse to call to encourage low K diet, avoid NSAIDs. Good hydration. Mail K finder. Repeat labs on 3/20

## 2020-03-17 NOTE — TELEPHONE ENCOUNTER
----- Message from Laina Mabry LPN sent at 3/17/2020  2:47 PM CDT -----  Called pt informed her of results / stated that she is not having any problems with urination or sx   ----- Message -----  From: Asya Og NP  Sent: 3/17/2020   8:56 AM CDT  To: Laina Mabry LPN    Pls call pt and let her know her potassium is high. Pls ask her to hydrate well, avoid NSAIDs (ibuprofen, advil, motrin, naproxen, aleve), and encourage low potassium diet. Please mail K finder.    Pls also ask if she has any symptoms of UTI.    Thanks

## 2020-03-20 ENCOUNTER — LAB VISIT (OUTPATIENT)
Dept: LAB | Facility: HOSPITAL | Age: 80
End: 2020-03-20
Attending: NURSE PRACTITIONER
Payer: MEDICARE

## 2020-03-20 DIAGNOSIS — N39.0 UTI (URINARY TRACT INFECTION), UNCOMPLICATED: Primary | ICD-10-CM

## 2020-03-20 DIAGNOSIS — E87.5 HYPERKALEMIA: ICD-10-CM

## 2020-03-20 LAB
ANION GAP SERPL CALC-SCNC: 5 MMOL/L (ref 8–16)
BUN SERPL-MCNC: 26 MG/DL (ref 8–23)
CALCIUM SERPL-MCNC: 9.7 MG/DL (ref 8.7–10.5)
CHLORIDE SERPL-SCNC: 100 MMOL/L (ref 95–110)
CO2 SERPL-SCNC: 31 MMOL/L (ref 23–29)
CREAT SERPL-MCNC: 1.1 MG/DL (ref 0.5–1.4)
EST. GFR  (AFRICAN AMERICAN): 56 ML/MIN/1.73 M^2
EST. GFR  (NON AFRICAN AMERICAN): 48.5 ML/MIN/1.73 M^2
GLUCOSE SERPL-MCNC: 128 MG/DL (ref 70–110)
POTASSIUM SERPL-SCNC: 4.9 MMOL/L (ref 3.5–5.1)
SODIUM SERPL-SCNC: 136 MMOL/L (ref 136–145)

## 2020-03-20 PROCEDURE — 36415 COLL VENOUS BLD VENIPUNCTURE: CPT | Mod: HCNC,PO

## 2020-03-20 PROCEDURE — 80048 BASIC METABOLIC PNL TOTAL CA: CPT | Mod: HCNC

## 2020-04-01 RX ORDER — FLASH GLUCOSE SENSOR
KIT MISCELLANEOUS
Qty: 2 KIT | Refills: 6 | Status: SHIPPED | OUTPATIENT
Start: 2020-04-01 | End: 2020-09-24 | Stop reason: SDUPTHER

## 2020-04-01 NOTE — TELEPHONE ENCOUNTER
Called pt and verónica , lvm . Left ilda stating when refill has been sent to pharmacy I'll contact her .   LOV 1/31/2020

## 2020-04-01 NOTE — TELEPHONE ENCOUNTER
----- Message from Cheri Dawson sent at 3/31/2020 11:17 AM CDT -----  Contact: Patient  Type: Patient Call Back    Who called:Patient    What is the request in detail: Patient is requesting a call back. Patient needs to speak with someone.    Can the clinic reply by [facility]? No    Would the patient rather a call back or a response via [facility]? Call    Best call back number:[phone] ([place]) or [phone]    Additional Information:n/a

## 2020-04-01 NOTE — TELEPHONE ENCOUNTER
----- Message from Bernarda Sellers sent at 3/31/2020 11:34 AM CDT -----  Contact: SYMONE JONES   Can the clinic reply in [facility]: no      Please refill the medication(s) listed below. Please call the patient when the prescription(s) is ready for  at this phone number   [phone]      Medication #1 FREESTYLE OSMAN 14 DAY SENSOR Kit    Medication #2       Preferred Pharmacy: [facility] - [address]

## 2020-04-06 ENCOUNTER — TELEPHONE (OUTPATIENT)
Dept: INFECTIOUS DISEASES | Facility: CLINIC | Age: 80
End: 2020-04-06

## 2020-04-06 DIAGNOSIS — E87.5 HYPERKALEMIA: Primary | ICD-10-CM

## 2020-04-06 DIAGNOSIS — Z79.4 TYPE 2 DIABETES MELLITUS WITH HYPERGLYCEMIA, WITH LONG-TERM CURRENT USE OF INSULIN: ICD-10-CM

## 2020-04-06 DIAGNOSIS — E11.65 TYPE 2 DIABETES MELLITUS WITH HYPERGLYCEMIA, WITH LONG-TERM CURRENT USE OF INSULIN: ICD-10-CM

## 2020-04-06 NOTE — TELEPHONE ENCOUNTER
----- Message from Asya Og NP sent at 4/6/2020  4:09 PM CDT -----  Contact: pt  Since patient is new, she would have to have in person visit or virtual visit.  Last I spoke to Cezar (manager), they were not having people come into the clinic.  If she cannot due virtual visit, will have to talk to Cezar about having her come to clinic, but last week he was adamant that patients not come in.    -Please tell her to talk to PCP about hives and itching.  I'll message PCP about putting in a dietician order since I have not seen her yet.  -The message below says she wants to see a doctor, if she prefers a doctor over an NP she should be scheduled with one of the MDs or fellows.    I had sent this message to my nurse when her labs last resulted: Pls inform pt that potassium is better. Continue to hydrate well and eat a low potassium diet. Also inform her that her urine did not appear infected on the culture; it did appear contaminated but not an actual infection.   ----- Message -----  From: Elizabeth Mcknight MA  Sent: 4/6/2020   2:58 PM CDT  To: Asya Og NP    Can set up a virtual phone visit if you would like? Please advise what you would like for me to do for you.  ----- Message -----  From: Kandice Clayton  Sent: 4/6/2020   2:13 PM CDT  To: [facility] Staff    Pt is very upset    Mailed her the low potassium list    This contradicts the diabetic diet   PLEASE PUT ORDER IN DIETICIAN AT [place]   FOR THE  ONE ON ONE  APPT     Diabetes and potassium    __________________________________________  Pt is having hives all over   Itching   _________________________________________    Pt said her urine  was dee   Sent her back to do lab and  Urine    HAS NOT HEARD ABOUT THE LAB WORK    Did 2nd labs on 3/20/20         Urine is still discolored     ___________________________________________    PLUS PATIENT wants to see a doctor   -  Has cell phone  But can't set up  And doesn't know how to use  it   Said it uses up a lot of power runs out quick   New  [employer] phone          PLease call this lady   ( Her daughter was on three way   Not happy about homing back here to set up phone   And do appt )  Also wants her mother to take milk thistle for kidneys    Daughter saying it's patients liver and kidneys that have a problem   Been reading on it        Has her mother very  Upset   Pt is well to come in   Doesn't care about the virus  Just doesn't know what to do to take care of the kidney   Just had female surgery in Jan   Found a small spot of cancer   Etc      Ph [phone]   Thanks       Pt is crying on phone,   (And her daughter is disrespectful on phone to mother)

## 2020-04-07 ENCOUNTER — TELEPHONE (OUTPATIENT)
Dept: INFECTIOUS DISEASES | Facility: CLINIC | Age: 80
End: 2020-04-07

## 2020-04-07 ENCOUNTER — TELEPHONE (OUTPATIENT)
Dept: ENDOCRINOLOGY | Facility: CLINIC | Age: 80
End: 2020-04-07

## 2020-04-07 ENCOUNTER — TELEPHONE (OUTPATIENT)
Dept: NEPHROLOGY | Facility: CLINIC | Age: 80
End: 2020-04-07

## 2020-04-07 ENCOUNTER — PATIENT OUTREACH (OUTPATIENT)
Dept: ADMINISTRATIVE | Facility: OTHER | Age: 80
End: 2020-04-07

## 2020-04-07 DIAGNOSIS — N18.30 CKD (CHRONIC KIDNEY DISEASE) STAGE 3, GFR 30-59 ML/MIN: Primary | ICD-10-CM

## 2020-04-07 NOTE — TELEPHONE ENCOUNTER
----- Message from Adelina Wooten sent at 4/6/2020  5:16 PM CDT -----  Contact: [phone]/self  Type:  Patient Returning Call    Who Called: Patient  Who Left Message for Patient: Elizabeth  Does the patient know what this is regarding?: Unknown  Would the patient rather a call back or a response via [facility]? Call back  Best Call Back Number: [phone]  Additional Information: n/a

## 2020-04-07 NOTE — TELEPHONE ENCOUNTER
"Subjective:       Patient ID: Chelsea Gould is a 79 y.o. white female who presents for new evaluation of  CKD    HPI     The patient location is: home  The chief complaint leading to consultation is: new CKD evaluation  Visit type: Virtual visit with audio only  Total time spent with patient: 62  Each patient to whom he or she provides medical services by telemedicine is:  (1) informed of the relationship between the physician and patient and the respective role of any other health care provider with respect to management of the patient; and (2) notified that he or she may decline to receive medical services by telemedicine and may withdraw from such care at any time.    Patient is new to me. New to clinic.  Prior pertinent chart reviewed since this is patient's first appointment with me.    Patient has hives currently; thinks it may be related to nerves.  Very anxious about her diet and diabetes.    Does not take home BPs.    Allergies, history, and medications reviewed.  Pt not taking baby aspirin, Combigan, Prolensa, Lotemax, ofloxacin, Astelin, xyzal, oxycodone.    Patient presents for new evaluation of CKD.  Baseline creatinine of 1.1-1.2 mg/dL.    Significant hx includes DM2, silent MI (pt denies), "half blind," HLD, cervical cancer, hypothyroid, hysterectomy, hx fractured hip    The patient denies taking NSAIDs, herbal supplements, or new antibiotics, recreational drugs, recent episode of dehydration, diarrhea, nausea or vomiting, acute illness, hospitalization. Had IV contrast in January. Takes Qunol (CoQ10, turmeric).    Significant family hx includes: no known kidney disease    Last renal US: none in EMR    Review of Systems   Constitutional: Negative for fatigue.   Eyes: Positive for visual disturbance.   Respiratory: Negative for cough, shortness of breath, wheezing and stridor.    Cardiovascular: Positive for palpitations (only with anxiety). Negative for chest pain and leg swelling.   Gastrointestinal: " "Positive for constipation. Negative for diarrhea, nausea and vomiting.        Stomach cramps/spasms "when I'm all up in a roar"   Genitourinary: Negative for difficulty urinating, dysuria, frequency, hematuria and urgency.        "dee" urine; occasional stress incontinence   Musculoskeletal: Positive for back pain (right side, said near hip , lower).   Skin: Positive for rash (reports hives).        "itching"   Neurological: Positive for dizziness (if stands up too fast).        No recent falls   Psychiatric/Behavioral: The patient is nervous/anxious.        Objective:       There were no vitals taken for this visit.  Physical Exam  -unable to complete; audio visit only    Crt: 1.1 mg/dL  K: 4.9 mmol/L  Hgb: 14.0 g/dL  CO2: 31 mmol/L  UPCR: negative    Assessment:       1. CKD (chronic kidney disease) stage 3, GFR 30-59 ml/min    2. Hyperkalemia    3. Type 2 diabetes mellitus with hyperglycemia, with long-term current use of insulin    4. Hives        Plan:   CKD stage 3A c eGFR 43-48 mL/min - etiology not completely clear; possibly related to age-related nephron loss.  Diabetic but non-proteinuric.  sCr stable. Non-proteinuric. Low risk for progression to ESRD in next 5 years. Educated patient to control BP, BG, remain well-hydrated, and avoid NSAIDs to prevent progression of CKD.  Educated pt that urine is likely dee from being very concentrated; recommended increased hydration.    UPCR Non-proteinuric.   Acid-base Serum bicarb elevated on most recent labs. Will monitor.   Renal osteodystrophy Ca okay, vit D previously low. Will obtain PTH, phos for next visit. On vitamin D2 50,000 units q 7 days.   Anemia WNL.   DM A1c above goal. Says she is having labile blood sugars.   Lipid Management On statin.   ESRD planning Reassured that she is very far from needing dialysis     HTN - unable to assess today. Recent BPs high at appointments c [facility] but pt said she was anxious. Okay at family medicine clinic. " Encouraged patient to take home blood pressures.  She said she will obtain a BP cuff. Goal is BP < 140/90    Hyperkalemia - Improved. Already referred to dietician. Discussed balancing low potassium diet with diabetes diet.     Hives - instructed to follow up with PCP    All questions patient had were answered.  Asked if further questions. None. F/u in clinic in 6 mos  with labs and urine prior to next visit or sooner if needed.  ER for emergency concerns.    Summary of Plan:  1. Avoid Bactrim, IV contrast, aminoglycosides, NSAIDs,  2. Low potassium diabetic diet - see nutrition services  3. Take home BPs  4. F/u c PCP for hives  5. RTC in 6 mos

## 2020-04-07 NOTE — TELEPHONE ENCOUNTER
----- Message from Diane Macias MA sent at 4/6/2020  4:18 PM CDT -----  Contact: Alexsandra  Patient's daughter, caretaker, called because she her mother has a rash.  She is concerned that the rash is due to her kidneys.  I was able to get an appointment on May 4. If someone can please schedule her virtual visit tomorrow that would be great.    Please return call:  [phone] - [place]

## 2020-04-07 NOTE — TELEPHONE ENCOUNTER
Called patient back and she stated that she is worried because when she heard that the potassium level was high for her she was sent a list of foods/diet to eat and the food interfered with her diabetic diet. She stated paper was in small print and on a colored paper she had a hard time reading. She stated was frustrated because wanted to talk to NP and if a nurse wont even call her back then she feels helpless in regards to a MD calling her back. She wanted to talk to NP and schedule visit with her. Offered an audio visit today with NP. She agreed. Informed itching and hives will be PCP. Informed can schedule an appointment with PCP or send a message to her office. Patient stated MD that is on chart as PCP is not covered by her insurance and will need someone else. Informed her that will need to call insurance to get list of PCP that they will cover. She stated her daughter was on the phone with them for 4 hours and couldn't get any information. Informed her will look into for her. Also she stated would like PCP on [facility] where she lives. Informed her will try to set up for her. Also informed will need to see nutritionist to discuss diet and will send message for them to schedule her. She stated ok. Patient confirmed will be waiting by phone for audio visit scheduled today.

## 2020-04-08 ENCOUNTER — TELEPHONE (OUTPATIENT)
Dept: INFECTIOUS DISEASES | Facility: CLINIC | Age: 80
End: 2020-04-08

## 2020-04-08 ENCOUNTER — TELEPHONE (OUTPATIENT)
Dept: FAMILY MEDICINE | Facility: CLINIC | Age: 80
End: 2020-04-08

## 2020-04-08 NOTE — TELEPHONE ENCOUNTER
----- Message from Elizabeth Mcknight MA sent at 4/7/2020 12:11 PM CDT -----  This patient need to establish a PCP that is in network with her insurance. She is on [employer]. Please schedule her an appointment.

## 2020-04-08 NOTE — TELEPHONE ENCOUNTER
I spoke to the pt and advised we are unable to accept new patients at this time due to COVID-19. Pt reports that she has hives and needs an appointment.  Pt advised to go to  for evaluation. Pt refuses due to cost. She will contact her PCP for further assistance.

## 2020-04-08 NOTE — TELEPHONE ENCOUNTER
Called patient and she stated that she has hives and they itch really bad, its all over her body. The itch started about two weeks ago. She is using triamcinolone acetonide 0.1% (KENALOG) 0.1 % cream as well as hydrOXYzine pamoate (VISTARIL) 25 MG Cap which is still not working. Patient would like something else called in to her pharmacy on file. ([facility]). She saw CLAIRE Flores on 01/31/2020, she stated that she does not want to see her again. Her LOV with you was 05/01/2019. Please advise.

## 2020-04-08 NOTE — TELEPHONE ENCOUNTER
----- Message from Marisabel Palacios sent at 4/8/2020 10:17 AM CDT -----  Contact: pt  Type: Patient Call Back    Who called:pt    What is the request in detail:pt has hives that itch and would like something called in for her. Please call pt    Can the clinic reply by [facility]?    Would the patient rather a call back or a response via [facility]? call    Best call back number:[phone]    Additional Information:

## 2020-04-09 ENCOUNTER — OFFICE VISIT (OUTPATIENT)
Dept: FAMILY MEDICINE | Facility: CLINIC | Age: 80
End: 2020-04-09
Payer: MEDICARE

## 2020-04-09 DIAGNOSIS — E66.01 SEVERE OBESITY (BMI 35.0-35.9 WITH COMORBIDITY): ICD-10-CM

## 2020-04-09 DIAGNOSIS — R21 RASH AND NONSPECIFIC SKIN ERUPTION: Primary | ICD-10-CM

## 2020-04-09 DIAGNOSIS — I70.0 ATHEROSCLEROSIS OF AORTA: ICD-10-CM

## 2020-04-09 PROCEDURE — 3288F PR FALLS RISK ASSESSMENT DOCUMENTED: ICD-10-PCS | Mod: HCNC,CPTII,, | Performed by: INTERNAL MEDICINE

## 2020-04-09 PROCEDURE — 99214 PR OFFICE/OUTPT VISIT, EST, LEVL IV, 30-39 MIN: ICD-10-PCS | Mod: HCNC,95,, | Performed by: INTERNAL MEDICINE

## 2020-04-09 PROCEDURE — 1100F PTFALLS ASSESS-DOCD GE2>/YR: CPT | Mod: HCNC,CPTII,, | Performed by: INTERNAL MEDICINE

## 2020-04-09 PROCEDURE — 99214 OFFICE O/P EST MOD 30 MIN: CPT | Mod: HCNC,95,, | Performed by: INTERNAL MEDICINE

## 2020-04-09 PROCEDURE — 99499 UNLISTED E&M SERVICE: CPT | Mod: 95,,, | Performed by: INTERNAL MEDICINE

## 2020-04-09 PROCEDURE — 1159F PR MEDICATION LIST DOCUMENTED IN MEDICAL RECORD: ICD-10-PCS | Mod: HCNC,,, | Performed by: INTERNAL MEDICINE

## 2020-04-09 PROCEDURE — 1159F MED LIST DOCD IN RCRD: CPT | Mod: HCNC,,, | Performed by: INTERNAL MEDICINE

## 2020-04-09 PROCEDURE — 3288F FALL RISK ASSESSMENT DOCD: CPT | Mod: HCNC,CPTII,, | Performed by: INTERNAL MEDICINE

## 2020-04-09 PROCEDURE — 1100F PR PT FALLS ASSESS DOC 2+ FALLS/FALL W/INJURY/YR: ICD-10-PCS | Mod: HCNC,CPTII,, | Performed by: INTERNAL MEDICINE

## 2020-04-09 PROCEDURE — 99499 RISK ADDL DX/OHS AUDIT: ICD-10-PCS | Mod: 95,,, | Performed by: INTERNAL MEDICINE

## 2020-04-09 RX ORDER — METHYLPREDNISOLONE 4 MG/1
TABLET ORAL
Qty: 1 PACKAGE | Refills: 0 | Status: SHIPPED | OUTPATIENT
Start: 2020-04-10 | End: 2020-10-18 | Stop reason: ALTCHOICE

## 2020-04-09 NOTE — TELEPHONE ENCOUNTER
----- Message from Valerie Bush sent at 4/9/2020 10:40 AM CDT -----  Contact: self  Type: Patient Call Back    Who called:self    What is the request in detail:Pt is having issues with her insulin and need to speak to nurse about her sugar being low     Can the clinic reply by [facility]?no    Would the patient rather a call back or a response via [facility]? call    Best call back number:

## 2020-04-09 NOTE — TELEPHONE ENCOUNTER
Pt c/o low glucoses overnight and also during the day.   DM meds: lantus 30 units, Novolog 17-12-12 units ac, and Ozempic 0.5 mg once weekly.   This morning her FBG was in the 70s. Waited til after breakfast when her BG was 240s and then took Novolog. BG dropped to 40s later. Advised her to decrease Lantus to 20 units and Novolog to 8 units ac. Skip Novolog if BG <  100. Will call again on Monday. She voiced understanding.

## 2020-04-09 NOTE — PROGRESS NOTES
SUBJECTIVE     No chief complaint on file.      TOMAS Gould is a 79 y.o. female with multiple medical diagnoses as listed in the medical history and problem list that presents for evaluation of hives x 2 weeks. Pt reports having erythematous bumps over the entire body. The rash is itchy and she is now having sores develop on her legs from scratching so much. Denies any drainage, fever, chills, or night sweats. Pt has been applying a topical steroid cream without improvement of symptoms. Denies any recent changes to soaps, lotions, body washes, detergents, etc.    PAST MEDICAL HISTORY:  Past Medical History:   Diagnosis Date    Cervical cancer, FIGO stage IA1 12/17/2019    Concern about heart attack without diagnosis     silent heart attack    Diabetes mellitus     Eye problems        PAST SURGICAL HISTORY:  Past Surgical History:   Procedure Laterality Date    COLD KNIFE CONIZATION OF CERVIX N/A 11/26/2019    Procedure: CONE BIOPSY, CERVIX, USING COLD KNIFE;  Surgeon: Fani Vieira MD;  Location: [facility];  Service: OB/GYN;  Laterality: N/A;    EYE SURGERY      HYSTEROSCOPY WITH DILATION AND CURETTAGE OF UTERUS N/A 10/25/2019    Procedure: HYSTEROSCOPY, WITH DILATION AND CURETTAGE OF UTERUS;  Surgeon: Marcia Canseco MD;  Location: [facility] OR;  Service: OB/GYN;  Laterality: N/A;    JOINT REPLACEMENT Right     hip    ROBOT-ASSISTED LAPAROSCOPIC ABDOMINAL HYSTERECTOMY USING DA DENIS XI N/A 1/17/2020    Procedure: XI ROBOTIC HYSTERECTOMY;  Surgeon: Fani Vieira MD;  Location: [facility];  Service: OB/GYN;  Laterality: N/A;    ROBOT-ASSISTED LAPAROSCOPIC SALPINGO-OOPHORECTOMY USING DA DENIS XI Bilateral 1/17/2020    Procedure: XI ROBOTIC SALPINGO-OOPHORECTOMY;  Surgeon: Fani Vieira MD;  Location: [facility];  Service: OB/GYN;  Laterality: Bilateral;    TUBAL LIGATION         SOCIAL HISTORY:  Social History     Socioeconomic History    Marital status:      Spouse name: Not on file    Number  "of children: Not on file    Years of education: Not on file    Highest education level: Not on file   Occupational History    Not on file   Social Needs    Financial resource strain: Somewhat hard    Food insecurity:     Worry: Never true     Inability: Never true    Transportation needs:     Medical: Yes     Non-medical: Yes   Tobacco Use    Smoking status: Never Smoker    Smokeless tobacco: Never Used   Substance and Sexual Activity    Alcohol use: No     Frequency: Never     Drinks per session: Patient refused     Binge frequency: Never    Drug use: No    Sexual activity: Not Currently     Partners: Male   Lifestyle    Physical activity:     Days per week: 1 day     Minutes per session: 20 min    Stress: To some extent   Relationships    Social connections:     Talks on phone: More than three times a week     Gets together: Three times a week     Attends Rastafarian service: Not on file     Active member of club or organization: No     Attends meetings of clubs or organizations: Patient refused     Relationship status:    Other Topics Concern    Not on file   Social History Narrative    Not on file       FAMILY HISTORY:  Family History   Problem Relation Age of Onset    Breast cancer Daughter 44    Colon cancer Neg Hx        ALLERGIES AND MEDICATIONS: updated and reviewed.  Review of patient's allergies indicates:  No Known Allergies  Current Outpatient Medications   Medication Sig Dispense Refill    alendronate (FOSAMAX) 70 MG tablet Take 1 tablet (70 mg total) by mouth every 7 days. 4 tablet 11    aspirin (ECOTRIN) 81 MG EC tablet Take 81 mg by mouth.      azelastine (ASTELIN) 137 mcg (0.1 %) nasal spray 1 spray (137 mcg total) by Nasal route 2 (two) times daily as needed for Rhinitis. 30 mL 0    BD ULTRA-FINE MINI PEN NEEDLE 31 gauge x 3/16" Ndle Inject 1 each into the skin 4 (four) times daily with meals and nightly. 100 each 11    blood sugar diagnostic Strp 1 strip by " Misc.(Non-Drug; Combo Route) route 4 (four) times daily with meals and nightly. 100 strip 6    brimonidine-timolol (COMBIGAN) 0.2-0.5 % Drop       bromfenac (PROLENSA) 0.07 % Drop Apply 1 drop to eye.      ciprofloxacin HCl (CILOXAN) 0.3 % ophthalmic solution       cycloSPORINE (RESTASIS) 0.05 % ophthalmic emulsion Apply 1 drop to eye.      diazePAM (VALIUM) 5 MG tablet       DULoxetine (CYMBALTA) 60 MG capsule Take 60 mg by mouth 2 (two) times daily.      FREESTYLE OSMAN 14 DAY SENSOR Kit U UTD WITH READER FOR GLUCOSE 2 kit 6    hydrOXYzine pamoate (VISTARIL) 25 MG Cap Take 25 mg by mouth.      ibuprofen (ADVIL,MOTRIN) 600 MG tablet Take 1 tablet (600 mg total) by mouth every 6 (six) hours as needed for Pain. (Patient not taking: Reported on 3/10/2020) 40 tablet 0    ibuprofen (ADVIL,MOTRIN) 600 MG tablet Take 1 tablet (600 mg total) by mouth every 8 (eight) hours as needed for Pain. (Patient not taking: Reported on 3/10/2020) 30 tablet 1    insulin aspart U-100 (NOVOLOG FLEXPEN U-100 INSULIN) 100 unit/mL (3 mL) InPn pen Take 17 units with breakfast, and 12 units with lunch and dinner, Plus correction scale 15 mL 11    ketorolac 0.5% (ACULAR) 0.5 % Drop       Lactobacillus rhamnosus GG (CULTURELLE ORAL) Take 1 capsule by mouth once daily.      LANTUS SOLOSTAR U-100 INSULIN glargine 100 units/mL (3mL) SubQ pen       levocetirizine (XYZAL) 5 MG tablet Take 0.5 tablets (2.5 mg total) by mouth every other day. 8 tablet 11    levothyroxine (SYNTHROID) 112 MCG tablet Take 1 tablet (112 mcg total) by mouth before breakfast. 30 tablet 11    loteprednol (LOTEMAX) 0.5 % ophthalmic suspension       [START ON 4/10/2020] methylPREDNISolone (MEDROL DOSEPACK) 4 mg tablet use as directed 1 Package 0    ofloxacin (OCUFLOX) 0.3 % ophthalmic solution INSTILL 1 DROP INTO AFFECTED EYE 4 TIMES A DAY  4    oxyCODONE (ROXICODONE) 10 mg Tab immediate release tablet       PRODIGY AUTOCODE METER kit Use as directed 1  each 0    PRODIGY NO CODING Strp       RESTASIS 0.05 % ophthalmic emulsion Place 1 drop into both eyes 2 (two) times daily.  3    rosuvastatin (CRESTOR) 10 MG tablet Take 10 mg by mouth.      semaglutide (OZEMPIC) 0.25 mg or 0.5 mg(2 mg/1.5 mL) PnIj Inject 0.5 mg into the skin every 7 days. 1 Syringe 2    triamcinolone acetonide 0.1% (KENALOG) 0.1 % cream APPLY TWICE DAILY TO ITCHY SKIN UP TO 2 WEEKS/MONTH AS NEEDED  3    VITAMIN D2 1,250 mcg (50,000 unit) capsule Take 1 capsule (50,000 Units total) by mouth every 7 days. 12 capsule 0     No current facility-administered medications for this visit.        ROS  Review of Systems   Constitutional: Negative for chills and fever.   HENT: Negative for hearing loss and sore throat.    Eyes: Negative for visual disturbance.   Respiratory: Negative for cough and shortness of breath.    Cardiovascular: Negative for chest pain, palpitations and leg swelling.   Gastrointestinal: Negative for abdominal pain, constipation, diarrhea, nausea and vomiting.   Genitourinary: Negative for dysuria, frequency and urgency.   Musculoskeletal: Negative for arthralgias, joint swelling and myalgias.   Skin: Positive for rash. Negative for wound.   Neurological: Negative for headaches.   Psychiatric/Behavioral: Negative for agitation and confusion. The patient is not nervous/anxious.          OBJECTIVE     Physical Exam  There were no vitals filed for this visit. There is no height or weight on file to calculate BMI.            Physical Exam   Constitutional: No distress.   Pulmonary/Chest: Effort normal. No respiratory distress.   Neurological: She is alert.   Psychiatric: She has a normal mood and affect. Judgment and thought content normal.         Health Maintenance       Date Due Completion Date    TETANUS VACCINE 07/23/1960 ---    Shingles Vaccine (1 of 2) 07/23/1992 ---    Pneumococcal Vaccine (65+ High/Highest Risk) (1 of 2 - PCV13) 07/23/2007 ---    Influenza Vaccine (1)  09/01/2019 ---    Lipid Panel 03/15/2020 3/15/2019    Hemoglobin A1c 07/24/2020 1/24/2020    Urine Microalbumin 03/16/2021 3/16/2020    DEXA SCAN 07/08/2022 7/8/2019            ASSESSMENT     79 y.o. female with     1. Rash and nonspecific skin eruption    2. Severe obesity (BMI 35.0-35.9 with comorbidity)    3. Atherosclerosis of aorta        PLAN:     1. Rash and nonspecific skin eruption  - Pt made aware that her blood sugars may be slightly elevated over the next several days while taking steroids as below; advised to adhere to an ADA diet and meds and call for any blood sugar elevations; she voiced understanding  - Continue Hydroxyzine prn itch as previously prescribed  - methylPREDNISolone (MEDROL DOSEPACK) 4 mg tablet; use as directed  Dispense: 1 Package; Refill: 0  - Ambulatory referral/consult to Dermatology; Future    2. Severe obesity (BMI 35.0-35.9 with comorbidity)  - Pt aware of importance of eating a prudent diet and exercising    3. Atherosclerosis of aorta  - Stable; no acute issues  - Monitor        RTC in 1-2 weeks as needed for any acute worsening of current condition or failure to improve     Consult Start Time: 04/09/2020 10:18  Consult End Time: 04/09/2020 10:31      The patient location is: home  The chief complaint leading to consultation is: hives  Visit type: Virtual visit with synchronous audio and video  Total time spent with patient: 13 min  Each patient to whom he or she provides medical services by telemedicine is:  (1) informed of the relationship between the physician and patient and the respective role of any other health care provider with respect to management of the patient; and (2) notified that he or she may decline to receive medical services by telemedicine and may withdraw from such care at any time.    Notes: As above        Sari Gupta MD  04/09/2020 10:18 AM        No follow-ups on file.

## 2020-04-27 ENCOUNTER — LAB VISIT (OUTPATIENT)
Dept: LAB | Facility: HOSPITAL | Age: 80
End: 2020-04-27
Attending: INTERNAL MEDICINE
Payer: MEDICARE

## 2020-04-27 DIAGNOSIS — E55.9 HYPOVITAMINOSIS D: ICD-10-CM

## 2020-04-27 DIAGNOSIS — Z79.4 TYPE 2 DIABETES MELLITUS WITH HYPERGLYCEMIA, WITH LONG-TERM CURRENT USE OF INSULIN: ICD-10-CM

## 2020-04-27 DIAGNOSIS — E11.65 TYPE 2 DIABETES MELLITUS WITH HYPERGLYCEMIA, WITH LONG-TERM CURRENT USE OF INSULIN: ICD-10-CM

## 2020-04-27 DIAGNOSIS — E78.5 HYPERLIPIDEMIA ASSOCIATED WITH TYPE 2 DIABETES MELLITUS: ICD-10-CM

## 2020-04-27 DIAGNOSIS — E11.69 HYPERLIPIDEMIA ASSOCIATED WITH TYPE 2 DIABETES MELLITUS: ICD-10-CM

## 2020-04-27 LAB
25(OH)D3+25(OH)D2 SERPL-MCNC: 28 NG/ML (ref 30–96)
C PEPTIDE SERPL-MCNC: 3.29 NG/ML (ref 0.78–5.19)
CHOLEST SERPL-MCNC: 131 MG/DL (ref 120–199)
CHOLEST/HDLC SERPL: 2.8 {RATIO} (ref 2–5)
ESTIMATED AVG GLUCOSE: 180 MG/DL (ref 68–131)
GLUCOSE SERPL-MCNC: 215 MG/DL (ref 70–110)
HBA1C MFR BLD HPLC: 7.9 % (ref 4–5.6)
HDLC SERPL-MCNC: 46 MG/DL (ref 40–75)
HDLC SERPL: 35.1 % (ref 20–50)
LDLC SERPL CALC-MCNC: 68.6 MG/DL (ref 63–159)
NONHDLC SERPL-MCNC: 85 MG/DL
TRIGL SERPL-MCNC: 82 MG/DL (ref 30–150)

## 2020-04-27 PROCEDURE — 83036 HEMOGLOBIN GLYCOSYLATED A1C: CPT | Mod: HCNC

## 2020-04-27 PROCEDURE — 80061 LIPID PANEL: CPT | Mod: HCNC

## 2020-04-27 PROCEDURE — 36415 COLL VENOUS BLD VENIPUNCTURE: CPT | Mod: HCNC,PO

## 2020-04-27 PROCEDURE — 84681 ASSAY OF C-PEPTIDE: CPT | Mod: HCNC

## 2020-04-27 PROCEDURE — 82947 ASSAY GLUCOSE BLOOD QUANT: CPT | Mod: HCNC

## 2020-04-27 PROCEDURE — 82306 VITAMIN D 25 HYDROXY: CPT | Mod: HCNC

## 2020-05-01 ENCOUNTER — PATIENT OUTREACH (OUTPATIENT)
Dept: ADMINISTRATIVE | Facility: OTHER | Age: 80
End: 2020-05-01

## 2020-05-01 ENCOUNTER — TELEPHONE (OUTPATIENT)
Dept: ENDOCRINOLOGY | Facility: CLINIC | Age: 80
End: 2020-05-01

## 2020-05-01 NOTE — TELEPHONE ENCOUNTER
Tried contacting pt due to her having 2 appointments with [facility] on 05/04 @11:30AM and @2PM. Also to inform her we are not doing in person visits at this time and offer her a virtual visit. LVM for pt to return call with which appointment she wants.

## 2020-05-04 ENCOUNTER — OFFICE VISIT (OUTPATIENT)
Dept: ENDOCRINOLOGY | Facility: CLINIC | Age: 80
End: 2020-05-04
Payer: MEDICARE

## 2020-05-04 DIAGNOSIS — E11.65 TYPE 2 DIABETES MELLITUS WITH HYPERGLYCEMIA, WITH LONG-TERM CURRENT USE OF INSULIN: Primary | ICD-10-CM

## 2020-05-04 DIAGNOSIS — N18.30 CKD (CHRONIC KIDNEY DISEASE) STAGE 3, GFR 30-59 ML/MIN: ICD-10-CM

## 2020-05-04 DIAGNOSIS — Z79.4 TYPE 2 DIABETES MELLITUS WITH HYPERGLYCEMIA, WITH LONG-TERM CURRENT USE OF INSULIN: Primary | ICD-10-CM

## 2020-05-04 PROCEDURE — 3051F PR MOST RECENT HEMOGLOBIN A1C LEVEL 7.0 - < 8.0%: ICD-10-PCS | Mod: HCNC,CPTII,, | Performed by: NURSE PRACTITIONER

## 2020-05-04 PROCEDURE — 3288F PR FALLS RISK ASSESSMENT DOCUMENTED: ICD-10-PCS | Mod: HCNC,CPTII,95, | Performed by: NURSE PRACTITIONER

## 2020-05-04 PROCEDURE — 1100F PTFALLS ASSESS-DOCD GE2>/YR: CPT | Mod: HCNC,CPTII,95, | Performed by: NURSE PRACTITIONER

## 2020-05-04 PROCEDURE — 99443 PR PHYSICIAN TELEPHONE EVALUATION 21-30 MIN: ICD-10-PCS | Mod: HCNC,95,, | Performed by: NURSE PRACTITIONER

## 2020-05-04 PROCEDURE — 99443 PR PHYSICIAN TELEPHONE EVALUATION 21-30 MIN: CPT | Mod: HCNC,95,, | Performed by: NURSE PRACTITIONER

## 2020-05-04 PROCEDURE — 1100F PR PT FALLS ASSESS DOC 2+ FALLS/FALL W/INJURY/YR: ICD-10-PCS | Mod: HCNC,CPTII,95, | Performed by: NURSE PRACTITIONER

## 2020-05-04 PROCEDURE — 3288F FALL RISK ASSESSMENT DOCD: CPT | Mod: HCNC,CPTII,95, | Performed by: NURSE PRACTITIONER

## 2020-05-04 PROCEDURE — 3051F HG A1C>EQUAL 7.0%<8.0%: CPT | Mod: HCNC,CPTII,, | Performed by: NURSE PRACTITIONER

## 2020-05-04 PROCEDURE — 1159F PR MEDICATION LIST DOCUMENTED IN MEDICAL RECORD: ICD-10-PCS | Mod: HCNC,95,, | Performed by: NURSE PRACTITIONER

## 2020-05-04 PROCEDURE — 1159F MED LIST DOCD IN RCRD: CPT | Mod: HCNC,95,, | Performed by: NURSE PRACTITIONER

## 2020-05-04 NOTE — PROGRESS NOTES
Established Patient - Audio Only Telehealth Visit     The patient location is: home  The chief complaint leading to consultation is: type 2 diabetes  Visit type: Virtual visit with audio only (telephone)  Total time spent with patient: 35 minutes       The reason for the audio only service rather than synchronous audio and video virtual visit was related to technical difficulties or patient preference/necessity.     Each patient to whom I provide medical services by telemedicine is:  (1) informed of the relationship between the physician and patient and the respective role of any other health care provider with respect to management of the patient; and (2) notified that they may decline to receive medical services by telemedicine and may withdraw from such care at any time. Patient verbally consented to receive this service via voice-only telephone call.      CC: This 79 y.o. White female  is here for evaluation of  T2DM along with comorbidities indicated in the Visit Diagnosis section of this encounter.    HPI: Chelsea Gould was diagnosed with T2DM at age 44.           Prior visit 1/2020 arrives with her friend Becca, who is very supportive.   a1c up from 7.6 to 8.2%. Admits she probably she ate more during the holidays.   Patient had a hysterectomy 2 weeks ago.   She is a difficult historian and unable to recall recent diet. Does not like to inject Novolog in public so will miss her doses if she's eating out.   She continues to have some itching but better than before. Ozempic previously dc'd last year because she though it could be causing the pruritis.   Plan Work on taking Novolog before each meal even when you go out to eat.   Restart Ozempic at 0.25 mg once weekly for a month. Then increase Ozempic to 0.5 mg once weekly.   Stop Tradjenta when you start the Ozempic.   Monitor glucose 4x/day.   Return to clinic in 3 months with labs prior.       Interval history  a1c lower from 8.2 to 7.9%.   She has cut down  "Lantus from 30 to 20 units. She is taking Novolog 12-8-8 units before meals. Not sure when she decreased her insulin doses and by whose direction. She'll skip Novolog < 150. Prior doses of insulin at lov: Lantus 30 units, Novolog 17-12-12 units ac.   She has restarted Ozempic. She is seeing Derm for rash. She is very anxious during visit and upset that she cannot come into the clinic d/t COVID19 risk. She is a difficult historian.         LAST DIABETES EDUCATION: 7/17/19 - Liliam Chow RN, CDE   It was determined that VGo was not a good option for the patient "due to the lack of sensation in her fingers which will make it difficult to push the buttons for bolusing, lack of home support, and confusion."       DIABETES MEDICATIONS: Lantus Solostar and Novolog as above, Ozempic 0.5 mg once weekly     Misses medication doses - No  She does carry Novolog pen with her.     DM COMPLICATIONS: nephropathy    SIGNIFICANT DIABETES MED HISTORY:   Diarrhea on metformin    SELF MONITORING BLOOD GLUCOSE: monitors glucose at least 10x/day with Freestyle Yoav.   140s in the mornings.   BGs mid 100s to 304 during the day.     HYPOGLYCEMIC EPISODES: denies overnight symptoms. She does have low BGs down to 60s during the daytime as well but appears to be infrequent; patient corrects with soda.      CURRENT DIET: eats 3 meals/day. She understands she must eat some type of starch with meals in order to avoid low BG from Novolog.           There were no vitals taken for this visit.      ROS:           PHYSICAL EXAM:        Hemoglobin A1C   Date Value Ref Range Status   04/27/2020 7.9 (H) 4.0 - 5.6 % Final     Comment:     ADA Screening Guidelines:  5.7-6.4%  Consistent with prediabetes  >or=6.5%  Consistent with diabetes  High levels of fetal hemoglobin interfere with the HbA1C  assay. Heterozygous hemoglobin variants (HbS, HgC, etc)do  not significantly interfere with this assay.   However, presence of multiple variants may affect " accuracy.     01/24/2020 8.2 (H) 4.0 - 5.6 % Final     Comment:     ADA Screening Guidelines:  5.7-6.4%  Consistent with prediabetes  >or=6.5%  Consistent with diabetes  High levels of fetal hemoglobin interfere with the HbA1C  assay. Heterozygous hemoglobin variants (HbS, HgC, etc)do  not significantly interfere with this assay.   However, presence of multiple variants may affect accuracy.     09/18/2019 7.6 (H) 4.0 - 5.6 % Final     Comment:     ADA Screening Guidelines:  5.7-6.4%  Consistent with prediabetes  >or=6.5%  Consistent with diabetes  High levels of fetal hemoglobin interfere with the HbA1C  assay. Heterozygous hemoglobin variants (HbS, HgC, etc)do  not significantly interfere with this assay.   However, presence of multiple variants may affect accuracy.            Ref. Range 4/27/2020 09:05   Glucose Latest Ref Range: 70 - 110 mg/dL 215 (H)   C-Peptide Latest Ref Range: 0.78 - 5.19 ng/mL 3.29       Chemistry        Component Value Date/Time     03/20/2020 1429    K 4.9 03/20/2020 1429     03/20/2020 1429    CO2 31 (H) 03/20/2020 1429    BUN 26 (H) 03/20/2020 1429    CREATININE 1.1 03/20/2020 1429     (H) 04/27/2020 0905        Component Value Date/Time    CALCIUM 9.7 03/20/2020 1429    ESTGFRAFRICA 56.0 (A) 03/20/2020 1429    EGFRNONAA 48.5 (A) 03/20/2020 1429          Lab Results   Component Value Date    LDLCALC 68.6 04/27/2020          Ref. Range 4/27/2020 09:05   Cholesterol Latest Ref Range: 120 - 199 mg/dL 131   HDL Latest Ref Range: 40 - 75 mg/dL 46   Hdl/Cholesterol Ratio Latest Ref Range: 20.0 - 50.0 % 35.1   LDL Cholesterol External Latest Ref Range: 63.0 - 159.0 mg/dL 68.6   Non-HDL Cholesterol Latest Units: mg/dL 85   Total Cholesterol/HDL Ratio Latest Ref Range: 2.0 - 5.0  2.8   Triglycerides Latest Ref Range: 30 - 150 mg/dL 82       Lab Results   Component Value Date    MICALBCREAT 9.1 05/01/2019           ASSESSMENT and PLAN:    A1C GOAL:     1. Type 2 diabetes  mellitus with hyperglycemia, with long-term current use of insulin  a1c at goal for age.   Discussed risk of COVID19 and importance of social distancing at this time.   Reviewed lab results with her.   Inject Novolog if BG > 100.   Advised her again that she needs to make notes re: food intake/activity surrounding episodes of very high or very low BGs so that future episodes can be avoided.       Hemoglobin A1C   2. CKD (chronic kidney disease) stage 3, GFR 30-59 ml/min  Improve glycemic control.   Recent GFR improved to 48        Orders Placed This Encounter   Procedures    Hemoglobin A1C     Standing Status:   Future     Standing Expiration Date:   7/3/2021        Follow up in about 2 months (around 7/4/2020).             This service was not originating from a related E/M service provided within the previous 7 days nor will  to an E/M service or procedure within the next 24 hours or my soonest available appointment.  Prevailing standard of care was able to be met in this audio-only visit.

## 2020-05-28 ENCOUNTER — OFFICE VISIT (OUTPATIENT)
Dept: FAMILY MEDICINE | Facility: CLINIC | Age: 80
End: 2020-05-28
Payer: MEDICARE

## 2020-05-28 VITALS
HEART RATE: 88 BPM | OXYGEN SATURATION: 95 % | BODY MASS INDEX: 30.84 KG/M2 | TEMPERATURE: 97 F | WEIGHT: 179.69 LBS | RESPIRATION RATE: 16 BRPM | SYSTOLIC BLOOD PRESSURE: 122 MMHG | DIASTOLIC BLOOD PRESSURE: 55 MMHG

## 2020-05-28 DIAGNOSIS — Z79.4 TYPE 2 DIABETES MELLITUS WITH HYPERGLYCEMIA, WITH LONG-TERM CURRENT USE OF INSULIN: ICD-10-CM

## 2020-05-28 DIAGNOSIS — N18.30 CKD (CHRONIC KIDNEY DISEASE), STAGE III: ICD-10-CM

## 2020-05-28 DIAGNOSIS — E11.65 TYPE 2 DIABETES MELLITUS WITH HYPERGLYCEMIA, WITH LONG-TERM CURRENT USE OF INSULIN: ICD-10-CM

## 2020-05-28 DIAGNOSIS — L29.9 PRURITUS: ICD-10-CM

## 2020-05-28 DIAGNOSIS — L50.9 HIVES: Primary | ICD-10-CM

## 2020-05-28 PROCEDURE — 1100F PR PT FALLS ASSESS DOC 2+ FALLS/FALL W/INJURY/YR: ICD-10-PCS | Mod: HCNC,CPTII,S$GLB, | Performed by: FAMILY MEDICINE

## 2020-05-28 PROCEDURE — 3288F PR FALLS RISK ASSESSMENT DOCUMENTED: ICD-10-PCS | Mod: HCNC,CPTII,S$GLB, | Performed by: FAMILY MEDICINE

## 2020-05-28 PROCEDURE — 99499 RISK ADDL DX/OHS AUDIT: ICD-10-PCS | Mod: S$GLB,,, | Performed by: FAMILY MEDICINE

## 2020-05-28 PROCEDURE — 99999 PR PBB SHADOW E&M-EST. PATIENT-LVL III: CPT | Mod: PBBFAC,HCNC,, | Performed by: FAMILY MEDICINE

## 2020-05-28 PROCEDURE — 1159F MED LIST DOCD IN RCRD: CPT | Mod: HCNC,S$GLB,, | Performed by: FAMILY MEDICINE

## 2020-05-28 PROCEDURE — 99214 OFFICE O/P EST MOD 30 MIN: CPT | Mod: HCNC,S$GLB,, | Performed by: FAMILY MEDICINE

## 2020-05-28 PROCEDURE — 3051F HG A1C>EQUAL 7.0%<8.0%: CPT | Mod: HCNC,CPTII,S$GLB, | Performed by: FAMILY MEDICINE

## 2020-05-28 PROCEDURE — 3288F FALL RISK ASSESSMENT DOCD: CPT | Mod: HCNC,CPTII,S$GLB, | Performed by: FAMILY MEDICINE

## 2020-05-28 PROCEDURE — 99499 UNLISTED E&M SERVICE: CPT | Mod: S$GLB,,, | Performed by: FAMILY MEDICINE

## 2020-05-28 PROCEDURE — 1159F PR MEDICATION LIST DOCUMENTED IN MEDICAL RECORD: ICD-10-PCS | Mod: HCNC,S$GLB,, | Performed by: FAMILY MEDICINE

## 2020-05-28 PROCEDURE — 3051F PR MOST RECENT HEMOGLOBIN A1C LEVEL 7.0 - < 8.0%: ICD-10-PCS | Mod: HCNC,CPTII,S$GLB, | Performed by: FAMILY MEDICINE

## 2020-05-28 PROCEDURE — 1100F PTFALLS ASSESS-DOCD GE2>/YR: CPT | Mod: HCNC,CPTII,S$GLB, | Performed by: FAMILY MEDICINE

## 2020-05-28 PROCEDURE — 99214 PR OFFICE/OUTPT VISIT, EST, LEVL IV, 30-39 MIN: ICD-10-PCS | Mod: HCNC,S$GLB,, | Performed by: FAMILY MEDICINE

## 2020-05-28 PROCEDURE — 99999 PR PBB SHADOW E&M-EST. PATIENT-LVL III: ICD-10-PCS | Mod: PBBFAC,HCNC,, | Performed by: FAMILY MEDICINE

## 2020-05-28 RX ORDER — PERMETHRIN 50 MG/G
CREAM TOPICAL ONCE
Qty: 60 G | Refills: 0 | Status: SHIPPED | OUTPATIENT
Start: 2020-05-28 | End: 2020-05-28

## 2020-05-28 NOTE — PROGRESS NOTES
"After visit as patient was waiting outside for her  she apparently had a fall down a step. I was called to come evaluate her. She stated she hit her left wrist and elbow, both knees, and right breast. Motion at left wrist and elbow normal. There was some bleeding at 4th left digit. Advised patient to come in for evaluation and she refused and that she felt fine. She did not allow me to see her knees. She then stated she was going to take a Valium which she pulled a bottle out of her purse. I advised against it since she just fell. She stated "why, I didn't hit my head?" I told her I would urge against it since you just did fall and that can cause sedation. Again advised her to come inside for evaluation and she refused. Her  but her in their motor vehicle and they left.  "

## 2020-05-28 NOTE — PROGRESS NOTES
"Subjective:       Patient ID: Chelsea Gould is a 79 y.o. female.    Chief Complaint: Urticaria    HPI   79 year old female, previously unknown to me comes in for evaluation of whole body itching. Throughout the visit, she is very tangential in answering questions stating she has lots of problems and that she has seen lots of doctors for the itching and "hates that she looks like a leper" because of all the hives. She has been seeing a dermatologist, . When asked what has been prescribed, she states "all the creams, and nothing works." She has an appointment with him tomorrow. When asked if she has a pet at home she states yes. Her dog is also itchy and has a yeast infection. She reports that he is scratching as well. She insists that the two things have nothing to do with each other.  When asked if permethrin has been prescribed to her, she does not recognize the name.   Also while speaking she reveals that Dr. Mart recommended she have light therapy she she refused because she doesn't see how lights would help. In addition she states that he recommend she see a rheumatologist for and evaluation which she does not see how that would help.   During the visit it is a bit difficult getting the patient to focus on the current problem as she starts to discuss that she read potassium can be her issue and she does not like a diet plan she was given. She is also upset that she has cancelled ophthalmology appointments because of the itching as she didn't want to be seen as a leper.     Review of Systems   Constitutional: Negative for chills, fever and unexpected weight change.   Skin: Positive for rash.        See HPI       Objective:     BP (!) 122/55 (BP Location: Left arm, Patient Position: Sitting, BP Method: Medium (Automatic))   Pulse 88   Temp 96.8 °F (36 °C) (Skin)   Resp 16   Wt 81.5 kg (179 lb 10.8 oz)   SpO2 95%   BMI 30.84 kg/m²     Physical Exam   Constitutional: No distress.   HENT:   Head: " "Normocephalic and atraumatic.   Cardiovascular: Normal rate, regular rhythm and intact distal pulses.   Pulmonary/Chest: Effort normal and breath sounds normal.   Skin:   Multiple lesions throughout body- upper and lower extremities, trunk/torso consisting of papules and what appear as excoriated papules or pustules, and some hives   Psychiatric: Her mood appears anxious. Her speech is tangential. She is agitated.       Assessment:       1. Hives    2. Pruritus    3. Type 2 diabetes mellitus with hyperglycemia, with long-term current use of insulin    4. CKD (chronic kidney disease), stage III        Plan:       Chelsea was seen today for urticaria.    Diagnoses and all orders for this visit:    Hives  -     Ambulatory referral/consult to Allergy; Future  Patient has not seen an allergist and I recommend getting and allergy consult. She was upset that I could not cure her today as "I want to be done with this."  I apologized that I cannot cure her today as I am not sure what has been done and what is causing her symptoms.  I advised continued derm follow up and seeing allergist and following recommendations made including possible rheum eval as suggested by derm.  Also discussed possible contribution from her pet which she became very upset with me and started to raise her voice. Discussed treating for possible scabies.    Pruritus  -     permethrin (ELIMITE) 5 % cream; Apply topically once. Apply to entire body overnight for at least 8 hours for 1 dose  As above    Type 2 diabetes mellitus with hyperglycemia, with long-term current use of insulin  Follow up with PCP    CKD (chronic kidney disease), stage III  Follow up with PCP      "

## 2020-06-01 ENCOUNTER — PATIENT OUTREACH (OUTPATIENT)
Dept: ADMINISTRATIVE | Facility: OTHER | Age: 80
End: 2020-06-01

## 2020-06-01 DIAGNOSIS — E55.9 HYPOVITAMINOSIS D: Primary | ICD-10-CM

## 2020-06-01 RX ORDER — ERGOCALCIFEROL 1.25 MG/1
50000 CAPSULE ORAL
Qty: 12 CAPSULE | Refills: 0 | Status: SHIPPED | OUTPATIENT
Start: 2020-06-01 | End: 2020-08-25 | Stop reason: SDUPTHER

## 2020-06-01 NOTE — TELEPHONE ENCOUNTER
----- Message from Marcella De La Torre sent at 6/1/2020 10:14 AM CDT -----  Contact: Chelsea [phone]  Type: Patient Call Back    Who called:Chelsea     What is the request in detail: The patient is requesting a call back from the staff. She wants to know if she can get the vitmin D refilled. The patient stated that she wants to continue taking the medication    Can the clinic reply by [facility]?no    Would the patient rather a call back or a response via [facility]? Call back     Best call back number:[phone]

## 2020-06-02 ENCOUNTER — OFFICE VISIT (OUTPATIENT)
Dept: ALLERGY | Facility: CLINIC | Age: 80
End: 2020-06-02
Payer: MEDICARE

## 2020-06-02 VITALS — BODY MASS INDEX: 30.53 KG/M2 | WEIGHT: 178.81 LBS | HEIGHT: 64 IN

## 2020-06-02 DIAGNOSIS — E11.69 HYPERLIPIDEMIA ASSOCIATED WITH TYPE 2 DIABETES MELLITUS: ICD-10-CM

## 2020-06-02 DIAGNOSIS — Z90.722 S/P TOTAL HYSTERECTOMY AND BILATERAL SALPINGO-OOPHORECTOMY: ICD-10-CM

## 2020-06-02 DIAGNOSIS — E78.5 HYPERLIPIDEMIA ASSOCIATED WITH TYPE 2 DIABETES MELLITUS: ICD-10-CM

## 2020-06-02 DIAGNOSIS — Z90.710 S/P TOTAL HYSTERECTOMY AND BILATERAL SALPINGO-OOPHORECTOMY: ICD-10-CM

## 2020-06-02 DIAGNOSIS — Z90.79 S/P TOTAL HYSTERECTOMY AND BILATERAL SALPINGO-OOPHORECTOMY: ICD-10-CM

## 2020-06-02 DIAGNOSIS — E11.65 TYPE 2 DIABETES MELLITUS WITH HYPERGLYCEMIA, WITH LONG-TERM CURRENT USE OF INSULIN: ICD-10-CM

## 2020-06-02 DIAGNOSIS — L50.9 HIVES: ICD-10-CM

## 2020-06-02 DIAGNOSIS — L30.9 DERMATITIS: Primary | ICD-10-CM

## 2020-06-02 DIAGNOSIS — Z79.4 TYPE 2 DIABETES MELLITUS WITH HYPERGLYCEMIA, WITH LONG-TERM CURRENT USE OF INSULIN: ICD-10-CM

## 2020-06-02 DIAGNOSIS — C53.9 CERVICAL CANCER, FIGO STAGE IA1: ICD-10-CM

## 2020-06-02 DIAGNOSIS — M81.0 AGE-RELATED OSTEOPOROSIS WITHOUT CURRENT PATHOLOGICAL FRACTURE: ICD-10-CM

## 2020-06-02 PROCEDURE — 3288F PR FALLS RISK ASSESSMENT DOCUMENTED: ICD-10-PCS | Mod: HCNC,CPTII,S$GLB, | Performed by: ALLERGY & IMMUNOLOGY

## 2020-06-02 PROCEDURE — 3051F PR MOST RECENT HEMOGLOBIN A1C LEVEL 7.0 - < 8.0%: ICD-10-PCS | Mod: HCNC,CPTII,S$GLB, | Performed by: ALLERGY & IMMUNOLOGY

## 2020-06-02 PROCEDURE — 1159F PR MEDICATION LIST DOCUMENTED IN MEDICAL RECORD: ICD-10-PCS | Mod: HCNC,S$GLB,, | Performed by: ALLERGY & IMMUNOLOGY

## 2020-06-02 PROCEDURE — 1100F PTFALLS ASSESS-DOCD GE2>/YR: CPT | Mod: HCNC,CPTII,S$GLB, | Performed by: ALLERGY & IMMUNOLOGY

## 2020-06-02 PROCEDURE — 1100F PR PT FALLS ASSESS DOC 2+ FALLS/FALL W/INJURY/YR: ICD-10-PCS | Mod: HCNC,CPTII,S$GLB, | Performed by: ALLERGY & IMMUNOLOGY

## 2020-06-02 PROCEDURE — 1126F AMNT PAIN NOTED NONE PRSNT: CPT | Mod: HCNC,S$GLB,, | Performed by: ALLERGY & IMMUNOLOGY

## 2020-06-02 PROCEDURE — 1159F MED LIST DOCD IN RCRD: CPT | Mod: HCNC,S$GLB,, | Performed by: ALLERGY & IMMUNOLOGY

## 2020-06-02 PROCEDURE — 99999 PR PBB SHADOW E&M-EST. PATIENT-LVL III: CPT | Mod: PBBFAC,HCNC,, | Performed by: ALLERGY & IMMUNOLOGY

## 2020-06-02 PROCEDURE — 99204 OFFICE O/P NEW MOD 45 MIN: CPT | Mod: HCNC,S$GLB,, | Performed by: ALLERGY & IMMUNOLOGY

## 2020-06-02 PROCEDURE — 99999 PR PBB SHADOW E&M-EST. PATIENT-LVL III: ICD-10-PCS | Mod: PBBFAC,HCNC,, | Performed by: ALLERGY & IMMUNOLOGY

## 2020-06-02 PROCEDURE — 1126F PR PAIN SEVERITY QUANTIFIED, NO PAIN PRESENT: ICD-10-PCS | Mod: HCNC,S$GLB,, | Performed by: ALLERGY & IMMUNOLOGY

## 2020-06-02 PROCEDURE — 3288F FALL RISK ASSESSMENT DOCD: CPT | Mod: HCNC,CPTII,S$GLB, | Performed by: ALLERGY & IMMUNOLOGY

## 2020-06-02 PROCEDURE — 3051F HG A1C>EQUAL 7.0%<8.0%: CPT | Mod: HCNC,CPTII,S$GLB, | Performed by: ALLERGY & IMMUNOLOGY

## 2020-06-02 PROCEDURE — 99204 PR OFFICE/OUTPT VISIT, NEW, LEVL IV, 45-59 MIN: ICD-10-PCS | Mod: HCNC,S$GLB,, | Performed by: ALLERGY & IMMUNOLOGY

## 2020-06-02 RX ORDER — PERMETHRIN 50 MG/G
CREAM TOPICAL
COMMUNITY
Start: 2020-05-28 | End: 2023-08-11

## 2020-06-02 RX ORDER — FLUOROMETHOLONE 1 MG/ML
SUSPENSION/ DROPS OPHTHALMIC
COMMUNITY
Start: 2020-03-27

## 2020-06-02 RX ORDER — FLUOCINONIDE 0.5 MG/G
CREAM TOPICAL
COMMUNITY
Start: 2020-04-21 | End: 2023-08-11

## 2020-06-02 RX ORDER — MUPIROCIN 20 MG/G
OINTMENT TOPICAL
COMMUNITY
Start: 2020-05-11

## 2020-06-02 NOTE — PROGRESS NOTES
Chelsea Gould is referred by Dr. Blanco Nunez for a consult regarding a rash and itching.  She is here alone.  She has a very difficult historian.    She developed a rash about five years ago that has been increasing in severity.  The lesions are red, raised, pruritic and last weeks to months at a time in the same location.  They do not come and go.  She denies any angioedema.    She has been seeing a dermatologist, Dr. Mart, who has given her several different creams which have not been helpful.  She has also been prescribed several antihistamines which does not help with the itching.  She has been given oral steroids.  She does not know whether these have helped or not.    There is no association with any food, contact, or ingestion.    She denies any rhinitis or conjunctivitis.  She denies any cough, wheezing, or shortness of breath.  She denies any history of asthma.    For ROS, FH, SH please see Allergy and Asthma Questionnaire dated today.    Some relevant pertinent positives:    Review of Systems/PMH:  She has type 2 diabetes and takes insulin and Ozempic.  She has osteoporosis and takes Fosamax.  She takes Combigan for probable glaucoma.  She has hypothyroidism and is on levothyroxine.  She has hyperlipidemia and takes Crestor.  She has had cervical cancer stage IA 1.  She did have a hysterectomy and oophorectomy.  She does occasionally have heartburn.  She is not currently taking any medications.    Family History:  Negative for known atopic disease.    Home environment:  She she has lived in the same house for over 50 years.  There was no water damage.  There is no evidence of mold.  There is no carpeting in the bedroom.  There is one dog that lives inside the house.  She does not have any problems around the dog.    Social History:  She is a nonsmoker.  She is retired.    Physical Examination:  General: Well-developed, well-nourished, no acute distress.  Head: No sinus tenderness.  Eyes: Conjunctivae:  No  bulbar or palpebral conjunctival injection.  Ears: EAC's clear.  TM's clear.  No pre-auricular nodes.  Nose: Nasal Mucosa:  Pink.  Septum: No apparent deviation.  Turbinates:  No significant edema.  Polyps/Mass:  None visible.  Teeth/Gums:  No bleeding noted.  Oropharynx: No exudates.  Neck: Supple without thyromegaly. No cervical lymphadenopathy.    Respiratory/Chest: Effort: Good.  Auscultation:  Clear bilaterally.  Cardiovascular:  No murmur, rubs, or gallop heard.   GI:  Non-tender.  No masses.  No organomegaly.  Extremities:  No cyanosis, clubbing, or edema.  Skin: Good turgor.  No urticaria or angioedema.  There are multiple small erythematous lesions across her arms, chest, and legs.  There are multiple excoriations.  Neuro/Psych: Oriented x 3.    Assessment:  1.  Chronic dermatitis, not urticaria.  2.  Type 2 diabetes on insulin and Ozempic.  3.  Osteoporosis on Fosamax.  4.  Possible glaucoma on Combigan.  5.  Hypothyroidism on levothyroxine.  6.  Hyperlipidemia on Crestor.  7.  S/P hysterectomy and oophorectomy secondary to cervical cancer stage IA 1.  8.  GERD.    Recommendations:  1.  She does not have urticaria.  2.  Dermatology evaluation.

## 2020-06-02 NOTE — LETTER
June 4, 2020      Blanco Nunez Jr., MD  [address] [facility]  [place] [address]           [facility]  [address]  [facility] [address]  Phone: [phone]  Fax: [phone]          Patient: Chelsea Gould   MR Number: [id number]   YOB: 1940   Date of Visit: 6/2/2020       Dear Dr. Blanco Nunez Jr.:    Thank you for referring Chelsea Gould to me for evaluation. Attached you will find relevant portions of my assessment and plan of care.    If you have questions, please do not hesitate to call me. I look forward to following Chelsea Gould along with you.    Sincerely,    JULIAN Hyman III, MD    Enclosure  CC:  No Recipients    If you would like to receive this communication electronically, please contact [email or web address] or [phone] to request more information on [employer] Link access.    For providers and/or their staff who would like to refer a patient to [facility], please contact us through our one-stop-shop provider referral line, [facility], at [phone].    If you feel you have received this communication in error or would no longer like to receive these types of communications, please e-mail [email or web address]

## 2020-06-02 NOTE — PROGRESS NOTES
Patient's chart was reviewed.   Requested updates within Care Everywhere.  Immunizations reconciled.    Health Maintenance was updated.\

## 2020-06-03 ENCOUNTER — TELEPHONE (OUTPATIENT)
Dept: NEPHROLOGY | Facility: CLINIC | Age: 80
End: 2020-06-03

## 2020-06-03 NOTE — TELEPHONE ENCOUNTER
Pt states that she didn't need the appointment         ----- Message from Amber Gutierrez sent at 6/2/2020  4:53 PM CDT -----  Contact: self, [phone]  Patient requests to speak with you regarding 6/4 Nutrition appt scheduled. Pt states she has other issues and doesn't understand why she was scheduled this appt. Please advise.

## 2020-06-04 ENCOUNTER — TELEPHONE (OUTPATIENT)
Dept: ALLERGY | Facility: CLINIC | Age: 80
End: 2020-06-04

## 2020-06-04 NOTE — TELEPHONE ENCOUNTER
Please schedule appointment  for patient. Patient was seen in allergy for a rash that we were unable to treat. Patient did not have hives.  Patient reports itchy bumps/sores on neck, back, abdomen, arms and legs. Patient states she has tried several steroid creams with no improvement.       Thank you,  Mesha

## 2020-06-23 ENCOUNTER — OFFICE VISIT (OUTPATIENT)
Dept: GYNECOLOGIC ONCOLOGY | Facility: CLINIC | Age: 80
End: 2020-06-23
Payer: MEDICARE

## 2020-06-23 VITALS
DIASTOLIC BLOOD PRESSURE: 62 MMHG | WEIGHT: 179.25 LBS | HEIGHT: 60 IN | HEART RATE: 81 BPM | BODY MASS INDEX: 35.19 KG/M2 | SYSTOLIC BLOOD PRESSURE: 135 MMHG

## 2020-06-23 DIAGNOSIS — C53.0 MALIGNANT NEOPLASM OF ENDOCERVIX: Primary | ICD-10-CM

## 2020-06-23 PROCEDURE — 99999 PR PBB SHADOW E&M-EST. PATIENT-LVL IV: CPT | Mod: PBBFAC,HCNC,, | Performed by: OBSTETRICS & GYNECOLOGY

## 2020-06-23 PROCEDURE — 1126F AMNT PAIN NOTED NONE PRSNT: CPT | Mod: HCNC,S$GLB,, | Performed by: OBSTETRICS & GYNECOLOGY

## 2020-06-23 PROCEDURE — 99214 OFFICE O/P EST MOD 30 MIN: CPT | Mod: HCNC,S$GLB,, | Performed by: OBSTETRICS & GYNECOLOGY

## 2020-06-23 PROCEDURE — 1159F PR MEDICATION LIST DOCUMENTED IN MEDICAL RECORD: ICD-10-PCS | Mod: HCNC,S$GLB,, | Performed by: OBSTETRICS & GYNECOLOGY

## 2020-06-23 PROCEDURE — 99999 PR PBB SHADOW E&M-EST. PATIENT-LVL IV: ICD-10-PCS | Mod: PBBFAC,HCNC,, | Performed by: OBSTETRICS & GYNECOLOGY

## 2020-06-23 PROCEDURE — 1101F PR PT FALLS ASSESS DOC 0-1 FALLS W/OUT INJ PAST YR: ICD-10-PCS | Mod: HCNC,CPTII,S$GLB, | Performed by: OBSTETRICS & GYNECOLOGY

## 2020-06-23 PROCEDURE — 1101F PT FALLS ASSESS-DOCD LE1/YR: CPT | Mod: HCNC,CPTII,S$GLB, | Performed by: OBSTETRICS & GYNECOLOGY

## 2020-06-23 PROCEDURE — 99214 PR OFFICE/OUTPT VISIT, EST, LEVL IV, 30-39 MIN: ICD-10-PCS | Mod: HCNC,S$GLB,, | Performed by: OBSTETRICS & GYNECOLOGY

## 2020-06-23 PROCEDURE — 1159F MED LIST DOCD IN RCRD: CPT | Mod: HCNC,S$GLB,, | Performed by: OBSTETRICS & GYNECOLOGY

## 2020-06-23 PROCEDURE — 1126F PR PAIN SEVERITY QUANTIFIED, NO PAIN PRESENT: ICD-10-PCS | Mod: HCNC,S$GLB,, | Performed by: OBSTETRICS & GYNECOLOGY

## 2020-06-23 NOTE — PROGRESS NOTES
Subjective:      Patient ID: Chelsea Gould is a 79 y.o. female.    Chief Complaint: No chief complaint on file.      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    Now s/p RTLH/BSO 1/17/2020  Pathology reviewed showing no residual malignancy, does show areas of residual severe cervical dysplasia.   No adjuvant therapy is indicated.      Presents today for surveillance. Without complaints. Specifically denies N/V, pain, swelling, bleeding, unexpected weight change, SOB, problems with bowel or bladder function.   Disease free interval 5 months.      History:  Referred by Dr. Marcia Canseco for newly diagnosed endocervical carcinoma in situ.   Evaluated by Dr. Canseco for post menopausal bleeding.      Pap 9/20/18 negative   EMB 9/27/19   FINAL PATHOLOGIC DIAGNOSIS   Endometrium, curettage:   Scant strips of inactive endometrial glands and lower uterine segment glands in a background of mucus and blood.   Negative for atypia or malignancy.   Comment: No significant stromal component is present to evaluate for endometrial hyperplasia. The findings may   reflect an atrophic process. However, re-sampling may be considered in a highly suspicious clinical setting.      Underwent further evaluation with D&C hysteroscopy 10/25/19.   FINAL PATHOLOGIC DIAGNOSIS   ENDOMETRIAL CURETTAGE:   - Multiple fragments of squamous epithelium with severe squamous dysplasia/carcinoma in situ with involvement of   the endocervix (see comment).   - Rare small fragments of atrophic endometrium.   - Strips of benign endocervical epithelium and fragments of benign squamous epithelium intermixed with blood and   mucus.   - No evidence of endometrial hyperplasia, endometrial intraepithelial neoplasia or endometrial adenocarcinoma.   COMMENT: The fragments of squamous epithelium represent at least severe squamous dysplasia/carcinoma in   situ and appear to arise from the cervix. The fragments are cut  in a tangential fashion and the interface of the   epithelium with the stroma cannot be accurately evaluated. The possibility of an invasive squamous cell carcinoma   cannot be excluded. There is no evidence of endometrial intraepithelial neoplasia or endometrial adenocarcinoma.   However, the possibility of a squamous cell carcinoma arising within the endometrium, although unlikely, cannot be   excluded.      Pelvic US 10/9/19  Uterus:  Size: 6.8 x 3.6 x 3.8 cm  Masses: There are multiple small partially calcified uterine fibroids present with the largest measuring 1.4 cm in greatest dimension. Multiple nabothian cysts are present.  Endometrium: Upper normal in this post menopausal patient, measuring 5 mm. There is a trace amount of free fluid within the endometrial cavity as well.  Right ovary: Not visualized.  Left ovary: Not visualized.      Medical comorbidities include DM (last hgb A1c 7.6, Cr 1.2), HLD, hypothyroid.   Prior abdominal surgeries include BTL.      S/p CKC 11/26/19  Cervical excision shows small microinvasive squamous cell carcinoma of the cervix, 1mm. Stage IA1, negative LVSI.   1. CERVIX, LEEP:   - Superficial squamous cell carcinoma, less than 1 mm.   - High-grade squamous intraepithelial lesion (DEE DEE III) extending into   endocervical glands.   - Immunohistochemistry with appropriate control for keratin AE1/3 highlights   the carcinoma.   - Immunohistochemistry with appropriate control for p16 is positive in both   the invasive carcinoma and     DEE DEE III.   - See CAP synoptic report.   2.  ENDOCERVICAL CURETTAGE:   - Benign glandular epithelium and blood clot.   - Immunohistochemistry with appropriate control for p16 is noncontributory.   Deep Margin:  Uninvolved by invasive carcinoma or high-grade squamous   intraepithelial lesion (DEE DEE 2-3). Distance of invasive carcinoma from margin (millimeters): 4.5   mm.   Lymphovascular Invasion:  Not identified.     CT CAP 1/2020  Impression:  1. Patient  with a reported history of cervical cancer.  No imaging findings to suggest distant metastatic disease.     Review of Systems   Constitutional: Negative for appetite change, chills, fatigue and fever.   HENT: Negative for mouth sores.    Respiratory: Negative for cough and shortness of breath.    Cardiovascular: Negative for leg swelling.   Gastrointestinal: Negative for abdominal pain, blood in stool, constipation and diarrhea.   Endocrine: Negative for cold intolerance.   Genitourinary: Negative for dysuria and vaginal bleeding.   Musculoskeletal: Negative for myalgias.   Skin: Negative for rash.   Allergic/Immunologic: Negative.    Neurological: Negative for weakness and numbness.   Hematological: Negative for adenopathy. Does not bruise/bleed easily.   Psychiatric/Behavioral: Negative for confusion.       Objective:   Physical Exam:   Constitutional: She is oriented to person, place, and time. She appears well-developed and well-nourished.    HENT:   Head: Normocephalic and atraumatic.    Eyes: Pupils are equal, round, and reactive to light. EOM are normal.    Neck: Normal range of motion. Neck supple. No thyromegaly present.    Cardiovascular: Normal rate, regular rhythm and intact distal pulses.     Pulmonary/Chest: Effort normal and breath sounds normal. No respiratory distress. She has no wheezes.        Abdominal: Soft. Bowel sounds are normal. She exhibits no distension and no mass. There is no abdominal tenderness.     Genitourinary:    Vagina and rectum normal.      Pelvic exam was performed with patient supine.   There is no lesion on the right labia. There is no lesion on the left labia. Uterus is absent. Right adnexum displays no mass. Left adnexum displays no mass. Vaginal cuff normal.Cervix exhibits absence.           Musculoskeletal: Normal range of motion and moves all extremeties.      Lymphadenopathy:     She has no cervical adenopathy.        Right: No supraclavicular adenopathy present.         Left: No supraclavicular adenopathy present.    Neurological: She is alert and oriented to person, place, and time.    Skin: Skin is warm and dry. No rash noted.    Psychiatric: She has a normal mood and affect.       Assessment:     1. Malignant neoplasm of endocervix        Plan:   No orders of the defined types were placed in this encounter.    No evidence of disease on today's exam  Feels well, no new symptoms  Disease free interval 5 months     Recommend continued surveillance. RTC 3 months or sooner if needed.

## 2020-06-30 ENCOUNTER — LAB VISIT (OUTPATIENT)
Dept: LAB | Facility: HOSPITAL | Age: 80
End: 2020-06-30
Attending: NURSE PRACTITIONER
Payer: MEDICARE

## 2020-06-30 DIAGNOSIS — Z79.4 TYPE 2 DIABETES MELLITUS WITH HYPERGLYCEMIA, WITH LONG-TERM CURRENT USE OF INSULIN: ICD-10-CM

## 2020-06-30 DIAGNOSIS — E11.65 TYPE 2 DIABETES MELLITUS WITH HYPERGLYCEMIA, WITH LONG-TERM CURRENT USE OF INSULIN: ICD-10-CM

## 2020-06-30 DIAGNOSIS — N18.30 CKD (CHRONIC KIDNEY DISEASE) STAGE 3, GFR 30-59 ML/MIN: ICD-10-CM

## 2020-06-30 LAB
25(OH)D3+25(OH)D2 SERPL-MCNC: 32 NG/ML (ref 30–96)
ESTIMATED AVG GLUCOSE: 160 MG/DL (ref 68–131)
HBA1C MFR BLD HPLC: 7.2 % (ref 4–5.6)

## 2020-06-30 PROCEDURE — 36415 COLL VENOUS BLD VENIPUNCTURE: CPT | Mod: HCNC,PO

## 2020-06-30 PROCEDURE — 82306 VITAMIN D 25 HYDROXY: CPT | Mod: HCNC

## 2020-06-30 PROCEDURE — 83036 HEMOGLOBIN GLYCOSYLATED A1C: CPT | Mod: HCNC

## 2020-07-01 DIAGNOSIS — E03.9 HYPOTHYROIDISM, UNSPECIFIED TYPE: ICD-10-CM

## 2020-07-01 RX ORDER — LEVOTHYROXINE SODIUM 112 UG/1
112 TABLET ORAL
Qty: 30 TABLET | Refills: 0 | Status: SHIPPED | OUTPATIENT
Start: 2020-07-01 | End: 2020-08-14

## 2020-07-06 RX ORDER — LEVOTHYROXINE SODIUM 112 UG/1
TABLET ORAL
Qty: 30 TABLET | Refills: 10 | OUTPATIENT
Start: 2020-07-06

## 2020-07-06 NOTE — TELEPHONE ENCOUNTER
----- Message from Valerie Bush sent at 7/6/2020  3:29 PM CDT -----  Type: Patient Call Back    Who called:self    What is the request in detail: pt need need her semaglutide (OZEMPIC) 0.25 mg or 0.5 mg(2 mg/1.5 mL) PnIj    Can the clinic reply by [facility]?no    Would the patient rather a call back or a response via [facility]? call    Best call back number:

## 2020-07-07 ENCOUNTER — LAB VISIT (OUTPATIENT)
Dept: LAB | Facility: HOSPITAL | Age: 80
End: 2020-07-07
Attending: NURSE PRACTITIONER
Payer: MEDICARE

## 2020-07-07 DIAGNOSIS — E03.9 HYPOTHYROIDISM, UNSPECIFIED TYPE: ICD-10-CM

## 2020-07-07 PROCEDURE — 36415 COLL VENOUS BLD VENIPUNCTURE: CPT | Mod: HCNC,PO

## 2020-07-07 PROCEDURE — 84443 ASSAY THYROID STIM HORMONE: CPT | Mod: HCNC

## 2020-07-07 RX ORDER — SEMAGLUTIDE 1.34 MG/ML
0.5 INJECTION, SOLUTION SUBCUTANEOUS
Qty: 1 SYRINGE | Refills: 11 | Status: SHIPPED | OUTPATIENT
Start: 2020-07-07 | End: 2021-01-11 | Stop reason: SDUPTHER

## 2020-07-08 ENCOUNTER — OFFICE VISIT (OUTPATIENT)
Dept: ENDOCRINOLOGY | Facility: CLINIC | Age: 80
End: 2020-07-08
Payer: MEDICARE

## 2020-07-08 ENCOUNTER — PATIENT MESSAGE (OUTPATIENT)
Dept: ENDOCRINOLOGY | Facility: CLINIC | Age: 80
End: 2020-07-08

## 2020-07-08 VITALS
DIASTOLIC BLOOD PRESSURE: 72 MMHG | WEIGHT: 177.13 LBS | SYSTOLIC BLOOD PRESSURE: 120 MMHG | TEMPERATURE: 97 F | HEART RATE: 84 BPM | BODY MASS INDEX: 34.59 KG/M2

## 2020-07-08 DIAGNOSIS — N18.30 CKD (CHRONIC KIDNEY DISEASE) STAGE 3, GFR 30-59 ML/MIN: ICD-10-CM

## 2020-07-08 DIAGNOSIS — E11.65 TYPE 2 DIABETES MELLITUS WITH HYPERGLYCEMIA, WITH LONG-TERM CURRENT USE OF INSULIN: Primary | ICD-10-CM

## 2020-07-08 DIAGNOSIS — E11.649 HYPOGLYCEMIA ASSOCIATED WITH DIABETES: ICD-10-CM

## 2020-07-08 DIAGNOSIS — E66.9 NON MORBID OBESITY, UNSPECIFIED OBESITY TYPE: ICD-10-CM

## 2020-07-08 DIAGNOSIS — Z79.4 TYPE 2 DIABETES MELLITUS WITH HYPERGLYCEMIA, WITH LONG-TERM CURRENT USE OF INSULIN: Primary | ICD-10-CM

## 2020-07-08 LAB — TSH SERPL DL<=0.005 MIU/L-ACNC: 0.41 UIU/ML (ref 0.4–4)

## 2020-07-08 PROCEDURE — 99499 RISK ADDL DX/OHS AUDIT: ICD-10-PCS | Mod: HCNC,S$GLB,, | Performed by: NURSE PRACTITIONER

## 2020-07-08 PROCEDURE — 1126F AMNT PAIN NOTED NONE PRSNT: CPT | Mod: HCNC,S$GLB,, | Performed by: NURSE PRACTITIONER

## 2020-07-08 PROCEDURE — 99999 PR PBB SHADOW E&M-EST. PATIENT-LVL V: ICD-10-PCS | Mod: PBBFAC,HCNC,, | Performed by: NURSE PRACTITIONER

## 2020-07-08 PROCEDURE — 99214 PR OFFICE/OUTPT VISIT, EST, LEVL IV, 30-39 MIN: ICD-10-PCS | Mod: HCNC,S$GLB,, | Performed by: NURSE PRACTITIONER

## 2020-07-08 PROCEDURE — 99999 PR PBB SHADOW E&M-EST. PATIENT-LVL V: CPT | Mod: PBBFAC,HCNC,, | Performed by: NURSE PRACTITIONER

## 2020-07-08 PROCEDURE — 95251 CONT GLUC MNTR ANALYSIS I&R: CPT | Mod: HCNC,S$GLB,, | Performed by: NURSE PRACTITIONER

## 2020-07-08 PROCEDURE — 1101F PT FALLS ASSESS-DOCD LE1/YR: CPT | Mod: HCNC,CPTII,S$GLB, | Performed by: NURSE PRACTITIONER

## 2020-07-08 PROCEDURE — 1101F PR PT FALLS ASSESS DOC 0-1 FALLS W/OUT INJ PAST YR: ICD-10-PCS | Mod: HCNC,CPTII,S$GLB, | Performed by: NURSE PRACTITIONER

## 2020-07-08 PROCEDURE — 1159F MED LIST DOCD IN RCRD: CPT | Mod: HCNC,S$GLB,, | Performed by: NURSE PRACTITIONER

## 2020-07-08 PROCEDURE — 99499 UNLISTED E&M SERVICE: CPT | Mod: HCNC,S$GLB,, | Performed by: NURSE PRACTITIONER

## 2020-07-08 PROCEDURE — 1126F PR PAIN SEVERITY QUANTIFIED, NO PAIN PRESENT: ICD-10-PCS | Mod: HCNC,S$GLB,, | Performed by: NURSE PRACTITIONER

## 2020-07-08 PROCEDURE — 95251 PR GLUCOSE MONITOR, 72 HOUR, PHYS INTERP: ICD-10-PCS | Mod: HCNC,S$GLB,, | Performed by: NURSE PRACTITIONER

## 2020-07-08 PROCEDURE — 99214 OFFICE O/P EST MOD 30 MIN: CPT | Mod: HCNC,S$GLB,, | Performed by: NURSE PRACTITIONER

## 2020-07-08 PROCEDURE — 1159F PR MEDICATION LIST DOCUMENTED IN MEDICAL RECORD: ICD-10-PCS | Mod: HCNC,S$GLB,, | Performed by: NURSE PRACTITIONER

## 2020-07-08 PROCEDURE — 3051F PR MOST RECENT HEMOGLOBIN A1C LEVEL 7.0 - < 8.0%: ICD-10-PCS | Mod: HCNC,CPTII,S$GLB, | Performed by: NURSE PRACTITIONER

## 2020-07-08 PROCEDURE — 3051F HG A1C>EQUAL 7.0%<8.0%: CPT | Mod: HCNC,CPTII,S$GLB, | Performed by: NURSE PRACTITIONER

## 2020-07-08 RX ORDER — INSULIN ASPART 100 [IU]/ML
INJECTION, SOLUTION INTRAVENOUS; SUBCUTANEOUS
Qty: 30 ML | Refills: 2 | Status: SHIPPED | OUTPATIENT
Start: 2020-07-08 | End: 2020-10-09

## 2020-07-08 RX ORDER — INSULIN GLARGINE 100 [IU]/ML
INJECTION, SOLUTION SUBCUTANEOUS
Qty: 15 ML | Refills: 2 | Status: SHIPPED | OUTPATIENT
Start: 2020-07-08 | End: 2020-10-09 | Stop reason: SDUPTHER

## 2020-07-08 NOTE — PATIENT INSTRUCTIONS
Do not take Novolog until ready to eat so you may need to inject Novolog before supper around 9-10 pm if that's supper time.   Decrease Lantus from 20 to 18 units.   Call if blood sugars fall below 80 so that insulin dose can be adjusted.   Return to clinic in 3 months with labs prior

## 2020-07-08 NOTE — PROGRESS NOTES
"CC: This 79 y.o. White female  is here for evaluation of  T2DM along with comorbidities indicated in the Visit Diagnosis section of this encounter.    HPI: Chelsea Gould was diagnosed with T2DM at age 44.         Prior visit 5/2020 virtual visit   a1c lower from 8.2 to 7.9%.   She has cut down Lantus from 30 to 20 units. She is taking Novolog 12-8-8 units before meals. Not sure when she decreased her insulin doses and by whose direction. She'll skip Novolog < 150. Prior doses of insulin at lov: Lantus 30 units, Novolog 17-12-12 units ac.   She has restarted Ozempic. She is seeing Derm for rash. She is very anxious during visit and upset that she cannot come into the clinic d/t COVID19 risk. She is a difficult historian.   Plan a1c at goal for age.   Discussed risk of COVID19 and importance of social distancing at this time.   Reviewed lab results with her.   Inject Novolog if BG > 100.   Advised her again that she needs to make notes re: food intake/activity surrounding episodes of very high or very low BGs so that future episodes can be avoided.        Interval history arrives alone.   a1c is down from 7.9 to 7.2% which is the lowest it's been in a year.   She has lost 23 lb since January. She has been watching what she eats. She likes to dancing school where she watches people dance. She has been taking Novolog at 7:30 every evening on time while she's at the school but does not eat dinner until ~  9-10 pm.         LAST DIABETES EDUCATION: 7/17/19 - Liliam Chow RN, CDE   It was determined that VGo was not a good option for the patient "due to the lack of sensation in her fingers which will make it difficult to push the buttons for bolusing, lack of home support, and confusion."       DIABETES MEDICATIONS: Lantus Solostar 20 units hs, Novolog 12-8-8 units ac, Ozempic 0.5 mg once weekly     Misses medication doses - No  She does carry Novolog pen with her.     DM COMPLICATIONS: nephropathy    SIGNIFICANT DIABETES " MED HISTORY:   Diarrhea on metformin    SELF MONITORING BLOOD GLUCOSE: monitors glucose at least 10x/day with Freestyle Yoav.   14 day CGM interpretation: Her BG tends to be highest overnight related to insufficient insulin coverage for supper. FBGs are lowest. Mild hypoglycemia noted mid-afternoon, suspect it was r/t long interval between meals.   Average glucose 161, glucose variability 31%.     HYPOGLYCEMIC EPISODES: denies overnight symptoms.  patient corrects with soda.      CURRENT DIET: eats 3 meals/day. She understands she must eat some type of starch with meals in order to avoid low BG from Novolog.           /72 (BP Location: Right arm, Patient Position: Sitting, BP Method: Large (Automatic))   Pulse 84   Temp 97.1 °F (36.2 °C) (Temporal)   Wt 80.3 kg (177 lb 1.6 oz)   BMI 34.59 kg/m²       ROS:   CONSTITUTIONAL: Appetite good, denies fatigue  : no dysuria       PHYSICAL EXAM:  GENERAL: Well developed, well nourished. No acute distress.   PSYCH: AAOx3, appropriate mood and affect, conversant, well-groomed. Judgement and insight good. Pt is euthymic and much calmer today than past visits.   NEURO: Cranial nerves grossly intact. Speech clear, no tremor.   NECK: Trachea midline, no thyromegaly or lymphadenopathy.   CHEST: Respirations even and unlabored. CTA bilaterally.        Hemoglobin A1C   Date Value Ref Range Status   06/30/2020 7.2 (H) 4.0 - 5.6 % Final     Comment:     ADA Screening Guidelines:  5.7-6.4%  Consistent with prediabetes  >or=6.5%  Consistent with diabetes  High levels of fetal hemoglobin interfere with the HbA1C  assay. Heterozygous hemoglobin variants (HbS, HgC, etc)do  not significantly interfere with this assay.   However, presence of multiple variants may affect accuracy.     04/27/2020 7.9 (H) 4.0 - 5.6 % Final     Comment:     ADA Screening Guidelines:  5.7-6.4%  Consistent with prediabetes  >or=6.5%  Consistent with diabetes  High levels of fetal hemoglobin interfere  with the HbA1C  assay. Heterozygous hemoglobin variants (HbS, HgC, etc)do  not significantly interfere with this assay.   However, presence of multiple variants may affect accuracy.     01/24/2020 8.2 (H) 4.0 - 5.6 % Final     Comment:     ADA Screening Guidelines:  5.7-6.4%  Consistent with prediabetes  >or=6.5%  Consistent with diabetes  High levels of fetal hemoglobin interfere with the HbA1C  assay. Heterozygous hemoglobin variants (HbS, HgC, etc)do  not significantly interfere with this assay.   However, presence of multiple variants may affect accuracy.            Ref. Range 4/27/2020 09:05   Glucose Latest Ref Range: 70 - 110 mg/dL 215 (H)   C-Peptide Latest Ref Range: 0.78 - 5.19 ng/mL 3.29       Chemistry        Component Value Date/Time     03/20/2020 1429    K 4.9 03/20/2020 1429     03/20/2020 1429    CO2 31 (H) 03/20/2020 1429    BUN 26 (H) 03/20/2020 1429    CREATININE 1.1 03/20/2020 1429     (H) 04/27/2020 0905        Component Value Date/Time    CALCIUM 9.7 03/20/2020 1429    ESTGFRAFRICA 56.0 (A) 03/20/2020 1429    EGFRNONAA 48.5 (A) 03/20/2020 1429          Lab Results   Component Value Date    LDLCALC 68.6 04/27/2020          Ref. Range 4/27/2020 09:05   Cholesterol Latest Ref Range: 120 - 199 mg/dL 131   HDL Latest Ref Range: 40 - 75 mg/dL 46   Hdl/Cholesterol Ratio Latest Ref Range: 20.0 - 50.0 % 35.1   LDL Cholesterol External Latest Ref Range: 63.0 - 159.0 mg/dL 68.6   Non-HDL Cholesterol Latest Units: mg/dL 85   Total Cholesterol/HDL Ratio Latest Ref Range: 2.0 - 5.0  2.8   Triglycerides Latest Ref Range: 30 - 150 mg/dL 82       Lab Results   Component Value Date    MICALBCREAT 9.1 05/01/2019           ASSESSMENT and PLAN:    A1C GOAL: < 8 %       1. Type 2 diabetes mellitus with hyperglycemia, with long-term current use of insulin  Do not take Novolog until ready to eat so you may need to inject Novolog before supper around 9-10 pm if that's supper time.   Decrease Lantus  from 20 to 18 units.   Call if blood sugars fall below 80 so that insulin dose can be adjusted.   Return to clinic in 3 months with labs prior       insulin aspart U-100 (NOVOLOG FLEXPEN U-100 INSULIN) 100 unit/mL (3 mL) InPn pen    insulin (LANTUS SOLOSTAR U-100 INSULIN) glargine 100 units/mL (3mL) SubQ pen    Hemoglobin A1C   2. CKD (chronic kidney disease) stage 3, GFR 30-59 ml/min  Avoid hypoglycemia.      3. Hypoglycemia associated with diabetes  As above.    4. Non morbid obesity, unspecified obesity type  Weight loss r/t dietary improvements - Praised pt on her progress.        Patient is testing blood sugars 4x/day at least with CGM and taking 4 injections of insulin a day. The patient's insulin treatment regimen requires frequent adjustments by the patient on the basis of therapeutic CGM testing results.       Orders Placed This Encounter   Procedures    Hemoglobin A1C     Standing Status:   Future     Standing Expiration Date:   9/6/2021        Follow up in about 3 months (around 10/8/2020).

## 2020-07-29 DIAGNOSIS — Z79.4 TYPE 2 DIABETES MELLITUS WITH HYPERGLYCEMIA, WITH LONG-TERM CURRENT USE OF INSULIN: ICD-10-CM

## 2020-07-29 DIAGNOSIS — E11.65 TYPE 2 DIABETES MELLITUS WITH HYPERGLYCEMIA, WITH LONG-TERM CURRENT USE OF INSULIN: ICD-10-CM

## 2020-07-29 RX ORDER — PEN NEEDLE, DIABETIC 30 GX3/16"
1 NEEDLE, DISPOSABLE MISCELLANEOUS
Qty: 400 EACH | Refills: 3 | Status: SHIPPED | OUTPATIENT
Start: 2020-07-29 | End: 2021-08-11

## 2020-07-29 NOTE — TELEPHONE ENCOUNTER
----- Message from Christa Mayer sent at 7/29/2020 10:47 AM CDT -----  Type: Patient Call Back    Who called: pt     What is the request in detail: pt requesting a refill on her needles.     Can the clinic reply by [facility]?    Would the patient rather a call back or a response via [facility]? Call back     Best call back number: [phone]    Additional Information:..  [facility] - [address] [address]  Phone: [phone] Fax: [phone]

## 2020-08-14 DIAGNOSIS — Z11.59 NEED FOR HEPATITIS C SCREENING TEST: ICD-10-CM

## 2020-08-25 RX ORDER — ERGOCALCIFEROL 1.25 MG/1
50000 CAPSULE ORAL
Qty: 3 CAPSULE | Refills: 0 | Status: SHIPPED | OUTPATIENT
Start: 2020-08-25 | End: 2020-10-25

## 2020-09-15 ENCOUNTER — TELEPHONE (OUTPATIENT)
Dept: NEPHROLOGY | Facility: CLINIC | Age: 80
End: 2020-09-15

## 2020-09-15 NOTE — TELEPHONE ENCOUNTER
----- Message from Laina Mabry LPN sent at 9/15/2020 10:45 AM CDT -----  Pt. Stated she did not want to be seen by [facility] anymore.   ----- Message -----  From: Asya Og NP  Sent: 8/25/2020   9:13 AM CDT  To: ELOISA Bernard, Ev make sure pt gets scheduled for f/u in Sept. Thnaks.

## 2020-09-24 RX ORDER — FLASH GLUCOSE SENSOR
KIT MISCELLANEOUS
Qty: 2 KIT | Refills: 11 | Status: SHIPPED | OUTPATIENT
Start: 2020-09-24 | End: 2021-01-11 | Stop reason: SDUPTHER

## 2020-09-24 NOTE — TELEPHONE ENCOUNTER
----- Message from Francie Monge sent at 9/23/2020  4:41 PM CDT -----  Type: RX Refill Request    Who Called: Self     Have you contacted your pharmacy: yes     Refill or New Rx: refill     RX Name and Strength: FREESTYLE OSMAN 14 DAY SENSOR Kit    Is this a 30 day or 90 day RX: 30 day     [facility] with phone number[facility] [phone] (Phone)  [phone] (Fax)    Local or Mail Order: local     Would the patient rather a call back or a response via [facility]? Call back     Best Call Back Number: [phone] or [phone]

## 2020-09-29 ENCOUNTER — PATIENT MESSAGE (OUTPATIENT)
Dept: OTHER | Facility: OTHER | Age: 80
End: 2020-09-29

## 2020-10-05 ENCOUNTER — LAB VISIT (OUTPATIENT)
Dept: LAB | Facility: HOSPITAL | Age: 80
End: 2020-10-05
Attending: NURSE PRACTITIONER
Payer: MEDICARE

## 2020-10-05 DIAGNOSIS — Z79.4 TYPE 2 DIABETES MELLITUS WITH HYPERGLYCEMIA, WITH LONG-TERM CURRENT USE OF INSULIN: ICD-10-CM

## 2020-10-05 DIAGNOSIS — E55.9 HYPOVITAMINOSIS D: ICD-10-CM

## 2020-10-05 DIAGNOSIS — E11.65 TYPE 2 DIABETES MELLITUS WITH HYPERGLYCEMIA, WITH LONG-TERM CURRENT USE OF INSULIN: ICD-10-CM

## 2020-10-05 PROCEDURE — 82306 VITAMIN D 25 HYDROXY: CPT | Mod: HCNC

## 2020-10-05 PROCEDURE — 83036 HEMOGLOBIN GLYCOSYLATED A1C: CPT | Mod: HCNC

## 2020-10-05 PROCEDURE — 36415 COLL VENOUS BLD VENIPUNCTURE: CPT | Mod: HCNC,PO

## 2020-10-06 LAB
25(OH)D3+25(OH)D2 SERPL-MCNC: 24 NG/ML (ref 30–96)
ESTIMATED AVG GLUCOSE: 154 MG/DL (ref 68–131)
HBA1C MFR BLD HPLC: 7 % (ref 4–5.6)

## 2020-10-09 ENCOUNTER — PATIENT OUTREACH (OUTPATIENT)
Dept: ADMINISTRATIVE | Facility: OTHER | Age: 80
End: 2020-10-09

## 2020-10-09 ENCOUNTER — OFFICE VISIT (OUTPATIENT)
Dept: ENDOCRINOLOGY | Facility: CLINIC | Age: 80
End: 2020-10-09
Payer: MEDICARE

## 2020-10-09 VITALS
WEIGHT: 175 LBS | SYSTOLIC BLOOD PRESSURE: 112 MMHG | DIASTOLIC BLOOD PRESSURE: 64 MMHG | HEART RATE: 80 BPM | BODY MASS INDEX: 34.18 KG/M2 | TEMPERATURE: 98 F

## 2020-10-09 DIAGNOSIS — N18.30 STAGE 3 CHRONIC KIDNEY DISEASE, UNSPECIFIED WHETHER STAGE 3A OR 3B CKD: ICD-10-CM

## 2020-10-09 DIAGNOSIS — E11.649 HYPOGLYCEMIA ASSOCIATED WITH DIABETES: ICD-10-CM

## 2020-10-09 PROCEDURE — 1126F AMNT PAIN NOTED NONE PRSNT: CPT | Mod: HCNC,S$GLB,, | Performed by: NURSE PRACTITIONER

## 2020-10-09 PROCEDURE — 1101F PR PT FALLS ASSESS DOC 0-1 FALLS W/OUT INJ PAST YR: ICD-10-PCS | Mod: HCNC,CPTII,S$GLB, | Performed by: NURSE PRACTITIONER

## 2020-10-09 PROCEDURE — 99214 PR OFFICE/OUTPT VISIT, EST, LEVL IV, 30-39 MIN: ICD-10-PCS | Mod: HCNC,S$GLB,, | Performed by: NURSE PRACTITIONER

## 2020-10-09 PROCEDURE — 1159F MED LIST DOCD IN RCRD: CPT | Mod: HCNC,S$GLB,, | Performed by: NURSE PRACTITIONER

## 2020-10-09 PROCEDURE — 99999 PR PBB SHADOW E&M-EST. PATIENT-LVL V: CPT | Mod: PBBFAC,HCNC,, | Performed by: NURSE PRACTITIONER

## 2020-10-09 PROCEDURE — 95251 CONT GLUC MNTR ANALYSIS I&R: CPT | Mod: HCNC,S$GLB,, | Performed by: NURSE PRACTITIONER

## 2020-10-09 PROCEDURE — 95251 PR GLUCOSE MONITOR, 72 HOUR, PHYS INTERP: ICD-10-PCS | Mod: HCNC,S$GLB,, | Performed by: NURSE PRACTITIONER

## 2020-10-09 PROCEDURE — 3051F PR MOST RECENT HEMOGLOBIN A1C LEVEL 7.0 - < 8.0%: ICD-10-PCS | Mod: HCNC,CPTII,S$GLB, | Performed by: NURSE PRACTITIONER

## 2020-10-09 PROCEDURE — 3051F HG A1C>EQUAL 7.0%<8.0%: CPT | Mod: HCNC,CPTII,S$GLB, | Performed by: NURSE PRACTITIONER

## 2020-10-09 PROCEDURE — 1101F PT FALLS ASSESS-DOCD LE1/YR: CPT | Mod: HCNC,CPTII,S$GLB, | Performed by: NURSE PRACTITIONER

## 2020-10-09 PROCEDURE — 99214 OFFICE O/P EST MOD 30 MIN: CPT | Mod: HCNC,S$GLB,, | Performed by: NURSE PRACTITIONER

## 2020-10-09 PROCEDURE — 1126F PR PAIN SEVERITY QUANTIFIED, NO PAIN PRESENT: ICD-10-PCS | Mod: HCNC,S$GLB,, | Performed by: NURSE PRACTITIONER

## 2020-10-09 PROCEDURE — 99999 PR PBB SHADOW E&M-EST. PATIENT-LVL V: ICD-10-PCS | Mod: PBBFAC,HCNC,, | Performed by: NURSE PRACTITIONER

## 2020-10-09 PROCEDURE — 1159F PR MEDICATION LIST DOCUMENTED IN MEDICAL RECORD: ICD-10-PCS | Mod: HCNC,S$GLB,, | Performed by: NURSE PRACTITIONER

## 2020-10-09 RX ORDER — INSULIN ASPART 100 [IU]/ML
INJECTION, SOLUTION INTRAVENOUS; SUBCUTANEOUS
Qty: 15 ML | Refills: 2 | Status: SHIPPED | OUTPATIENT
Start: 2020-10-09 | End: 2021-01-11 | Stop reason: SDUPTHER

## 2020-10-09 RX ORDER — INSULIN GLARGINE 100 [IU]/ML
INJECTION, SOLUTION SUBCUTANEOUS
Qty: 15 ML | Refills: 2 | Status: SHIPPED | OUTPATIENT
Start: 2020-10-09 | End: 2021-01-11 | Stop reason: SDUPTHER

## 2020-10-09 NOTE — PATIENT INSTRUCTIONS
Make sure you are taking the vitamin d monthly supplement.   Decrease Lantus from 18 to 14 units once daily at night.   Decrease Novolog from 12 to 10 units before breakfast.   Continue Ozempic 0.5 mg weekly.   Follow up in 3 months with labs. Call with any issues especially if blood sugars less than 80.

## 2020-10-09 NOTE — PROGRESS NOTES
CC: This 80 y.o. White female  is here for evaluation of  T2DM along with comorbidities indicated in the Visit Diagnosis section of this encounter.    HPI: Chelsea Gould was diagnosed with T2DM at age 44.         Prior visit 5/2020 virtual visit   a1c lower from 8.2 to 7.9%.   She has cut down Lantus from 30 to 20 units. She is taking Novolog 12-8-8 units before meals. Not sure when she decreased her insulin doses and by whose direction. She'll skip Novolog < 150. Prior doses of insulin at lov: Lantus 30 units, Novolog 17-12-12 units ac.   She has restarted Ozempic. She is seeing Derm for rash. She is very anxious during visit and upset that she cannot come into the clinic d/t COVID19 risk. She is a difficult historian.   Plan a1c at goal for age.   Discussed risk of COVID19 and importance of social distancing at this time.   Reviewed lab results with her.   Inject Novolog if BG > 100.   Advised her again that she needs to make notes re: food intake/activity surrounding episodes of very high or very low BGs so that future episodes can be avoided.        Prior visit 7/2020 arrives alone.   a1c is down from 7.9 to 7.2% which is the lowest it's been in a year.   She has lost 23 lb since January. She has been watching what she eats. She likes to dancing school where she watches people dance. She has been taking Novolog at 7:30 every evening on time while she's at the school but does not eat dinner until ~  9-10 pm.   Plan Do not take Novolog until ready to eat so you may need to inject Novolog before supper around 9-10 pm if that's supper time.   Decrease Lantus from 20 to 18 units.   Call if blood sugars fall below 80 so that insulin dose can be adjusted.   Return to clinic in 3 months with labs prior.       Interval history arrives with her son.   a1c is a bit lower now from 7.2 to 7%.       LAST DIABETES EDUCATION: 7/17/19 - Liliam Chow RN, CDE   It was determined that VGo was not a good option for the patient  ""due to the lack of sensation in her fingers which will make it difficult to push the buttons for bolusing, lack of home support, and confusion."       DIABETES MEDICATIONS: Lantus Solostar 18 units hs, Novolog 12-8-8 units ac, Ozempic 0.5 mg once weekly     Misses medication doses - No  She does carry Novolog pen with her.     DM COMPLICATIONS: nephropathy    SIGNIFICANT DIABETES MED HISTORY:   Diarrhea on metformin    SELF MONITORING BLOOD GLUCOSE: monitors glucose at least 10x/day with Freestyle Yoav.   14 day CGM interpretation: BG drops overnight and also after meals, particularly after breakfast.   Average glucose 126, glucose variability 35%, GMI 6.3%, hypoglycemia 11%.     HYPOGLYCEMIC EPISODES: denies overnight symptoms.  patient corrects with soda.      CURRENT DIET: eats 3 meals/day. She understands she must eat some type of starch with meals in order to avoid low BG from Novolog.           /64 (BP Location: Right arm, Patient Position: Sitting, BP Method: Large (Automatic))   Pulse 80   Temp 98.2 °F (36.8 °C) (Oral)   Wt 79.4 kg (175 lb)   BMI 34.18 kg/m²       ROS:   CONSTITUTIONAL: Appetite good, denies fatigue  EYES: + visual disturbances       PHYSICAL EXAM:  GENERAL: Well developed, well nourished. No acute distress.   PSYCH: AAOx3, appropriate mood and affect, conversant, well-groomed. Judgement and insight good. Pt is euthymic and much calmer today than past visits.   NEURO: Cranial nerves grossly intact. Speech clear, no tremor.   NECK: Trachea midline, no thyromegaly or lymphadenopathy.   CHEST: Respirations even and unlabored. CTA bilaterally.        Hemoglobin A1C   Date Value Ref Range Status   10/05/2020 7.0 (H) 4.0 - 5.6 % Final     Comment:     ADA Screening Guidelines:  5.7-6.4%  Consistent with prediabetes  >or=6.5%  Consistent with diabetes  High levels of fetal hemoglobin interfere with the HbA1C  assay. Heterozygous hemoglobin variants (HbS, HgC, etc)do  not significantly " interfere with this assay.   However, presence of multiple variants may affect accuracy.     06/30/2020 7.2 (H) 4.0 - 5.6 % Final     Comment:     ADA Screening Guidelines:  5.7-6.4%  Consistent with prediabetes  >or=6.5%  Consistent with diabetes  High levels of fetal hemoglobin interfere with the HbA1C  assay. Heterozygous hemoglobin variants (HbS, HgC, etc)do  not significantly interfere with this assay.   However, presence of multiple variants may affect accuracy.     04/27/2020 7.9 (H) 4.0 - 5.6 % Final     Comment:     ADA Screening Guidelines:  5.7-6.4%  Consistent with prediabetes  >or=6.5%  Consistent with diabetes  High levels of fetal hemoglobin interfere with the HbA1C  assay. Heterozygous hemoglobin variants (HbS, HgC, etc)do  not significantly interfere with this assay.   However, presence of multiple variants may affect accuracy.            Ref. Range 4/27/2020 09:05   Glucose Latest Ref Range: 70 - 110 mg/dL 215 (H)   C-Peptide Latest Ref Range: 0.78 - 5.19 ng/mL 3.29       Chemistry        Component Value Date/Time     03/20/2020 1429    K 4.9 03/20/2020 1429     03/20/2020 1429    CO2 31 (H) 03/20/2020 1429    BUN 26 (H) 03/20/2020 1429    CREATININE 1.1 03/20/2020 1429     (H) 04/27/2020 0905        Component Value Date/Time    CALCIUM 9.7 03/20/2020 1429    ESTGFRAFRICA 56.0 (A) 03/20/2020 1429    EGFRNONAA 48.5 (A) 03/20/2020 1429          Lab Results   Component Value Date    LDLCALC 68.6 04/27/2020          Ref. Range 4/27/2020 09:05   Cholesterol Latest Ref Range: 120 - 199 mg/dL 131   HDL Latest Ref Range: 40 - 75 mg/dL 46   Hdl/Cholesterol Ratio Latest Ref Range: 20.0 - 50.0 % 35.1   LDL Cholesterol External Latest Ref Range: 63.0 - 159.0 mg/dL 68.6   Non-HDL Cholesterol Latest Units: mg/dL 85   Total Cholesterol/HDL Ratio Latest Ref Range: 2.0 - 5.0  2.8   Triglycerides Latest Ref Range: 30 - 150 mg/dL 82       Lab Results   Component Value Date    MICALBCREAT 9.1  05/01/2019           ASSESSMENT and PLAN:    A1C GOAL: < 8 %     1. Diabetes mellitus type 2, uncontrolled, with complications  Make sure you are taking the vitamin d monthly supplement.   Decrease Lantus from 18 to 14 units once daily at night.   Decrease Novolog from 12 to 10 units before breakfast.   Continue Ozempic 0.5 mg weekly.   Follow up in 3 months with labs. Call with any issues especially if blood sugars less than 80.     insulin aspart U-100 (NOVOLOG FLEXPEN U-100 INSULIN) 100 unit/mL (3 mL) InPn pen    insulin (LANTUS SOLOSTAR U-100 INSULIN) glargine 100 units/mL (3mL) SubQ pen    Hemoglobin A1C   2. Stage 3 chronic kidney disease, unspecified whether stage 3a or 3b CKD  Avoid hypoglycemia.      3. Hypoglycemia associated with diabetes  Reduce insulin as above.            Orders Placed This Encounter   Procedures    Hemoglobin A1C     Standing Status:   Future     Standing Expiration Date:   12/8/2021        Follow up in about 3 months (around 1/9/2021).

## 2020-10-18 ENCOUNTER — OFFICE VISIT (OUTPATIENT)
Dept: URGENT CARE | Facility: CLINIC | Age: 80
End: 2020-10-18
Payer: MEDICARE

## 2020-10-18 VITALS
SYSTOLIC BLOOD PRESSURE: 173 MMHG | OXYGEN SATURATION: 96 % | WEIGHT: 175 LBS | DIASTOLIC BLOOD PRESSURE: 93 MMHG | HEART RATE: 84 BPM | TEMPERATURE: 99 F | BODY MASS INDEX: 34.18 KG/M2

## 2020-10-18 DIAGNOSIS — M19.079 ARTHRITIS OF FOOT: Primary | ICD-10-CM

## 2020-10-18 DIAGNOSIS — M79.671 FOOT PAIN, BILATERAL: ICD-10-CM

## 2020-10-18 DIAGNOSIS — M79.672 FOOT PAIN, BILATERAL: ICD-10-CM

## 2020-10-18 DIAGNOSIS — M79.89 SWELLING OF LEFT FOOT: ICD-10-CM

## 2020-10-18 PROCEDURE — 73630 X-RAY EXAM OF FOOT: CPT | Mod: 50,S$GLB,, | Performed by: RADIOLOGY

## 2020-10-18 PROCEDURE — 99214 OFFICE O/P EST MOD 30 MIN: CPT | Mod: S$GLB,,, | Performed by: PHYSICIAN ASSISTANT

## 2020-10-18 PROCEDURE — 99214 PR OFFICE/OUTPT VISIT, EST, LEVL IV, 30-39 MIN: ICD-10-PCS | Mod: S$GLB,,, | Performed by: PHYSICIAN ASSISTANT

## 2020-10-18 PROCEDURE — 73630 XR FOOT COMPLETE 3 VIEW BILATERAL: ICD-10-PCS | Mod: 50,S$GLB,, | Performed by: RADIOLOGY

## 2020-10-18 RX ORDER — NAPROXEN 500 MG/1
500 TABLET ORAL 2 TIMES DAILY WITH MEALS
Qty: 30 TABLET | Refills: 0 | Status: SHIPPED | OUTPATIENT
Start: 2020-10-18 | End: 2020-11-02

## 2020-10-18 RX ORDER — NAPROXEN 500 MG/1
500 TABLET ORAL 2 TIMES DAILY WITH MEALS
Qty: 30 TABLET | Refills: 0 | Status: SHIPPED | OUTPATIENT
Start: 2020-10-18 | End: 2020-10-18

## 2020-10-18 NOTE — PROGRESS NOTES
Subjective:       Patient ID: Chelsea Gould is a 80 y.o. female.    Vitals:  weight is 79.4 kg (175 lb). Her temperature is 98.5 °F (36.9 °C). Her blood pressure is 173/93 (abnormal) and her pulse is 84. Her oxygen saturation is 96%.     Chief Complaint: Foot Pain    Patient presenting with left foot pain and swelling x 2 weeks. She's been applying topical pain cream to the foot without relief.    Foot Pain  This is a new problem. The current episode started 1 to 4 weeks ago. The problem occurs constantly. The problem has been unchanged. Associated symptoms include arthralgias, joint swelling and myalgias. Pertinent negatives include no chills, diaphoresis, fatigue, fever, nausea, numbness or vomiting. The symptoms are aggravated by standing and walking. Treatments tried: pain cream  The treatment provided no relief.       Constitution: Negative for appetite change, chills, sweating, fatigue and fever.   Gastrointestinal: Negative for nausea, vomiting and diarrhea.   Musculoskeletal: Positive for pain, joint pain, joint swelling and muscle ache.   Neurological: Negative for numbness and tingling.       Objective:      Physical Exam   Constitutional: She is oriented to person, place, and time. She appears well-developed. She is cooperative.  Non-toxic appearance. She does not appear ill. No distress.   HENT:   Head: Normocephalic and atraumatic.   Ears:   Right Ear: Hearing, tympanic membrane, external ear and ear canal normal.   Left Ear: Hearing, tympanic membrane, external ear and ear canal normal.   Nose: Nose normal. No mucosal edema, rhinorrhea or nasal deformity. No epistaxis. Right sinus exhibits no maxillary sinus tenderness and no frontal sinus tenderness. Left sinus exhibits no maxillary sinus tenderness and no frontal sinus tenderness.   Mouth/Throat: Uvula is midline, oropharynx is clear and moist and mucous membranes are normal. No trismus in the jaw. Normal dentition. No uvula swelling. No posterior  oropharyngeal erythema.   Eyes: Conjunctivae and lids are normal. Right eye exhibits no discharge. Left eye exhibits no discharge. No scleral icterus.   Neck: Trachea normal, normal range of motion, full passive range of motion without pain and phonation normal. Neck supple.   Cardiovascular: Normal rate, regular rhythm, normal heart sounds and normal pulses.   Pulmonary/Chest: Effort normal and breath sounds normal. No respiratory distress.   Abdominal: Soft. Normal appearance and bowel sounds are normal. She exhibits no distension, no pulsatile midline mass and no mass. There is no abdominal tenderness.   Musculoskeletal: Normal range of motion.         General: No deformity.      Right foot: Normal range of motion. Tenderness and bony tenderness present. No swelling.      Left foot: Normal range of motion. Tenderness, bony tenderness and swelling present.      Comments: Significant swelling noted to the left foot/ankle with pain on dorsiflexion/plantar flexion. No erythema or warmth noted.   Neurological: She is alert and oriented to person, place, and time. She exhibits normal muscle tone. Coordination normal.   Skin: Skin is warm, dry, intact, not diaphoretic and not pale. Psychiatric: Her speech is normal and behavior is normal. Judgment and thought content normal.   Nursing note and vitals reviewed.        Xr Foot Complete 3 View Bilateral    Result Date: 10/18/2020  EXAMINATION: XR FOOT COMPLETE 3 VIEW BILATERAL CLINICAL HISTORY: Pain in right foot TECHNIQUE: AP, lateral, and oblique views of both feet were performed. COMPARISON: None FINDINGS: Right foot: The bone mineralization is within normal limits.  There is an enthesophyte at the insertion of the Achilles tendon.  There is a plantar calcaneal spur present.  There is no cortical step-off there is no evidence of periostitis. There is joint space narrowing throughout the right foot.  The Lisfranc articulation is within normal limits.  The soft tissues  are unremarkable.  No radiopaque foreign body is identified. Left foot: The bone mineralization is within normal limits.  There is an enthesophyte at the insertion of the Achilles tendon.  There is a plantar calcaneal spur.  There is cortical irregularity of the base of the left 5th metatarsal.  The findings probably represent remote injury. There is joint space narrowing throughout the left foot.  There is marked soft tissue swelling throughout the left foot.  There are vascular calcifications.  No radiopaque foreign body is identified.     Right foot: No acute process. Left foot: Marked soft tissue swelling throughout the left foot.  No evidence of a fracture or dislocation. Electronically signed by: Pierre Ruff MD Date:    10/18/2020 Time:    15:07    Assessment:       1. Arthritis of foot    2. Foot pain, bilateral    3. Swelling of left foot        Plan:         Arthritis of foot  -     Ambulatory referral/consult to Podiatry  -     naproxen (NAPROSYN) 500 MG tablet; Take 1 tablet (500 mg total) by mouth 2 (two) times daily with meals. for 15 days  Dispense: 30 tablet; Refill: 0    Foot pain, bilateral  -     XR FOOT COMPLETE 3 VIEW BILATERAL; Future; Expected date: 10/18/2020    Swelling of left foot  -     XR FOOT COMPLETE 3 VIEW BILATERAL; Future; Expected date: 10/18/2020  -     Uric Acid          Patient Instructions   General Discharge Instructions   If you were prescribed a narcotic or controlled medication, do not drive or operate heavy equipment or machinery while taking these medications.  If you were prescribed antibiotics, please take them to completion.  You must understand that you've received an Urgent Care treatment only and that you may be released before all your medical problems are known or treated. You, the patient, will arrange for follow up care as instructed.  Follow up with your PCP or specialty clinic as directed in the next 1-2 weeks if not improved or as needed.  You can call [phone]  [phone] to schedule an appointment with the appropriate provider.  If your condition worsens we recommend that you receive another evaluation at the emergency room immediately or contact your primary medical clinics after hours call service to discuss your concerns.  Please return here or go to the Emergency Department for any concerns or worsening of condition.      Treating Arthritis in the Foot  If your symptoms are mild, medications may be enough to reduce pain and swelling. For more severe arthritis, surgery may be needed to improve the condition of the joint.    Medicine  Your doctor may prescribe medicine--pills or injections--to limit pain and swelling. Ice, aspirin, acetaminophen, or ibuprofen may help relieve mild symptoms that occur after activity.  Surgery and bone trimming  To ease movement and reduce pain, your doctor may trim damaged bone. If arthritis is severe, the joint may be fused or removed. If the bone is not damaged too badly, your doctor may simply shave away bone spurs. Any excess bone growth related to a bunion may also be trimmed.  Fusing joints  If damage is more severe, your doctor may fuse the joint to prevent the bones from rubbing. Afterward, staples, plates, or screws may hold the bones in place so they heal properly. In some cases, the joint may be removed and replaced with an implant.  After surgery  During the early stages of recovery, your foot is likely to be bandaged and immobilized for a while. For best results, follow up with your doctor as scheduled. These visits help ensure that your foot heals properly.  As you heal  After surgery, youll be told how to care for your incision and how soon to begin walking on the foot. Until the foot can bear weight, you may need to walk with crutches or a cane.  For surgery on the big toe, your foot may be splinted to limit movement for several weeks. Despite this, you should be able to walk soon after surgery.  For surgery on rear or  midfoot joints, you may need to wear a cast or surgical shoe. These joints are fairly large, so full recovery may take a few months. Once the bone has healed, any staples, plates, or screws may be removed.  Date Last Reviewed: 7/1/2016 © 2000-2017 [employer]. [address] [place] [address], [place] [address]. All rights reserved. This information is not intended as a substitute for professional medical care. Always follow your healthcare professional's instructions.      Leg Swelling in Both Legs    Swelling of the feet, ankles, and legs is called edema. It is caused by excess fluid that has collected in the tissues. Extra fluid in the body settles in the lowest part because of gravity. This is why the legs and feet are most affected.  Some of the causes for edema include:  · Disease of the heart like congestive heart failure  · Standing or sitting for long periods of time  · Infection of the feet or legs  · Blood pooling in the veins of your legs (venous insufficiency)  · Dilated veins in your lower leg (varicose veins)  · Garters or other clothing that is tight on your legs. This will cause blood to pool in your legs because the clothing limits blood flow.  · Some medicines such as hormones like birth control pills, some blood pressure medicines like calcium channel blockers (amlodipine) and steroids, some antidepressants like MAO inhibitors and tricyclics  · Menstrual periods that cause you to retain fluids  · Many types of renal disease  · Liver failure or cirrhosis  · Pregnancy, some swelling is normal, but a sudden increase in leg swelling or weight gain can be a sign of a dangerous complication of pregnancy  · Poor nutrition  · Thyroid disease  Medical treatment will depend on what is causing the swelling in your legs. Your healthcare provider may prescribe water pills (diuretics) to get rid of the extra fluid.  Home care  Follow these guidelines when caring for yourself at home:  · Don't wear  clothing like garters that is tight on your legs.  · Keep your legs up while lying or sitting.  · If infection, injury, or recent surgery is causing the swelling, stay off your legs as much as possible until symptoms get better.  · If your healthcare provider says that your leg swelling is caused by venous insufficiency or varicose veins, don't sit or  one place for long periods of time. Take breaks and walk about every few hours. Brisk walking is a good exercise. It helps circulate the blood that has collected in your leg. Talk with your provider about using support stockings to stop daytime leg swelling.  · If your provider says that heart disease is causing your leg swelling, follow a low-salt diet to stop extra fluid from staying in your body. You may also need medicine.  Follow-up care  Follow up with your healthcare provider, or as advised.  When to seek medical advice  Call your healthcare provider right away if any of these occur:  · New shortness of breath or chest pain  · Shortness of breath or chest pain that gets worse  · Swelling in both legs or ankles that gets worse  · Swelling of the abdomen  · Redness, warmth, or swelling in one leg  · Fever of 100.4ºF (38ºC) or higher, or as directed by your healthcare provider  · Yellow color to your skin or eyes  · Rapid, unexplained weight gain  · Having to sleep upright or use an increased number of pillows  Date Last Reviewed: 3/31/2016  © 4202-5413 [employer]. [address] [place] [address], [place] [address]. All rights reserved. This information is not intended as a substitute for professional medical care. Always follow your healthcare professional's instructions.

## 2020-10-18 NOTE — PATIENT INSTRUCTIONS
General Discharge Instructions   If you were prescribed a narcotic or controlled medication, do not drive or operate heavy equipment or machinery while taking these medications.  If you were prescribed antibiotics, please take them to completion.  You must understand that you've received an Urgent Care treatment only and that you may be released before all your medical problems are known or treated. You, the patient, will arrange for follow up care as instructed.  Follow up with your PCP or specialty clinic as directed in the next 1-2 weeks if not improved or as needed.  You can call [phone] to schedule an appointment with the appropriate provider.  If your condition worsens we recommend that you receive another evaluation at the emergency room immediately or contact your primary medical clinics after hours call service to discuss your concerns.  Please return here or go to the Emergency Department for any concerns or worsening of condition.      Treating Arthritis in the Foot  If your symptoms are mild, medications may be enough to reduce pain and swelling. For more severe arthritis, surgery may be needed to improve the condition of the joint.    Medicine  Your doctor may prescribe medicine--pills or injections--to limit pain and swelling. Ice, aspirin, acetaminophen, or ibuprofen may help relieve mild symptoms that occur after activity.  Surgery and bone trimming  To ease movement and reduce pain, your doctor may trim damaged bone. If arthritis is severe, the joint may be fused or removed. If the bone is not damaged too badly, your doctor may simply shave away bone spurs. Any excess bone growth related to a bunion may also be trimmed.  Fusing joints  If damage is more severe, your doctor may fuse the joint to prevent the bones from rubbing. Afterward, staples, plates, or screws may hold the bones in place so they heal properly. In some cases, the joint may be removed and replaced with an implant.  After  surgery  During the early stages of recovery, your foot is likely to be bandaged and immobilized for a while. For best results, follow up with your doctor as scheduled. These visits help ensure that your foot heals properly.  As you heal  After surgery, youll be told how to care for your incision and how soon to begin walking on the foot. Until the foot can bear weight, you may need to walk with crutches or a cane.  For surgery on the big toe, your foot may be splinted to limit movement for several weeks. Despite this, you should be able to walk soon after surgery.  For surgery on rear or midfoot joints, you may need to wear a cast or surgical shoe. These joints are fairly large, so full recovery may take a few months. Once the bone has healed, any staples, plates, or screws may be removed.  Date Last Reviewed: 7/1/2016  © 7156-2450 [employer]. [address] [place] [address]. All rights reserved. This information is not intended as a substitute for professional medical care. Always follow your healthcare professional's instructions.      Leg Swelling in Both Legs    Swelling of the feet, ankles, and legs is called edema. It is caused by excess fluid that has collected in the tissues. Extra fluid in the body settles in the lowest part because of gravity. This is why the legs and feet are most affected.  Some of the causes for edema include:  · Disease of the heart like congestive heart failure  · Standing or sitting for long periods of time  · Infection of the feet or legs  · Blood pooling in the veins of your legs (venous insufficiency)  · Dilated veins in your lower leg (varicose veins)  · Garters or other clothing that is tight on your legs. This will cause blood to pool in your legs because the clothing limits blood flow.  · Some medicines such as hormones like birth control pills, some blood pressure medicines like calcium channel blockers (amlodipine) and steroids, some  antidepressants like MAO inhibitors and tricyclics  · Menstrual periods that cause you to retain fluids  · Many types of renal disease  · Liver failure or cirrhosis  · Pregnancy, some swelling is normal, but a sudden increase in leg swelling or weight gain can be a sign of a dangerous complication of pregnancy  · Poor nutrition  · Thyroid disease  Medical treatment will depend on what is causing the swelling in your legs. Your healthcare provider may prescribe water pills (diuretics) to get rid of the extra fluid.  Home care  Follow these guidelines when caring for yourself at home:  · Don't wear clothing like garters that is tight on your legs.  · Keep your legs up while lying or sitting.  · If infection, injury, or recent surgery is causing the swelling, stay off your legs as much as possible until symptoms get better.  · If your healthcare provider says that your leg swelling is caused by venous insufficiency or varicose veins, don't sit or  one place for long periods of time. Take breaks and walk about every few hours. Brisk walking is a good exercise. It helps circulate the blood that has collected in your leg. Talk with your provider about using support stockings to stop daytime leg swelling.  · If your provider says that heart disease is causing your leg swelling, follow a low-salt diet to stop extra fluid from staying in your body. You may also need medicine.  Follow-up care  Follow up with your healthcare provider, or as advised.  When to seek medical advice  Call your healthcare provider right away if any of these occur:  · New shortness of breath or chest pain  · Shortness of breath or chest pain that gets worse  · Swelling in both legs or ankles that gets worse  · Swelling of the abdomen  · Redness, warmth, or swelling in one leg  · Fever of 100.4ºF (38ºC) or higher, or as directed by your healthcare provider  · Yellow color to your skin or eyes  · Rapid, unexplained weight gain  · Having to  sleep upright or use an increased number of pillows  Date Last Reviewed: 3/31/2016  © 5548-9360 [employer]. [address] [place] [address], [place] [address]. All rights reserved. This information is not intended as a substitute for professional medical care. Always follow your healthcare professional's instructions.

## 2020-10-20 ENCOUNTER — OFFICE VISIT (OUTPATIENT)
Dept: GYNECOLOGIC ONCOLOGY | Facility: CLINIC | Age: 80
End: 2020-10-20
Payer: MEDICARE

## 2020-10-20 ENCOUNTER — TELEPHONE (OUTPATIENT)
Dept: URGENT CARE | Facility: CLINIC | Age: 80
End: 2020-10-20

## 2020-10-20 VITALS
HEART RATE: 76 BPM | BODY MASS INDEX: 34.62 KG/M2 | SYSTOLIC BLOOD PRESSURE: 171 MMHG | DIASTOLIC BLOOD PRESSURE: 74 MMHG | WEIGHT: 177.25 LBS

## 2020-10-20 DIAGNOSIS — C53.9 CERVICAL CANCER, FIGO STAGE IA1: Primary | ICD-10-CM

## 2020-10-20 LAB — URATE SERPL-MCNC: 5 MG/DL (ref 2.5–7.1)

## 2020-10-20 PROCEDURE — 1125F PR PAIN SEVERITY QUANTIFIED, PAIN PRESENT: ICD-10-PCS | Mod: HCNC,S$GLB,, | Performed by: OBSTETRICS & GYNECOLOGY

## 2020-10-20 PROCEDURE — 1125F AMNT PAIN NOTED PAIN PRSNT: CPT | Mod: HCNC,S$GLB,, | Performed by: OBSTETRICS & GYNECOLOGY

## 2020-10-20 PROCEDURE — 99214 PR OFFICE/OUTPT VISIT, EST, LEVL IV, 30-39 MIN: ICD-10-PCS | Mod: HCNC,S$GLB,, | Performed by: OBSTETRICS & GYNECOLOGY

## 2020-10-20 PROCEDURE — 99214 OFFICE O/P EST MOD 30 MIN: CPT | Mod: HCNC,S$GLB,, | Performed by: OBSTETRICS & GYNECOLOGY

## 2020-10-20 PROCEDURE — 1159F MED LIST DOCD IN RCRD: CPT | Mod: HCNC,S$GLB,, | Performed by: OBSTETRICS & GYNECOLOGY

## 2020-10-20 PROCEDURE — 99499 UNLISTED E&M SERVICE: CPT | Mod: S$GLB,,, | Performed by: OBSTETRICS & GYNECOLOGY

## 2020-10-20 PROCEDURE — 99999 PR PBB SHADOW E&M-EST. PATIENT-LVL IV: CPT | Mod: PBBFAC,HCNC,, | Performed by: OBSTETRICS & GYNECOLOGY

## 2020-10-20 PROCEDURE — 1101F PT FALLS ASSESS-DOCD LE1/YR: CPT | Mod: HCNC,CPTII,S$GLB, | Performed by: OBSTETRICS & GYNECOLOGY

## 2020-10-20 PROCEDURE — 99999 PR PBB SHADOW E&M-EST. PATIENT-LVL IV: ICD-10-PCS | Mod: PBBFAC,HCNC,, | Performed by: OBSTETRICS & GYNECOLOGY

## 2020-10-20 PROCEDURE — 1101F PR PT FALLS ASSESS DOC 0-1 FALLS W/OUT INJ PAST YR: ICD-10-PCS | Mod: HCNC,CPTII,S$GLB, | Performed by: OBSTETRICS & GYNECOLOGY

## 2020-10-20 PROCEDURE — 99499 RISK ADDL DX/OHS AUDIT: ICD-10-PCS | Mod: S$GLB,,, | Performed by: OBSTETRICS & GYNECOLOGY

## 2020-10-20 PROCEDURE — 1159F PR MEDICATION LIST DOCUMENTED IN MEDICAL RECORD: ICD-10-PCS | Mod: HCNC,S$GLB,, | Performed by: OBSTETRICS & GYNECOLOGY

## 2020-10-20 NOTE — LETTER
October 20, 2020        Marcia Canseco MD  [address] [facility] [address]  [address]  [facility] [address]             [facility] [address]  [address], [address]  [facility] [address]  Phone: [phone]  Fax: [phone]   Patient: Chelsea Gould   MR Number: [id number]   YOB: 1940   Date of Visit: 10/20/2020       Dear Dr. Canseco:    Thank you for referring Chelsea Gould to me for evaluation. Below are the relevant portions of my assessment and plan of care.            If you have questions, please do not hesitate to call me. I look forward to following hCelsea along with you.    Sincerely,      Fani Vieira MD           CC  No Recipients

## 2020-10-20 NOTE — TELEPHONE ENCOUNTER
Called patient and discussed uric acid results.  Patient's pain has improved but swelling remains.  Patient encouraged to f/u with podiatry due to negative x-ray and uric acid results.  Patient very hesitant due to possibly needing surgery in the past.  Patient encouraged to be evaluated by the podiatrist for further recommendations.  All questions answered to patient's satisfaction.

## 2020-10-20 NOTE — PROGRESS NOTES
Subjective:      Patient ID: Chelsea Gould is a 80 y.o. female.    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    Now s/p RTLH/BSO 1/17/2020  Pathology reviewed showing no residual malignancy, does show areas of residual severe cervical dysplasia.   No adjuvant therapy is indicated.      Presents today for surveillance. Without complaints. Specifically denies N/V, pain, swelling, bleeding, unexpected weight change, SOB, problems with bowel or bladder function.   Disease free interval 9 months.      History:  Referred by Dr. Marcia Canseco for newly diagnosed endocervical carcinoma in situ.   Evaluated by Dr. Canseco for post menopausal bleeding.      Pap 9/20/18 negative   EMB 9/27/19   FINAL PATHOLOGIC DIAGNOSIS   Endometrium, curettage:   Scant strips of inactive endometrial glands and lower uterine segment glands in a background of mucus and blood.   Negative for atypia or malignancy.   Comment: No significant stromal component is present to evaluate for endometrial hyperplasia. The findings may   reflect an atrophic process. However, re-sampling may be considered in a highly suspicious clinical setting.      Underwent further evaluation with D&C hysteroscopy 10/25/19.   FINAL PATHOLOGIC DIAGNOSIS   ENDOMETRIAL CURETTAGE:   - Multiple fragments of squamous epithelium with severe squamous dysplasia/carcinoma in situ with involvement of   the endocervix (see comment).   - Rare small fragments of atrophic endometrium.   - Strips of benign endocervical epithelium and fragments of benign squamous epithelium intermixed with blood and   mucus.   - No evidence of endometrial hyperplasia, endometrial intraepithelial neoplasia or endometrial adenocarcinoma.   COMMENT: The fragments of squamous epithelium represent at least severe squamous dysplasia/carcinoma in   situ and appear to arise from the cervix. The fragments are cut in a tangential  fashion and the interface of the   epithelium with the stroma cannot be accurately evaluated. The possibility of an invasive squamous cell carcinoma   cannot be excluded. There is no evidence of endometrial intraepithelial neoplasia or endometrial adenocarcinoma.   However, the possibility of a squamous cell carcinoma arising within the endometrium, although unlikely, cannot be   excluded.      Pelvic US 10/9/19  Uterus:  Size: 6.8 x 3.6 x 3.8 cm  Masses: There are multiple small partially calcified uterine fibroids present with the largest measuring 1.4 cm in greatest dimension. Multiple nabothian cysts are present.  Endometrium: Upper normal in this post menopausal patient, measuring 5 mm. There is a trace amount of free fluid within the endometrial cavity as well.  Right ovary: Not visualized.  Left ovary: Not visualized.      Medical comorbidities include DM (last hgb A1c 7.6, Cr 1.2), HLD, hypothyroid.   Prior abdominal surgeries include BTL.      S/p CKC 11/26/19  Cervical excision shows small microinvasive squamous cell carcinoma of the cervix, 1mm. Stage IA1, negative LVSI.   1. CERVIX, LEEP:   - Superficial squamous cell carcinoma, less than 1 mm.   - High-grade squamous intraepithelial lesion (DEE DEE III) extending into   endocervical glands.   - Immunohistochemistry with appropriate control for keratin AE1/3 highlights   the carcinoma.   - Immunohistochemistry with appropriate control for p16 is positive in both   the invasive carcinoma and     DEE DEE III.   - See CAP synoptic report.   2.  ENDOCERVICAL CURETTAGE:   - Benign glandular epithelium and blood clot.   - Immunohistochemistry with appropriate control for p16 is noncontributory.   Deep Margin:  Uninvolved by invasive carcinoma or high-grade squamous   intraepithelial lesion (DEE DEE 2-3). Distance of invasive carcinoma from margin (millimeters): 4.5   mm.   Lymphovascular Invasion:  Not identified.      CT CAP 1/2020  Impression:  1. Patient with a  reported history of cervical cancer.  No imaging findings to suggest distant metastatic disease.  Review of Systems   Constitutional: Negative for appetite change, chills, fatigue and fever.   HENT: Negative for mouth sores.    Respiratory: Negative for cough and shortness of breath.    Cardiovascular: Negative for leg swelling.   Gastrointestinal: Negative for abdominal pain, blood in stool, constipation and diarrhea.   Endocrine: Negative for cold intolerance.   Genitourinary: Negative for dysuria and vaginal bleeding.   Musculoskeletal: Negative for myalgias.   Skin: Negative for rash.   Allergic/Immunologic: Negative.    Neurological: Negative for weakness and numbness.   Hematological: Negative for adenopathy. Does not bruise/bleed easily.   Psychiatric/Behavioral: Negative for confusion.       Objective:   Physical Exam:   Constitutional: She is oriented to person, place, and time. She appears well-developed and well-nourished.    HENT:   Head: Normocephalic and atraumatic.    Eyes: Pupils are equal, round, and reactive to light. EOM are normal.    Neck: Normal range of motion. Neck supple. No thyromegaly present.    Cardiovascular: Normal rate, regular rhythm and intact distal pulses.     Pulmonary/Chest: Effort normal and breath sounds normal. No respiratory distress. She has no wheezes.        Abdominal: Soft. Bowel sounds are normal. She exhibits no distension and no mass. There is no abdominal tenderness.     Genitourinary:    Vagina and rectum normal.      Pelvic exam was performed with patient supine.   There is no lesion on the right labia. There is no lesion on the left labia. Uterus is absent. Right adnexum displays no mass. Left adnexum displays no mass. Vaginal cuff normal.Cervix exhibits absence.           Musculoskeletal: Normal range of motion and moves all extremeties.      Lymphadenopathy:     She has no cervical adenopathy.        Right: No supraclavicular adenopathy present.        Left: No  supraclavicular adenopathy present.    Neurological: She is alert and oriented to person, place, and time.    Skin: Skin is warm and dry. No rash noted.    Psychiatric: She has a normal mood and affect.       Assessment:     1. Cervical cancer, FIGO stage IA1        Plan:   No orders of the defined types were placed in this encounter.    No evidence of disease on today's exam  Feels well, no new symptoms  Disease free interval 9 months      Recommend continued surveillance. RTC 3 months or sooner if needed    I spent approximately 30 minutes reviewing the available records and evaluating the patient, out of which over 50% of the time was spent face to face with the patient in counseling and coordinating this patient's care.

## 2020-11-02 ENCOUNTER — TELEPHONE (OUTPATIENT)
Dept: FAMILY MEDICINE | Facility: CLINIC | Age: 80
End: 2020-11-02

## 2020-11-02 NOTE — TELEPHONE ENCOUNTER
----- Message from Francie Monge sent at 11/2/2020 10:42 AM CST -----  Type: RX Refill Request    Who Called:  [facility]     Have you contacted your pharmacy: yes     Refill or New Rx: refill     RX Name and Strength: VITAMIN D2 1,250 mcg (50,000 unit) capsule    Is this a 30 day or 90 day RX: 30 day     Preferred Pharmacy with phone number: [facility] [place] [address] [phone] (Phone)  [phone] (Fax)    Local or Mail Order: local    Would the patient rather a call back or a response via My [facility]? Call back     Best Call Back Number: [phone]

## 2020-11-02 NOTE — TELEPHONE ENCOUNTER
Last Office Visit Info:   The patient's last visit with Sari Gutpa MD was on 4/9/2020.    The patient's last visit in current department was on Visit date not found.        Last CBC Results:   Lab Results   Component Value Date    WBC 9.29 03/16/2020    HGB 14.0 03/16/2020    HCT 44.8 03/16/2020     03/16/2020       Last CMP Results  Lab Results   Component Value Date     03/20/2020    K 4.9 03/20/2020     03/20/2020    CO2 31 (H) 03/20/2020    BUN 26 (H) 03/20/2020    CREATININE 1.1 03/20/2020    CALCIUM 9.7 03/20/2020       Last Lipids  Lab Results   Component Value Date    CHOL 131 04/27/2020    TRIG 82 04/27/2020    HDL 46 04/27/2020    LDLCALC 68.6 04/27/2020       Last A1C  Lab Results   Component Value Date    HGBA1C 7.0 (H) 10/05/2020       Last TSH  Lab Results   Component Value Date    TSH 0.415 07/07/2020         Current Med Refills  Medication List with Changes/Refills   Current Medications    ALENDRONATE (FOSAMAX) 70 MG TABLET    TAKE 1 TABLET BY MOUTH EVERY 7 DAYS       Start Date: 6/1/2020  End Date: --    BLOOD SUGAR DIAGNOSTIC STRP    1 strip by Misc.(Non-Drug; Combo Route) route 4 (four) times daily with meals and nightly.       Start Date: 1/18/2020 End Date: --    BRIMONIDINE-TIMOLOL (COMBIGAN) 0.2-0.5 % DROP           Start Date: 9/14/2018 End Date: --    BROMFENAC (PROLENSA) 0.07 % DROP    Apply 1 drop to eye.       Start Date: 8/28/2018 End Date: --    CIPROFLOXACIN HCL (CILOXAN) 0.3 % OPHTHALMIC SOLUTION           Start Date: 7/23/2018 End Date: --    CYCLOSPORINE (RESTASIS) 0.05 % OPHTHALMIC EMULSION    Apply 1 drop to eye.       Start Date: 8/28/2018 End Date: --    DIAZEPAM (VALIUM) 5 MG TABLET    Take 5 mg by mouth every 6 (six) hours as needed.        Start Date: 7/30/2018 End Date: --    DULOXETINE (CYMBALTA) 60 MG CAPSULE    Take 60 mg by mouth 2 (two) times daily.       Start Date: 7/25/2018 End Date: --    FLASH GLUCOSE SENSOR (FREESTYLE OSMAN 14 DAY SENSOR)  "KIT    U UTD WITH READER FOR GLUCOSE       Start Date: 9/24/2020 End Date: --    FLUOCINONIDE 0.05% (LIDEX) 0.05 % CREAM           Start Date: 4/21/2020 End Date: --    FLUOROMETHOLONE 0.1% (FML) 0.1 % DRPS           Start Date: 3/27/2020 End Date: --    HYDROXYZINE PAMOATE (VISTARIL) 25 MG CAP    Take 25 mg by mouth.       Start Date: 4/15/2019 End Date: --    INSULIN (LANTUS SOLOSTAR U-100 INSULIN) GLARGINE 100 UNITS/ML (3ML) SUBQ PEN    Inject 14 units once daily       Start Date: 10/9/2020 End Date: --    INSULIN ASPART U-100 (NOVOLOG FLEXPEN U-100 INSULIN) 100 UNIT/ML (3 ML) INPN PEN    Take 10 units before breakfast and 8 units before lunch and supper       Start Date: 10/9/2020 End Date: --    KETOROLAC 0.5% (ACULAR) 0.5 % DROP           Start Date: 9/20/2019 End Date: --    LACTOBACILLUS RHAMNOSUS GG (CULTURELLE ORAL)    Take 1 capsule by mouth once daily.       Start Date: --        End Date: --    LEVOTHYROXINE (SYNTHROID) 112 MCG TABLET    TAKE 1 TABLET BY MOUTH EVERY MORNING BEFORE BREAKFAST       Start Date: 9/15/2020 End Date: --    LOTEPREDNOL (LOTEMAX) 0.5 % OPHTHALMIC SUSPENSION           Start Date: 9/14/2018 End Date: --    MUPIROCIN (BACTROBAN) 2 % OINTMENT    APPLY 1 APPLICATION TOPICALLY TO RED AREA ON CHEST TWICE DAILY       Start Date: 5/11/2020 End Date: --    NAPROXEN (NAPROSYN) 500 MG TABLET    Take 1 tablet (500 mg total) by mouth 2 (two) times daily with meals. for 15 days       Start Date: 10/18/2020End Date: 11/2/2020    OFLOXACIN (OCUFLOX) 0.3 % OPHTHALMIC SOLUTION    INSTILL 1 DROP INTO AFFECTED EYE 4 TIMES A DAY       Start Date: 7/29/2019 End Date: --    OXYCODONE (ROXICODONE) 10 MG TAB IMMEDIATE RELEASE TABLET           Start Date: 1/9/2020  End Date: --    PEN NEEDLE, DIABETIC (BD ULTRA-FINE MINI PEN NEEDLE) 31 GAUGE X 3/16" NDLE    Inject 1 each into the skin 4 (four) times daily with meals and nightly.       Start Date: 7/29/2020 End Date: --    PERMETHRIN (ELIMITE) 5 % CREAM   "         Start Date: 5/28/2020 End Date: --    PRODIGY AUTOCODE METER KIT    Use as directed       Start Date: 1/18/2020 End Date: --    PRODIGY NO CODING STRP           Start Date: 9/14/2018 End Date: --    RESTASIS 0.05 % OPHTHALMIC EMULSION    Place 1 drop into both eyes 2 (two) times daily.       Start Date: 8/28/2018 End Date: --    ROSUVASTATIN (CRESTOR) 10 MG TABLET    Take 10 mg by mouth.       Start Date: 8/21/2018 End Date: --    SEMAGLUTIDE (OZEMPIC) 0.25 MG OR 0.5 MG(2 MG/1.5 ML) PNIJ    Inject 0.5 mg into the skin every 7 days.       Start Date: 7/7/2020  End Date: --    TRIAMCINOLONE ACETONIDE 0.1% (KENALOG) 0.1 % CREAM    APPLY TWICE DAILY TO ITCHY SKIN UP TO 2 WEEKS/MONTH AS NEEDED       Start Date: 8/16/2019 End Date: --

## 2020-11-03 ENCOUNTER — TELEPHONE (OUTPATIENT)
Dept: ENDOCRINOLOGY | Facility: CLINIC | Age: 80
End: 2020-11-03

## 2020-11-03 RX ORDER — ERGOCALCIFEROL 1.25 MG/1
50000 CAPSULE ORAL
Qty: 3 CAPSULE | Refills: 1 | Status: SHIPPED | OUTPATIENT
Start: 2020-11-03 | End: 2021-01-11 | Stop reason: SDUPTHER

## 2020-11-03 NOTE — TELEPHONE ENCOUNTER
----- Message from Greta Rider sent at 11/3/2020  2:37 PM CST -----  Regarding: [facility]  [phone]  .Type: RX Refill Request    Who Called: [facility]     Have you contacted your pharmacy: yes    Refill or New Rx: refill    RX Name and Strength: VITAMIN D2 1,250 mcg (50,000 unit) capsule     Is this a 30 day or 90 day RX: 30 days    Preferred Pharmacy with phone number:   [facility] [place] [address] [address]  Phone: [phone] Fax: [phone]    Local or Mail Order: local    Would the patient rather a call back or a response via My [facility]? Call back    Best Call Back Number: [phone]

## 2020-11-05 DIAGNOSIS — M06.9 RHEUMATOID ARTHRITIS, INVOLVING UNSPECIFIED SITE, UNSPECIFIED WHETHER RHEUMATOID FACTOR PRESENT: ICD-10-CM

## 2020-11-05 DIAGNOSIS — N18.31 STAGE 3A CHRONIC KIDNEY DISEASE: Primary | ICD-10-CM

## 2020-11-05 RX ORDER — ERGOCALCIFEROL 1.25 MG/1
50000 CAPSULE ORAL
Qty: 3 CAPSULE | Refills: 0 | OUTPATIENT
Start: 2020-11-05 | End: 2021-01-05

## 2020-11-05 NOTE — TELEPHONE ENCOUNTER
[facility] would like to know if patient should be taking medication - Vitamin D2 once a week or once a month.  Please advise.

## 2020-11-05 NOTE — TELEPHONE ENCOUNTER
Patient requesting a referral to Rheumatology and labs to check her kidneys.  Stated she is concerned because it seems as if she has a lot wrong with her health and would like to get things checked.  Please advise.

## 2020-11-05 NOTE — TELEPHONE ENCOUNTER
----- Message from Greta Rider sent at 11/5/2020  9:26 AM CST -----  Regarding: [facility] [phone]  .Type: Patient Call Back    Who called: [facility]    What is the request in detail: In regards to VITAMIN D2 1,250 mcg (50,000 unit) capsule pt is saying she takes it once a week and pharmacy has once a month, just need clarification     Can the clinic reply by [facility]? Call back     Would the patient rather a call back or a response via [facility]? Call back     Best call back number: [phone]

## 2020-11-05 NOTE — TELEPHONE ENCOUNTER
----- Message from Francie Reynolds sent at 11/5/2020  9:49 AM CST -----  Regarding: Orders/ Callback  Contact: pt  Type: Patient Call Back    Who called: PT    What is the request in detail: PT called in and stated she's needing a referral Rheumatology and also needing a RX for Vitamin D.     Can the clinic reply by [facility]? No    Would the patient rather a call back or a response via [facility]? Call     Best call back number: [phone] (Cell) or [phone] (home)      Additional Information:

## 2020-11-06 ENCOUNTER — TELEPHONE (OUTPATIENT)
Dept: ENDOCRINOLOGY | Facility: CLINIC | Age: 80
End: 2020-11-06

## 2020-11-06 NOTE — TELEPHONE ENCOUNTER
Return call to Pt, and informed that her lab orders was placed for her to have done, and it didn't require fasting. Pt states that she wants to see Rheumatology for Rheumatoid arthritis. Pt informed per Provider that she can take a daily OTC Vit.D of 2,000 units daily.

## 2020-11-06 NOTE — TELEPHONE ENCOUNTER
----- Message from Esperanza Gonzalez NP sent at 11/6/2020 10:31 AM CST -----  Please ask pt to schedule with Dr. Rahman for osteoporosis and future vitamin D refills. He is better qualified to help with her with this issue. Thanks

## 2020-11-09 ENCOUNTER — LAB VISIT (OUTPATIENT)
Dept: LAB | Facility: HOSPITAL | Age: 80
End: 2020-11-09
Attending: INTERNAL MEDICINE
Payer: MEDICARE

## 2020-11-09 DIAGNOSIS — N18.31 STAGE 3A CHRONIC KIDNEY DISEASE: ICD-10-CM

## 2020-11-09 DIAGNOSIS — Z11.59 NEED FOR HEPATITIS C SCREENING TEST: ICD-10-CM

## 2020-11-09 DIAGNOSIS — E11.65 TYPE 2 DIABETES MELLITUS WITH HYPERGLYCEMIA, WITH LONG-TERM CURRENT USE OF INSULIN: ICD-10-CM

## 2020-11-09 DIAGNOSIS — Z79.4 TYPE 2 DIABETES MELLITUS WITH HYPERGLYCEMIA, WITH LONG-TERM CURRENT USE OF INSULIN: ICD-10-CM

## 2020-11-09 LAB
ANION GAP SERPL CALC-SCNC: 10 MMOL/L (ref 8–16)
BUN SERPL-MCNC: 30 MG/DL (ref 8–23)
CALCIUM SERPL-MCNC: 9.4 MG/DL (ref 8.7–10.5)
CHLORIDE SERPL-SCNC: 100 MMOL/L (ref 95–110)
CO2 SERPL-SCNC: 26 MMOL/L (ref 23–29)
CREAT SERPL-MCNC: 1.2 MG/DL (ref 0.5–1.4)
EST. GFR  (AFRICAN AMERICAN): 50 ML/MIN/1.73 M^2
EST. GFR  (NON AFRICAN AMERICAN): 43.4 ML/MIN/1.73 M^2
ESTIMATED AVG GLUCOSE: 163 MG/DL (ref 68–131)
GLUCOSE SERPL-MCNC: 328 MG/DL (ref 70–110)
HBA1C MFR BLD HPLC: 7.3 % (ref 4–5.6)
POTASSIUM SERPL-SCNC: 4.3 MMOL/L (ref 3.5–5.1)
SODIUM SERPL-SCNC: 136 MMOL/L (ref 136–145)

## 2020-11-09 PROCEDURE — 83036 HEMOGLOBIN GLYCOSYLATED A1C: CPT | Mod: HCNC

## 2020-11-09 PROCEDURE — 36415 COLL VENOUS BLD VENIPUNCTURE: CPT | Mod: HCNC,PO

## 2020-11-09 PROCEDURE — 80048 BASIC METABOLIC PNL TOTAL CA: CPT | Mod: HCNC

## 2020-11-09 PROCEDURE — 86803 HEPATITIS C AB TEST: CPT | Mod: HCNC

## 2020-11-10 LAB — HCV AB SERPL QL IA: ABNORMAL

## 2020-11-11 DIAGNOSIS — Z11.59 ENCOUNTER FOR HEPATITIS C VIRUS SCREENING TEST FOR HIGH RISK PATIENT: Primary | ICD-10-CM

## 2020-11-11 DIAGNOSIS — Z91.89 ENCOUNTER FOR HEPATITIS C VIRUS SCREENING TEST FOR HIGH RISK PATIENT: Primary | ICD-10-CM

## 2020-11-12 ENCOUNTER — LAB VISIT (OUTPATIENT)
Dept: LAB | Facility: HOSPITAL | Age: 80
End: 2020-11-12
Attending: INTERNAL MEDICINE
Payer: MEDICARE

## 2020-11-12 ENCOUNTER — TELEPHONE (OUTPATIENT)
Dept: FAMILY MEDICINE | Facility: CLINIC | Age: 80
End: 2020-11-12

## 2020-11-12 DIAGNOSIS — Z11.59 ENCOUNTER FOR HEPATITIS C VIRUS SCREENING TEST FOR HIGH RISK PATIENT: ICD-10-CM

## 2020-11-12 DIAGNOSIS — Z91.89 ENCOUNTER FOR HEPATITIS C VIRUS SCREENING TEST FOR HIGH RISK PATIENT: ICD-10-CM

## 2020-11-12 PROCEDURE — 87522 HEPATITIS C REVRS TRNSCRPJ: CPT | Mod: HCNC

## 2020-11-12 PROCEDURE — 36415 COLL VENOUS BLD VENIPUNCTURE: CPT | Mod: HCNC,PO

## 2020-11-12 PROCEDURE — 86803 HEPATITIS C AB TEST: CPT | Mod: HCNC

## 2020-11-12 NOTE — TELEPHONE ENCOUNTER
----- Message from Sailaja Denney, Patient Care Assistant sent at 11/12/2020 10:44 AM CST -----  Name of Who is Calling: SYMONE JONES [[id number]]    What is the request in detail: Requesting a call back in regards of test results. Please contact to further discuss and advise      Can the clinic reply by [facility]: No    What Number to Call Back if not in [facility]:   [phone] or [phone]

## 2020-11-13 ENCOUNTER — TELEPHONE (OUTPATIENT)
Dept: FAMILY MEDICINE | Facility: CLINIC | Age: 80
End: 2020-11-13

## 2020-11-13 LAB — HCV AB SERPL QL IA: ABNORMAL

## 2020-11-13 NOTE — TELEPHONE ENCOUNTER
"Return call to Pt, and she was asking for lab results to be mailed. Pt informed that her lab results from the 9th are ready but the one's from yesterday are still in process. Pt stated, " I don't understand. Tthe man yesterday told me that it will go straight there". Informed Pt that yes, it went straight to the lab, but it's still in process. Pt sates to just wait until her results are back, and send them to her at one time.   "

## 2020-11-13 NOTE — TELEPHONE ENCOUNTER
----- Message from Deepa Calixto sent at 11/13/2020  4:18 PM CST -----  Contact: Patient  [phone] or cell [phone]  Type:  Test Results    Who Called: Patient     Name of Test (Lab/Mammo/Etc): Blood work    Date of Test: 11-09-20 and 11-12-20    [facility]     Would the patient rather a call back or a response via [facility]?  Call back    Best Call Back Number:  [phone] or cell [phone]    Additional Information:  Patient would also like to get a hard copy of both test results. Please call pt ASAP!    For Clinical Team:Has the provider reviewed the results?

## 2020-11-16 ENCOUNTER — TELEPHONE (OUTPATIENT)
Dept: FAMILY MEDICINE | Facility: CLINIC | Age: 80
End: 2020-11-16

## 2020-11-16 NOTE — TELEPHONE ENCOUNTER
----- Message from Marcella De La Torre sent at 11/16/2020  9:14 AM CST -----  Regarding: questions about last lab results/ copy mailed to her home  Type: Patient Call Back    Who called:Chelsea     What is the request in detail: the patient is requesting a call back from the staff in regards to her recent labs. She has some questions to ask about why Dr. Gupta requested a Hepatitis lab. Also, the patient would like a copy of her latest lab results mailed to her home.    Can the clinic reply by [facility]?no    Would the patient rather a call back or a response via [facility]? Call back     Best call back number:[phone]

## 2020-11-16 NOTE — TELEPHONE ENCOUNTER
Patient advised that Hepatitis C lab was a lab offered to all patients.  Patient verbalized understanding.  She wanted to know if she could believe the results.  Advised patient that results are valid.  Patient verbalized understanding.      Lab results mailed to patient's home address, per her request.

## 2020-11-17 DIAGNOSIS — R76.8 FALSE POSITIVE SEROLOGICAL TEST FOR HEPATITIS C: Primary | ICD-10-CM

## 2020-11-17 LAB
HCV RNA SERPL NAA+PROBE-LOG IU: <1.08 LOG (10) IU/ML
HCV RNA SERPL QL NAA+PROBE: NOT DETECTED IU/ML
HCV RNA SPEC NAA+PROBE-ACNC: <12 IU/ML

## 2020-11-22 ENCOUNTER — PATIENT OUTREACH (OUTPATIENT)
Dept: ADMINISTRATIVE | Facility: OTHER | Age: 80
End: 2020-11-22

## 2020-11-25 ENCOUNTER — OFFICE VISIT (OUTPATIENT)
Dept: URGENT CARE | Facility: CLINIC | Age: 80
End: 2020-11-25
Payer: MEDICARE

## 2020-11-25 VITALS
OXYGEN SATURATION: 96 % | TEMPERATURE: 98 F | RESPIRATION RATE: 16 BRPM | SYSTOLIC BLOOD PRESSURE: 154 MMHG | HEART RATE: 86 BPM | DIASTOLIC BLOOD PRESSURE: 85 MMHG

## 2020-11-25 DIAGNOSIS — Z20.822 EXPOSURE TO COVID-19 VIRUS: ICD-10-CM

## 2020-11-25 DIAGNOSIS — U07.1 COVID-19 VIRUS INFECTION: Primary | ICD-10-CM

## 2020-11-25 DIAGNOSIS — U07.1 COVID-19 VIRUS DETECTED: ICD-10-CM

## 2020-11-25 LAB
CTP QC/QA: YES
SARS-COV-2 RDRP RESP QL NAA+PROBE: POSITIVE

## 2020-11-25 PROCEDURE — U0002 COVID-19 LAB TEST NON-CDC: HCPCS | Mod: QW,S$GLB,, | Performed by: PHYSICIAN ASSISTANT

## 2020-11-25 PROCEDURE — 99214 OFFICE O/P EST MOD 30 MIN: CPT | Mod: S$GLB,,, | Performed by: PHYSICIAN ASSISTANT

## 2020-11-25 PROCEDURE — 99214 PR OFFICE/OUTPT VISIT, EST, LEVL IV, 30-39 MIN: ICD-10-PCS | Mod: S$GLB,,, | Performed by: PHYSICIAN ASSISTANT

## 2020-11-25 PROCEDURE — U0002: ICD-10-PCS | Mod: QW,S$GLB,, | Performed by: PHYSICIAN ASSISTANT

## 2020-11-25 RX ORDER — BENZONATATE 100 MG/1
100 CAPSULE ORAL EVERY 6 HOURS PRN
Qty: 30 CAPSULE | Refills: 1 | Status: SHIPPED | OUTPATIENT
Start: 2020-11-25 | End: 2021-01-11

## 2020-11-25 NOTE — PATIENT INSTRUCTIONS
Rest and fluids are important.  Please take tylenol or iobuprofen for help with fever, pain, headaches unless you have a medical condition or allergy which would prevent you from taking these medications.  Mucinex can help with chest congestion.  Tessalon perrles can be used as directed as needed to help with cough.    If you develop worsening symptoms, especially worsening shortness of breath or difficulty breathing, please go to the ED for further evaluation.      You have tested positive for COVID-19 today. Per the CDC, you are now in a 10 day isolation.    This isolation starts from the day you first developed symptoms, not the day of your positive test. For example, if your symptoms began on a Monday, and you waited until Friday of the same week to get tested, and it was positive, your 10 day isolation begins from that Monday, not the Friday you tested positive.    Also, per the CDC guidelines, once your 10 days have passed, and you have not had fever greater than 100.4F in the last 24 hours without taking any fever reducers such as Tylenol (Acetaminophen) or Motrin (Ibuprofen), you may return to your normal activities including social distancing, wearing masks, and frequent handwashing - YOU DO NOT NEED ANOTHER TEST, OR TO TEST NEGATIVE, IN ORDER TO END YOUR QUARANTINE!      Instructions for Patients with Confirmed or Suspected COVID-19    If you are awaiting your test result, you will either be called or it will be released to the patient portal.  If you have any questions about your test, please visit [email or web address] or call our COVID-19 information line at [phone].      Instructions for non-hospitalized or discharged patients with confirmed or suspected COVID-19:       Stay home except to get medical care.    Separate yourself from other people and animals in your home.    Call ahead before visiting your doctor.    Wear a face mask.    Cover your coughs and sneezes.    Clean  your hands often.    Avoid sharing personal household items.    Clean all high-touch surfaces every day.    Monitor your symptoms. Seek prompt medical attention if your illness is worsening (e.g., difficulty breathing). Before seeking care, call your healthcare provider.    If you have a medical emergency and must call 911, notify the dispatcher that you have or are being evaluated for COVID-19. If possible, put on a face mask before emergency medical services arrive.    Use the following symptom-based strategy to return to normal activity following a suspected or confirmed case of COVID-19. Continue isolation until:   o At least 3 days (72 hours) have passed since recovery defined as resolution of fever without the use of fever-reducing medications and improvement in respiratory symptoms (e.g. cough, shortness of breath), and   o At least 10 days have passed since the first positive test.       As one of the next steps, you will receive a call or text from the [place] Department of Health ([facility] COVID-19 Tracing Team. See the contact information below so you know not to ignore the health departments call. It is important that you contact them back immediately so they can help.     Contact Tracer Number:  [phone]  Caller ID for most carriers: [facility]    What is contact tracing?   Contact tracing is a process that helps identify everyone who has been in close contact with an infected person. Contact tracers let those people know they may have been exposed and guide them on next steps. Confidentiality is important for everyone; no one will be told who may have exposed them to the virus.   Your involvement is important. The more we know about where and how this virus is spreading, the better chance we have at stopping it from spreading further.  What does exposure mean?   Exposure means you have been within 6 feet for more than 15 minutes with a person who has or had COVID-19.  What kind of  questions do the contact tracers ask?   A contact tracer will confirm your basic contact information including name, address, phone number, and next of kin, as well as asking about any symptoms you may have had. Theyll also ask you how you think you may have gotten sick, such as places where you may have been exposed to the virus, and people you were with. Those names will never be shared with anyone outside of that call, and will only be used to help trace and stop the spread of the virus.   I have privacy concerns. How will the state use my information?   Your privacy about your health is important. All calls are completed using call centers that use the appropriate health privacy protection measures (HIPAA compliance), meaning that your patient information is safe. No one will ever ask you any questions related to immigration status. Your health comes first.   Do I have to participate?   You do not have to participate, but we strongly encourage you to. Contact tracing can help us catch and control new outbreaks as theyre developing to keep your friends and family safe.   What if I dont hear from anyone?   If you dont receive a call within 24 hours, you can call the number above right away to inquire about your status. That line is open from 8:00 am - 8:00 p.m., 7 days a week.  Contact tracing saves lives! Together, we have the power to beat this virus and keep our loved ones and neighbors safe.       Instructions for household members, intimate partners and caregivers in a non-healthcare setting of a patient with confirmed or suspected COVID-19:         Close contacts should monitor their health and call their healthcare provider right away if they develop symptoms suggestive of COVID-19 (e.g., fever, cough, shortness of breath).    Stay home except to get medical care. Separate yourself from other people and animals in the home.   Monitor the patients symptoms. If the patient is getting sicker, call  his or her healthcare provider. If the patient has a medical emergency and you need to call 911, notify the dispatch personnel that the patient has or is being evaluated for COVID-19.    Wear a facemask when around other people such as sharing a room or vehicle and before entering a healthcare provider's office.   Cover coughs and sneezes with a tissue. Throw used tissues in a lined trash can immediately and wash hands.   Clean hands often with soap and water for at least 20 seconds or with an alcohol-based hand , rubbing hands together until they feel dry. Avoid touching your eyes, nose, and mouth with unwashed hands.   Clean all high-touch; surfaces every day, including counters, tabletops, doorknobs, bathroom fixtures, toilets, phones, keyboards, tablets, bedside tables, etc. Use a household cleaning spray or wipe according to label instructions.   Avoid sharing personal household items such as dishes, drinking glasses, cups, towels, bedding, etc. After these items are used, they should be washed thoroughly with soap and water.   Continue isolation until:   At least 3 days (72 hours) have passed since recovery defined as resolution of fever without the use of fever-reducing medications and improvement in respiratory symptoms (e.g. cough, shortness of breath), and    At least 10 days have passed since the patients first positive test.    https://www.cdc.gov/coronavirus/2019-ncov/your-health/index.htm

## 2021-01-04 ENCOUNTER — TELEPHONE (OUTPATIENT)
Dept: FAMILY MEDICINE | Facility: CLINIC | Age: 81
End: 2021-01-04

## 2021-01-04 ENCOUNTER — LAB VISIT (OUTPATIENT)
Dept: LAB | Facility: HOSPITAL | Age: 81
End: 2021-01-04
Attending: NURSE PRACTITIONER
Payer: MEDICARE

## 2021-01-04 LAB
ESTIMATED AVG GLUCOSE: 169 MG/DL (ref 68–131)
HBA1C MFR BLD HPLC: 7.5 % (ref 4–5.6)

## 2021-01-04 PROCEDURE — 83036 HEMOGLOBIN GLYCOSYLATED A1C: CPT | Mod: HCNC

## 2021-01-04 PROCEDURE — 36415 COLL VENOUS BLD VENIPUNCTURE: CPT | Mod: HCNC,PO

## 2021-01-08 ENCOUNTER — TELEPHONE (OUTPATIENT)
Dept: ENDOCRINOLOGY | Facility: CLINIC | Age: 81
End: 2021-01-08

## 2021-01-10 ENCOUNTER — IMMUNIZATION (OUTPATIENT)
Dept: OBSTETRICS AND GYNECOLOGY | Facility: CLINIC | Age: 81
End: 2021-01-10
Payer: MEDICARE

## 2021-01-10 DIAGNOSIS — Z23 NEED FOR VACCINATION: ICD-10-CM

## 2021-01-10 PROCEDURE — 91300 COVID-19, MRNA, LNP-S, PF, 30 MCG/0.3 ML DOSE VACCINE: CPT | Mod: PBBFAC | Performed by: FAMILY MEDICINE

## 2021-01-11 ENCOUNTER — OFFICE VISIT (OUTPATIENT)
Dept: ENDOCRINOLOGY | Facility: CLINIC | Age: 81
End: 2021-01-11
Payer: MEDICARE

## 2021-01-11 ENCOUNTER — TELEPHONE (OUTPATIENT)
Dept: ENDOCRINOLOGY | Facility: CLINIC | Age: 81
End: 2021-01-11

## 2021-01-11 VITALS
HEART RATE: 79 BPM | WEIGHT: 170 LBS | DIASTOLIC BLOOD PRESSURE: 71 MMHG | TEMPERATURE: 98 F | SYSTOLIC BLOOD PRESSURE: 135 MMHG | BODY MASS INDEX: 33.2 KG/M2

## 2021-01-11 DIAGNOSIS — E03.9 HYPOTHYROIDISM, UNSPECIFIED TYPE: ICD-10-CM

## 2021-01-11 DIAGNOSIS — E78.5 HYPERLIPIDEMIA ASSOCIATED WITH TYPE 2 DIABETES MELLITUS: ICD-10-CM

## 2021-01-11 DIAGNOSIS — E11.69 HYPERLIPIDEMIA ASSOCIATED WITH TYPE 2 DIABETES MELLITUS: ICD-10-CM

## 2021-01-11 DIAGNOSIS — M81.0 AGE-RELATED OSTEOPOROSIS WITHOUT CURRENT PATHOLOGICAL FRACTURE: ICD-10-CM

## 2021-01-11 DIAGNOSIS — E66.9 NON MORBID OBESITY, UNSPECIFIED OBESITY TYPE: ICD-10-CM

## 2021-01-11 DIAGNOSIS — E11.649 HYPOGLYCEMIA ASSOCIATED WITH DIABETES: ICD-10-CM

## 2021-01-11 PROCEDURE — 1159F MED LIST DOCD IN RCRD: CPT | Mod: HCNC,S$GLB,, | Performed by: HOSPITALIST

## 2021-01-11 PROCEDURE — 1159F PR MEDICATION LIST DOCUMENTED IN MEDICAL RECORD: ICD-10-PCS | Mod: HCNC,S$GLB,, | Performed by: HOSPITALIST

## 2021-01-11 PROCEDURE — 95251 PR GLUCOSE MONITOR, 72 HOUR, PHYS INTERP: ICD-10-PCS | Mod: HCNC,S$GLB,, | Performed by: HOSPITALIST

## 2021-01-11 PROCEDURE — 99214 OFFICE O/P EST MOD 30 MIN: CPT | Mod: HCNC,S$GLB,, | Performed by: HOSPITALIST

## 2021-01-11 PROCEDURE — 1126F PR PAIN SEVERITY QUANTIFIED, NO PAIN PRESENT: ICD-10-PCS | Mod: HCNC,S$GLB,, | Performed by: HOSPITALIST

## 2021-01-11 PROCEDURE — 3051F HG A1C>EQUAL 7.0%<8.0%: CPT | Mod: HCNC,CPTII,S$GLB, | Performed by: HOSPITALIST

## 2021-01-11 PROCEDURE — 99214 PR OFFICE/OUTPT VISIT, EST, LEVL IV, 30-39 MIN: ICD-10-PCS | Mod: HCNC,S$GLB,, | Performed by: HOSPITALIST

## 2021-01-11 PROCEDURE — 99499 UNLISTED E&M SERVICE: CPT | Mod: HCNC,S$GLB,, | Performed by: HOSPITALIST

## 2021-01-11 PROCEDURE — 99999 PR PBB SHADOW E&M-EST. PATIENT-LVL V: ICD-10-PCS | Mod: PBBFAC,HCNC,, | Performed by: HOSPITALIST

## 2021-01-11 PROCEDURE — 99499 RISK ADDL DX/OHS AUDIT: ICD-10-PCS | Mod: HCNC,S$GLB,, | Performed by: HOSPITALIST

## 2021-01-11 PROCEDURE — 3051F PR MOST RECENT HEMOGLOBIN A1C LEVEL 7.0 - < 8.0%: ICD-10-PCS | Mod: HCNC,CPTII,S$GLB, | Performed by: HOSPITALIST

## 2021-01-11 PROCEDURE — 99999 PR PBB SHADOW E&M-EST. PATIENT-LVL V: CPT | Mod: PBBFAC,HCNC,, | Performed by: HOSPITALIST

## 2021-01-11 PROCEDURE — 1126F AMNT PAIN NOTED NONE PRSNT: CPT | Mod: HCNC,S$GLB,, | Performed by: HOSPITALIST

## 2021-01-11 PROCEDURE — 95251 CONT GLUC MNTR ANALYSIS I&R: CPT | Mod: HCNC,S$GLB,, | Performed by: HOSPITALIST

## 2021-01-11 RX ORDER — SEMAGLUTIDE 1.34 MG/ML
0.5 INJECTION, SOLUTION SUBCUTANEOUS
Qty: 1 SYRINGE | Refills: 11 | Status: SHIPPED | OUTPATIENT
Start: 2021-01-11 | End: 2021-11-02

## 2021-01-11 RX ORDER — FLASH GLUCOSE SENSOR
KIT MISCELLANEOUS
Qty: 2 KIT | Refills: 11 | Status: SHIPPED | OUTPATIENT
Start: 2021-01-11 | End: 2022-03-07

## 2021-01-11 RX ORDER — INSULIN ASPART 100 [IU]/ML
INJECTION, SOLUTION INTRAVENOUS; SUBCUTANEOUS
Qty: 15 ML | Refills: 11 | Status: SHIPPED | OUTPATIENT
Start: 2021-01-11 | End: 2021-07-29 | Stop reason: SDUPTHER

## 2021-01-11 RX ORDER — ERGOCALCIFEROL 1.25 MG/1
50000 CAPSULE ORAL
Qty: 12 CAPSULE | Refills: 3 | Status: SHIPPED | OUTPATIENT
Start: 2021-01-11 | End: 2022-01-07

## 2021-01-11 RX ORDER — INSULIN GLARGINE 100 [IU]/ML
INJECTION, SOLUTION SUBCUTANEOUS
Qty: 15 ML | Refills: 11 | Status: SHIPPED | OUTPATIENT
Start: 2021-01-11 | End: 2021-07-29

## 2021-01-14 ENCOUNTER — CLINICAL SUPPORT (OUTPATIENT)
Dept: DIABETES | Facility: CLINIC | Age: 81
End: 2021-01-14
Payer: MEDICARE

## 2021-01-14 ENCOUNTER — TELEPHONE (OUTPATIENT)
Dept: GYNECOLOGIC ONCOLOGY | Facility: CLINIC | Age: 81
End: 2021-01-14

## 2021-01-14 DIAGNOSIS — E11.65 TYPE 2 DIABETES MELLITUS WITH HYPERGLYCEMIA, WITH LONG-TERM CURRENT USE OF INSULIN: Primary | ICD-10-CM

## 2021-01-14 DIAGNOSIS — Z79.4 TYPE 2 DIABETES MELLITUS WITH HYPERGLYCEMIA, WITH LONG-TERM CURRENT USE OF INSULIN: Primary | ICD-10-CM

## 2021-01-14 DIAGNOSIS — E78.5 HYPERLIPIDEMIA ASSOCIATED WITH TYPE 2 DIABETES MELLITUS: ICD-10-CM

## 2021-01-14 DIAGNOSIS — E11.69 HYPERLIPIDEMIA ASSOCIATED WITH TYPE 2 DIABETES MELLITUS: ICD-10-CM

## 2021-01-14 PROCEDURE — G0108 DIAB MANAGE TRN  PER INDIV: HCPCS | Mod: HCNC,S$GLB,, | Performed by: DIETITIAN, REGISTERED

## 2021-01-14 PROCEDURE — G0108 PR DIAB MANAGE TRN  PER INDIV: ICD-10-PCS | Mod: HCNC,S$GLB,, | Performed by: DIETITIAN, REGISTERED

## 2021-01-14 PROCEDURE — 99999 PR PBB SHADOW E&M-EST. PATIENT-LVL I: ICD-10-PCS | Mod: PBBFAC,HCNC,, | Performed by: DIETITIAN, REGISTERED

## 2021-01-14 PROCEDURE — 99999 PR PBB SHADOW E&M-EST. PATIENT-LVL I: CPT | Mod: PBBFAC,HCNC,, | Performed by: DIETITIAN, REGISTERED

## 2021-01-19 RX ORDER — ROSUVASTATIN CALCIUM 10 MG/1
10 TABLET, COATED ORAL DAILY
Qty: 30 TABLET | Refills: 2 | Status: SHIPPED | OUTPATIENT
Start: 2021-01-19 | End: 2021-04-27 | Stop reason: SDUPTHER

## 2021-01-20 ENCOUNTER — OFFICE VISIT (OUTPATIENT)
Dept: GYNECOLOGIC ONCOLOGY | Facility: CLINIC | Age: 81
End: 2021-01-20
Payer: MEDICARE

## 2021-01-20 VITALS
WEIGHT: 169.19 LBS | DIASTOLIC BLOOD PRESSURE: 70 MMHG | SYSTOLIC BLOOD PRESSURE: 154 MMHG | BODY MASS INDEX: 33.05 KG/M2 | HEART RATE: 79 BPM

## 2021-01-20 DIAGNOSIS — C53.9 CERVICAL CANCER, FIGO STAGE IA1: Primary | ICD-10-CM

## 2021-01-20 PROCEDURE — 99999 PR PBB SHADOW E&M-EST. PATIENT-LVL IV: ICD-10-PCS | Mod: PBBFAC,HCNC,, | Performed by: NURSE PRACTITIONER

## 2021-01-20 PROCEDURE — 1126F AMNT PAIN NOTED NONE PRSNT: CPT | Mod: S$GLB,,, | Performed by: NURSE PRACTITIONER

## 2021-01-20 PROCEDURE — 99499 UNLISTED E&M SERVICE: CPT | Mod: HCNC,S$GLB,, | Performed by: NURSE PRACTITIONER

## 2021-01-20 PROCEDURE — 3288F FALL RISK ASSESSMENT DOCD: CPT | Mod: CPTII,S$GLB,, | Performed by: NURSE PRACTITIONER

## 2021-01-20 PROCEDURE — 1101F PR PT FALLS ASSESS DOC 0-1 FALLS W/OUT INJ PAST YR: ICD-10-PCS | Mod: CPTII,S$GLB,, | Performed by: NURSE PRACTITIONER

## 2021-01-20 PROCEDURE — 1159F MED LIST DOCD IN RCRD: CPT | Mod: S$GLB,,, | Performed by: NURSE PRACTITIONER

## 2021-01-20 PROCEDURE — 1126F PR PAIN SEVERITY QUANTIFIED, NO PAIN PRESENT: ICD-10-PCS | Mod: S$GLB,,, | Performed by: NURSE PRACTITIONER

## 2021-01-20 PROCEDURE — 99213 PR OFFICE/OUTPT VISIT, EST, LEVL III, 20-29 MIN: ICD-10-PCS | Mod: S$GLB,,, | Performed by: NURSE PRACTITIONER

## 2021-01-20 PROCEDURE — 3288F PR FALLS RISK ASSESSMENT DOCUMENTED: ICD-10-PCS | Mod: CPTII,S$GLB,, | Performed by: NURSE PRACTITIONER

## 2021-01-20 PROCEDURE — 88175 CYTOPATH C/V AUTO FLUID REDO: CPT

## 2021-01-20 PROCEDURE — 99999 PR PBB SHADOW E&M-EST. PATIENT-LVL IV: CPT | Mod: PBBFAC,HCNC,, | Performed by: NURSE PRACTITIONER

## 2021-01-20 PROCEDURE — 99213 OFFICE O/P EST LOW 20 MIN: CPT | Mod: S$GLB,,, | Performed by: NURSE PRACTITIONER

## 2021-01-20 PROCEDURE — 1159F PR MEDICATION LIST DOCUMENTED IN MEDICAL RECORD: ICD-10-PCS | Mod: S$GLB,,, | Performed by: NURSE PRACTITIONER

## 2021-01-20 PROCEDURE — 1101F PT FALLS ASSESS-DOCD LE1/YR: CPT | Mod: CPTII,S$GLB,, | Performed by: NURSE PRACTITIONER

## 2021-01-20 PROCEDURE — 99499 RISK ADDL DX/OHS AUDIT: ICD-10-PCS | Mod: HCNC,S$GLB,, | Performed by: NURSE PRACTITIONER

## 2021-01-31 ENCOUNTER — IMMUNIZATION (OUTPATIENT)
Dept: OBSTETRICS AND GYNECOLOGY | Facility: CLINIC | Age: 81
End: 2021-01-31
Payer: MEDICARE

## 2021-01-31 DIAGNOSIS — Z23 NEED FOR VACCINATION: Primary | ICD-10-CM

## 2021-01-31 PROCEDURE — 0002A COVID-19, MRNA, LNP-S, PF, 30 MCG/0.3 ML DOSE VACCINE: CPT | Mod: PBBFAC | Performed by: FAMILY MEDICINE

## 2021-01-31 PROCEDURE — 91300 COVID-19, MRNA, LNP-S, PF, 30 MCG/0.3 ML DOSE VACCINE: CPT | Mod: PBBFAC | Performed by: FAMILY MEDICINE

## 2021-02-15 LAB
FINAL PATHOLOGIC DIAGNOSIS: NORMAL
Lab: NORMAL

## 2021-02-24 NOTE — TELEPHONE ENCOUNTER
Called patient and gave information on 3 PCP providers on the [facility] that her insurance covered and informed will send her printout from her insurance companies website on them. Informed patient to call number and try to schedule appointment. Patient agreed.   Order set for amiodarone drip received. Verified with Dr. Reyes. Per MD, initiate amiodarone bolus and continue to wean down Cardizem until stopped completely. Give amiodarone bolus slowly over max time, will give over 30 minutes per order for hypotension.   Patient received 150mg amiodarone bolus and started on continuous drip at 1mg/min. Pt tolerating well. Remains AFIB but HR improving, more consistently 80s-120s. Cardizem drip weaned off. BP now improving 100s/60s. Will continue to monitor.     Dr. Reyes updated. Per MD, hold scheduled metoprolol.   Patient remains on amiodarone drip. Over last hour HR has been increasing, now up to 120s-140s. BP remain stable. Amiodarone drip due to be stopped at 0300. Dr. Reyes updated. No new orders received at this time. Will continue to monitor.    Patient's HR remains 120s-150s AFIB despite increased rate of cardizem and one time dose of digoxin IV. BP stable with MAPs >65. Dr. Reyes updated. See new order from metoprolol IV q 6 hrs.   Patient's HR remains 120s-150s AFIB on 15mg/hr cardizem. BP now hypotensive 70s/50s with MAPS low 60s. Dr. Reyes notified. Per MD, begin to wean patient down on cardizem and start amiodarone drip as ordered.   Pt's HR 100s-140s after 10mg IV metoprolol and another increase in cardizem drip to 15mg/hr. BP remain stable with MAPs >65. Dr. Reyes updated. Will continue to monitor at this time.    hair removal not indicated

## 2021-03-03 ENCOUNTER — PES CALL (OUTPATIENT)
Dept: ADMINISTRATIVE | Facility: CLINIC | Age: 81
End: 2021-03-03

## 2021-03-30 ENCOUNTER — TELEPHONE (OUTPATIENT)
Dept: GYNECOLOGIC ONCOLOGY | Facility: CLINIC | Age: 81
End: 2021-03-30

## 2021-04-05 ENCOUNTER — LAB VISIT (OUTPATIENT)
Dept: LAB | Facility: HOSPITAL | Age: 81
End: 2021-04-05
Attending: HOSPITALIST
Payer: MEDICAID

## 2021-04-05 DIAGNOSIS — E03.9 HYPOTHYROIDISM, UNSPECIFIED TYPE: ICD-10-CM

## 2021-04-05 LAB
ALBUMIN SERPL BCP-MCNC: 3.8 G/DL (ref 3.5–5.2)
ALP SERPL-CCNC: 70 U/L (ref 55–135)
ALT SERPL W/O P-5'-P-CCNC: 18 U/L (ref 10–44)
ANION GAP SERPL CALC-SCNC: 9 MMOL/L (ref 8–16)
AST SERPL-CCNC: 20 U/L (ref 10–40)
BILIRUB SERPL-MCNC: 0.4 MG/DL (ref 0.1–1)
BUN SERPL-MCNC: 39 MG/DL (ref 8–23)
CALCIUM SERPL-MCNC: 9.5 MG/DL (ref 8.7–10.5)
CHLORIDE SERPL-SCNC: 102 MMOL/L (ref 95–110)
CO2 SERPL-SCNC: 27 MMOL/L (ref 23–29)
CREAT SERPL-MCNC: 1.4 MG/DL (ref 0.5–1.4)
EST. GFR  (AFRICAN AMERICAN): 41.5 ML/MIN/1.73 M^2
EST. GFR  (NON AFRICAN AMERICAN): 36 ML/MIN/1.73 M^2
ESTIMATED AVG GLUCOSE: 160 MG/DL (ref 68–131)
GLUCOSE SERPL-MCNC: 192 MG/DL (ref 70–110)
HBA1C MFR BLD: 7.2 % (ref 4–5.6)
POTASSIUM SERPL-SCNC: 4.8 MMOL/L (ref 3.5–5.1)
PROT SERPL-MCNC: 7.3 G/DL (ref 6–8.4)
SODIUM SERPL-SCNC: 138 MMOL/L (ref 136–145)
TSH SERPL DL<=0.005 MIU/L-ACNC: 1.81 UIU/ML (ref 0.4–4)

## 2021-04-05 PROCEDURE — 80053 COMPREHEN METABOLIC PANEL: CPT | Performed by: HOSPITALIST

## 2021-04-05 PROCEDURE — 36415 COLL VENOUS BLD VENIPUNCTURE: CPT | Mod: PO | Performed by: HOSPITALIST

## 2021-04-05 PROCEDURE — 83036 HEMOGLOBIN GLYCOSYLATED A1C: CPT | Performed by: HOSPITALIST

## 2021-04-05 PROCEDURE — 84443 ASSAY THYROID STIM HORMONE: CPT | Performed by: HOSPITALIST

## 2021-04-09 ENCOUNTER — PATIENT OUTREACH (OUTPATIENT)
Dept: ADMINISTRATIVE | Facility: OTHER | Age: 81
End: 2021-04-09

## 2021-04-12 ENCOUNTER — OFFICE VISIT (OUTPATIENT)
Dept: ENDOCRINOLOGY | Facility: CLINIC | Age: 81
End: 2021-04-12
Payer: MEDICARE

## 2021-04-12 VITALS
SYSTOLIC BLOOD PRESSURE: 137 MMHG | WEIGHT: 167.69 LBS | BODY MASS INDEX: 32.75 KG/M2 | DIASTOLIC BLOOD PRESSURE: 75 MMHG | TEMPERATURE: 98 F | HEART RATE: 69 BPM

## 2021-04-12 DIAGNOSIS — N18.31 CHRONIC RENAL FAILURE, STAGE 3A: ICD-10-CM

## 2021-04-12 DIAGNOSIS — E78.5 HYPERLIPIDEMIA ASSOCIATED WITH TYPE 2 DIABETES MELLITUS: ICD-10-CM

## 2021-04-12 DIAGNOSIS — E11.65 TYPE 2 DIABETES MELLITUS WITH HYPERGLYCEMIA, WITH LONG-TERM CURRENT USE OF INSULIN: Primary | ICD-10-CM

## 2021-04-12 DIAGNOSIS — M81.0 AGE-RELATED OSTEOPOROSIS WITHOUT CURRENT PATHOLOGICAL FRACTURE: ICD-10-CM

## 2021-04-12 DIAGNOSIS — E11.649 HYPOGLYCEMIA ASSOCIATED WITH DIABETES: ICD-10-CM

## 2021-04-12 DIAGNOSIS — E66.9 CLASS 1 OBESITY WITH SERIOUS COMORBIDITY AND BODY MASS INDEX (BMI) OF 32.0 TO 32.9 IN ADULT, UNSPECIFIED OBESITY TYPE: ICD-10-CM

## 2021-04-12 DIAGNOSIS — E03.9 HYPOTHYROIDISM, UNSPECIFIED TYPE: ICD-10-CM

## 2021-04-12 DIAGNOSIS — E11.69 HYPERLIPIDEMIA ASSOCIATED WITH TYPE 2 DIABETES MELLITUS: ICD-10-CM

## 2021-04-12 DIAGNOSIS — Z79.4 TYPE 2 DIABETES MELLITUS WITH HYPERGLYCEMIA, WITH LONG-TERM CURRENT USE OF INSULIN: Primary | ICD-10-CM

## 2021-04-12 PROCEDURE — 3051F HG A1C>EQUAL 7.0%<8.0%: CPT | Mod: CPTII,S$GLB,, | Performed by: HOSPITALIST

## 2021-04-12 PROCEDURE — 3051F PR MOST RECENT HEMOGLOBIN A1C LEVEL 7.0 - < 8.0%: ICD-10-PCS | Mod: CPTII,S$GLB,, | Performed by: HOSPITALIST

## 2021-04-12 PROCEDURE — 99999 PR PBB SHADOW E&M-EST. PATIENT-LVL V: ICD-10-PCS | Mod: PBBFAC,,, | Performed by: HOSPITALIST

## 2021-04-12 PROCEDURE — 1126F AMNT PAIN NOTED NONE PRSNT: CPT | Mod: S$GLB,,, | Performed by: HOSPITALIST

## 2021-04-12 PROCEDURE — 1159F MED LIST DOCD IN RCRD: CPT | Mod: S$GLB,,, | Performed by: HOSPITALIST

## 2021-04-12 PROCEDURE — 99499 UNLISTED E&M SERVICE: CPT | Mod: HCNC,S$GLB,, | Performed by: HOSPITALIST

## 2021-04-12 PROCEDURE — 99214 OFFICE O/P EST MOD 30 MIN: CPT | Mod: 25,S$GLB,, | Performed by: HOSPITALIST

## 2021-04-12 PROCEDURE — 99214 PR OFFICE/OUTPT VISIT, EST, LEVL IV, 30-39 MIN: ICD-10-PCS | Mod: 25,S$GLB,, | Performed by: HOSPITALIST

## 2021-04-12 PROCEDURE — 1159F PR MEDICATION LIST DOCUMENTED IN MEDICAL RECORD: ICD-10-PCS | Mod: S$GLB,,, | Performed by: HOSPITALIST

## 2021-04-12 PROCEDURE — 1126F PR PAIN SEVERITY QUANTIFIED, NO PAIN PRESENT: ICD-10-PCS | Mod: S$GLB,,, | Performed by: HOSPITALIST

## 2021-04-12 PROCEDURE — 95251 PR GLUCOSE MONITOR, 72 HOUR, PHYS INTERP: ICD-10-PCS | Mod: S$GLB,,, | Performed by: HOSPITALIST

## 2021-04-12 PROCEDURE — 95251 CONT GLUC MNTR ANALYSIS I&R: CPT | Mod: S$GLB,,, | Performed by: HOSPITALIST

## 2021-04-12 PROCEDURE — 99999 PR PBB SHADOW E&M-EST. PATIENT-LVL V: CPT | Mod: PBBFAC,,, | Performed by: HOSPITALIST

## 2021-04-12 PROCEDURE — 99499 RISK ADDL DX/OHS AUDIT: ICD-10-PCS | Mod: HCNC,S$GLB,, | Performed by: HOSPITALIST

## 2021-04-12 RX ORDER — KETOROLAC TROMETHAMINE 4 MG/ML
1 SOLUTION/ DROPS OPHTHALMIC 2 TIMES DAILY
COMMUNITY
Start: 2021-03-02

## 2021-04-12 RX ORDER — NETARSUDIL 0.2 MG/ML
1 SOLUTION/ DROPS OPHTHALMIC; TOPICAL DAILY
COMMUNITY
Start: 2021-03-02

## 2021-04-12 RX ORDER — NEOMYCIN SULFATE, POLYMYXIN B SULFATE AND DEXAMETHASONE 3.5; 10000; 1 MG/G; [USP'U]/G; MG/G
OINTMENT OPHTHALMIC
COMMUNITY
Start: 2021-03-02

## 2021-04-12 RX ORDER — LOTEPREDNOL ETABONATE 3.8 MG/G
GEL OPHTHALMIC
COMMUNITY
Start: 2021-04-05

## 2021-04-21 ENCOUNTER — TELEPHONE (OUTPATIENT)
Dept: FAMILY MEDICINE | Facility: CLINIC | Age: 81
End: 2021-04-21

## 2021-04-27 ENCOUNTER — OFFICE VISIT (OUTPATIENT)
Dept: FAMILY MEDICINE | Facility: CLINIC | Age: 81
End: 2021-04-27
Payer: MEDICARE

## 2021-04-27 VITALS — DIASTOLIC BLOOD PRESSURE: 64 MMHG | SYSTOLIC BLOOD PRESSURE: 112 MMHG

## 2021-04-27 DIAGNOSIS — E78.5 HYPERLIPIDEMIA ASSOCIATED WITH TYPE 2 DIABETES MELLITUS: Primary | ICD-10-CM

## 2021-04-27 DIAGNOSIS — M81.0 AGE-RELATED OSTEOPOROSIS WITHOUT CURRENT PATHOLOGICAL FRACTURE: ICD-10-CM

## 2021-04-27 DIAGNOSIS — E11.69 HYPERLIPIDEMIA ASSOCIATED WITH TYPE 2 DIABETES MELLITUS: Primary | ICD-10-CM

## 2021-04-27 DIAGNOSIS — I70.0 ATHEROSCLEROSIS OF AORTA: ICD-10-CM

## 2021-04-27 PROBLEM — E66.01 SEVERE OBESITY (BMI 35.0-35.9 WITH COMORBIDITY): Status: RESOLVED | Noted: 2019-05-01 | Resolved: 2021-04-27

## 2021-04-27 PROCEDURE — 3051F HG A1C>EQUAL 7.0%<8.0%: CPT | Mod: CPTII,95,, | Performed by: INTERNAL MEDICINE

## 2021-04-27 PROCEDURE — 99442 PR PHYSICIAN TELEPHONE EVALUATION 11-20 MIN: ICD-10-PCS | Mod: 95,,, | Performed by: INTERNAL MEDICINE

## 2021-04-27 PROCEDURE — 3051F PR MOST RECENT HEMOGLOBIN A1C LEVEL 7.0 - < 8.0%: ICD-10-PCS | Mod: CPTII,95,, | Performed by: INTERNAL MEDICINE

## 2021-04-27 PROCEDURE — 1159F MED LIST DOCD IN RCRD: CPT | Mod: 95,,, | Performed by: INTERNAL MEDICINE

## 2021-04-27 PROCEDURE — 99442 PR PHYSICIAN TELEPHONE EVALUATION 11-20 MIN: CPT | Mod: 95,,, | Performed by: INTERNAL MEDICINE

## 2021-04-27 PROCEDURE — 1159F PR MEDICATION LIST DOCUMENTED IN MEDICAL RECORD: ICD-10-PCS | Mod: 95,,, | Performed by: INTERNAL MEDICINE

## 2021-04-27 RX ORDER — ROSUVASTATIN CALCIUM 10 MG/1
10 TABLET, COATED ORAL DAILY
Qty: 90 TABLET | Refills: 1 | Status: SHIPPED | OUTPATIENT
Start: 2021-04-27 | End: 2021-10-25

## 2021-04-27 RX ORDER — ALENDRONATE SODIUM 70 MG/1
70 TABLET ORAL
Qty: 12 TABLET | Refills: 1 | Status: SHIPPED | OUTPATIENT
Start: 2021-04-27 | End: 2021-10-04

## 2021-04-29 ENCOUNTER — OFFICE VISIT (OUTPATIENT)
Dept: GYNECOLOGIC ONCOLOGY | Facility: CLINIC | Age: 81
End: 2021-04-29
Payer: MEDICARE

## 2021-04-29 VITALS
DIASTOLIC BLOOD PRESSURE: 69 MMHG | HEART RATE: 77 BPM | SYSTOLIC BLOOD PRESSURE: 138 MMHG | WEIGHT: 168 LBS | BODY MASS INDEX: 32.81 KG/M2

## 2021-04-29 DIAGNOSIS — C53.0 MALIGNANT NEOPLASM OF ENDOCERVIX: Primary | ICD-10-CM

## 2021-04-29 PROCEDURE — 99214 PR OFFICE/OUTPT VISIT, EST, LEVL IV, 30-39 MIN: ICD-10-PCS | Mod: S$GLB,,, | Performed by: OBSTETRICS & GYNECOLOGY

## 2021-04-29 PROCEDURE — 1126F AMNT PAIN NOTED NONE PRSNT: CPT | Mod: S$GLB,,, | Performed by: OBSTETRICS & GYNECOLOGY

## 2021-04-29 PROCEDURE — 99499 RISK ADDL DX/OHS AUDIT: ICD-10-PCS | Mod: HCNC,S$GLB,, | Performed by: OBSTETRICS & GYNECOLOGY

## 2021-04-29 PROCEDURE — 1126F PR PAIN SEVERITY QUANTIFIED, NO PAIN PRESENT: ICD-10-PCS | Mod: S$GLB,,, | Performed by: OBSTETRICS & GYNECOLOGY

## 2021-04-29 PROCEDURE — 99999 PR PBB SHADOW E&M-EST. PATIENT-LVL IV: CPT | Mod: PBBFAC,,, | Performed by: OBSTETRICS & GYNECOLOGY

## 2021-04-29 PROCEDURE — 99999 PR PBB SHADOW E&M-EST. PATIENT-LVL IV: ICD-10-PCS | Mod: PBBFAC,,, | Performed by: OBSTETRICS & GYNECOLOGY

## 2021-04-29 PROCEDURE — 1101F PT FALLS ASSESS-DOCD LE1/YR: CPT | Mod: CPTII,S$GLB,, | Performed by: OBSTETRICS & GYNECOLOGY

## 2021-04-29 PROCEDURE — 1159F PR MEDICATION LIST DOCUMENTED IN MEDICAL RECORD: ICD-10-PCS | Mod: S$GLB,,, | Performed by: OBSTETRICS & GYNECOLOGY

## 2021-04-29 PROCEDURE — 3288F FALL RISK ASSESSMENT DOCD: CPT | Mod: CPTII,S$GLB,, | Performed by: OBSTETRICS & GYNECOLOGY

## 2021-04-29 PROCEDURE — 1101F PR PT FALLS ASSESS DOC 0-1 FALLS W/OUT INJ PAST YR: ICD-10-PCS | Mod: CPTII,S$GLB,, | Performed by: OBSTETRICS & GYNECOLOGY

## 2021-04-29 PROCEDURE — 1159F MED LIST DOCD IN RCRD: CPT | Mod: S$GLB,,, | Performed by: OBSTETRICS & GYNECOLOGY

## 2021-04-29 PROCEDURE — 3288F PR FALLS RISK ASSESSMENT DOCUMENTED: ICD-10-PCS | Mod: CPTII,S$GLB,, | Performed by: OBSTETRICS & GYNECOLOGY

## 2021-04-29 PROCEDURE — 99214 OFFICE O/P EST MOD 30 MIN: CPT | Mod: S$GLB,,, | Performed by: OBSTETRICS & GYNECOLOGY

## 2021-04-29 PROCEDURE — 99499 UNLISTED E&M SERVICE: CPT | Mod: HCNC,S$GLB,, | Performed by: OBSTETRICS & GYNECOLOGY

## 2021-06-28 ENCOUNTER — LAB VISIT (OUTPATIENT)
Dept: LAB | Facility: HOSPITAL | Age: 81
End: 2021-06-28
Attending: HOSPITALIST
Payer: MEDICARE

## 2021-06-28 DIAGNOSIS — E11.649 HYPOGLYCEMIA ASSOCIATED WITH DIABETES: ICD-10-CM

## 2021-06-28 LAB
ALBUMIN SERPL BCP-MCNC: 3.8 G/DL (ref 3.5–5.2)
ALP SERPL-CCNC: 69 U/L (ref 55–135)
ALT SERPL W/O P-5'-P-CCNC: 20 U/L (ref 10–44)
ANION GAP SERPL CALC-SCNC: 11 MMOL/L (ref 8–16)
AST SERPL-CCNC: 22 U/L (ref 10–40)
BILIRUB SERPL-MCNC: 0.7 MG/DL (ref 0.1–1)
BUN SERPL-MCNC: 25 MG/DL (ref 8–23)
CALCIUM SERPL-MCNC: 9.8 MG/DL (ref 8.7–10.5)
CHLORIDE SERPL-SCNC: 102 MMOL/L (ref 95–110)
CO2 SERPL-SCNC: 25 MMOL/L (ref 23–29)
CREAT SERPL-MCNC: 1.1 MG/DL (ref 0.5–1.4)
EST. GFR  (AFRICAN AMERICAN): 55.6 ML/MIN/1.73 M^2
EST. GFR  (NON AFRICAN AMERICAN): 48.2 ML/MIN/1.73 M^2
ESTIMATED AVG GLUCOSE: 157 MG/DL (ref 68–131)
GLUCOSE SERPL-MCNC: 216 MG/DL (ref 70–110)
HBA1C MFR BLD: 7.1 % (ref 4–5.6)
POTASSIUM SERPL-SCNC: 4.5 MMOL/L (ref 3.5–5.1)
PROT SERPL-MCNC: 7.2 G/DL (ref 6–8.4)
SODIUM SERPL-SCNC: 138 MMOL/L (ref 136–145)

## 2021-06-28 PROCEDURE — 83036 HEMOGLOBIN GLYCOSYLATED A1C: CPT | Performed by: HOSPITALIST

## 2021-06-28 PROCEDURE — 36415 COLL VENOUS BLD VENIPUNCTURE: CPT | Mod: PO | Performed by: HOSPITALIST

## 2021-06-28 PROCEDURE — 80053 COMPREHEN METABOLIC PANEL: CPT | Performed by: HOSPITALIST

## 2021-07-02 ENCOUNTER — OFFICE VISIT (OUTPATIENT)
Dept: RHEUMATOLOGY | Facility: CLINIC | Age: 81
End: 2021-07-02
Payer: MEDICARE

## 2021-07-02 ENCOUNTER — LAB VISIT (OUTPATIENT)
Dept: LAB | Facility: HOSPITAL | Age: 81
End: 2021-07-02
Attending: INTERNAL MEDICINE
Payer: MEDICARE

## 2021-07-02 VITALS
OXYGEN SATURATION: 95 % | BODY MASS INDEX: 34.15 KG/M2 | SYSTOLIC BLOOD PRESSURE: 135 MMHG | WEIGHT: 173.94 LBS | TEMPERATURE: 98 F | DIASTOLIC BLOOD PRESSURE: 77 MMHG | HEART RATE: 78 BPM | RESPIRATION RATE: 18 BRPM | HEIGHT: 60 IN

## 2021-07-02 DIAGNOSIS — M79.7 FIBROMYALGIA: ICD-10-CM

## 2021-07-02 DIAGNOSIS — M15.9 PRIMARY OSTEOARTHRITIS INVOLVING MULTIPLE JOINTS: ICD-10-CM

## 2021-07-02 DIAGNOSIS — M81.0 OSTEOPOROSIS, UNSPECIFIED OSTEOPOROSIS TYPE, UNSPECIFIED PATHOLOGICAL FRACTURE PRESENCE: ICD-10-CM

## 2021-07-02 DIAGNOSIS — E66.9 CLASS 1 OBESITY WITH BODY MASS INDEX (BMI) OF 33.0 TO 33.9 IN ADULT, UNSPECIFIED OBESITY TYPE, UNSPECIFIED WHETHER SERIOUS COMORBIDITY PRESENT: ICD-10-CM

## 2021-07-02 DIAGNOSIS — Z71.89 COUNSELING AND COORDINATION OF CARE: ICD-10-CM

## 2021-07-02 DIAGNOSIS — M15.9 PRIMARY OSTEOARTHRITIS INVOLVING MULTIPLE JOINTS: Primary | ICD-10-CM

## 2021-07-02 LAB
ALBUMIN SERPL BCP-MCNC: 4 G/DL (ref 3.5–5.2)
ALP SERPL-CCNC: 72 U/L (ref 55–135)
ALT SERPL W/O P-5'-P-CCNC: 22 U/L (ref 10–44)
ANION GAP SERPL CALC-SCNC: 11 MMOL/L (ref 8–16)
AST SERPL-CCNC: 25 U/L (ref 10–40)
BASOPHILS # BLD AUTO: 0.07 K/UL (ref 0–0.2)
BASOPHILS NFR BLD: 0.9 % (ref 0–1.9)
BILIRUB SERPL-MCNC: 0.4 MG/DL (ref 0.1–1)
BUN SERPL-MCNC: 19 MG/DL (ref 8–23)
CALCIUM SERPL-MCNC: 10.2 MG/DL (ref 8.7–10.5)
CCP AB SER IA-ACNC: 0.7 U/ML
CHLORIDE SERPL-SCNC: 103 MMOL/L (ref 95–110)
CO2 SERPL-SCNC: 25 MMOL/L (ref 23–29)
CREAT SERPL-MCNC: 1.1 MG/DL (ref 0.5–1.4)
CRP SERPL-MCNC: 0.7 MG/L (ref 0–8.2)
DIFFERENTIAL METHOD: NORMAL
EOSINOPHIL # BLD AUTO: 0.3 K/UL (ref 0–0.5)
EOSINOPHIL NFR BLD: 4.5 % (ref 0–8)
ERYTHROCYTE [DISTWIDTH] IN BLOOD BY AUTOMATED COUNT: 12.9 % (ref 11.5–14.5)
ERYTHROCYTE [SEDIMENTATION RATE] IN BLOOD BY WESTERGREN METHOD: 18 MM/HR (ref 0–36)
EST. GFR  (AFRICAN AMERICAN): 55.6 ML/MIN/1.73 M^2
EST. GFR  (NON AFRICAN AMERICAN): 48.2 ML/MIN/1.73 M^2
GLUCOSE SERPL-MCNC: 189 MG/DL (ref 70–110)
HCT VFR BLD AUTO: 41.1 % (ref 37–48.5)
HGB BLD-MCNC: 13.4 G/DL (ref 12–16)
IMM GRANULOCYTES # BLD AUTO: 0.02 K/UL (ref 0–0.04)
IMM GRANULOCYTES NFR BLD AUTO: 0.3 % (ref 0–0.5)
LYMPHOCYTES # BLD AUTO: 1.7 K/UL (ref 1–4.8)
LYMPHOCYTES NFR BLD: 23.4 % (ref 18–48)
MCH RBC QN AUTO: 29.6 PG (ref 27–31)
MCHC RBC AUTO-ENTMCNC: 32.6 G/DL (ref 32–36)
MCV RBC AUTO: 91 FL (ref 82–98)
MONOCYTES # BLD AUTO: 0.6 K/UL (ref 0.3–1)
MONOCYTES NFR BLD: 8.3 % (ref 4–15)
NEUTROPHILS # BLD AUTO: 4.6 K/UL (ref 1.8–7.7)
NEUTROPHILS NFR BLD: 62.6 % (ref 38–73)
NRBC BLD-RTO: 0 /100 WBC
PLATELET # BLD AUTO: 255 K/UL (ref 150–450)
PMV BLD AUTO: 10.7 FL (ref 9.2–12.9)
POTASSIUM SERPL-SCNC: 5 MMOL/L (ref 3.5–5.1)
PROT SERPL-MCNC: 7.4 G/DL (ref 6–8.4)
RBC # BLD AUTO: 4.52 M/UL (ref 4–5.4)
RHEUMATOID FACT SERPL-ACNC: 14 IU/ML (ref 0–15)
SODIUM SERPL-SCNC: 139 MMOL/L (ref 136–145)
URATE SERPL-MCNC: 4.7 MG/DL (ref 2.4–5.7)
WBC # BLD AUTO: 7.38 K/UL (ref 3.9–12.7)

## 2021-07-02 PROCEDURE — 3288F PR FALLS RISK ASSESSMENT DOCUMENTED: ICD-10-PCS | Mod: CPTII,S$GLB,, | Performed by: INTERNAL MEDICINE

## 2021-07-02 PROCEDURE — 99999 PR PBB SHADOW E&M-EST. PATIENT-LVL V: CPT | Mod: PBBFAC,,, | Performed by: INTERNAL MEDICINE

## 2021-07-02 PROCEDURE — 36415 COLL VENOUS BLD VENIPUNCTURE: CPT | Mod: PO | Performed by: INTERNAL MEDICINE

## 2021-07-02 PROCEDURE — 86200 CCP ANTIBODY: CPT | Performed by: INTERNAL MEDICINE

## 2021-07-02 PROCEDURE — 83520 IMMUNOASSAY QUANT NOS NONAB: CPT | Performed by: INTERNAL MEDICINE

## 2021-07-02 PROCEDURE — 1159F PR MEDICATION LIST DOCUMENTED IN MEDICAL RECORD: ICD-10-PCS | Mod: S$GLB,,, | Performed by: INTERNAL MEDICINE

## 2021-07-02 PROCEDURE — 85025 COMPLETE CBC W/AUTO DIFF WBC: CPT | Performed by: INTERNAL MEDICINE

## 2021-07-02 PROCEDURE — 1159F MED LIST DOCD IN RCRD: CPT | Mod: S$GLB,,, | Performed by: INTERNAL MEDICINE

## 2021-07-02 PROCEDURE — 86140 C-REACTIVE PROTEIN: CPT | Performed by: INTERNAL MEDICINE

## 2021-07-02 PROCEDURE — 86431 RHEUMATOID FACTOR QUANT: CPT | Performed by: INTERNAL MEDICINE

## 2021-07-02 PROCEDURE — 1101F PT FALLS ASSESS-DOCD LE1/YR: CPT | Mod: CPTII,S$GLB,, | Performed by: INTERNAL MEDICINE

## 2021-07-02 PROCEDURE — 84550 ASSAY OF BLOOD/URIC ACID: CPT | Performed by: INTERNAL MEDICINE

## 2021-07-02 PROCEDURE — 1101F PR PT FALLS ASSESS DOC 0-1 FALLS W/OUT INJ PAST YR: ICD-10-PCS | Mod: CPTII,S$GLB,, | Performed by: INTERNAL MEDICINE

## 2021-07-02 PROCEDURE — 80053 COMPREHEN METABOLIC PANEL: CPT | Performed by: INTERNAL MEDICINE

## 2021-07-02 PROCEDURE — 85652 RBC SED RATE AUTOMATED: CPT | Performed by: INTERNAL MEDICINE

## 2021-07-02 PROCEDURE — 99499 UNLISTED E&M SERVICE: CPT | Mod: S$GLB,,, | Performed by: INTERNAL MEDICINE

## 2021-07-02 PROCEDURE — 99204 OFFICE O/P NEW MOD 45 MIN: CPT | Mod: S$GLB,,, | Performed by: INTERNAL MEDICINE

## 2021-07-02 PROCEDURE — 99499 RISK ADDL DX/OHS AUDIT: ICD-10-PCS | Mod: S$GLB,,, | Performed by: INTERNAL MEDICINE

## 2021-07-02 PROCEDURE — 3288F FALL RISK ASSESSMENT DOCD: CPT | Mod: CPTII,S$GLB,, | Performed by: INTERNAL MEDICINE

## 2021-07-02 PROCEDURE — 99999 PR PBB SHADOW E&M-EST. PATIENT-LVL V: ICD-10-PCS | Mod: PBBFAC,,, | Performed by: INTERNAL MEDICINE

## 2021-07-02 PROCEDURE — 99204 PR OFFICE/OUTPT VISIT, NEW, LEVL IV, 45-59 MIN: ICD-10-PCS | Mod: S$GLB,,, | Performed by: INTERNAL MEDICINE

## 2021-07-02 RX ORDER — GABAPENTIN 300 MG/1
300 CAPSULE ORAL NIGHTLY
Qty: 30 CAPSULE | Refills: 11 | Status: SHIPPED | OUTPATIENT
Start: 2021-07-02 | End: 2023-08-11

## 2021-07-08 LAB — IL6 SERPL-MCNC: 2.2 PG/ML

## 2021-07-12 ENCOUNTER — TELEPHONE (OUTPATIENT)
Dept: ENDOCRINOLOGY | Facility: CLINIC | Age: 81
End: 2021-07-12

## 2021-07-12 ENCOUNTER — TELEPHONE (OUTPATIENT)
Dept: FAMILY MEDICINE | Facility: CLINIC | Age: 81
End: 2021-07-12

## 2021-07-29 ENCOUNTER — OFFICE VISIT (OUTPATIENT)
Dept: ENDOCRINOLOGY | Facility: CLINIC | Age: 81
End: 2021-07-29
Payer: MEDICARE

## 2021-07-29 VITALS
TEMPERATURE: 98 F | HEART RATE: 84 BPM | DIASTOLIC BLOOD PRESSURE: 60 MMHG | SYSTOLIC BLOOD PRESSURE: 108 MMHG | BODY MASS INDEX: 32.97 KG/M2 | WEIGHT: 168.81 LBS

## 2021-07-29 DIAGNOSIS — E78.2 MIXED HYPERLIPIDEMIA: ICD-10-CM

## 2021-07-29 DIAGNOSIS — N18.31 CHRONIC RENAL FAILURE, STAGE 3A: ICD-10-CM

## 2021-07-29 DIAGNOSIS — H54.7 VISUAL IMPAIRMENT DUE TO DIABETES MELLITUS: ICD-10-CM

## 2021-07-29 DIAGNOSIS — M81.0 AGE-RELATED OSTEOPOROSIS WITHOUT CURRENT PATHOLOGICAL FRACTURE: ICD-10-CM

## 2021-07-29 DIAGNOSIS — E11.65 CONTROLLED TYPE 2 DIABETES MELLITUS WITH HYPERGLYCEMIA, WITH LONG-TERM CURRENT USE OF INSULIN: ICD-10-CM

## 2021-07-29 DIAGNOSIS — E11.39 VISUAL IMPAIRMENT DUE TO DIABETES MELLITUS: ICD-10-CM

## 2021-07-29 DIAGNOSIS — Z79.4 CONTROLLED TYPE 2 DIABETES MELLITUS WITH HYPERGLYCEMIA, WITH LONG-TERM CURRENT USE OF INSULIN: ICD-10-CM

## 2021-07-29 DIAGNOSIS — E03.9 HYPOTHYROIDISM, UNSPECIFIED TYPE: ICD-10-CM

## 2021-07-29 DIAGNOSIS — E11.65 TYPE 2 DIABETES MELLITUS WITH HYPERGLYCEMIA, WITH LONG-TERM CURRENT USE OF INSULIN: Primary | ICD-10-CM

## 2021-07-29 DIAGNOSIS — E11.69 HYPERLIPIDEMIA ASSOCIATED WITH TYPE 2 DIABETES MELLITUS: ICD-10-CM

## 2021-07-29 DIAGNOSIS — E78.5 HYPERLIPIDEMIA ASSOCIATED WITH TYPE 2 DIABETES MELLITUS: ICD-10-CM

## 2021-07-29 DIAGNOSIS — Z79.4 TYPE 2 DIABETES MELLITUS WITH HYPERGLYCEMIA, WITH LONG-TERM CURRENT USE OF INSULIN: Primary | ICD-10-CM

## 2021-07-29 PROCEDURE — 1159F MED LIST DOCD IN RCRD: CPT | Mod: CPTII,S$GLB,, | Performed by: HOSPITALIST

## 2021-07-29 PROCEDURE — 3074F SYST BP LT 130 MM HG: CPT | Mod: CPTII,S$GLB,, | Performed by: HOSPITALIST

## 2021-07-29 PROCEDURE — 3051F PR MOST RECENT HEMOGLOBIN A1C LEVEL 7.0 - < 8.0%: ICD-10-PCS | Mod: CPTII,S$GLB,, | Performed by: HOSPITALIST

## 2021-07-29 PROCEDURE — 99214 OFFICE O/P EST MOD 30 MIN: CPT | Mod: S$GLB,,, | Performed by: HOSPITALIST

## 2021-07-29 PROCEDURE — 3078F PR MOST RECENT DIASTOLIC BLOOD PRESSURE < 80 MM HG: ICD-10-PCS | Mod: CPTII,S$GLB,, | Performed by: HOSPITALIST

## 2021-07-29 PROCEDURE — 3051F HG A1C>EQUAL 7.0%<8.0%: CPT | Mod: CPTII,S$GLB,, | Performed by: HOSPITALIST

## 2021-07-29 PROCEDURE — 3074F PR MOST RECENT SYSTOLIC BLOOD PRESSURE < 130 MM HG: ICD-10-PCS | Mod: CPTII,S$GLB,, | Performed by: HOSPITALIST

## 2021-07-29 PROCEDURE — 3078F DIAST BP <80 MM HG: CPT | Mod: CPTII,S$GLB,, | Performed by: HOSPITALIST

## 2021-07-29 PROCEDURE — 95251 CONT GLUC MNTR ANALYSIS I&R: CPT | Mod: S$GLB,,, | Performed by: HOSPITALIST

## 2021-07-29 PROCEDURE — 99214 PR OFFICE/OUTPT VISIT, EST, LEVL IV, 30-39 MIN: ICD-10-PCS | Mod: S$GLB,,, | Performed by: HOSPITALIST

## 2021-07-29 PROCEDURE — 1126F PR PAIN SEVERITY QUANTIFIED, NO PAIN PRESENT: ICD-10-PCS | Mod: CPTII,S$GLB,, | Performed by: HOSPITALIST

## 2021-07-29 PROCEDURE — 1126F AMNT PAIN NOTED NONE PRSNT: CPT | Mod: CPTII,S$GLB,, | Performed by: HOSPITALIST

## 2021-07-29 PROCEDURE — 99499 UNLISTED E&M SERVICE: CPT | Mod: S$GLB,,, | Performed by: HOSPITALIST

## 2021-07-29 PROCEDURE — 99999 PR PBB SHADOW E&M-EST. PATIENT-LVL V: ICD-10-PCS | Mod: PBBFAC,,, | Performed by: HOSPITALIST

## 2021-07-29 PROCEDURE — 95251 PR GLUCOSE MONITOR, 72 HOUR, PHYS INTERP: ICD-10-PCS | Mod: S$GLB,,, | Performed by: HOSPITALIST

## 2021-07-29 PROCEDURE — 1159F PR MEDICATION LIST DOCUMENTED IN MEDICAL RECORD: ICD-10-PCS | Mod: CPTII,S$GLB,, | Performed by: HOSPITALIST

## 2021-07-29 PROCEDURE — 99999 PR PBB SHADOW E&M-EST. PATIENT-LVL V: CPT | Mod: PBBFAC,,, | Performed by: HOSPITALIST

## 2021-07-29 PROCEDURE — 1160F RVW MEDS BY RX/DR IN RCRD: CPT | Mod: CPTII,S$GLB,, | Performed by: HOSPITALIST

## 2021-07-29 PROCEDURE — 99499 RISK ADDL DX/OHS AUDIT: ICD-10-PCS | Mod: S$GLB,,, | Performed by: HOSPITALIST

## 2021-07-29 PROCEDURE — 1160F PR REVIEW ALL MEDS BY PRESCRIBER/CLIN PHARMACIST DOCUMENTED: ICD-10-PCS | Mod: CPTII,S$GLB,, | Performed by: HOSPITALIST

## 2021-07-29 RX ORDER — INSULIN ASPART 100 [IU]/ML
INJECTION, SOLUTION INTRAVENOUS; SUBCUTANEOUS
Qty: 15 ML | Refills: 11
Start: 2021-07-29 | End: 2021-11-02 | Stop reason: SDUPTHER

## 2021-07-29 RX ORDER — INSULIN GLARGINE 100 [IU]/ML
INJECTION, SOLUTION SUBCUTANEOUS
Qty: 15 ML | Refills: 11
Start: 2021-07-29 | End: 2021-11-02 | Stop reason: SDUPTHER

## 2021-08-02 ENCOUNTER — PES CALL (OUTPATIENT)
Dept: ADMINISTRATIVE | Facility: CLINIC | Age: 81
End: 2021-08-02

## 2021-08-18 ENCOUNTER — TELEPHONE (OUTPATIENT)
Dept: FAMILY MEDICINE | Facility: CLINIC | Age: 81
End: 2021-08-18

## 2021-09-09 ENCOUNTER — OFFICE VISIT (OUTPATIENT)
Dept: FAMILY MEDICINE | Facility: CLINIC | Age: 81
End: 2021-09-09
Payer: MEDICARE

## 2021-09-09 DIAGNOSIS — Z71.89 MEDICATION CARE PLAN DISCUSSED WITH PATIENT: Primary | ICD-10-CM

## 2021-09-09 DIAGNOSIS — G89.29 OTHER CHRONIC PAIN: ICD-10-CM

## 2021-09-09 PROCEDURE — 1159F PR MEDICATION LIST DOCUMENTED IN MEDICAL RECORD: ICD-10-PCS | Mod: CPTII,95,, | Performed by: INTERNAL MEDICINE

## 2021-09-09 PROCEDURE — 1160F RVW MEDS BY RX/DR IN RCRD: CPT | Mod: CPTII,95,, | Performed by: INTERNAL MEDICINE

## 2021-09-09 PROCEDURE — 99442 PR PHYSICIAN TELEPHONE EVALUATION 11-20 MIN: CPT | Mod: 95,,, | Performed by: INTERNAL MEDICINE

## 2021-09-09 PROCEDURE — 99442 PR PHYSICIAN TELEPHONE EVALUATION 11-20 MIN: ICD-10-PCS | Mod: 95,,, | Performed by: INTERNAL MEDICINE

## 2021-09-09 PROCEDURE — 1159F MED LIST DOCD IN RCRD: CPT | Mod: CPTII,95,, | Performed by: INTERNAL MEDICINE

## 2021-09-09 PROCEDURE — 1160F PR REVIEW ALL MEDS BY PRESCRIBER/CLIN PHARMACIST DOCUMENTED: ICD-10-PCS | Mod: CPTII,95,, | Performed by: INTERNAL MEDICINE

## 2021-09-15 ENCOUNTER — PATIENT MESSAGE (OUTPATIENT)
Dept: GYNECOLOGIC ONCOLOGY | Facility: CLINIC | Age: 81
End: 2021-09-15

## 2021-09-28 ENCOUNTER — OFFICE VISIT (OUTPATIENT)
Dept: GYNECOLOGIC ONCOLOGY | Facility: CLINIC | Age: 81
End: 2021-09-28
Payer: MEDICARE

## 2021-09-28 VITALS
BODY MASS INDEX: 32.42 KG/M2 | WEIGHT: 166 LBS | HEART RATE: 77 BPM | SYSTOLIC BLOOD PRESSURE: 126 MMHG | DIASTOLIC BLOOD PRESSURE: 62 MMHG

## 2021-09-28 DIAGNOSIS — C53.9 CERVICAL CANCER, FIGO STAGE IA1: Primary | ICD-10-CM

## 2021-09-28 PROCEDURE — 3288F FALL RISK ASSESSMENT DOCD: CPT | Mod: CPTII,S$GLB,, | Performed by: OBSTETRICS & GYNECOLOGY

## 2021-09-28 PROCEDURE — 99214 OFFICE O/P EST MOD 30 MIN: CPT | Mod: S$GLB,,, | Performed by: OBSTETRICS & GYNECOLOGY

## 2021-09-28 PROCEDURE — 99999 PR PBB SHADOW E&M-EST. PATIENT-LVL IV: CPT | Mod: PBBFAC,,, | Performed by: OBSTETRICS & GYNECOLOGY

## 2021-09-28 PROCEDURE — 99214 PR OFFICE/OUTPT VISIT, EST, LEVL IV, 30-39 MIN: ICD-10-PCS | Mod: S$GLB,,, | Performed by: OBSTETRICS & GYNECOLOGY

## 2021-09-28 PROCEDURE — 1160F RVW MEDS BY RX/DR IN RCRD: CPT | Mod: CPTII,S$GLB,, | Performed by: OBSTETRICS & GYNECOLOGY

## 2021-09-28 PROCEDURE — 3078F PR MOST RECENT DIASTOLIC BLOOD PRESSURE < 80 MM HG: ICD-10-PCS | Mod: CPTII,S$GLB,, | Performed by: OBSTETRICS & GYNECOLOGY

## 2021-09-28 PROCEDURE — 1159F MED LIST DOCD IN RCRD: CPT | Mod: CPTII,S$GLB,, | Performed by: OBSTETRICS & GYNECOLOGY

## 2021-09-28 PROCEDURE — 99999 PR PBB SHADOW E&M-EST. PATIENT-LVL IV: ICD-10-PCS | Mod: PBBFAC,,, | Performed by: OBSTETRICS & GYNECOLOGY

## 2021-09-28 PROCEDURE — 1101F PR PT FALLS ASSESS DOC 0-1 FALLS W/OUT INJ PAST YR: ICD-10-PCS | Mod: CPTII,S$GLB,, | Performed by: OBSTETRICS & GYNECOLOGY

## 2021-09-28 PROCEDURE — 3288F PR FALLS RISK ASSESSMENT DOCUMENTED: ICD-10-PCS | Mod: CPTII,S$GLB,, | Performed by: OBSTETRICS & GYNECOLOGY

## 2021-09-28 PROCEDURE — 1126F PR PAIN SEVERITY QUANTIFIED, NO PAIN PRESENT: ICD-10-PCS | Mod: CPTII,S$GLB,, | Performed by: OBSTETRICS & GYNECOLOGY

## 2021-09-28 PROCEDURE — 1101F PT FALLS ASSESS-DOCD LE1/YR: CPT | Mod: CPTII,S$GLB,, | Performed by: OBSTETRICS & GYNECOLOGY

## 2021-09-28 PROCEDURE — 1160F PR REVIEW ALL MEDS BY PRESCRIBER/CLIN PHARMACIST DOCUMENTED: ICD-10-PCS | Mod: CPTII,S$GLB,, | Performed by: OBSTETRICS & GYNECOLOGY

## 2021-09-28 PROCEDURE — 1159F PR MEDICATION LIST DOCUMENTED IN MEDICAL RECORD: ICD-10-PCS | Mod: CPTII,S$GLB,, | Performed by: OBSTETRICS & GYNECOLOGY

## 2021-09-28 PROCEDURE — 3074F PR MOST RECENT SYSTOLIC BLOOD PRESSURE < 130 MM HG: ICD-10-PCS | Mod: CPTII,S$GLB,, | Performed by: OBSTETRICS & GYNECOLOGY

## 2021-09-28 PROCEDURE — 3078F DIAST BP <80 MM HG: CPT | Mod: CPTII,S$GLB,, | Performed by: OBSTETRICS & GYNECOLOGY

## 2021-09-28 PROCEDURE — 3074F SYST BP LT 130 MM HG: CPT | Mod: CPTII,S$GLB,, | Performed by: OBSTETRICS & GYNECOLOGY

## 2021-09-28 PROCEDURE — 1126F AMNT PAIN NOTED NONE PRSNT: CPT | Mod: CPTII,S$GLB,, | Performed by: OBSTETRICS & GYNECOLOGY

## 2021-10-05 ENCOUNTER — OFFICE VISIT (OUTPATIENT)
Dept: RHEUMATOLOGY | Facility: CLINIC | Age: 81
End: 2021-10-05
Payer: MEDICARE

## 2021-10-05 VITALS
SYSTOLIC BLOOD PRESSURE: 138 MMHG | TEMPERATURE: 98 F | OXYGEN SATURATION: 96 % | HEART RATE: 77 BPM | DIASTOLIC BLOOD PRESSURE: 73 MMHG | RESPIRATION RATE: 18 BRPM | BODY MASS INDEX: 33.07 KG/M2 | HEIGHT: 60 IN | WEIGHT: 168.44 LBS

## 2021-10-05 DIAGNOSIS — M15.9 PRIMARY OSTEOARTHRITIS INVOLVING MULTIPLE JOINTS: Primary | ICD-10-CM

## 2021-10-05 DIAGNOSIS — E66.9 CLASS 1 OBESITY WITH BODY MASS INDEX (BMI) OF 32.0 TO 32.9 IN ADULT, UNSPECIFIED OBESITY TYPE, UNSPECIFIED WHETHER SERIOUS COMORBIDITY PRESENT: ICD-10-CM

## 2021-10-05 DIAGNOSIS — Z71.89 COUNSELING AND COORDINATION OF CARE: ICD-10-CM

## 2021-10-05 DIAGNOSIS — M81.0 OSTEOPOROSIS, UNSPECIFIED OSTEOPOROSIS TYPE, UNSPECIFIED PATHOLOGICAL FRACTURE PRESENCE: ICD-10-CM

## 2021-10-05 DIAGNOSIS — M79.7 FIBROMYALGIA: ICD-10-CM

## 2021-10-05 PROCEDURE — 99499 UNLISTED E&M SERVICE: CPT | Mod: S$GLB,,, | Performed by: INTERNAL MEDICINE

## 2021-10-05 PROCEDURE — 3075F SYST BP GE 130 - 139MM HG: CPT | Mod: CPTII,S$GLB,, | Performed by: INTERNAL MEDICINE

## 2021-10-05 PROCEDURE — 99214 OFFICE O/P EST MOD 30 MIN: CPT | Mod: S$GLB,,, | Performed by: INTERNAL MEDICINE

## 2021-10-05 PROCEDURE — 1125F AMNT PAIN NOTED PAIN PRSNT: CPT | Mod: CPTII,S$GLB,, | Performed by: INTERNAL MEDICINE

## 2021-10-05 PROCEDURE — 1125F PR PAIN SEVERITY QUANTIFIED, PAIN PRESENT: ICD-10-PCS | Mod: CPTII,S$GLB,, | Performed by: INTERNAL MEDICINE

## 2021-10-05 PROCEDURE — 1159F MED LIST DOCD IN RCRD: CPT | Mod: CPTII,S$GLB,, | Performed by: INTERNAL MEDICINE

## 2021-10-05 PROCEDURE — 99999 PR PBB SHADOW E&M-EST. PATIENT-LVL V: ICD-10-PCS | Mod: PBBFAC,,, | Performed by: INTERNAL MEDICINE

## 2021-10-05 PROCEDURE — 1101F PR PT FALLS ASSESS DOC 0-1 FALLS W/OUT INJ PAST YR: ICD-10-PCS | Mod: CPTII,S$GLB,, | Performed by: INTERNAL MEDICINE

## 2021-10-05 PROCEDURE — 1159F PR MEDICATION LIST DOCUMENTED IN MEDICAL RECORD: ICD-10-PCS | Mod: CPTII,S$GLB,, | Performed by: INTERNAL MEDICINE

## 2021-10-05 PROCEDURE — 3078F PR MOST RECENT DIASTOLIC BLOOD PRESSURE < 80 MM HG: ICD-10-PCS | Mod: CPTII,S$GLB,, | Performed by: INTERNAL MEDICINE

## 2021-10-05 PROCEDURE — 1101F PT FALLS ASSESS-DOCD LE1/YR: CPT | Mod: CPTII,S$GLB,, | Performed by: INTERNAL MEDICINE

## 2021-10-05 PROCEDURE — 3075F PR MOST RECENT SYSTOLIC BLOOD PRESS GE 130-139MM HG: ICD-10-PCS | Mod: CPTII,S$GLB,, | Performed by: INTERNAL MEDICINE

## 2021-10-05 PROCEDURE — 99999 PR PBB SHADOW E&M-EST. PATIENT-LVL V: CPT | Mod: PBBFAC,,, | Performed by: INTERNAL MEDICINE

## 2021-10-05 PROCEDURE — 99499 RISK ADDL DX/OHS AUDIT: ICD-10-PCS | Mod: S$GLB,,, | Performed by: INTERNAL MEDICINE

## 2021-10-05 PROCEDURE — 3288F FALL RISK ASSESSMENT DOCD: CPT | Mod: CPTII,S$GLB,, | Performed by: INTERNAL MEDICINE

## 2021-10-05 PROCEDURE — 3078F DIAST BP <80 MM HG: CPT | Mod: CPTII,S$GLB,, | Performed by: INTERNAL MEDICINE

## 2021-10-05 PROCEDURE — 99214 PR OFFICE/OUTPT VISIT, EST, LEVL IV, 30-39 MIN: ICD-10-PCS | Mod: S$GLB,,, | Performed by: INTERNAL MEDICINE

## 2021-10-05 PROCEDURE — 3288F PR FALLS RISK ASSESSMENT DOCUMENTED: ICD-10-PCS | Mod: CPTII,S$GLB,, | Performed by: INTERNAL MEDICINE

## 2021-10-20 ENCOUNTER — TELEPHONE (OUTPATIENT)
Dept: FAMILY MEDICINE | Facility: CLINIC | Age: 81
End: 2021-10-20

## 2021-10-21 ENCOUNTER — TELEPHONE (OUTPATIENT)
Dept: ENDOCRINOLOGY | Facility: CLINIC | Age: 81
End: 2021-10-21

## 2021-10-26 ENCOUNTER — HOSPITAL ENCOUNTER (OUTPATIENT)
Dept: RADIOLOGY | Facility: CLINIC | Age: 81
Discharge: HOME | End: 2021-10-26
Attending: HOSPITALIST
Payer: MEDICARE

## 2021-10-26 DIAGNOSIS — M81.0 AGE-RELATED OSTEOPOROSIS WITHOUT CURRENT PATHOLOGICAL FRACTURE: ICD-10-CM

## 2021-10-26 PROCEDURE — 77080 DEXA BONE DENSITY SPINE HIP: ICD-10-PCS | Mod: 26,HCNC,, | Performed by: INTERNAL MEDICINE

## 2021-10-26 PROCEDURE — 77080 DXA BONE DENSITY AXIAL: CPT | Mod: TC,HCNC,PO

## 2021-10-26 PROCEDURE — 77080 DXA BONE DENSITY AXIAL: CPT | Mod: 26,HCNC,, | Performed by: INTERNAL MEDICINE

## 2021-11-02 ENCOUNTER — OFFICE VISIT (OUTPATIENT)
Dept: ENDOCRINOLOGY | Facility: CLINIC | Age: 81
End: 2021-11-02
Payer: MEDICARE

## 2021-11-02 VITALS
TEMPERATURE: 98 F | WEIGHT: 166 LBS | HEART RATE: 79 BPM | SYSTOLIC BLOOD PRESSURE: 166 MMHG | DIASTOLIC BLOOD PRESSURE: 72 MMHG | BODY MASS INDEX: 32.42 KG/M2

## 2021-11-02 DIAGNOSIS — E78.2 MIXED HYPERLIPIDEMIA: ICD-10-CM

## 2021-11-02 DIAGNOSIS — E66.9 NON MORBID OBESITY, UNSPECIFIED OBESITY TYPE: ICD-10-CM

## 2021-11-02 DIAGNOSIS — E11.65 CONTROLLED TYPE 2 DIABETES MELLITUS WITH HYPERGLYCEMIA, WITH LONG-TERM CURRENT USE OF INSULIN: ICD-10-CM

## 2021-11-02 DIAGNOSIS — E11.65 TYPE 2 DIABETES MELLITUS WITH HYPERGLYCEMIA, WITH LONG-TERM CURRENT USE OF INSULIN: Primary | ICD-10-CM

## 2021-11-02 DIAGNOSIS — N18.31 CHRONIC RENAL FAILURE, STAGE 3A: ICD-10-CM

## 2021-11-02 DIAGNOSIS — E11.69 HYPERLIPIDEMIA ASSOCIATED WITH TYPE 2 DIABETES MELLITUS: ICD-10-CM

## 2021-11-02 DIAGNOSIS — Z79.4 CONTROLLED TYPE 2 DIABETES MELLITUS WITH HYPERGLYCEMIA, WITH LONG-TERM CURRENT USE OF INSULIN: ICD-10-CM

## 2021-11-02 DIAGNOSIS — H54.7 VISUAL IMPAIRMENT DUE TO DIABETES MELLITUS: ICD-10-CM

## 2021-11-02 DIAGNOSIS — M81.0 AGE-RELATED OSTEOPOROSIS WITHOUT CURRENT PATHOLOGICAL FRACTURE: ICD-10-CM

## 2021-11-02 DIAGNOSIS — E78.5 HYPERLIPIDEMIA ASSOCIATED WITH TYPE 2 DIABETES MELLITUS: ICD-10-CM

## 2021-11-02 DIAGNOSIS — E03.9 HYPOTHYROIDISM, UNSPECIFIED TYPE: ICD-10-CM

## 2021-11-02 DIAGNOSIS — Z79.4 TYPE 2 DIABETES MELLITUS WITH HYPERGLYCEMIA, WITH LONG-TERM CURRENT USE OF INSULIN: Primary | ICD-10-CM

## 2021-11-02 DIAGNOSIS — E11.39 VISUAL IMPAIRMENT DUE TO DIABETES MELLITUS: ICD-10-CM

## 2021-11-02 PROCEDURE — 99999 PR PBB SHADOW E&M-EST. PATIENT-LVL V: CPT | Mod: PBBFAC,HCNC,, | Performed by: HOSPITALIST

## 2021-11-02 PROCEDURE — 1126F AMNT PAIN NOTED NONE PRSNT: CPT | Mod: HCNC,CPTII,S$GLB, | Performed by: HOSPITALIST

## 2021-11-02 PROCEDURE — 1159F PR MEDICATION LIST DOCUMENTED IN MEDICAL RECORD: ICD-10-PCS | Mod: HCNC,CPTII,S$GLB, | Performed by: HOSPITALIST

## 2021-11-02 PROCEDURE — 99999 PR PBB SHADOW E&M-EST. PATIENT-LVL V: ICD-10-PCS | Mod: PBBFAC,HCNC,, | Performed by: HOSPITALIST

## 2021-11-02 PROCEDURE — 3077F PR MOST RECENT SYSTOLIC BLOOD PRESSURE >= 140 MM HG: ICD-10-PCS | Mod: HCNC,CPTII,S$GLB, | Performed by: HOSPITALIST

## 2021-11-02 PROCEDURE — 99214 PR OFFICE/OUTPT VISIT, EST, LEVL IV, 30-39 MIN: ICD-10-PCS | Mod: HCNC,S$GLB,, | Performed by: HOSPITALIST

## 2021-11-02 PROCEDURE — 3077F SYST BP >= 140 MM HG: CPT | Mod: HCNC,CPTII,S$GLB, | Performed by: HOSPITALIST

## 2021-11-02 PROCEDURE — 3078F DIAST BP <80 MM HG: CPT | Mod: HCNC,CPTII,S$GLB, | Performed by: HOSPITALIST

## 2021-11-02 PROCEDURE — 95251 PR GLUCOSE MONITOR, 72 HOUR, PHYS INTERP: ICD-10-PCS | Mod: HCNC,S$GLB,, | Performed by: HOSPITALIST

## 2021-11-02 PROCEDURE — 95251 CONT GLUC MNTR ANALYSIS I&R: CPT | Mod: HCNC,S$GLB,, | Performed by: HOSPITALIST

## 2021-11-02 PROCEDURE — 3051F HG A1C>EQUAL 7.0%<8.0%: CPT | Mod: HCNC,CPTII,S$GLB, | Performed by: HOSPITALIST

## 2021-11-02 PROCEDURE — 99214 OFFICE O/P EST MOD 30 MIN: CPT | Mod: HCNC,S$GLB,, | Performed by: HOSPITALIST

## 2021-11-02 PROCEDURE — 3078F PR MOST RECENT DIASTOLIC BLOOD PRESSURE < 80 MM HG: ICD-10-PCS | Mod: HCNC,CPTII,S$GLB, | Performed by: HOSPITALIST

## 2021-11-02 PROCEDURE — 3051F PR MOST RECENT HEMOGLOBIN A1C LEVEL 7.0 - < 8.0%: ICD-10-PCS | Mod: HCNC,CPTII,S$GLB, | Performed by: HOSPITALIST

## 2021-11-02 PROCEDURE — 1159F MED LIST DOCD IN RCRD: CPT | Mod: HCNC,CPTII,S$GLB, | Performed by: HOSPITALIST

## 2021-11-02 PROCEDURE — 1126F PR PAIN SEVERITY QUANTIFIED, NO PAIN PRESENT: ICD-10-PCS | Mod: HCNC,CPTII,S$GLB, | Performed by: HOSPITALIST

## 2021-11-02 RX ORDER — PEN NEEDLE, DIABETIC 30 GX3/16"
NEEDLE, DISPOSABLE MISCELLANEOUS
Qty: 200 EACH | Refills: 5 | Status: SHIPPED | OUTPATIENT
Start: 2021-11-02 | End: 2022-06-01 | Stop reason: SDUPTHER

## 2021-11-02 RX ORDER — SEMAGLUTIDE 1.34 MG/ML
0.5 INJECTION, SOLUTION SUBCUTANEOUS
Qty: 2 PEN | Refills: 11 | Status: SHIPPED | OUTPATIENT
Start: 2021-11-02 | End: 2022-06-01

## 2021-11-02 RX ORDER — INSULIN GLARGINE 100 [IU]/ML
INJECTION, SOLUTION SUBCUTANEOUS
Qty: 15 ML | Refills: 11 | Status: SHIPPED | OUTPATIENT
Start: 2021-11-02 | End: 2022-06-01

## 2021-11-02 RX ORDER — INSULIN ASPART 100 [IU]/ML
INJECTION, SOLUTION INTRAVENOUS; SUBCUTANEOUS
Qty: 15 ML | Refills: 11 | Status: SHIPPED | OUTPATIENT
Start: 2021-11-02 | End: 2022-06-01 | Stop reason: SDUPTHER

## 2021-11-22 ENCOUNTER — TELEPHONE (OUTPATIENT)
Dept: ENDOCRINOLOGY | Facility: CLINIC | Age: 81
End: 2021-11-22
Payer: MEDICARE

## 2021-12-28 ENCOUNTER — OFFICE VISIT (OUTPATIENT)
Dept: GYNECOLOGIC ONCOLOGY | Facility: CLINIC | Age: 81
End: 2021-12-28
Payer: MEDICARE

## 2021-12-28 VITALS
DIASTOLIC BLOOD PRESSURE: 74 MMHG | BODY MASS INDEX: 32.42 KG/M2 | SYSTOLIC BLOOD PRESSURE: 167 MMHG | HEART RATE: 79 BPM | WEIGHT: 166 LBS

## 2021-12-28 DIAGNOSIS — C53.0 MALIGNANT NEOPLASM OF ENDOCERVIX: Primary | ICD-10-CM

## 2021-12-28 PROCEDURE — 3288F FALL RISK ASSESSMENT DOCD: CPT | Mod: HCNC,CPTII,S$GLB, | Performed by: OBSTETRICS & GYNECOLOGY

## 2021-12-28 PROCEDURE — 3078F DIAST BP <80 MM HG: CPT | Mod: HCNC,CPTII,S$GLB, | Performed by: OBSTETRICS & GYNECOLOGY

## 2021-12-28 PROCEDURE — 1160F RVW MEDS BY RX/DR IN RCRD: CPT | Mod: HCNC,CPTII,S$GLB, | Performed by: OBSTETRICS & GYNECOLOGY

## 2021-12-28 PROCEDURE — 99999 PR PBB SHADOW E&M-EST. PATIENT-LVL IV: ICD-10-PCS | Mod: PBBFAC,HCNC,, | Performed by: OBSTETRICS & GYNECOLOGY

## 2021-12-28 PROCEDURE — 99214 PR OFFICE/OUTPT VISIT, EST, LEVL IV, 30-39 MIN: ICD-10-PCS | Mod: HCNC,S$GLB,, | Performed by: OBSTETRICS & GYNECOLOGY

## 2021-12-28 PROCEDURE — 3077F PR MOST RECENT SYSTOLIC BLOOD PRESSURE >= 140 MM HG: ICD-10-PCS | Mod: HCNC,CPTII,S$GLB, | Performed by: OBSTETRICS & GYNECOLOGY

## 2021-12-28 PROCEDURE — 3288F PR FALLS RISK ASSESSMENT DOCUMENTED: ICD-10-PCS | Mod: HCNC,CPTII,S$GLB, | Performed by: OBSTETRICS & GYNECOLOGY

## 2021-12-28 PROCEDURE — 1126F AMNT PAIN NOTED NONE PRSNT: CPT | Mod: HCNC,CPTII,S$GLB, | Performed by: OBSTETRICS & GYNECOLOGY

## 2021-12-28 PROCEDURE — 99499 RISK ADDL DX/OHS AUDIT: ICD-10-PCS | Mod: HCNC,S$GLB,, | Performed by: OBSTETRICS & GYNECOLOGY

## 2021-12-28 PROCEDURE — 3078F PR MOST RECENT DIASTOLIC BLOOD PRESSURE < 80 MM HG: ICD-10-PCS | Mod: HCNC,CPTII,S$GLB, | Performed by: OBSTETRICS & GYNECOLOGY

## 2021-12-28 PROCEDURE — 1126F PR PAIN SEVERITY QUANTIFIED, NO PAIN PRESENT: ICD-10-PCS | Mod: HCNC,CPTII,S$GLB, | Performed by: OBSTETRICS & GYNECOLOGY

## 2021-12-28 PROCEDURE — 99999 PR PBB SHADOW E&M-EST. PATIENT-LVL IV: CPT | Mod: PBBFAC,HCNC,, | Performed by: OBSTETRICS & GYNECOLOGY

## 2021-12-28 PROCEDURE — 3077F SYST BP >= 140 MM HG: CPT | Mod: HCNC,CPTII,S$GLB, | Performed by: OBSTETRICS & GYNECOLOGY

## 2021-12-28 PROCEDURE — 99214 OFFICE O/P EST MOD 30 MIN: CPT | Mod: HCNC,S$GLB,, | Performed by: OBSTETRICS & GYNECOLOGY

## 2021-12-28 PROCEDURE — 1101F PR PT FALLS ASSESS DOC 0-1 FALLS W/OUT INJ PAST YR: ICD-10-PCS | Mod: HCNC,CPTII,S$GLB, | Performed by: OBSTETRICS & GYNECOLOGY

## 2021-12-28 PROCEDURE — 99499 UNLISTED E&M SERVICE: CPT | Mod: HCNC,S$GLB,, | Performed by: OBSTETRICS & GYNECOLOGY

## 2021-12-28 PROCEDURE — 1101F PT FALLS ASSESS-DOCD LE1/YR: CPT | Mod: HCNC,CPTII,S$GLB, | Performed by: OBSTETRICS & GYNECOLOGY

## 2021-12-28 PROCEDURE — 1160F PR REVIEW ALL MEDS BY PRESCRIBER/CLIN PHARMACIST DOCUMENTED: ICD-10-PCS | Mod: HCNC,CPTII,S$GLB, | Performed by: OBSTETRICS & GYNECOLOGY

## 2021-12-28 PROCEDURE — 1159F PR MEDICATION LIST DOCUMENTED IN MEDICAL RECORD: ICD-10-PCS | Mod: HCNC,CPTII,S$GLB, | Performed by: OBSTETRICS & GYNECOLOGY

## 2021-12-28 PROCEDURE — 1159F MED LIST DOCD IN RCRD: CPT | Mod: HCNC,CPTII,S$GLB, | Performed by: OBSTETRICS & GYNECOLOGY

## 2021-12-28 RX ORDER — NEOMYCIN SULFATE, POLYMYXIN B SULFATE AND DEXAMETHASONE 3.5; 10000; 1 MG/ML; [USP'U]/ML; MG/ML
SUSPENSION/ DROPS OPHTHALMIC
COMMUNITY
Start: 2021-11-16

## 2022-01-03 ENCOUNTER — PES CALL (OUTPATIENT)
Dept: ADMINISTRATIVE | Facility: CLINIC | Age: 82
End: 2022-01-03
Payer: MEDICARE

## 2022-01-06 DIAGNOSIS — E55.9 VITAMIN D DEFICIENCY: Primary | ICD-10-CM

## 2022-01-07 RX ORDER — ERGOCALCIFEROL 1.25 MG/1
CAPSULE ORAL
Qty: 12 CAPSULE | Refills: 2 | Status: SHIPPED | OUTPATIENT
Start: 2022-01-07 | End: 2022-01-07

## 2022-01-07 RX ORDER — ERGOCALCIFEROL 1.25 MG/1
50000 CAPSULE ORAL
Qty: 12 CAPSULE | Refills: 2 | Status: SHIPPED | OUTPATIENT
Start: 2022-01-07 | End: 2023-01-25

## 2022-01-07 NOTE — TELEPHONE ENCOUNTER
----- Message from Hallie Jean sent at 1/6/2022  4:58 PM CST -----  Contact: Patient  Type: Patient Call Back         Who called: Patient         What is the request in detail: calling requesting vitamin D so she can take on Monday; please advise          Can the clinic reply by [facility]?         Would the patient rather a call back or a response via [facility]?          Best call back number: [phone] or [phone]         Additional Information:     [facility] - [place] STOUT [address] [address]  Phone: [phone] Fax: [phone]               Thank You

## 2022-01-21 DIAGNOSIS — E78.5 HYPERLIPIDEMIA ASSOCIATED WITH TYPE 2 DIABETES MELLITUS: ICD-10-CM

## 2022-01-21 DIAGNOSIS — E11.69 HYPERLIPIDEMIA ASSOCIATED WITH TYPE 2 DIABETES MELLITUS: ICD-10-CM

## 2022-01-21 NOTE — TELEPHONE ENCOUNTER
Care Due:                  Date            Visit Type   Department     Provider  --------------------------------------------------------------------------------                                             [facility]                                           MEDICINE/INTERN  Last Visit: 09-      None         AL ALFREDO Gupta  Next Visit: None Scheduled  None         None Found                                                            Last  Test          Frequency    Reason                     Performed    Due Date  --------------------------------------------------------------------------------    Lipid Panel.  12 months..  rosuvastatin.............  Not Found    Overdue    Powered by [employer]. Reference number: [id number].   1/21/2022 9:23:52 AM CST

## 2022-01-23 RX ORDER — ROSUVASTATIN CALCIUM 10 MG/1
TABLET, COATED ORAL
Qty: 90 TABLET | Refills: 0 | Status: SHIPPED | OUTPATIENT
Start: 2022-01-23 | End: 2022-04-26

## 2022-01-26 ENCOUNTER — LAB VISIT (OUTPATIENT)
Dept: LAB | Facility: HOSPITAL | Age: 82
End: 2022-01-26
Attending: HOSPITALIST
Payer: MEDICARE

## 2022-01-26 DIAGNOSIS — E03.9 HYPOTHYROIDISM, UNSPECIFIED TYPE: ICD-10-CM

## 2022-01-26 DIAGNOSIS — Z79.4 TYPE 2 DIABETES MELLITUS WITH HYPERGLYCEMIA, WITH LONG-TERM CURRENT USE OF INSULIN: ICD-10-CM

## 2022-01-26 DIAGNOSIS — E11.65 TYPE 2 DIABETES MELLITUS WITH HYPERGLYCEMIA, WITH LONG-TERM CURRENT USE OF INSULIN: ICD-10-CM

## 2022-01-26 LAB
ALBUMIN SERPL BCP-MCNC: 4.1 G/DL (ref 3.5–5.2)
ALP SERPL-CCNC: 76 U/L (ref 55–135)
ALT SERPL W/O P-5'-P-CCNC: 19 U/L (ref 10–44)
ANION GAP SERPL CALC-SCNC: 8 MMOL/L (ref 8–16)
AST SERPL-CCNC: 24 U/L (ref 10–40)
BILIRUB SERPL-MCNC: 0.9 MG/DL (ref 0.1–1)
BUN SERPL-MCNC: 26 MG/DL (ref 8–23)
CALCIUM SERPL-MCNC: 10.2 MG/DL (ref 8.7–10.5)
CHLORIDE SERPL-SCNC: 104 MMOL/L (ref 95–110)
CO2 SERPL-SCNC: 27 MMOL/L (ref 23–29)
CREAT SERPL-MCNC: 1.1 MG/DL (ref 0.5–1.4)
EST. GFR  (AFRICAN AMERICAN): 55.2 ML/MIN/1.73 M^2
EST. GFR  (NON AFRICAN AMERICAN): 47.9 ML/MIN/1.73 M^2
ESTIMATED AVG GLUCOSE: 157 MG/DL (ref 68–131)
GLUCOSE SERPL-MCNC: 177 MG/DL (ref 70–110)
HBA1C MFR BLD: 7.1 % (ref 4–5.6)
POTASSIUM SERPL-SCNC: 4.8 MMOL/L (ref 3.5–5.1)
PROT SERPL-MCNC: 7.8 G/DL (ref 6–8.4)
SODIUM SERPL-SCNC: 139 MMOL/L (ref 136–145)
T4 FREE SERPL-MCNC: 1.11 NG/DL (ref 0.71–1.51)
TSH SERPL DL<=0.005 MIU/L-ACNC: 1.02 UIU/ML (ref 0.4–4)

## 2022-01-26 PROCEDURE — 80053 COMPREHEN METABOLIC PANEL: CPT | Mod: HCNC | Performed by: HOSPITALIST

## 2022-01-26 PROCEDURE — 36415 COLL VENOUS BLD VENIPUNCTURE: CPT | Mod: HCNC,PO | Performed by: HOSPITALIST

## 2022-01-26 PROCEDURE — 83036 HEMOGLOBIN GLYCOSYLATED A1C: CPT | Mod: HCNC | Performed by: HOSPITALIST

## 2022-01-26 PROCEDURE — 84443 ASSAY THYROID STIM HORMONE: CPT | Mod: HCNC | Performed by: HOSPITALIST

## 2022-01-26 PROCEDURE — 84439 ASSAY OF FREE THYROXINE: CPT | Mod: HCNC | Performed by: HOSPITALIST

## 2022-02-02 ENCOUNTER — OFFICE VISIT (OUTPATIENT)
Dept: ENDOCRINOLOGY | Facility: CLINIC | Age: 82
End: 2022-02-02
Payer: MEDICARE

## 2022-02-02 VITALS
SYSTOLIC BLOOD PRESSURE: 153 MMHG | DIASTOLIC BLOOD PRESSURE: 76 MMHG | HEART RATE: 81 BPM | TEMPERATURE: 98 F | WEIGHT: 163.88 LBS | BODY MASS INDEX: 32.01 KG/M2

## 2022-02-02 DIAGNOSIS — Z79.4 TYPE 2 DIABETES MELLITUS WITH HYPERGLYCEMIA, WITH LONG-TERM CURRENT USE OF INSULIN: Primary | ICD-10-CM

## 2022-02-02 DIAGNOSIS — E11.39 VISUAL IMPAIRMENT DUE TO DIABETES MELLITUS: ICD-10-CM

## 2022-02-02 DIAGNOSIS — N18.31 CHRONIC RENAL FAILURE, STAGE 3A: ICD-10-CM

## 2022-02-02 DIAGNOSIS — M81.0 AGE-RELATED OSTEOPOROSIS WITHOUT CURRENT PATHOLOGICAL FRACTURE: ICD-10-CM

## 2022-02-02 DIAGNOSIS — E11.65 TYPE 2 DIABETES MELLITUS WITH HYPERGLYCEMIA, WITH LONG-TERM CURRENT USE OF INSULIN: Primary | ICD-10-CM

## 2022-02-02 DIAGNOSIS — E03.9 HYPOTHYROIDISM, UNSPECIFIED TYPE: ICD-10-CM

## 2022-02-02 DIAGNOSIS — H54.7 VISUAL IMPAIRMENT DUE TO DIABETES MELLITUS: ICD-10-CM

## 2022-02-02 PROCEDURE — 3288F FALL RISK ASSESSMENT DOCD: CPT | Mod: HCNC,CPTII,S$GLB, | Performed by: HOSPITALIST

## 2022-02-02 PROCEDURE — 99214 OFFICE O/P EST MOD 30 MIN: CPT | Mod: 25,HCNC,S$GLB, | Performed by: HOSPITALIST

## 2022-02-02 PROCEDURE — 95251 PR GLUCOSE MONITOR, 72 HOUR, PHYS INTERP: ICD-10-PCS | Mod: HCNC,S$GLB,, | Performed by: HOSPITALIST

## 2022-02-02 PROCEDURE — 1126F PR PAIN SEVERITY QUANTIFIED, NO PAIN PRESENT: ICD-10-PCS | Mod: HCNC,CPTII,S$GLB, | Performed by: HOSPITALIST

## 2022-02-02 PROCEDURE — 1160F RVW MEDS BY RX/DR IN RCRD: CPT | Mod: HCNC,CPTII,S$GLB, | Performed by: HOSPITALIST

## 2022-02-02 PROCEDURE — 1160F PR REVIEW ALL MEDS BY PRESCRIBER/CLIN PHARMACIST DOCUMENTED: ICD-10-PCS | Mod: HCNC,CPTII,S$GLB, | Performed by: HOSPITALIST

## 2022-02-02 PROCEDURE — 3077F PR MOST RECENT SYSTOLIC BLOOD PRESSURE >= 140 MM HG: ICD-10-PCS | Mod: HCNC,CPTII,S$GLB, | Performed by: HOSPITALIST

## 2022-02-02 PROCEDURE — 1159F MED LIST DOCD IN RCRD: CPT | Mod: HCNC,CPTII,S$GLB, | Performed by: HOSPITALIST

## 2022-02-02 PROCEDURE — 1126F AMNT PAIN NOTED NONE PRSNT: CPT | Mod: HCNC,CPTII,S$GLB, | Performed by: HOSPITALIST

## 2022-02-02 PROCEDURE — 3078F DIAST BP <80 MM HG: CPT | Mod: HCNC,CPTII,S$GLB, | Performed by: HOSPITALIST

## 2022-02-02 PROCEDURE — 99499 UNLISTED E&M SERVICE: CPT | Mod: S$PBB,,, | Performed by: HOSPITALIST

## 2022-02-02 PROCEDURE — 3077F SYST BP >= 140 MM HG: CPT | Mod: HCNC,CPTII,S$GLB, | Performed by: HOSPITALIST

## 2022-02-02 PROCEDURE — 3051F PR MOST RECENT HEMOGLOBIN A1C LEVEL 7.0 - < 8.0%: ICD-10-PCS | Mod: HCNC,CPTII,S$GLB, | Performed by: HOSPITALIST

## 2022-02-02 PROCEDURE — 3051F HG A1C>EQUAL 7.0%<8.0%: CPT | Mod: HCNC,CPTII,S$GLB, | Performed by: HOSPITALIST

## 2022-02-02 PROCEDURE — 99214 PR OFFICE/OUTPT VISIT, EST, LEVL IV, 30-39 MIN: ICD-10-PCS | Mod: 25,HCNC,S$GLB, | Performed by: HOSPITALIST

## 2022-02-02 PROCEDURE — 1159F PR MEDICATION LIST DOCUMENTED IN MEDICAL RECORD: ICD-10-PCS | Mod: HCNC,CPTII,S$GLB, | Performed by: HOSPITALIST

## 2022-02-02 PROCEDURE — 99999 PR PBB SHADOW E&M-EST. PATIENT-LVL V: CPT | Mod: PBBFAC,HCNC,, | Performed by: HOSPITALIST

## 2022-02-02 PROCEDURE — 3288F PR FALLS RISK ASSESSMENT DOCUMENTED: ICD-10-PCS | Mod: HCNC,CPTII,S$GLB, | Performed by: HOSPITALIST

## 2022-02-02 PROCEDURE — 3078F PR MOST RECENT DIASTOLIC BLOOD PRESSURE < 80 MM HG: ICD-10-PCS | Mod: HCNC,CPTII,S$GLB, | Performed by: HOSPITALIST

## 2022-02-02 PROCEDURE — 95251 CONT GLUC MNTR ANALYSIS I&R: CPT | Mod: HCNC,S$GLB,, | Performed by: HOSPITALIST

## 2022-02-02 PROCEDURE — 1101F PT FALLS ASSESS-DOCD LE1/YR: CPT | Mod: HCNC,CPTII,S$GLB, | Performed by: HOSPITALIST

## 2022-02-02 PROCEDURE — 99999 PR PBB SHADOW E&M-EST. PATIENT-LVL V: ICD-10-PCS | Mod: PBBFAC,HCNC,, | Performed by: HOSPITALIST

## 2022-02-02 PROCEDURE — 99499 RISK ADDL DX/OHS AUDIT: ICD-10-PCS | Mod: S$PBB,,, | Performed by: HOSPITALIST

## 2022-02-02 PROCEDURE — 1101F PR PT FALLS ASSESS DOC 0-1 FALLS W/OUT INJ PAST YR: ICD-10-PCS | Mod: HCNC,CPTII,S$GLB, | Performed by: HOSPITALIST

## 2022-02-02 NOTE — PROGRESS NOTES
Subjective:      Patient ID: Chelsea Gould is a 81 y.o. female presented to [facility] on 2/2/2022.  Chief Complaint:  Diabetes      History of Present Illness: Chelsea Gould is a 81 y.o. female here for management of type 2 diabetes  Significant past medical history:  CAD, CKD stage IIIA, visual impairment (seen Ophthalmology for injections/may need surgery), hypothyroidism, osteoporosis (on Fosamax given by PCP)  Previously seen Esperanza Gonzalez NP 10/2020      Diabetes Mellitus Type 2, Follow up Visit  Patient here for an evaluation of diabetes, initially diagnosed at age  44  Known diabetic complications: nephropathy, peripheral neuropathy and cardiovascular disease  Cardiovascular risk factors: advanced age (older than 55 for men, 65 for women), diabetes mellitus, dyslipidemia, hypertension and obesity (BMI >= 30 kg/m2), poor historian       Interval history:  Patient is here for follow-up of type 2 diabetes.  On MDI insulin, reports off and on good compliance with insulin regimen endorse sometimes missing NovoLog due to her busy in night life schedule (dancing). Eat out at night after dancing school, often does not bring insulin.  Patient now given Lantus in at night.  Does endorse postprandial hypoglycemia on occasion.  Due to variable meal intake.  Patient also has postprandial hyperglycemia due to busy night schedule as above.  Currently seeing Ophthalmology for injections  Scanning regularly on Freestyle Yoav 14 day device  Rotating of injection sites at this time      Current meds:               Lantus 12 units in the morning   NovoLog 12 units before each meal: breakfast, lunch, missed dinner due to her being out   Ozempic 0.5 mg weekly  Misses medication doses - Yes, NovoLog on occasion especially at night  Injection Technique: Good, Rotation of site: Yes  Previous meds:              Metformin:  Due to diarrhea  Home glucose checks:  On Freestyle Yoav,  a scanning regularly    CGM  download and interpretation: Data was downloaded and personally reviewed by myself  CGM type: Freestyle yoav 14 day device is sensor  Number of Day CGM worn:  14 days, percentage of time CGM is active: 97%  Average blood glucose:  161 , Glucose variability: 34%  , Glucose management indicator (GMI):  7.2%,   Time in Range:  8 % very high, 24% High, 67 % Target Range, 1 % low, 0% very low>> reviewed and discussed, goal TIR discussed with patient    Patient with well-controlled diabetes.  Given GM eye.  Does have occasional postprandial hyperglycemia as well as postprandial hypoglycemia.  Suspect due to missed insulin doses or too much insulin on occasion.  Patient is a poor historian, which makes further questioning of these episode difficult.  Patient also with noted poor vision  No obvious hypoglycemic trend appreciated.  Patient is doing a good job in scanning Freestyle Yoav         Diet/Exercise:               Eatin- 3 meals per day, often eat out especially at night, suspect dietary indiscretion as she often eat out at night              Snacking               Drink:  Sugar free              Current exercise: aerobics:  Dancing  Weight trend: decreasing steadily  Diabetes Education:  Yes, 19 - Liliam Chow RN, CDE   Diabetes Related Hospitalization:  denies  Hx of pancreatitis, hx of thyroid cancer:  Denies  Family history of diabetes:  Yes  Occupation:  Retired    Eye exam current (within one year): yes, DR: no, outside of [facility]  Reports cuts or ulcers on feet:  Denies    Statin: Taking  ACE/ARB: Not taking    Diabetes Management Status: Reviewed     A1C Trend  Lab Results   Component Value Date    HGBA1C 7.1 (H) 2022    HGBA1C 7.1 (H) 2021    HGBA1C 7.2 (H) 2021       Lab Results   Component Value Date    MICALBCREAT 9.1 2019     No results found for: VGGHGJHQ43  No results found for: TTGIGA    Lab Results   Component Value Date    CPEPTIDE 3.29 2020         Screening or Prevention Patient's value Goal Complete/Controlled?   HgA1C Testing and Control   Lab Results   Component Value Date    HGBA1C 7.1 (H) 01/26/2022      Annually/Less than 8% Yes   Lipid profile Most Recent Lipid Panel Health Maintenance Topic Completion: Not Found Annually Yes   LDL control Lab Results   Component Value Date    LDLCALC 68.6 04/27/2020    Annually/Less than 100 mg/dl  Yes   Nephropathy screening Lab Results   Component Value Date    LABMICR 17.0 05/01/2019     Lab Results   Component Value Date    PROTEINUA Negative 03/20/2020    Annually No   Blood pressure BP Readings from Last 1 Encounters:   02/02/22 (!) 153/76    Less than 140/90 Yes   Dilated retinal exam Most Recent Eye Exam Date: Not Found Annually Yes   Foot exam   Most Recent Foot Exam Date: Not Found Annually Yes       With regards to hypothyroidism  On levothyroxine 112 mcg daily  Taking as directed, reports compliance  Reports compliant with medication, chronic  Lab Results   Component Value Date    TSH 1.024 01/26/2022         With regards to osteoporosis/osteopenia  No recent falls  On Fosamax per PCP, started on 7/2019  Most recent bone density done review:  10/21 osteopenia with high FRAX score    DXA 10/26/21  Lumbar spine (L1-L4): BMD is 1.107 g/cm2, T-score is 0.5, and Z-score is 3.2.  Total hip: BMD is 0.794 g/cm2, T-score is -1.2, and Z-score is 0.8  Femoral neck: BMD is 0.608 g/cm2, T-score is -2.2, and Z-score is 0.1.  Distal 1/3 radius: Not applicable     COMPARISON:  Comparison study done on 07/08/2019.  Lumbar spine: BMD 0.974 g/cm2 and T-score -0.7.  Total Hip: BMD 0.786 g/cm2 and T-score -1.3.  Distal 1/3 radius: Not applicable    FRAX: 23% risk of a major osteoporotic fracture in the next 10 years.  6.3% risk of hip fracture in the next 10 years.     Impression:  LOW BONE MASS WITH A SIGNIFICANT INCREASE OF 13.6% IN THE LUMBAR SPINE BMD COMPARED TO THE PRIOR STUDY.        Reviewed past surgical, medical,  family, social history and updated as appropriate.    Review of SystemsSee above    Objective:   BP (!) 153/76   Pulse 81   Temp 97.8 °F (36.6 °C) (Oral)   Wt 74.3 kg (163 lb 14.4 oz)   BMI 32.01 kg/m²     Body mass index is 32.01 kg/m².  Vital signs reviewed    Physical Exam  Vitals and nursing note reviewed.   Constitutional:       Appearance: She is well-developed. She is obese.   HENT:      Head: Normocephalic.   Eyes:      General: No scleral icterus.     Conjunctiva/sclera: Conjunctivae normal.   Neck:      Thyroid: No thyromegaly.   Cardiovascular:      Rate and Rhythm: Normal rate.      Heart sounds: Normal heart sounds.   Pulmonary:      Effort: Pulmonary effort is normal. No respiratory distress.   Abdominal:      General: Abdomen is flat.      Tenderness: There is no abdominal tenderness.      Comments: Insulin injection sites examined on abdomen, clean, no redness, no nodule/masses   Musculoskeletal:         General: Normal range of motion.      Cervical back: Normal range of motion and neck supple.   Skin:     General: Skin is warm.      Findings: No erythema.   Neurological:      Mental Status: She is alert. Mental status is at baseline.      Coordination: Coordination normal.   Psychiatric:         Behavior: Behavior normal.         Lab Reviewed:   Lab Results   Component Value Date    HGBA1C 7.1 (H) 01/26/2022       Lab Results   Component Value Date    CHOL 131 04/27/2020    HDL 46 04/27/2020    LDLCALC 68.6 04/27/2020    TRIG 82 04/27/2020    CHOLHDL 35.1 04/27/2020       Lab Results   Component Value Date     01/26/2022    K 4.8 01/26/2022     01/26/2022    CO2 27 01/26/2022     (H) 01/26/2022    BUN 26 (H) 01/26/2022    CREATININE 1.1 01/26/2022    CALCIUM 10.2 01/26/2022    PROT 7.8 01/26/2022    ALBUMIN 4.1 01/26/2022    BILITOT 0.9 01/26/2022    ALKPHOS 76 01/26/2022    AST 24 01/26/2022    ALT 19 01/26/2022    ANIONGAP 8 01/26/2022    ESTGFRAFRICA 55.2 (A) 01/26/2022     EGFRNONAA 47.9 (A) 01/26/2022    TSH 1.024 01/26/2022        Lab Results   Component Value Date    .3 (H) 10/26/2021    FOJMQNGZ93QT 31 10/26/2021    XRFJYBVG70IP 24 (L) 10/05/2020    GAJIVGJF04GA 32 06/30/2020    CALCIUM 10.2 01/26/2022    CALCIUM 10.2 10/26/2021    CALCIUM 10.2 07/02/2021    ALKPHOS 76 01/26/2022    ALKPHOS 70 10/26/2021    ALKPHOS 72 07/02/2021    TSH 1.024 01/26/2022       Assessment     1. Type 2 diabetes mellitus with hyperglycemia, with long-term current use of insulin  Hemoglobin A1C   2. Age-related osteoporosis without current pathological fracture     3. Hypothyroidism, unspecified type     4. Chronic renal failure, stage 3a     5. 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 Given poor insight into care, poor historian, visual impairment leading to injection issues  - Hx of hypoglycemia, hyperglycemia  - Complicated by CKD3, dietary indiscretion, visual impairment  - Reviewed goals of therapy are to get the best control we can without hypoglycemia  - Routine health maintenance/screening: reviewed  - Freestyle Yoav download review showed hyperglycemia and occasional hypoglycemia postprandial  - Diabetic supplies/medications:  Refills given      Plan:  - discussed with patient, who is quite hesitant to change, discuss about treatment for hypoglycemic event:  Recommend glucose tablets or candy, handout given  - continue Lantus 12 units daytime, recommend that she decrease NovoLog 10 units t.i.d. before each meals if glucose is less than 160  - Continue Ozempic 0.5 mg Q weekly  - patient continue to benefit from Freestyle Yoav Given complex insulin schedule, variable glucose control, hyperglycemia/hypoglycemia  - continue follow-up with PCP for routine health maintenance  - follow up in 3 mo, given  high risk of hypoglycemia    Age-related osteoporosis without current pathological fracture  - bone density for 10/2021 completed, Stable BMD, high frax score  - On Fosamax per PCP since 7/2019  - Advised patient to continue to be active   - Continue calcium and vitamin-D supplementation  - continue fosamax    Hypothyroidism  - TSH is at goal, continue levothyroxine as prescribed  - continue LT4 112 mcg daily  - repeat TSH every 6 months as indicated    Chronic renal failure, stage 3a  - Kidney function stable   - Renally Adjust diabetes medication  - routine monitor    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   RTC in 3 months    Dominick Rahman M.D  Endocrinology  [facility]  2/2/2022      Disclaimer: This note has been generated using voice-recognition software. There may be typographical errors that have been missed during proof-reading.  ------------------------------------------------------------    Indications for CGMs:    Patent with diagnosed:  Type 2 Diabetes Mellitus that required frequent glucose monitoring (4 + times a day and 4x/day testing), frequent adjustments to insulin regiment based on BG or CGM results. Patent requires a therapeutic CGM and is willing to use therapeutic CGM/SMBG for Necessary frequent adjustments in insulin therapy, patient demonstrated an understanding of the technology (can use and recognize alerts and alarms). I, the physician, have assessed adherence to CGM regimen and to the plan, patient will have close follow up in clinic as indicated, every 3 months to 6 months.     Patient experiences (including but not limited to):  [x]   Discrepancies between records and A1C, at risk severe hypoglycemia episode and hospitalization  [x]   Recurrent, unexplained,  severe hypoglycemic episodes  [x]   Postprandial hyperglycemia  [x]   Unexplained blood glucose excursions  []   Impaired awareness of hypoglycemia    []   Patient uses insulin pump for diabetes management: will need frequent adjustments to insulin regiment based on BG: including adjustment to  insulin pump setting, sliding scale, correction factor, additional bolusing/correction

## 2022-02-02 NOTE — ASSESSMENT & PLAN NOTE
- TSH is at goal, continue levothyroxine as prescribed  - continue LT4 112 mcg daily  - repeat TSH every 6 months as indicated

## 2022-02-02 NOTE — ASSESSMENT & PLAN NOTE
- bone density for 10/2021 completed, Stable BMD, high frax score  - On Fosamax per PCP since 7/2019  - Advised patient to continue to be active   - Continue calcium and vitamin-D supplementation  - continue fosamax

## 2022-02-02 NOTE — ASSESSMENT & PLAN NOTE
- Diabetes is controlled, given most recent A1c, A1C goal for this patient is 7-7.5. Given poor insight into care, poor historian, visual impairment leading to injection issues  - Hx of hypoglycemia, hyperglycemia  - Complicated by CKD3, dietary indiscretion, visual impairment  - Reviewed goals of therapy are to get the best control we can without hypoglycemia  - Routine health maintenance/screening: reviewed  - Freestyle Yoav download review showed hyperglycemia and occasional hypoglycemia postprandial  - Diabetic supplies/medications:  Refills given      Plan:  - discussed with patient, who is quite hesitant to change, discuss about treatment for hypoglycemic event:  Recommend glucose tablets or candy, handout given  - continue Lantus 12 units daytime, recommend that she decrease NovoLog 10 units t.i.d. before each meals if glucose is less than 160  - Continue Ozempic 0.5 mg Q weekly  - patient continue to benefit from Freestyle Yoav Given complex insulin schedule, variable glucose control, hyperglycemia/hypoglycemia  - continue follow-up with PCP for routine health maintenance  - follow up in 5 mo

## 2022-02-02 NOTE — ASSESSMENT & PLAN NOTE
- poor vision, continue follow-up with Ophthalmology  - continues to benefit from CGM given prevention of hypoglycemia

## 2022-02-02 NOTE — PATIENT INSTRUCTIONS
Thank you for completing the visit with me    NO CHANGES MADE TO YOUR REGIMENT   Lantus 12 units once a day, in the morning   NovoLog   o IF GLUCOSE IS ABOVE 160 give12 units BEFORE you eat  o IF glucose is less than 160, give 10 untis before you eat     Continue Ozempic 0.5mg once a week injection every Sunday    NEED to ROTATE INJECTION SITES    Scan/Check your sugar often with Freestyle Yoav, scan every 5 hours if not eating.     Call your eye doctor> to get your vision evaluated     Please CALL MY OFFICE and leave a message if your insurance does not cover the medications I prescribed, so I can prescribed alternative medications.     We will plan an in-clinic visit in 5  months, with labs prior to that appointment.      Dominick Rahman M.D  [facility] [address] [facility], [address]  [place] [address]    Office:  [phone]  Fax:  [phone]

## 2022-02-18 NOTE — PROGRESS NOTES
Subjective:       Patient ID: Chelsea Gould is a 80 y.o. female.    Vitals:  temperature is 98.1 °F (36.7 °C). Her blood pressure is 154/85 (abnormal) and her pulse is 86. Her respiration is 16 and oxygen saturation is 96%.     Chief Complaint: COVID-19 Concerns    Mrs. Gould is a 81yo female with a PMHx of anxiety, depression, DM II (longterm use of insulin), cervical cancer (s/p hysterectomy and bilat salpingo-oophorectomy), hypothyroid, CKD stage III, and RA who presents to the urgent care for COVID-19 testing. States for the last week or two she has been having body aches and a mild cough. States that initially she did not think anything of the symptoms as she has RA and has body aches at baseline. States her  tested positive for COVID-19 this week, so she figured she should come in to get tested. States symptoms are mild and she has been taking dot-seltzer plus with good relief. Denies fever, chills, CP, SOB, difficulty breathing or swallowing, headaches, changes in vision, abdominal pain, N/V/D, rash.    Cough  This is a new problem. The current episode started 1 to 4 weeks ago. The problem has been unchanged. The problem occurs hourly. The cough is productive of sputum. Associated symptoms include postnasal drip. Pertinent negatives include no chest pain, chills, ear congestion, ear pain, fever, headaches, heartburn, hemoptysis, nasal congestion, rash, rhinorrhea, sore throat, shortness of breath, sweats, weight loss or wheezing. Nothing aggravates the symptoms. Treatments tried: dot-seltzer. The treatment provided moderate relief.       Constitution: Negative for chills, sweating, fatigue and fever.   HENT: Positive for congestion and postnasal drip. Negative for ear pain, sinus pain, sinus pressure, sore throat and trouble swallowing.    Neck: Negative for neck pain, neck stiffness and painful lymph nodes.   Cardiovascular: Negative for chest pain, leg swelling and palpitations.   Eyes: Negative  You had a psychiatric screening here in the ER  There is no acute psychiatric issue  for double vision and blurred vision.   Respiratory: Positive for cough and sputum production. Negative for chest tightness, bloody sputum, COPD, shortness of breath, stridor and wheezing.    Gastrointestinal: Negative for abdominal pain, nausea, vomiting, diarrhea and heartburn.   Genitourinary: Negative for dysuria, frequency, urgency and history of kidney stones.   Musculoskeletal: Positive for arthritis. Negative for joint pain, joint swelling and muscle cramps.        Body aches   Skin: Negative for color change, pale, rash, bruising and hives.   Allergic/Immunologic: Negative for seasonal allergies, hives, itching and sneezing.   Neurological: Negative for dizziness, history of vertigo, light-headedness, passing out and headaches.   Hematologic/Lymphatic: Negative for swollen lymph nodes.   Psychiatric/Behavioral: Negative for nervous/anxious, sleep disturbance and depression. The patient is not nervous/anxious.        Objective:      Physical Exam   Constitutional: She is oriented to person, place, and time. She appears well-developed. She is cooperative.  Non-toxic appearance. She does not appear ill. No distress.      Comments:Patient is sitting pleasantly on exam table in no acute distress. Nontoxic appearing.    HENT:   Head: Normocephalic and atraumatic.   Ears:   Right Ear: Hearing, tympanic membrane, external ear and ear canal normal.   Left Ear: Hearing, tympanic membrane, external ear and ear canal normal.   Nose: Mucosal edema present. No rhinorrhea, nasal deformity or congestion. No epistaxis. Right sinus exhibits no maxillary sinus tenderness and no frontal sinus tenderness. Left sinus exhibits no maxillary sinus tenderness and no frontal sinus tenderness.   Mouth/Throat: Uvula is midline and mucous membranes are normal. No trismus in the jaw. Normal dentition. No uvula swelling. Posterior oropharyngeal erythema and cobblestoning (PND noted) present. No oropharyngeal exudate, posterior  oropharyngeal edema or tonsillar abscesses. No tonsillar exudate.   Eyes: Pupils are equal, round, and reactive to light. Conjunctivae and lids are normal. No scleral icterus.   Neck: Trachea normal, normal range of motion, full passive range of motion without pain and phonation normal. Neck supple. No neck rigidity. No edema and no erythema present.   Cardiovascular: Normal rate, regular rhythm, normal heart sounds and normal pulses.   Pulmonary/Chest: Effort normal and breath sounds normal. No stridor. No respiratory distress. She has no decreased breath sounds. She has no wheezes. She has no rhonchi. She has no rales.    Comments: No evidence of respiratory distress noted, able to speak in complete sentences without pause; no wheezing, rales, or rhonchi appreciated; O2 sat 96% on RA    Abdominal: Normal appearance.   Musculoskeletal: Normal range of motion.         General: No deformity.   Lymphadenopathy:     She has cervical adenopathy.   Neurological: She is alert and oriented to person, place, and time. She exhibits normal muscle tone. Coordination normal.   Skin: Skin is warm, dry, intact, not diaphoretic and not pale. Psychiatric: Her speech is normal and behavior is normal. Mood, judgment and thought content normal.   Nursing note and vitals reviewed.        Results for orders placed or performed in visit on 11/25/20   POCT COVID-19 Rapid Screening   Result Value Ref Range    POC Rapid COVID Positive (A) Negative     Acceptable Yes        Clinically well appearing, VSS.  Rapid COVID-19 positive.   Advised on symptomatic care. Advised on COVID-19 and COVID-19 precautions. Advised on current CDC guidelines for home isolation and quarantine.  Advised on strict ER precautions.  Answered all patient questions. Patient verbalized understanding and voiced agreement with current treatment plan.      Assessment:       1. COVID-19 virus infection    2. Exposure to COVID-19 virus        Plan:          COVID-19 virus infection  -     benzonatate (TESSALON PERLES) 100 MG capsule; Take 1 capsule (100 mg total) by mouth every 6 (six) hours as needed for Cough.  Dispense: 30 capsule; Refill: 1    Exposure to COVID-19 virus  -     POCT COVID-19 Rapid Screening      Patient Instructions       Rest and fluids are important.  Please take tylenol or iobuprofen for help with fever, pain, headaches unless you have a medical condition or allergy which would prevent you from taking these medications.  Mucinex can help with chest congestion.  Tessalon perrles can be used as directed as needed to help with cough.    If you develop worsening symptoms, especially worsening shortness of breath or difficulty breathing, please go to the ED for further evaluation.      You have tested positive for COVID-19 today. Per the CDC, you are now in a 10 day isolation.    This isolation starts from the day you first developed symptoms, not the day of your positive test. For example, if your symptoms began on a Monday, and you waited until Friday of the same week to get tested, and it was positive, your 10 day isolation begins from that Monday, not the Friday you tested positive.    Also, per the CDC guidelines, once your 10 days have passed, and you have not had fever greater than 100.4F in the last 24 hours without taking any fever reducers such as Tylenol (Acetaminophen) or Motrin (Ibuprofen), you may return to your normal activities including social distancing, wearing masks, and frequent handwashing - YOU DO NOT NEED ANOTHER TEST, OR TO TEST NEGATIVE, IN ORDER TO END YOUR QUARANTINE!      Instructions for Patients with Confirmed or Suspected COVID-19    If you are awaiting your test result, you will either be called or it will be released to the patient portal.  If you have any questions about your test, please visit [email or web address] or call our COVID-19 information line at [phone].      Instructions for  non-hospitalized or discharged patients with confirmed or suspected COVID-19:       Stay home except to get medical care.    Separate yourself from other people and animals in your home.    Call ahead before visiting your doctor.    Wear a face mask.    Cover your coughs and sneezes.    Clean your hands often.    Avoid sharing personal household items.    Clean all high-touch surfaces every day.    Monitor your symptoms. Seek prompt medical attention if your illness is worsening (e.g., difficulty breathing). Before seeking care, call your healthcare provider.    If you have a medical emergency and must call 911, notify the dispatcher that you have or are being evaluated for COVID-19. If possible, put on a face mask before emergency medical services arrive.    Use the following symptom-based strategy to return to normal activity following a suspected or confirmed case of COVID-19. Continue isolation until:   o At least 3 days (72 hours) have passed since recovery defined as resolution of fever without the use of fever-reducing medications and improvement in respiratory symptoms (e.g. cough, shortness of breath), and   o At least 10 days have passed since the first positive test.       As one of the next steps, you will receive a call or text from the [facility] ([facility]) COVID-19 Tracing Team. See the contact information below so you know not to ignore the health departments call. It is important that you contact them back immediately so they can help.     Contact Tracer Number:  [phone]  Caller ID for most carriers: [place] Dept Health    What is contact tracing?   Contact tracing is a process that helps identify everyone who has been in close contact with an infected person. Contact tracers let those people know they may have been exposed and guide them on next steps. Confidentiality is important for everyone; no one will be told who may have exposed them to the virus.   Your  involvement is important. The more we know about where and how this virus is spreading, the better chance we have at stopping it from spreading further.  What does exposure mean?   Exposure means you have been within 6 feet for more than 15 minutes with a person who has or had COVID-19.  What kind of questions do the contact tracers ask?   A contact tracer will confirm your basic contact information including name, address, phone number, and next of kin, as well as asking about any symptoms you may have had. Theyll also ask you how you think you may have gotten sick, such as places where you may have been exposed to the virus, and people you were with. Those names will never be shared with anyone outside of that call, and will only be used to help trace and stop the spread of the virus.   I have privacy concerns. How will the state use my information?   Your privacy about your health is important. All calls are completed using call centers that use the appropriate health privacy protection measures (HIPAA compliance), meaning that your patient information is safe. No one will ever ask you any questions related to immigration status. Your health comes first.   Do I have to participate?   You do not have to participate, but we strongly encourage you to. Contact tracing can help us catch and control new outbreaks as theyre developing to keep your friends and family safe.   What if I dont hear from anyone?   If you dont receive a call within 24 hours, you can call the number above right away to inquire about your status. That line is open from 8:00 am - 8:00 p.m., 7 days a week.  Contact tracing saves lives! Together, we have the power to beat this virus and keep our loved ones and neighbors safe.       Instructions for household members, intimate partners and caregivers in a non-healthcare setting of a patient with confirmed or suspected COVID-19:         Close contacts should monitor their health and call  their healthcare provider right away if they develop symptoms suggestive of COVID-19 (e.g., fever, cough, shortness of breath).    Stay home except to get medical care. Separate yourself from other people and animals in the home.   Monitor the patients symptoms. If the patient is getting sicker, call his or her healthcare provider. If the patient has a medical emergency and you need to call 911, notify the dispatch personnel that the patient has or is being evaluated for COVID-19.    Wear a facemask when around other people such as sharing a room or vehicle and before entering a healthcare provider's office.   Cover coughs and sneezes with a tissue. Throw used tissues in a lined trash can immediately and wash hands.   Clean hands often with soap and water for at least 20 seconds or with an alcohol-based hand , rubbing hands together until they feel dry. Avoid touching your eyes, nose, and mouth with unwashed hands.   Clean all high-touch; surfaces every day, including counters, tabletops, doorknobs, bathroom fixtures, toilets, phones, keyboards, tablets, bedside tables, etc. Use a household cleaning spray or wipe according to label instructions.   Avoid sharing personal household items such as dishes, drinking glasses, cups, towels, bedding, etc. After these items are used, they should be washed thoroughly with soap and water.   Continue isolation until:   At least 3 days (72 hours) have passed since recovery defined as resolution of fever without the use of fever-reducing medications and improvement in respiratory symptoms (e.g. cough, shortness of breath), and    At least 10 days have passed since the patients first positive test.    https://www.cdc.gov/coronavirus/2019-ncov/your-health/index.htm

## 2022-03-07 DIAGNOSIS — E11.649 HYPOGLYCEMIA ASSOCIATED WITH DIABETES: ICD-10-CM

## 2022-03-07 RX ORDER — FLASH GLUCOSE SENSOR
KIT MISCELLANEOUS
Qty: 2 KIT | Refills: 11 | Status: SHIPPED | OUTPATIENT
Start: 2022-03-07 | End: 2023-01-13

## 2022-03-07 NOTE — TELEPHONE ENCOUNTER
----- Message from Ana Gupta sent at 3/7/2022  9:15 AM CST -----  Contact: Self [phone]  Requesting an RX refill or new RX.    Is this a refill or new RX: refill    RX name and strength (copy/paste from chart):  flash glucose sensor (FREESTYLE OSMAN 14 DAY SENSOR) Kit    Is this a 30 day or 90 day RX:     Pharmacy name and phone # (copy/paste from chart):      [facility] - [address] [address]  Phone: [phone] Fax: [phone]

## 2022-03-21 DIAGNOSIS — M81.0 AGE-RELATED OSTEOPOROSIS WITHOUT CURRENT PATHOLOGICAL FRACTURE: ICD-10-CM

## 2022-03-22 ENCOUNTER — TELEPHONE (OUTPATIENT)
Dept: ENDOCRINOLOGY | Facility: CLINIC | Age: 82
End: 2022-03-22
Payer: MEDICARE

## 2022-03-22 DIAGNOSIS — E11.65 TYPE 2 DIABETES MELLITUS WITH HYPERGLYCEMIA, WITH LONG-TERM CURRENT USE OF INSULIN: Primary | ICD-10-CM

## 2022-03-22 DIAGNOSIS — Z79.4 TYPE 2 DIABETES MELLITUS WITH HYPERGLYCEMIA, WITH LONG-TERM CURRENT USE OF INSULIN: Primary | ICD-10-CM

## 2022-03-22 NOTE — TELEPHONE ENCOUNTER
Provider informed pt to actually take Lantus 12 units at night and Novolog 10 units before each meal. Hold Ozempic as it might cause nausea. Pt has appt moved up to next week to further discuss 3/30/2022. Pt verbalized understanding.

## 2022-03-22 NOTE — TELEPHONE ENCOUNTER
Pt called office regarding a medication that was making her feel strange. Pt stated she couldn't recall the name but her eye doctor gave it to her. Informed pt her call was sent to Dr Rahman which is her diabetes doctor. Pt stated she needs to get her diabetes in check as well.   Pt says on the call that her sugars are going up and up (per patient). First she stated her sugar was 197,198 and before the call ended it was 218 and she doesn't know what to do. Asked pt if she took her insulin pt stated no, doesn't know which one to take and is scared that it would go too low if she takes the insulin.    Advised pt message will be sent to provider for further review, pt verbalized understanding.

## 2022-03-23 RX ORDER — ALENDRONATE SODIUM 70 MG/1
TABLET ORAL
Qty: 12 TABLET | Refills: 0 | Status: SHIPPED | OUTPATIENT
Start: 2022-03-23 | End: 2022-06-29

## 2022-03-23 NOTE — TELEPHONE ENCOUNTER
Last Office Visit Info:   The patient's last visit with Sari Gupta MD was on 9/9/2021.    The patient's last visit in current department was on Visit date not found.        Last CBC Results:   Lab Results   Component Value Date    WBC 7.38 07/02/2021    HGB 13.4 07/02/2021    HCT 41.1 07/02/2021     07/02/2021       Last CMP Results  Lab Results   Component Value Date     01/26/2022    K 4.8 01/26/2022     01/26/2022    CO2 27 01/26/2022    BUN 26 (H) 01/26/2022    CREATININE 1.1 01/26/2022    CALCIUM 10.2 01/26/2022    ALBUMIN 4.1 01/26/2022    AST 24 01/26/2022    ALT 19 01/26/2022       Last Lipids  Lab Results   Component Value Date    CHOL 131 04/27/2020    TRIG 82 04/27/2020    HDL 46 04/27/2020    LDLCALC 68.6 04/27/2020       Last A1C  Lab Results   Component Value Date    HGBA1C 7.1 (H) 01/26/2022       Last TSH  Lab Results   Component Value Date    TSH 1.024 01/26/2022             Current Med Refills  Medication List with Changes/Refills   Current Medications    ALENDRONATE (FOSAMAX) 70 MG TABLET    TAKE 1 TABLET (70 MG TOTAL) BY MOUTH EVERY 7 DAYS.       Start Date: 12/8/2021 End Date: --    BLOOD SUGAR DIAGNOSTIC STRP    1 strip by Misc.(Non-Drug; Combo Route) route 4 (four) times daily with meals and nightly.       Start Date: 1/18/2020 End Date: --    BRIMONIDINE-TIMOLOL (COMBIGAN) 0.2-0.5 % DROP           Start Date: 9/14/2018 End Date: --    BROMFENAC (PROLENSA) 0.07 % DROP    Apply 1 drop to eye.       Start Date: 8/28/2018 End Date: --    CIPROFLOXACIN HCL (CILOXAN) 0.3 % OPHTHALMIC SOLUTION           Start Date: 7/23/2018 End Date: --    CYCLOSPORINE (RESTASIS) 0.05 % OPHTHALMIC EMULSION    Apply 1 drop to eye.       Start Date: 8/28/2018 End Date: --    DIAZEPAM (VALIUM) 5 MG TABLET    Take 5 mg by mouth every 6 (six) hours as needed.        Start Date: 7/30/2018 End Date: --    DULOXETINE (CYMBALTA) 60 MG CAPSULE    Take 60 mg by mouth 2 (two) times daily.       Start  Date: 7/25/2018 End Date: --    ERGOCALCIFEROL (ERGOCALCIFEROL) 50,000 UNIT CAP    Take 1 capsule (50,000 Units total) by mouth every 30 days.       Start Date: 1/7/2022  End Date: --    FLUOCINONIDE 0.05% (LIDEX) 0.05 % CREAM           Start Date: 4/21/2020 End Date: --    FLUOROMETHOLONE 0.1% (FML) 0.1 % DRPS           Start Date: 3/27/2020 End Date: --    FREESTYLE OSMAN 14 DAY SENSOR KIT    CHANGE EVERY 14 DAYS       Start Date: 3/7/2022  End Date: --    GABAPENTIN (NEURONTIN) 300 MG CAPSULE    Take 1 capsule (300 mg total) by mouth every evening.       Start Date: 7/2/2021  End Date: 7/2/2022    HYDROXYZINE PAMOATE (VISTARIL) 25 MG CAP    Take 25 mg by mouth.       Start Date: 4/15/2019 End Date: --    INSULIN (LANTUS SOLOSTAR U-100 INSULIN) GLARGINE 100 UNITS/ML (3ML) SUBQ PEN    Inject 12 units once daily in the morning       Start Date: 11/2/2021 End Date: --    INSULIN ASPART U-100 (NOVOLOG FLEXPEN U-100 INSULIN) 100 UNIT/ML (3 ML) INPN PEN    Take 12 units before breakfast, lunch and supper       Start Date: 11/2/2021 End Date: --    KETOROLAC 0.4% (ACULAR) 0.4 % DROP    Place 1 drop into both eyes 2 (two) times daily.       Start Date: 3/2/2021  End Date: --    KETOROLAC 0.5% (ACULAR) 0.5 % DROP           Start Date: 9/20/2019 End Date: --    LACTOBACILLUS RHAMNOSUS GG (CULTURELLE ORAL)    Take 1 capsule by mouth once daily.       Start Date: --        End Date: --    LEVOTHYROXINE (SYNTHROID) 112 MCG TABLET    TAKE 1 TABLET BY MOUTH EVERY MORNING       Start Date: 2/21/2022 End Date: --    LOTEMAX SM 0.38 % DRPG           Start Date: 4/5/2021  End Date: --    LOTEPREDNOL (LOTEMAX) 0.5 % OPHTHALMIC SUSPENSION           Start Date: 9/14/2018 End Date: --    MAXITROL 3.5 MG/G-10,000 UNIT/G-0.1 % OINT    0.25 inch in injected eye 4 times a day       Start Date: 3/2/2021  End Date: --    MUPIROCIN (BACTROBAN) 2 % OINTMENT    APPLY 1 APPLICATION TOPICALLY TO RED AREA ON CHEST TWICE DAILY       Start Date:  "5/11/2020 End Date: --    NEOMYCIN-POLYMYXIN-DEXAMETHASONE (MAXITROL) 3.5MG/ML-10,000 UNIT/ML-0.1 % DRPS           Start Date: 11/16/2021End Date: --    OFLOXACIN (OCUFLOX) 0.3 % OPHTHALMIC SOLUTION    INSTILL 1 DROP INTO AFFECTED EYE 4 TIMES A DAY       Start Date: 7/29/2019 End Date: --    OXYCODONE (ROXICODONE) 10 MG TAB IMMEDIATE RELEASE TABLET           Start Date: 1/9/2020  End Date: --    PEN NEEDLE, DIABETIC (BD ULTRA-FINE MINI PEN NEEDLE) 31 GAUGE X 3/16" NDLE    Use with insulin pen, injection 4 times a day       Start Date: 11/2/2021 End Date: --    PERMETHRIN (ELIMITE) 5 % CREAM           Start Date: 5/28/2020 End Date: --    PRODIGY NO CODING STRP           Start Date: 9/14/2018 End Date: --    RESTASIS 0.05 % OPHTHALMIC EMULSION    Place 1 drop into both eyes 2 (two) times daily.       Start Date: 8/28/2018 End Date: --    RHOPRESSA 0.02 % OPHTHALMIC SOLUTION    Place 1 drop into both eyes once daily.       Start Date: 3/2/2021  End Date: --    ROSUVASTATIN (CRESTOR) 10 MG TABLET    TAKE 1 TABLET BY MOUTH EVERY DAY       Start Date: 1/23/2022 End Date: --    SEMAGLUTIDE (OZEMPIC) 0.25 MG OR 0.5 MG(2 MG/1.5 ML) PEN INJECTOR    Inject 0.5 mg into the skin every 7 days.       Start Date: 11/2/2021 End Date: --    TRIAMCINOLONE ACETONIDE 0.1% (KENALOG) 0.1 % CREAM    APPLY TWICE DAILY TO ITCHY SKIN UP TO 2 WEEKS/MONTH AS NEEDED       Start Date: 8/16/2019 End Date: --                     "

## 2022-03-30 ENCOUNTER — OFFICE VISIT (OUTPATIENT)
Dept: ENDOCRINOLOGY | Facility: CLINIC | Age: 82
End: 2022-03-30
Payer: MEDICARE

## 2022-03-30 ENCOUNTER — TELEPHONE (OUTPATIENT)
Dept: ENDOCRINOLOGY | Facility: CLINIC | Age: 82
End: 2022-03-30
Payer: MEDICARE

## 2022-03-30 VITALS
WEIGHT: 149.88 LBS | BODY MASS INDEX: 29.28 KG/M2 | HEART RATE: 75 BPM | DIASTOLIC BLOOD PRESSURE: 74 MMHG | SYSTOLIC BLOOD PRESSURE: 142 MMHG | TEMPERATURE: 98 F

## 2022-03-30 DIAGNOSIS — Z79.4 TYPE 2 DIABETES MELLITUS WITH HYPERGLYCEMIA, WITH LONG-TERM CURRENT USE OF INSULIN: Primary | ICD-10-CM

## 2022-03-30 DIAGNOSIS — M81.0 AGE-RELATED OSTEOPOROSIS WITHOUT CURRENT PATHOLOGICAL FRACTURE: ICD-10-CM

## 2022-03-30 DIAGNOSIS — E78.2 MIXED HYPERLIPIDEMIA: ICD-10-CM

## 2022-03-30 DIAGNOSIS — E11.65 TYPE 2 DIABETES MELLITUS WITH HYPERGLYCEMIA, WITH LONG-TERM CURRENT USE OF INSULIN: Primary | ICD-10-CM

## 2022-03-30 DIAGNOSIS — H54.7 VISUAL IMPAIRMENT DUE TO DIABETES MELLITUS: ICD-10-CM

## 2022-03-30 DIAGNOSIS — E11.39 VISUAL IMPAIRMENT DUE TO DIABETES MELLITUS: ICD-10-CM

## 2022-03-30 DIAGNOSIS — N18.31 CHRONIC RENAL FAILURE, STAGE 3A: ICD-10-CM

## 2022-03-30 DIAGNOSIS — E03.9 HYPOTHYROIDISM, UNSPECIFIED TYPE: ICD-10-CM

## 2022-03-30 PROCEDURE — 99215 OFFICE O/P EST HI 40 MIN: CPT | Mod: PBBFAC | Performed by: HOSPITALIST

## 2022-03-30 PROCEDURE — 99999 PR PBB SHADOW E&M-EST. PATIENT-LVL V: CPT | Mod: PBBFAC,,, | Performed by: HOSPITALIST

## 2022-03-30 PROCEDURE — 99999 PR PBB SHADOW E&M-EST. PATIENT-LVL V: ICD-10-PCS | Mod: PBBFAC,,, | Performed by: HOSPITALIST

## 2022-03-30 PROCEDURE — 99214 PR OFFICE/OUTPT VISIT, EST, LEVL IV, 30-39 MIN: ICD-10-PCS | Mod: S$GLB,,, | Performed by: HOSPITALIST

## 2022-03-30 PROCEDURE — 99214 OFFICE O/P EST MOD 30 MIN: CPT | Mod: S$GLB,,, | Performed by: HOSPITALIST

## 2022-03-30 NOTE — ASSESSMENT & PLAN NOTE
- TSH is at goal, continue levothyroxine as prescribed  - continue LT4 112 mcg daily  - repeat TSH

## 2022-03-30 NOTE — ASSESSMENT & PLAN NOTE
- ASCVD Risk below: Statin: Taking  The 10-year ASCVD risk score (Diana MCCABE Jr., et al., 2013) is: 47.9%    Values used to calculate the score:      Age: 81 years      Sex: Female      Is Non- : No      Diabetic: Yes      Tobacco smoker: No      Systolic Blood Pressure: 142 mmHg      Is BP treated: No      HDL Cholesterol: 46 mg/dL      Total Cholesterol: 131 mg/dL

## 2022-03-30 NOTE — TELEPHONE ENCOUNTER
----- Message from Amanda Pete sent at 3/30/2022  9:02 AM CDT -----  Contact: [phone] Patient  Pt is requesting a call from the office about someone meeting her on the first floor due to the pt not being able to see the numbers/buttons on the elevator.

## 2022-03-30 NOTE — ASSESSMENT & PLAN NOTE
- Diabetes is controlled, given most recent A1c, A1C goal for this patient is 7-7.5.   - Given poor insight into diabetes care, poor historian, visual impairment leading to injection issues  - Hx of hypoglycemia, hyperglycemia  - Complicated by CKD3, dietary indiscretion, visual impairment  - Reviewed goals of therapy are to get the best control we can without hypoglycemia  - Routine health maintenance/screening: reviewed  - Freestyle Yoav download review showed mild hyperglycemia postprandially.  No hypoglycemia  - Diabetic supplies/medications:  Refills given  - patient reportedly has stop insulin for the last 4 days with no did hyperglycemia excursion     Plan:  - discussed with patient, who is quite hesitant to change,  - advised patient since glucose is within range, I advised that we should continue holding insulin, can restart Ozempic 0.5 mg Q weekly injection  - advised patient to use NovoLog as needed 10 units t.i.d. before meal if glucose >200  - VRB obtained, clear written instruction given on AVS  - continue Freestyle Yoav, follow-up in 2 months to re-evaluate data and make further adjust  - continue follow-up with PCP for routine health maintenance

## 2022-03-30 NOTE — PROGRESS NOTES
"   Subjective:      Patient ID: Chelsea Gould is a 81 y.o. female presented to [facility] on 3/30/2022.  Chief Complaint:  Diabetes      History of Present Illness: Chelsea Gould is a 81 y.o. female here for management of type 2 diabetes  Significant past medical history:  CAD, CKD stage IIIA, visual impairment (seen Ophthalmology for injections/may need surgery), hypothyroidism, osteoporosis (on Fosamax given by PCP)  Previously seen Esperanza Gonzalez NP 10/2020      1) Diabetes Mellitus Type 2, Follow up Visit  Patient here for an evaluation of diabetes, initially diagnosed at age  44  Known diabetic complications: nephropathy, peripheral neuropathy and cardiovascular disease  Cardiovascular risk factors: advanced age (older than 55 for men, 65 for women), diabetes mellitus, dyslipidemia, hypertension and obesity (BMI >= 30 kg/m2), poor historian       Interval history:  Patient is here for follow-up of type 2 diabetes.   Recently patient called our clinic complaining of  "medication that was making her feel strange.   Pt stated she couldn't recall the name but her eye doctor gave it to her."  >> She was give Doxycycline, which she couldn't tolerate    Was unclear about her much insulin she should be using even though she just saw me previously in 01/2022  Given patient overall confusion, advised patient to follow-up with me sooner to re-evaluate her Freestyle Yoav.    Patient reports that she has has stop insulin injection, stop Ozempic. Increase weight loss, due to poor appeite.  She "quit taking it" >> stop her insulin 3/26  Glucose is trending stable.  On Freestyle Yoav.  Does have occasional mild postprandial hyperglycemia that improved.  No hypoglycemia      Current meds:  All medication on hold              Lantus 12 units in the morning   NovoLog 12 units before each meal: breakfast, lunch, missed dinner due to her being out   Ozempic 0.5 mg weekly     Previous meds:              Metformin: "  Due to diarrhea  Home glucose checks:  On Freestyle Yoav,  a scanning regularly    CGM download and interpretation: Data was downloaded and personally reviewed by myself  CGM type: Freestyle yoav 14 day device is sensor  Number of Day CGM worn:  14 days, percentage of time CGM is active: 97%  Average blood glucose: 147 , Glucose variability: 36.8%  , Glucose management indicator (GMI): 6.8%,   Time in Range:  6% very high, 20% High, 71% Target Range, 3% low, 0% very low>> reviewed and discussed, goal TIR discussed with patient    Patient with well-controlled diabetes.  Given GM eye.  Does have occasional postprandial hyperglycemia as well as postprandial hypoglycemia.  Suspect due to missed insulin doses or too much insulin on occasion.  Patient is a poor historian, which makes further questioning of these episode difficult.  Patient also with noted poor vision  No obvious hypoglycemic trend appreciated.  Patient is doing a good job in scanning Freestyle Yoav         Diet/Exercise:               Eatin- 3 meals per day, often eat out especially at night, suspect dietary indiscretion as she often eat out at night              Snacking               Drink:  Sugar free              Current exercise: aerobics:  Dancing  Weight trend: decreasing steadily  Diabetes Education:  Yes, 19 - Liliam Chow RN, CDE   Diabetes Related Hospitalization:  denies  Hx of pancreatitis, hx of thyroid cancer:  Denies  Family history of diabetes:  Yes  Occupation:  Retired    Eye exam current (within one year): yes, DR: no, outside of [facility]  Reports cuts or ulcers on feet:  Denies    Statin: Taking  ACE/ARB: Not taking    Diabetes Management Status: Reviewed     A1C Trend  Lab Results   Component Value Date    HGBA1C 7.1 (H) 2022    HGBA1C 7.1 (H) 2021    HGBA1C 7.2 (H) 2021       Lab Results   Component Value Date    MICALBCREAT 9.1 2019     No results found for: DYJUFVZS90  No results  found for: TTGIGA    Lab Results   Component Value Date    CPEPTIDE 3.29 04/27/2020        Screening or Prevention Patient's value Goal Complete/Controlled?   HgA1C Testing and Control   Lab Results   Component Value Date    HGBA1C 7.1 (H) 01/26/2022      Annually/Less than 8% Yes   Lipid profile : 05/15/2020 Annually Yes   LDL control Lab Results   Component Value Date    LDLCALC 68.6 04/27/2020    Annually/Less than 100 mg/dl  Yes   Nephropathy screening Lab Results   Component Value Date    LABMICR 17.0 05/01/2019     Lab Results   Component Value Date    PROTEINUA Negative 03/20/2020    Annually No   Blood pressure BP Readings from Last 1 Encounters:   03/30/22 (!) 142/74    Less than 140/90 Yes   Dilated retinal exam : 02/01/2022 Annually Yes   Foot exam   Most Recent Foot Exam Date: Not Found Annually Yes       2) With regards to hypothyroidism  On levothyroxine 112 mcg daily  Taking as directed, reports compliance  Reports compliant with medication, chronic    Lab Results   Component Value Date    TSH 1.024 01/26/2022       3) With regards to osteoporosis/osteopenia  No recent falls  On Fosamax per PCP, started on 7/2019  Most recent bone density done review:  10/21 osteopenia with high FRAX score    DXA 10/26/21  Lumbar spine (L1-L4): BMD is 1.107 g/cm2, T-score is 0.5, and Z-score is 3.2.  Total hip: BMD is 0.794 g/cm2, T-score is -1.2, and Z-score is 0.8  Femoral neck: BMD is 0.608 g/cm2, T-score is -2.2, and Z-score is 0.1.  Distal 1/3 radius: Not applicable     COMPARISON:  Comparison study done on 07/08/2019.  Lumbar spine: BMD 0.974 g/cm2 and T-score -0.7.  Total Hip: BMD 0.786 g/cm2 and T-score -1.3.  Distal 1/3 radius: Not applicable    FRAX: 23% risk of a major osteoporotic fracture in the next 10 years.  6.3% risk of hip fracture in the next 10 years.     Impression:  LOW BONE MASS WITH A SIGNIFICANT INCREASE OF 13.6% IN THE LUMBAR SPINE BMD COMPARED TO THE PRIOR STUDY.      Reviewed past surgical,  medical, family, social history and updated as appropriate.    Review of SystemsSee above    Objective:   BP (!) 142/74 (BP Location: Left arm)   Pulse 75   Temp 97.6 °F (36.4 °C) (Oral)   Wt 68 kg (149 lb 14.4 oz)   BMI 29.28 kg/m²     Body mass index is 29.28 kg/m².  Vital signs reviewed    Physical Exam  Vitals and nursing note reviewed.   Constitutional:       Appearance: She is well-developed. She is obese.   HENT:      Head: Normocephalic.   Eyes:      General: No scleral icterus.     Conjunctiva/sclera: Conjunctivae normal.   Neck:      Thyroid: No thyromegaly.   Cardiovascular:      Rate and Rhythm: Normal rate.      Heart sounds: Normal heart sounds.   Pulmonary:      Effort: Pulmonary effort is normal. No respiratory distress.   Abdominal:      General: Abdomen is flat.      Tenderness: There is no abdominal tenderness.      Comments: Insulin injection sites examined on abdomen, clean, no redness, no nodule/masses   Musculoskeletal:         General: Normal range of motion.      Cervical back: Normal range of motion and neck supple.   Skin:     General: Skin is warm.      Findings: No erythema.   Neurological:      Mental Status: She is alert. Mental status is at baseline.      Coordination: Coordination normal.   Psychiatric:         Behavior: Behavior normal.       Lab Reviewed:   Lab Results   Component Value Date    HGBA1C 7.1 (H) 01/26/2022     Lab Results   Component Value Date    CHOL 131 04/27/2020    HDL 46 04/27/2020    LDLCALC 68.6 04/27/2020    TRIG 82 04/27/2020    CHOLHDL 35.1 04/27/2020     Lab Results   Component Value Date     01/26/2022    K 4.8 01/26/2022     01/26/2022    CO2 27 01/26/2022     (H) 01/26/2022    BUN 26 (H) 01/26/2022    CREATININE 1.1 01/26/2022    CALCIUM 10.2 01/26/2022    PROT 7.8 01/26/2022    ALBUMIN 4.1 01/26/2022    BILITOT 0.9 01/26/2022    ALKPHOS 76 01/26/2022    AST 24 01/26/2022    ALT 19 01/26/2022    ANIONGAP 8 01/26/2022     ESTGFRAFRICA 55.2 (A) 01/26/2022    EGFRNONAA 47.9 (A) 01/26/2022    TSH 1.024 01/26/2022     Lab Results   Component Value Date    .3 (H) 10/26/2021    GCMFRKSQ70HT 31 10/26/2021    BNUAMPDA04OB 24 (L) 10/05/2020    XPEHHPLT72BV 32 06/30/2020    CALCIUM 10.2 01/26/2022    CALCIUM 10.2 10/26/2021    CALCIUM 10.2 07/02/2021    ALKPHOS 76 01/26/2022    ALKPHOS 70 10/26/2021    ALKPHOS 72 07/02/2021    TSH 1.024 01/26/2022       Assessment     1. Type 2 diabetes mellitus with hyperglycemia, with long-term current use of insulin  Hemoglobin A1C    Basic Metabolic Panel   2. Hypothyroidism, unspecified type  TSH   3. Age-related osteoporosis without current pathological fracture     4. 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 Chronic renal failure, stage 3a     6. Mixed hyperlipidemia          Plan     Type 2 diabetes mellitus with hyperglycemia, with long-term current use of insulin  - Diabetes is controlled, given most recent A1c, A1C goal for this patient is 7-7.5.   - Given poor insight into diabetes care, poor historian, visual impairment leading to injection issues  - Hx of hypoglycemia, hyperglycemia  - Complicated by CKD3, dietary indiscretion, visual impairment  - Reviewed goals of therapy are to get the best control we can without hypoglycemia  - Routine health maintenance/screening: reviewed  - Freestyle Yoav download review showed mild hyperglycemia postprandially.  No hypoglycemia  - Diabetic supplies/medications:  Refills given  - patient reportedly has stop insulin for the last 4 days with no did hyperglycemia excursion     Plan:  - discussed with patient, who is quite hesitant to change,  - advised patient since glucose is within range, I advised that we should continue holding insulin, can restart Ozempic 0.5 mg Q weekly injection  - advised patient to use NovoLog as needed 10 units t.i.d. before meal if glucose >200  - VRB obtained, clear written instruction given on AVS  - continue  Freestyle Yoav, follow-up in 2 months to re-evaluate data and make further adjust  - continue follow-up with PCP for routine health maintenance    Age-related osteoporosis without current pathological fracture  - bone density for 10/2021 completed, Stable BMD, high frax score  - On Fosamax per PCP since 7/2019  - Advised patient to continue to be active   - Continue calcium and vitamin-D supplementation    Hypothyroidism  - TSH is at goal, continue levothyroxine as prescribed  - continue LT4 112 mcg daily  - repeat TSH     Visual impairment due to diabetes mellitus  - poor vision, continue follow-up with Ophthalmology  - continues to benefit from CGM given prevention of hypoglycemia    Chronic renal failure, stage 3a  - Kidney function stable   - Renally Adjust diabetes medication  - routine monitor    Mixed hyperlipidemia  - ASCVD Risk below: Statin: Taking  The 10-year ASCVD risk score (Diana MCCABE JrJose, et al., 2013) is: 47.9%    Values used to calculate the score:      Age: 81 years      Sex: Female      Is Non- : No      Diabetic: Yes      Tobacco smoker: No      Systolic Blood Pressure: 142 mmHg      Is BP treated: No      HDL Cholesterol: 46 mg/dL      Total Cholesterol: 131 mg/dL       Advised patient to follow up with PCP for routine health maintenance care.   RTC in 2 months    Dominick Rahman M.D  Endocrinology  [facility]  3/30/2022      Disclaimer: This note has been generated using voice-recognition software. There may be typographical errors that have been missed during proof-reading.  ------------------------------------------------------------    Indications for CGMs:    Patent with diagnosed:  Type 2 Diabetes Mellitus that required frequent glucose monitoring (4 + times a day and 4x/day testing), frequent adjustments to insulin regiment based on BG or CGM results. Patent requires a therapeutic CGM and is willing to use therapeutic CGM/SMBG for Necessary  frequent adjustments in insulin therapy, patient demonstrated an understanding of the technology (can use and recognize alerts and alarms). I, the physician, have assessed adherence to CGM regimen and to the plan, patient will have close follow up in clinic as indicated, every 3 months to 6 months.     Patient experiences (including but not limited to):  [x]   Discrepancies between records and A1C, at risk severe hypoglycemia episode and hospitalization  [x]   Recurrent, unexplained, severe hypoglycemic episodes  [x]   Postprandial hyperglycemia  [x]   Unexplained blood glucose excursions  []   Impaired awareness of hypoglycemia    []   Patient uses insulin pump for diabetes management: will need frequent adjustments to insulin regiment based on BG: including adjustment to  insulin pump setting, sliding scale, correction factor, additional bolusing/correction

## 2022-03-30 NOTE — ASSESSMENT & PLAN NOTE
- bone density for 10/2021 completed, Stable BMD, high frax score  - On Fosamax per PCP since 7/2019  - Advised patient to continue to be active   - Continue calcium and vitamin-D supplementation

## 2022-03-30 NOTE — PATIENT INSTRUCTIONS
Thank you for completing the visit with me    RESTART OZEMPIC 0.5mg once week injection    HOLD ALL INSULIN for now  IF GLUCOSE ARE >200 before you eat you can give NOVOLOG 10 units  DO NOT GIVE NOVOLOG IF YOU DO NOT EAT       Scan/Check your sugar often with Freestyle Yoav, scan every 5 hours if not eating.     Call your eye doctor> to get your vision evaluated     Please CALL MY OFFICE and leave a message if your insurance does not cover the medications I prescribed, so I can prescribed alternative medications.     We will plan an in-clinic visit in 2 months, with labs prior to that appointment.    Dominick Rahman M.D  [facility]  [address] [facility] [address], [address]  [place] [address]    Office:  [phone]  Fax:  [phone]

## 2022-04-20 ENCOUNTER — TELEPHONE (OUTPATIENT)
Dept: ENDOCRINOLOGY | Facility: CLINIC | Age: 82
End: 2022-04-20
Payer: MEDICARE

## 2022-04-20 NOTE — TELEPHONE ENCOUNTER
----- Message from Phylicia Sanz sent at 4/20/2022 10:10 AM CDT -----  Type: Patient Call Back    Who called self     What is the request in detail: Patient is concerned her semaglutide (OZEMPIC) 0.25 mg or 0.5 mg(2 mg/1.5 mL) pen injector is causing her to lose too much weight. Would like to speak with a nurse to see if she should  stop taking this medication.    Can the clinic reply by [facility]? No     Would the patient rather a call back or a response via [facility]? Call back     Best call back number: [phone]

## 2022-04-25 DIAGNOSIS — E11.69 HYPERLIPIDEMIA ASSOCIATED WITH TYPE 2 DIABETES MELLITUS: ICD-10-CM

## 2022-04-25 DIAGNOSIS — E78.5 HYPERLIPIDEMIA ASSOCIATED WITH TYPE 2 DIABETES MELLITUS: ICD-10-CM

## 2022-04-25 NOTE — TELEPHONE ENCOUNTER
Care Due:                  Date            Visit Type   Department     Provider  --------------------------------------------------------------------------------                                             [facility]      MEDICINE/INTERN  Last Visit: 09-      AUDIO ONLY   AL ALFREDO Gupta  Next Visit: None Scheduled  None         None Found                                                            Last  Test          Frequency    Reason                     Performed    Due Date  --------------------------------------------------------------------------------    Lipid Panel.  12 months..  rosuvastatin.............  04- 04-    Powered by [employer]. Reference number: [id number].   4/25/2022 9:45:10 AM CDT

## 2022-04-26 RX ORDER — ROSUVASTATIN CALCIUM 10 MG/1
TABLET, COATED ORAL
Qty: 90 TABLET | Refills: 0 | Status: SHIPPED | OUTPATIENT
Start: 2022-04-26 | End: 2022-07-25

## 2022-04-26 NOTE — TELEPHONE ENCOUNTER
Refill Routing Note   Medication(s) are not appropriate for processing by [facility] for the following reason(s):      - Required laboratory values are outdated    [facility] action(s):  Defer          Medication reconciliation completed: No     Appointments  past 12m or future 3m with PCP    Date Provider   Last Visit   9/9/2021 Sari Gupta MD   Next Visit   Visit date not found Sari Gupta MD   ED visits in past 90 days: 0        Note composed:1:25 PM 04/26/2022

## 2022-05-26 ENCOUNTER — LAB VISIT (OUTPATIENT)
Dept: LAB | Facility: HOSPITAL | Age: 82
End: 2022-05-26
Attending: HOSPITALIST
Payer: MEDICARE

## 2022-05-26 DIAGNOSIS — E03.9 HYPOTHYROIDISM, UNSPECIFIED TYPE: ICD-10-CM

## 2022-05-26 DIAGNOSIS — Z79.4 TYPE 2 DIABETES MELLITUS WITH HYPERGLYCEMIA, WITH LONG-TERM CURRENT USE OF INSULIN: ICD-10-CM

## 2022-05-26 DIAGNOSIS — E11.65 TYPE 2 DIABETES MELLITUS WITH HYPERGLYCEMIA, WITH LONG-TERM CURRENT USE OF INSULIN: ICD-10-CM

## 2022-05-26 LAB
ALBUMIN SERPL BCP-MCNC: 4 G/DL (ref 3.5–5.2)
ALP SERPL-CCNC: 73 U/L (ref 55–135)
ALT SERPL W/O P-5'-P-CCNC: 19 U/L (ref 10–44)
ANION GAP SERPL CALC-SCNC: 9 MMOL/L (ref 8–16)
ANION GAP SERPL CALC-SCNC: 9 MMOL/L (ref 8–16)
AST SERPL-CCNC: 18 U/L (ref 10–40)
BILIRUB SERPL-MCNC: 0.9 MG/DL (ref 0.1–1)
BUN SERPL-MCNC: 27 MG/DL (ref 8–23)
BUN SERPL-MCNC: 27 MG/DL (ref 8–23)
CALCIUM SERPL-MCNC: 10.2 MG/DL (ref 8.7–10.5)
CALCIUM SERPL-MCNC: 10.2 MG/DL (ref 8.7–10.5)
CHLORIDE SERPL-SCNC: 103 MMOL/L (ref 95–110)
CHLORIDE SERPL-SCNC: 103 MMOL/L (ref 95–110)
CO2 SERPL-SCNC: 27 MMOL/L (ref 23–29)
CO2 SERPL-SCNC: 27 MMOL/L (ref 23–29)
CREAT SERPL-MCNC: 1 MG/DL (ref 0.5–1.4)
CREAT SERPL-MCNC: 1 MG/DL (ref 0.5–1.4)
EST. GFR  (AFRICAN AMERICAN): >60 ML/MIN/1.73 M^2
EST. GFR  (AFRICAN AMERICAN): >60 ML/MIN/1.73 M^2
EST. GFR  (NON AFRICAN AMERICAN): 53.7 ML/MIN/1.73 M^2
EST. GFR  (NON AFRICAN AMERICAN): 53.7 ML/MIN/1.73 M^2
ESTIMATED AVG GLUCOSE: 194 MG/DL (ref 68–131)
GLUCOSE SERPL-MCNC: 236 MG/DL (ref 70–110)
GLUCOSE SERPL-MCNC: 236 MG/DL (ref 70–110)
HBA1C MFR BLD: 8.4 % (ref 4–5.6)
POTASSIUM SERPL-SCNC: 5 MMOL/L (ref 3.5–5.1)
POTASSIUM SERPL-SCNC: 5 MMOL/L (ref 3.5–5.1)
PROT SERPL-MCNC: 7 G/DL (ref 6–8.4)
SODIUM SERPL-SCNC: 139 MMOL/L (ref 136–145)
SODIUM SERPL-SCNC: 139 MMOL/L (ref 136–145)
T4 FREE SERPL-MCNC: 1.35 NG/DL (ref 0.71–1.51)
TSH SERPL DL<=0.005 MIU/L-ACNC: 0.36 UIU/ML (ref 0.4–4)

## 2022-05-26 PROCEDURE — 84443 ASSAY THYROID STIM HORMONE: CPT | Performed by: HOSPITALIST

## 2022-05-26 PROCEDURE — 83036 HEMOGLOBIN GLYCOSYLATED A1C: CPT | Performed by: HOSPITALIST

## 2022-05-26 PROCEDURE — 80053 COMPREHEN METABOLIC PANEL: CPT | Performed by: HOSPITALIST

## 2022-05-26 PROCEDURE — 36415 COLL VENOUS BLD VENIPUNCTURE: CPT | Mod: PO | Performed by: HOSPITALIST

## 2022-05-26 PROCEDURE — 84439 ASSAY OF FREE THYROXINE: CPT | Performed by: HOSPITALIST

## 2022-06-01 ENCOUNTER — OFFICE VISIT (OUTPATIENT)
Dept: ENDOCRINOLOGY | Facility: CLINIC | Age: 82
End: 2022-06-01
Payer: MEDICARE

## 2022-06-01 VITALS
HEART RATE: 85 BPM | BODY MASS INDEX: 26.8 KG/M2 | SYSTOLIC BLOOD PRESSURE: 136 MMHG | DIASTOLIC BLOOD PRESSURE: 77 MMHG | TEMPERATURE: 98 F | WEIGHT: 137.19 LBS

## 2022-06-01 DIAGNOSIS — H54.7 VISUAL IMPAIRMENT DUE TO DIABETES MELLITUS: ICD-10-CM

## 2022-06-01 DIAGNOSIS — E03.9 HYPOTHYROIDISM, UNSPECIFIED TYPE: ICD-10-CM

## 2022-06-01 DIAGNOSIS — E78.2 MIXED HYPERLIPIDEMIA: ICD-10-CM

## 2022-06-01 DIAGNOSIS — E11.39 VISUAL IMPAIRMENT DUE TO DIABETES MELLITUS: ICD-10-CM

## 2022-06-01 DIAGNOSIS — R41.89 COGNITIVE DEFICITS: ICD-10-CM

## 2022-06-01 DIAGNOSIS — Z79.4 TYPE 2 DIABETES MELLITUS WITH HYPERGLYCEMIA, WITH LONG-TERM CURRENT USE OF INSULIN: Primary | ICD-10-CM

## 2022-06-01 DIAGNOSIS — E11.65 TYPE 2 DIABETES MELLITUS WITH HYPERGLYCEMIA, WITH LONG-TERM CURRENT USE OF INSULIN: Primary | ICD-10-CM

## 2022-06-01 DIAGNOSIS — M81.0 AGE-RELATED OSTEOPOROSIS WITHOUT CURRENT PATHOLOGICAL FRACTURE: ICD-10-CM

## 2022-06-01 PROCEDURE — 99215 OFFICE O/P EST HI 40 MIN: CPT | Mod: PBBFAC | Performed by: HOSPITALIST

## 2022-06-01 PROCEDURE — 95251 CONT GLUC MNTR ANALYSIS I&R: CPT | Mod: ,,, | Performed by: HOSPITALIST

## 2022-06-01 PROCEDURE — 99999 PR PBB SHADOW E&M-EST. PATIENT-LVL V: CPT | Mod: PBBFAC,,, | Performed by: HOSPITALIST

## 2022-06-01 PROCEDURE — 99214 PR OFFICE/OUTPT VISIT, EST, LEVL IV, 30-39 MIN: ICD-10-PCS | Mod: 25,S$PBB,, | Performed by: HOSPITALIST

## 2022-06-01 PROCEDURE — 99999 PR PBB SHADOW E&M-EST. PATIENT-LVL V: ICD-10-PCS | Mod: PBBFAC,,, | Performed by: HOSPITALIST

## 2022-06-01 PROCEDURE — 95251 PR GLUCOSE MONITOR, 72 HOUR, PHYS INTERP: ICD-10-PCS | Mod: ,,, | Performed by: HOSPITALIST

## 2022-06-01 PROCEDURE — 99214 OFFICE O/P EST MOD 30 MIN: CPT | Mod: 25,S$PBB,, | Performed by: HOSPITALIST

## 2022-06-01 RX ORDER — INSULIN ASPART 100 [IU]/ML
INJECTION, SOLUTION INTRAVENOUS; SUBCUTANEOUS
Qty: 15 ML | Refills: 11 | Status: SHIPPED | OUTPATIENT
Start: 2022-06-01 | End: 2022-09-08 | Stop reason: SDUPTHER

## 2022-06-01 RX ORDER — PEN NEEDLE, DIABETIC 30 GX3/16"
NEEDLE, DISPOSABLE MISCELLANEOUS
Qty: 200 EACH | Refills: 5 | Status: SHIPPED | OUTPATIENT
Start: 2022-06-01 | End: 2023-08-08 | Stop reason: SDUPTHER

## 2022-06-01 RX ORDER — PIOGLITAZONEHYDROCHLORIDE 30 MG/1
30 TABLET ORAL DAILY
Qty: 90 TABLET | Refills: 3 | Status: SHIPPED | OUTPATIENT
Start: 2022-06-01 | End: 2022-09-08

## 2022-06-01 NOTE — ASSESSMENT & PLAN NOTE
- ASCVD Risk below: Statin: Taking  The 10-year ASCVD risk score (Diana MCCABE Jr., et al., 2013) is: 45%    Values used to calculate the score:      Age: 81 years      Sex: Female      Is Non- : No      Diabetic: Yes      Tobacco smoker: No      Systolic Blood Pressure: 136 mmHg      Is BP treated: No      HDL Cholesterol: 46 mg/dL      Total Cholesterol: 131 mg/dL

## 2022-06-01 NOTE — ASSESSMENT & PLAN NOTE
- Diabetes is not at goal, given worsening of recent A1c, A1C goal for this patient is 7-7.5%.   - diabetes management is tough due to poor insight into diabetes care, poor historian, mood disorder, vision impairment  - Complicated by CKD3, dietary indiscretion, visual impairment  - Reviewed goals of therapy are to get the best control we can without hypoglycemia  - Routine health maintenance/screening: reviewed  - Freestyle Yoav download review showed worsening  hyperglycemia postprandially.  No hypoglycemia  - Diabetic supplies/medications:  Refills given  - family friend was present for this visit, information discussed and given to family friend to help discussed importance of her diabetes control with her family     Plan:  - we will stop Ozempic given patient concern for continue weight loss  - advise restarting NovoLog 10 units before each meal, only hold if glucose is less than 140 prior to meals, to treat postprandial hyperglycemia  - start patient on Actos 30 mg daily  - family friend and patient would like to speak to a nutritionist to help set up a clear and definitive meal plan schedule>> Diabetes Education referral placed  - VRB obtained, clear written instruction given on AVS  - continue Freestyle Yoav, follow-up in 3 months to re-evaluate data and make further adjust  - continue follow-up with PCP for routine health maintenance

## 2022-06-01 NOTE — ASSESSMENT & PLAN NOTE
- TSH is at goal, continue levothyroxine as prescribed  - continue LT4 112 mcg daily  - repeat TSH in the future, if further lower TSH will decrease levothyroxine

## 2022-06-01 NOTE — PATIENT INSTRUCTIONS
Thank you for completing the visit with me    STOP OZEMPIC    START ACTOS 30mg once a day in the morning  NovoLog 10 units t.i.d. before meal give glucose 150    Scan/Check your sugar often with Freestyle Yoav before each meals.     Goal blood sugar in the morning, before breakfast: 100-140  Glucose goal AFTER MEALS:    2 Hour after: less than 140  Before going to bed: 100-140  Do not go to bed with glucose less than 100  Have a small snack if glucose is lower than 100       Please CALL MY OFFICE and leave a message if your insurance does not cover the medications I prescribed, so I can prescribed alternative medications.     We will plan an in-clinic visit in 3 months, with labs prior to that appointment.    Dominick Rahman M.D  [facility] [address] [facility] [address], [address]  [place] [address]    Office:  [phone]  Fax:  [phone]

## 2022-06-01 NOTE — PROGRESS NOTES
"     Subjective:      Patient ID: Chelsea Gould is a 81 y.o. female presented to [facility] on 6/1/2022.  Chief Complaint:  Diabetes      History of Present Illness: Chelsea Gould is a 81 y.o. female here for management of type 2 diabetes  Significant past medical history:  CAD, CKD stage IIIA, visual impairment (seen Ophthalmology for injections/may need surgery), hypothyroidism, osteoporosis (on Fosamax given by PCP)  Previously seen Esperanza Gonzalez NP 10/2020      Interval history:  Patient is here for follow-up of type 2 diabetes.  Present with a family friend.  Who once I get more information on her diabetes  According to family friend:  Patient is , has , has daughter.  But according to patient she is "by herself"    Patient continues to perseverate on the fact that previously she was given "medication that was making.. feel strange". She was give Doxycycline,  Patient reports weight loss, while on Ozempic.  Requesting to stop this medication  Patient continues to require frequent redirection  On Freestyle Yoav, data show hyperglycemia postprandial.    Per patient she is giving herself NovoLog as needed    1) Diabetes Mellitus Type 2, Follow up Visit  Patient here for an evaluation of diabetes, initially diagnosed at age  44  Known diabetic complications: nephropathy, peripheral neuropathy and cardiovascular disease  Cardiovascular risk factors: advanced age (older than 55 for men, 65 for women), diabetes mellitus, dyslipidemia, hypertension and obesity (BMI >= 30 kg/m2), poor historian     Patient reports that she has has stop insulin injection, stop Ozempic. Increase weight loss, due to poor appeite.  She "quit taking it" >> stop her insulin 3/26/22  Glucose is trending stable.  On Freestyle Yoav.  Does have occasional mild postprandial hyperglycemia that improved.  No hypoglycemia    Current meds:     Ozempic 0.25 mg Q weekly injection   NovoLog as needed 10 units t.i.d. " before meal if glucose >200  Previous meds:              Metformin:  Due to diarrhea              Lantus 12 units in the morning  Home glucose checks:  On Freestyle Yoav,  a scanning regularly    CGM download and interpretation: Data was downloaded and personally reviewed by myself  CGM type: Freestyle yoav 14 day device is sensor  Number of Day CGM worn:  14 days, percentage of time CGM is active: 47%  Average blood glucose: 210 , Glucose variability: 30.8%  , Glucose management indicator (GMI): 8.3%,   Time in Range:  25% very high, 35% High, 40% Target Range, 0% low, 0% very low>> reviewed and discussed, goal TIR discussed with patient    CGM data downloaded review show persistent hyperglycemia postprandial, no overnight hypoglycemia.  Hyperglycemia worsen for lunch and dinner.       Diet/Exercise:               Eatin- 3 meals per day, often eat out especially at night, suspect dietary indiscretion as she often eat out at night              Snacking               Drink:  Sugar free              Current exercise: aerobics:  Dancing  Weight trend: decreasing steadily  Diabetes Education:  Yes, 19 - Liliam Chow RN, CDE   Diabetes Related Hospitalization:  denies  Hx of pancreatitis, hx of thyroid cancer:  Denies  Family history of diabetes:  Yes  Occupation:  Retired    Eye exam current (within one year): yes, DR: no, outside of [facility]  Reports cuts or ulcers on feet:  Denies    Statin: Taking  ACE/ARB: Not taking    Diabetes Management Status: Reviewed     A1C Trend  Lab Results   Component Value Date    HGBA1C 8.4 (H) 2022    HGBA1C 7.1 (H) 2022    HGBA1C 7.1 (H) 2021       Lab Results   Component Value Date    MICALBCREAT 9.1 2019     No results found for: [id number]  No results found for: TTGIGA    Lab Results   Component Value Date    CPEPTIDE 3.29 2020        Screening or Prevention Patient's value Goal Complete/Controlled?   HgA1C Testing and Control    Lab Results   Component Value Date    HGBA1C 8.4 (H) 05/26/2022      Annually/Less than 8% Yes   Lipid profile : 05/15/2020 Annually Yes   LDL control Lab Results   Component Value Date    LDLCALC 68.6 04/27/2020    Annually/Less than 100 mg/dl  Yes   Nephropathy screening Lab Results   Component Value Date    LABMICR 17.0 05/01/2019     Lab Results   Component Value Date    PROTEINUA Negative 03/20/2020    Annually No   Blood pressure BP Readings from Last 1 Encounters:   06/01/22 136/77    Less than 140/90 Yes   Dilated retinal exam : 03/22/2022 Annually Yes   Foot exam   Most Recent Foot Exam Date: Not Found Annually Yes       2) With regards to hypothyroidism  On levothyroxine 112 mcg daily  Taking as directed, reports compliance  Reports compliant with medication, chronic     Lab Results   Component Value Date    TSH 0.359 (L) 05/26/2022    TSH 1.024 01/26/2022    TSH 0.337 (L) 10/26/2021    FREET4 1.35 05/26/2022    FREET4 1.11 01/26/2022    FREET4 1.05 10/26/2021         3) With regards to osteoporosis/osteopenia  No recent falls  On Fosamax per PCP, started on 7/2019  Most recent bone density done review:  10/21 osteopenia with high FRAX score    DXA 10/26/21  Lumbar spine (L1-L4): BMD is 1.107 g/cm2, T-score is 0.5, and Z-score is 3.2.  Total hip: BMD is 0.794 g/cm2, T-score is -1.2, and Z-score is 0.8  Femoral neck: BMD is 0.608 g/cm2, T-score is -2.2, and Z-score is 0.1.  Distal 1/3 radius: Not applicable     COMPARISON:  Comparison study done on 07/08/2019.  Lumbar spine: BMD 0.974 g/cm2 and T-score -0.7.  Total Hip: BMD 0.786 g/cm2 and T-score -1.3.  Distal 1/3 radius: Not applicable    FRAX: 23% risk of a major osteoporotic fracture in the next 10 years.  6.3% risk of hip fracture in the next 10 years.     Impression:  LOW BONE MASS WITH A SIGNIFICANT INCREASE OF 13.6% IN THE LUMBAR SPINE BMD COMPARED TO THE PRIOR STUDY.      Reviewed past surgical, medical, family, social history and updated as  appropriate.    Review of SystemsSee above    Objective:   /77 (BP Location: Left arm)   Pulse 85   Temp 97.8 °F (36.6 °C) (Oral)   Wt 62.2 kg (137 lb 3.2 oz)   BMI 26.80 kg/m²     Body mass index is 26.8 kg/m².  Vital signs reviewed    Physical Exam  Vitals and nursing note reviewed.   Constitutional:       General: She is not in acute distress.     Appearance: Normal appearance. She is well-developed. She is not toxic-appearing.   Neck:      Thyroid: No thyromegaly.   Pulmonary:      Effort: Pulmonary effort is normal.   Musculoskeletal:         General: Normal range of motion.      Cervical back: Normal range of motion.   Skin:     Findings: No bruising.   Neurological:      Mental Status: She is alert. She is disoriented.   Psychiatric:      Comments: Chronic mood issues noted, similar to previous visit       Lab Reviewed:   Lab Results   Component Value Date    HGBA1C 8.4 (H) 05/26/2022     Lab Results   Component Value Date    CHOL 131 04/27/2020    HDL 46 04/27/2020    LDLCALC 68.6 04/27/2020    TRIG 82 04/27/2020    CHOLHDL 35.1 04/27/2020     Lab Results   Component Value Date     05/26/2022     05/26/2022    K 5.0 05/26/2022    K 5.0 05/26/2022     05/26/2022     05/26/2022    CO2 27 05/26/2022    CO2 27 05/26/2022     (H) 05/26/2022     (H) 05/26/2022    BUN 27 (H) 05/26/2022    BUN 27 (H) 05/26/2022    CREATININE 1.0 05/26/2022    CREATININE 1.0 05/26/2022    CALCIUM 10.2 05/26/2022    CALCIUM 10.2 05/26/2022    PROT 7.0 05/26/2022    ALBUMIN 4.0 05/26/2022    BILITOT 0.9 05/26/2022    ALKPHOS 73 05/26/2022    AST 18 05/26/2022    ALT 19 05/26/2022    ANIONGAP 9 05/26/2022    ANIONGAP 9 05/26/2022    ESTGFRAFRICA >60.0 05/26/2022    ESTGFRAFRICA >60.0 05/26/2022    EGFRNONAA 53.7 (A) 05/26/2022    EGFRNONAA 53.7 (A) 05/26/2022    TSH 0.359 (L) 05/26/2022     Lab Results   Component Value Date    .3 (H) 10/26/2021    FINFZWVG07LV 31 10/26/2021     "PUPQMVEO83DQ 24 (L) 10/05/2020    MQKKNKOR04CA 32 06/30/2020    CALCIUM 10.2 05/26/2022    CALCIUM 10.2 05/26/2022    CALCIUM 10.2 01/26/2022    ALKPHOS 73 05/26/2022    ALKPHOS 76 01/26/2022    ALKPHOS 70 10/26/2021    TSH 0.359 (L) 05/26/2022       Assessment     1. Type 2 diabetes mellitus with hyperglycemia, with long-term current use of insulin  Ambulatory referral/consult to Diabetes Education    pioglitazone (ACTOS) 30 MG tablet    Hemoglobin A1C    Basic Metabolic Panel    insulin aspart U-100 (NOVOLOG FLEXPEN U-100 INSULIN) 100 unit/mL (3 mL) InPn pen    pen needle, diabetic (BD ULTRA-FINE MINI PEN NEEDLE) 31 gauge x 3/16" Ndle   2. Hypothyroidism, unspecified type  TSH   3. Age-related osteoporosis without current pathological fracture     4. Mixed hyperlipidemia     5. 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 Cognitive deficits          Plan     Type 2 diabetes mellitus with hyperglycemia, with long-term current use of insulin  - Diabetes is not at goal, given worsening of recent A1c, A1C goal for this patient is 7-7.5%.   - diabetes management is tough due to poor insight into diabetes care, poor historian, mood disorder, vision impairment  - Complicated by CKD3, dietary indiscretion, visual impairment  - Reviewed goals of therapy are to get the best control we can without hypoglycemia  - Routine health maintenance/screening: reviewed  - Freestyle Yoav download review showed worsening  hyperglycemia postprandially.  No hypoglycemia  - Diabetic supplies/medications:  Refills given  - family friend was present for this visit, information discussed and given to family friend to help discussed importance of her diabetes control with her family     Plan:  - we will stop Ozempic given patient concern for continue weight loss  - advise restarting NovoLog 10 units before each meal, only hold if glucose is less than 140 prior to meals, to treat postprandial hyperglycemia  - start patient on Actos 30 mg " daily  - family friend and patient would like to speak to a nutritionist to help set up a clear and definitive meal plan schedule>> Diabetes Education referral placed  - VRB obtained, clear written instruction given on AVS  - continue Freestyle Yoav, follow-up in 3 months to re-evaluate data and make further adjust  - continue follow-up with PCP for routine health maintenance    Age-related osteoporosis without current pathological fracture  - bone density for 10/2021 completed, Stable BMD, high frax score  - On Fosamax per PCP since 7/2019  - Advised patient to continue to be active   - Continue calcium and vitamin-D supplementation    Hypothyroidism  - TSH is at goal, continue levothyroxine as prescribed  - continue LT4 112 mcg daily  - repeat TSH in the future, if further lower TSH will decrease levothyroxine    Mixed hyperlipidemia  - ASCVD Risk below: Statin: Taking  The 10-year ASCVD risk score (Diana MCCABE Jr., et al., 2013) is: 45%    Values used to calculate the score:      Age: 81 years      Sex: Female      Is Non- : No      Diabetic: Yes      Tobacco smoker: No      Systolic Blood Pressure: 136 mmHg      Is BP treated: No      HDL Cholesterol: 46 mg/dL      Total Cholesterol: 131 mg/dL    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   RTC in 3 months    Dominick Rahman M.D  Endocrinology  [facility]  6/1/2022      Disclaimer: This note has been generated using voice-recognition software. There may be typographical errors that have been missed during proof-reading.  ------------------------------------------------------------    Indications for CGMs:    Patent with diagnosed:  Type 2 Diabetes  Mellitus that required frequent glucose monitoring (4 + times a day and 4x/day testing), frequent adjustments to insulin regiment based on BG or CGM results. Patent requires a therapeutic CGM and is willing to use therapeutic CGM/SMBG for Necessary frequent adjustments in insulin therapy, patient demonstrated an understanding of the technology (can use and recognize alerts and alarms). I, the physician, have assessed adherence to CGM regimen and to the plan, patient will have close follow up in clinic as indicated, every 3 months to 6 months.     Patient experiences (including but not limited to):  [x]   Discrepancies between records and A1C, at risk severe hypoglycemia episode and hospitalization  [x]   Recurrent, unexplained, severe hypoglycemic episodes  [x]   Postprandial hyperglycemia  [x]   Unexplained blood glucose excursions  []   Impaired awareness of hypoglycemia    []   Patient uses insulin pump for diabetes management: will need frequent adjustments to insulin regiment based on BG: including adjustment to  insulin pump setting, sliding scale, correction factor, additional bolusing/correction

## 2022-06-27 NOTE — PROGRESS NOTES
Subjective:      Patient ID: Chelsea Gould is a 81 y.o. female.    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    Now s/p RTLH/BSO 1/17/2020  Pathology reviewed showing no residual malignancy, does show areas of residual severe cervical dysplasia.   No adjuvant therapy is indicated.   Pap 1/2021 negative     Today's visit:   Presents today for surveillance. Without complaints. Specifically denies N/V, pain, swelling, bleeding, unexpected weight change, SOB, problems with bowel or bladder function.   Disease free interval 2.5 years       History:  Referred by Dr. Marcia Canseco for newly diagnosed endocervical carcinoma in situ.   Evaluated by Dr. Canseco for post menopausal bleeding.      Pap 9/20/18 negative   EMB 9/27/19   FINAL PATHOLOGIC DIAGNOSIS   Endometrium, curettage:   Scant strips of inactive endometrial glands and lower uterine segment glands in a background of mucus and blood.   Negative for atypia or malignancy.   Comment: No significant stromal component is present to evaluate for endometrial hyperplasia. The findings may   reflect an atrophic process. However, re-sampling may be considered in a highly suspicious clinical setting.      Underwent further evaluation with D&C hysteroscopy 10/25/19.   FINAL PATHOLOGIC DIAGNOSIS   ENDOMETRIAL CURETTAGE:   - Multiple fragments of squamous epithelium with severe squamous dysplasia/carcinoma in situ with involvement of   the endocervix (see comment).   - Rare small fragments of atrophic endometrium.   - Strips of benign endocervical epithelium and fragments of benign squamous epithelium intermixed with blood and   mucus.   - No evidence of endometrial hyperplasia, endometrial intraepithelial neoplasia or endometrial adenocarcinoma.   COMMENT: The fragments of squamous epithelium represent at least severe squamous dysplasia/carcinoma in   situ and appear to  arise from the cervix. The fragments are cut in a tangential fashion and the interface of the   epithelium with the stroma cannot be accurately evaluated. The possibility of an invasive squamous cell carcinoma   cannot be excluded. There is no evidence of endometrial intraepithelial neoplasia or endometrial adenocarcinoma.   However, the possibility of a squamous cell carcinoma arising within the endometrium, although unlikely, cannot be   excluded.      Pelvic US 10/9/19  Uterus:  Size: 6.8 x 3.6 x 3.8 cm  Masses: There are multiple small partially calcified uterine fibroids present with the largest measuring 1.4 cm in greatest dimension. Multiple nabothian cysts are present.  Endometrium: Upper normal in this post menopausal patient, measuring 5 mm. There is a trace amount of free fluid within the endometrial cavity as well.  Right ovary: Not visualized.  Left ovary: Not visualized.      Medical comorbidities include DM (last hgb A1c 7.6, Cr 1.2), HLD, hypothyroid.   Prior abdominal surgeries include BTL.      S/p CKC 11/26/19  Cervical excision shows small microinvasive squamous cell carcinoma of the cervix, 1mm. Stage IA1, negative LVSI.   1. CERVIX, LEEP:   - Superficial squamous cell carcinoma, less than 1 mm.   - High-grade squamous intraepithelial lesion (DEE DEE III) extending into   endocervical glands.   - Immunohistochemistry with appropriate control for keratin AE1/3 highlights   the carcinoma.   - Immunohistochemistry with appropriate control for p16 is positive in both   the invasive carcinoma and     DEE DEE III.   - See CAP synoptic report.   2.  ENDOCERVICAL CURETTAGE:   - Benign glandular epithelium and blood clot.   - Immunohistochemistry with appropriate control for p16 is noncontributory.   Deep Margin:  Uninvolved by invasive carcinoma or high-grade squamous   intraepithelial lesion (DEE DEE 2-3). Distance of invasive carcinoma from margin (millimeters): 4.5   mm.   Lymphovascular Invasion:  Not  identified.      CT CAP 1/2020  Impression:  1. Patient with a reported history of cervical cancer.  No imaging findings to suggest distant metastatic disease.  Review of Systems   Constitutional: Negative for appetite change, chills, fatigue and fever.   HENT: Negative for mouth sores.    Respiratory: Negative for cough and shortness of breath.    Cardiovascular: Negative for leg swelling.   Gastrointestinal: Negative for abdominal pain, blood in stool, constipation and diarrhea.   Endocrine: Negative for cold intolerance.   Genitourinary: Negative for dysuria and vaginal bleeding.   Musculoskeletal: Negative for myalgias.   Skin: Negative for rash.   Allergic/Immunologic: Negative.    Neurological: Negative for weakness and numbness.   Hematological: Negative for adenopathy. Does not bruise/bleed easily.   Psychiatric/Behavioral: Negative for confusion.       Objective:   Physical Exam:   Constitutional: She is oriented to person, place, and time. She appears well-developed and well-nourished.    HENT:   Head: Normocephalic and atraumatic.    Eyes: Pupils are equal, round, and reactive to light. EOM are normal.    Neck: No thyromegaly present.    Cardiovascular: Normal rate, regular rhythm and intact distal pulses.     Pulmonary/Chest: Effort normal and breath sounds normal. No respiratory distress. She has no wheezes.        Abdominal: Soft. Bowel sounds are normal. She exhibits no distension and no mass. There is no abdominal tenderness.     Genitourinary:    Vagina and rectum normal.      Pelvic exam was performed with patient supine.   There is no lesion on the right labia. There is no lesion on the left labia. Right adnexum displays no mass. Left adnexum displays no mass. Vaginal cuff normal.  Cervix is absent.   pap smear completedUterus is absent.           Musculoskeletal: Normal range of motion and moves all extremeties.      Lymphadenopathy:     She has no cervical adenopathy.        Right: No  supraclavicular adenopathy present.        Left: No supraclavicular adenopathy present.    Neurological: She is alert and oriented to person, place, and time.    Skin: Skin is warm and dry. No rash noted.    Psychiatric: She has a normal mood and affect.       Assessment:     1. Malignant neoplasm of endocervix        Plan:   No orders of the defined types were placed in this encounter.    No evidence of disease on today's exam  Feels well, no new symptoms  Disease free interval 2.5 years   Surveillance pap collected      Recommend continued surveillance. RTC 6 months or sooner if needed     I spent approximately 30 minutes reviewing the available records and evaluating the patient, out of which over 50% of the time was spent face to face with the patient in counseling and coordinating this patient's care.

## 2022-06-28 ENCOUNTER — OFFICE VISIT (OUTPATIENT)
Dept: GYNECOLOGIC ONCOLOGY | Facility: CLINIC | Age: 82
End: 2022-06-28
Payer: MEDICARE

## 2022-06-28 ENCOUNTER — TELEPHONE (OUTPATIENT)
Dept: FAMILY MEDICINE | Facility: CLINIC | Age: 82
End: 2022-06-28
Payer: MEDICARE

## 2022-06-28 VITALS
SYSTOLIC BLOOD PRESSURE: 159 MMHG | BODY MASS INDEX: 24.75 KG/M2 | HEART RATE: 70 BPM | WEIGHT: 134.5 LBS | HEIGHT: 62 IN | DIASTOLIC BLOOD PRESSURE: 71 MMHG

## 2022-06-28 DIAGNOSIS — C53.0 MALIGNANT NEOPLASM OF ENDOCERVIX: Primary | ICD-10-CM

## 2022-06-28 PROCEDURE — 99214 PR OFFICE/OUTPT VISIT, EST, LEVL IV, 30-39 MIN: ICD-10-PCS | Mod: S$PBB,,, | Performed by: OBSTETRICS & GYNECOLOGY

## 2022-06-28 PROCEDURE — 99214 OFFICE O/P EST MOD 30 MIN: CPT | Mod: PBBFAC | Performed by: OBSTETRICS & GYNECOLOGY

## 2022-06-28 PROCEDURE — 88175 CYTOPATH C/V AUTO FLUID REDO: CPT | Performed by: OBSTETRICS & GYNECOLOGY

## 2022-06-28 PROCEDURE — 99999 PR PBB SHADOW E&M-EST. PATIENT-LVL IV: ICD-10-PCS | Mod: PBBFAC,,, | Performed by: OBSTETRICS & GYNECOLOGY

## 2022-06-28 PROCEDURE — 99214 OFFICE O/P EST MOD 30 MIN: CPT | Mod: S$PBB,,, | Performed by: OBSTETRICS & GYNECOLOGY

## 2022-06-28 PROCEDURE — 99999 PR PBB SHADOW E&M-EST. PATIENT-LVL IV: CPT | Mod: PBBFAC,,, | Performed by: OBSTETRICS & GYNECOLOGY

## 2022-06-28 NOTE — TELEPHONE ENCOUNTER
----- Message from Fani Vieira MD sent at 6/28/2022 10:28 AM CDT -----  Regarding: routine health maintenance follow up  Patient in need of routine health maintenance follow up, MMG etc.   Please reach out to her for scheduling. [phone] best contact number.   I see her for her history of cervical cancer.   Thank you so much,  VICKI

## 2022-06-29 ENCOUNTER — LAB VISIT (OUTPATIENT)
Dept: LAB | Facility: HOSPITAL | Age: 82
End: 2022-06-29
Attending: HOSPITALIST
Payer: MEDICARE

## 2022-06-29 DIAGNOSIS — N18.31 CHRONIC RENAL FAILURE, STAGE 3A: ICD-10-CM

## 2022-06-29 DIAGNOSIS — E11.65 TYPE 2 DIABETES MELLITUS WITH HYPERGLYCEMIA, WITH LONG-TERM CURRENT USE OF INSULIN: ICD-10-CM

## 2022-06-29 DIAGNOSIS — M81.0 AGE-RELATED OSTEOPOROSIS WITHOUT CURRENT PATHOLOGICAL FRACTURE: ICD-10-CM

## 2022-06-29 DIAGNOSIS — Z79.4 TYPE 2 DIABETES MELLITUS WITH HYPERGLYCEMIA, WITH LONG-TERM CURRENT USE OF INSULIN: ICD-10-CM

## 2022-06-29 LAB
ESTIMATED AVG GLUCOSE: 192 MG/DL (ref 68–131)
HBA1C MFR BLD: 8.3 % (ref 4–5.6)
TSH SERPL DL<=0.005 MIU/L-ACNC: 0.7 UIU/ML (ref 0.4–4)

## 2022-06-29 PROCEDURE — 83036 HEMOGLOBIN GLYCOSYLATED A1C: CPT | Performed by: HOSPITALIST

## 2022-06-29 PROCEDURE — 84443 ASSAY THYROID STIM HORMONE: CPT | Performed by: HOSPITALIST

## 2022-06-29 PROCEDURE — 36415 COLL VENOUS BLD VENIPUNCTURE: CPT | Mod: PO | Performed by: HOSPITALIST

## 2022-07-13 ENCOUNTER — OFFICE VISIT (OUTPATIENT)
Dept: FAMILY MEDICINE | Facility: CLINIC | Age: 82
End: 2022-07-13
Payer: MEDICARE

## 2022-07-13 ENCOUNTER — HOSPITAL ENCOUNTER (OUTPATIENT)
Dept: RADIOLOGY | Facility: HOSPITAL | Age: 82
Discharge: HOME | End: 2022-07-13
Attending: INTERNAL MEDICINE
Payer: MEDICARE

## 2022-07-13 VITALS
TEMPERATURE: 98 F | HEIGHT: 62 IN | BODY MASS INDEX: 24.66 KG/M2 | WEIGHT: 134 LBS | OXYGEN SATURATION: 98 % | RESPIRATION RATE: 16 BRPM | HEART RATE: 80 BPM | DIASTOLIC BLOOD PRESSURE: 62 MMHG | SYSTOLIC BLOOD PRESSURE: 126 MMHG

## 2022-07-13 DIAGNOSIS — Z12.31 ENCOUNTER FOR SCREENING MAMMOGRAM FOR BREAST CANCER: ICD-10-CM

## 2022-07-13 DIAGNOSIS — Z12.31 ENCOUNTER FOR SCREENING MAMMOGRAM FOR BREAST CANCER: Primary | ICD-10-CM

## 2022-07-13 DIAGNOSIS — E78.2 MIXED HYPERLIPIDEMIA: ICD-10-CM

## 2022-07-13 DIAGNOSIS — Z00.00 ANNUAL PHYSICAL EXAM: ICD-10-CM

## 2022-07-13 PROCEDURE — 77067 SCR MAMMO BI INCL CAD: CPT | Mod: 26,,, | Performed by: RADIOLOGY

## 2022-07-13 PROCEDURE — 99397 PER PM REEVAL EST PAT 65+ YR: CPT | Mod: S$PBB,GZ,, | Performed by: INTERNAL MEDICINE

## 2022-07-13 PROCEDURE — 77067 SCR MAMMO BI INCL CAD: CPT | Mod: TC

## 2022-07-13 PROCEDURE — 77063 BREAST TOMOSYNTHESIS BI: CPT | Mod: TC

## 2022-07-13 PROCEDURE — 99215 OFFICE O/P EST HI 40 MIN: CPT | Mod: PBBFAC,PO | Performed by: INTERNAL MEDICINE

## 2022-07-13 PROCEDURE — 77063 BREAST TOMOSYNTHESIS BI: CPT | Mod: 26,,, | Performed by: RADIOLOGY

## 2022-07-13 PROCEDURE — 99397 PR PREVENTIVE VISIT,EST,65 & OVER: ICD-10-PCS | Mod: S$PBB,GZ,, | Performed by: INTERNAL MEDICINE

## 2022-07-13 PROCEDURE — 99999 PR PBB SHADOW E&M-EST. PATIENT-LVL V: CPT | Mod: PBBFAC,,, | Performed by: INTERNAL MEDICINE

## 2022-07-13 PROCEDURE — 77063 MAMMO DIGITAL SCREENING BILAT WITH TOMO: ICD-10-PCS | Mod: 26,,, | Performed by: RADIOLOGY

## 2022-07-13 PROCEDURE — 77067 MAMMO DIGITAL SCREENING BILAT WITH TOMO: ICD-10-PCS | Mod: 26,,, | Performed by: RADIOLOGY

## 2022-07-13 PROCEDURE — 99999 PR PBB SHADOW E&M-EST. PATIENT-LVL V: ICD-10-PCS | Mod: PBBFAC,,, | Performed by: INTERNAL MEDICINE

## 2022-07-13 NOTE — PROGRESS NOTES
SUBJECTIVE     Chief Complaint   Patient presents with    Annual Exam       HPI  Chelsea Gould is a 81 y.o. female with multiple medical diagnoses as listed in the medical history and problem list that presents for annual exam. Pt has been doing well since her last visit. She has a good appetite and eats well. She does not exercise. She sleeps okay nightly. Pt does take OTC supplements, which is Vit D. She does have any current stressors and does not have an outlet.     PAST MEDICAL HISTORY:  Past Medical History:   Diagnosis Date    Cervical cancer, FIGO stage IA1 12/17/2019    Concern about heart attack without diagnosis     silent heart attack    Diabetes mellitus     Eye problems     Urticaria        PAST SURGICAL HISTORY:  Past Surgical History:   Procedure Laterality Date    COLD KNIFE CONIZATION OF CERVIX N/A 11/26/2019    Procedure: CONE BIOPSY, CERVIX, USING COLD KNIFE;  Surgeon: Fani Vieira MD;  Location: [facility];  Service: OB/GYN;  Laterality: N/A;    EYE SURGERY      HYSTEROSCOPY WITH DILATION AND CURETTAGE OF UTERUS N/A 10/25/2019    Procedure: HYSTEROSCOPY, WITH DILATION AND CURETTAGE OF UTERUS;  Surgeon: Marcia Canseco MD;  Location: [facility];  Service: OB/GYN;  Laterality: N/A;    JOINT REPLACEMENT Right     hip    ROBOT-ASSISTED LAPAROSCOPIC ABDOMINAL HYSTERECTOMY USING DA DENIS XI N/A 1/17/2020    Procedure: XI ROBOTIC HYSTERECTOMY;  Surgeon: Fani Vieira MD;  Location: [facility];  Service: OB/GYN;  Laterality: N/A;    ROBOT-ASSISTED LAPAROSCOPIC SALPINGO-OOPHORECTOMY USING DA DENIS XI Bilateral 1/17/2020    Procedure: XI ROBOTIC SALPINGO-OOPHORECTOMY;  Surgeon: Fani Vieira MD;  Location: [facility];  Service: OB/GYN;  Laterality: Bilateral;    TUBAL LIGATION         SOCIAL HISTORY:  Social History     Socioeconomic History    Marital status:    Tobacco Use    Smoking status: Never Smoker    Smokeless tobacco: Never Used   Substance and Sexual Activity    Alcohol  use: No    Drug use: No    Sexual activity: Not Currently     Partners: Male       FAMILY HISTORY:  Family History   Problem Relation Age of Onset    Breast cancer Daughter 44    Colon cancer Neg Hx        ALLERGIES AND MEDICATIONS: updated and reviewed.  Review of patient's allergies indicates:  No Known Allergies  Current Outpatient Medications   Medication Sig Dispense Refill    alendronate (FOSAMAX) 70 MG tablet TAKE 1 TABLET BY MOUTH EVERY 7 DAYS 12 tablet 0    levothyroxine (SYNTHROID) 112 MCG tablet TAKE 1 TABLET BY MOUTH EVERY MORNING 90 tablet 3    loteprednol (LOTEMAX) 0.5 % ophthalmic suspension       RESTASIS 0.05 % ophthalmic emulsion Place 1 drop into both eyes 2 (two) times daily.  3    RHOPRESSA 0.02 % ophthalmic solution Place 1 drop into both eyes once daily.      rosuvastatin (CRESTOR) 10 MG tablet TAKE 1 TABLET BY MOUTH EVERY DAY 90 tablet 0    blood sugar diagnostic Strp 1 strip by Misc.(Non-Drug; Combo Route) route 4 (four) times daily with meals and nightly. (Patient not taking: Reported on 7/13/2022) 100 strip 6    brimonidine-timolol (COMBIGAN) 0.2-0.5 % Drop       bromfenac (PROLENSA) 0.07 % Drop Apply 1 drop to eye.      ciprofloxacin HCl (CILOXAN) 0.3 % ophthalmic solution       cycloSPORINE (RESTASIS) 0.05 % ophthalmic emulsion Apply 1 drop to eye.      diazePAM (VALIUM) 5 MG tablet Take 5 mg by mouth every 6 (six) hours as needed.       DULoxetine (CYMBALTA) 60 MG capsule Take 60 mg by mouth 2 (two) times daily.      ergocalciferol (ERGOCALCIFEROL) 50,000 unit Cap Take 1 capsule (50,000 Units total) by mouth every 30 days. 12 capsule 2    fluocinonide 0.05% (LIDEX) 0.05 % cream       fluorometholone 0.1% (FML) 0.1 % DrpS       FREESTYLE OSMAN 14 DAY SENSOR Kit CHANGE EVERY 14 DAYS (Patient not taking: Reported on 7/13/2022) 2 kit 11    gabapentin (NEURONTIN) 300 MG capsule Take 1 capsule (300 mg total) by mouth every evening. (Patient not taking: Reported on  "7/13/2022) 30 capsule 11    hydrOXYzine pamoate (VISTARIL) 25 MG Cap Take 25 mg by mouth.      insulin aspart U-100 (NOVOLOG FLEXPEN U-100 INSULIN) 100 unit/mL (3 mL) InPn pen Take 10 units before breakfast, lunch and supper (Patient not taking: Reported on 7/13/2022) 15 mL 11    ketorolac 0.4% (ACULAR) 0.4 % Drop Place 1 drop into both eyes 2 (two) times daily.      ketorolac 0.5% (ACULAR) 0.5 % Drop       Lactobacillus rhamnosus GG (CULTURELLE ORAL) Take 1 capsule by mouth once daily.      LOTEMAX SM 0.38 % DrpG       MAXITROL 3.5 mg/g-10,000 unit/g-0.1 % Oint 0.25 inch in injected eye 4 times a day      mupirocin (BACTROBAN) 2 % ointment APPLY 1 APPLICATION TOPICALLY TO RED AREA ON CHEST TWICE DAILY      neomycin-polymyxin-dexamethasone (MAXITROL) 3.5mg/mL-10,000 unit/mL-0.1 % DrpS       ofloxacin (OCUFLOX) 0.3 % ophthalmic solution INSTILL 1 DROP INTO AFFECTED EYE 4 TIMES A DAY  4    oxyCODONE (ROXICODONE) 10 mg Tab immediate release tablet       pen needle, diabetic (BD ULTRA-FINE MINI PEN NEEDLE) 31 gauge x 3/16" Ndle Use with insulin pen, injection 3 times a day (Patient not taking: Reported on 7/13/2022) 200 each 5    permethrin (ELIMITE) 5 % cream       pioglitazone (ACTOS) 30 MG tablet Take 1 tablet (30 mg total) by mouth once daily. (Patient not taking: Reported on 7/13/2022) 90 tablet 3    PRODIGY NO CODING Strp       triamcinolone acetonide 0.1% (KENALOG) 0.1 % cream APPLY TWICE DAILY TO ITCHY SKIN UP TO 2 WEEKS/MONTH AS NEEDED  3     No current facility-administered medications for this visit.       ROS  Review of Systems   Constitutional: Negative for chills and fever.   HENT: Negative for hearing loss and sore throat.    Eyes: Negative for visual disturbance.   Respiratory: Negative for cough and shortness of breath.    Cardiovascular: Negative for chest pain, palpitations and leg swelling.   Gastrointestinal: Negative for abdominal pain, constipation, diarrhea, nausea and vomiting. " "  Genitourinary: Negative for dysuria, frequency and urgency.   Musculoskeletal: Negative for arthralgias, joint swelling and myalgias.   Skin: Negative for rash and wound.   Neurological: Negative for headaches.   Psychiatric/Behavioral: Negative for agitation and confusion. The patient is not nervous/anxious.          OBJECTIVE     Physical Exam  Vitals:    07/13/22 1007   BP: 126/62   Pulse: 80   Resp: 16   Temp: 98.3 °F (36.8 °C)    Body mass index is 24.51 kg/m².  Weight: 60.8 kg (134 lb)   Height: 5' 2" (157.5 cm)     Physical Exam  Constitutional:       General: She is not in acute distress.     Appearance: She is well-developed.   HENT:      Head: Normocephalic and atraumatic.      Right Ear: External ear normal.      Left Ear: External ear normal.      Nose: Nose normal.   Eyes:      General: No scleral icterus.        Right eye: No discharge.         Left eye: No discharge.      Conjunctiva/sclera: Conjunctivae normal.   Neck:      Vascular: No JVD.      Trachea: No tracheal deviation.   Cardiovascular:      Rate and Rhythm: Normal rate and regular rhythm.      Heart sounds: No murmur heard.    No friction rub. No gallop.   Pulmonary:      Effort: Pulmonary effort is normal. No respiratory distress.      Breath sounds: Normal breath sounds. No wheezing.   Abdominal:      General: Bowel sounds are normal. There is no distension.      Palpations: Abdomen is soft. There is no mass.      Tenderness: There is no abdominal tenderness. There is no guarding or rebound.   Musculoskeletal:         General: No tenderness or deformity. Normal range of motion.      Cervical back: Normal range of motion and neck supple.   Skin:     General: Skin is warm and dry.      Findings: No erythema or rash.   Neurological:      Mental Status: She is alert and oriented to person, place, and time.      Motor: No abnormal muscle tone.      Coordination: Coordination normal.   Psychiatric:         Behavior: Behavior normal.         " Thought Content: Thought content normal.         Judgment: Judgment normal.           Health Maintenance       Date Due Completion Date    Pneumococcal Vaccines (Age 65+) (1 - PCV) Never done ---    TETANUS VACCINE Never done ---    Shingles Vaccine (1 of 2) Never done ---    COVID-19 Vaccine (3 - Booster for Pfizer series) 06/30/2021 1/31/2021    Influenza Vaccine (1) 09/01/2022 ---    Hemoglobin A1c 09/29/2022 6/29/2022    DEXA Scan 10/26/2023 10/26/2021            ASSESSMENT     81 y.o. female with     1. Encounter for screening mammogram for breast cancer    2. Annual physical exam    3. Mixed hyperlipidemia        PLAN:     1. Annual physical exam  - Counseled on age appropriate medical preventative services, including age appropriate cancer screenings, over all nutritional health, need for a consistent exercise regimen and an over all push towards maintaining a vigorous and active lifestyle.  Counseled on age appropriate vaccines and discussed upcoming health care needs based on age/gender.  Spent time with patient counseling on need for a good patient/doctor relationship moving forward.  Discussed use of common OTC medications and supplements.  Discussed common dietary aids and use of caffeine and the need for good sleep hygiene and stress management.  - Lipid Panel; Future  - CBC Auto Differential; Future    2. Mixed hyperlipidemia  - Lipid Panel; Future  - CBC Auto Differential; Future    3. Encounter for screening mammogram for breast cancer  - Mammo Digital Screening Bilat w/ Manoj; Future        RTC in 1 year     Sari Gupta MD  07/13/2022 10:29 AM        No follow-ups on file.

## 2022-07-25 DIAGNOSIS — E78.5 HYPERLIPIDEMIA ASSOCIATED WITH TYPE 2 DIABETES MELLITUS: ICD-10-CM

## 2022-07-25 DIAGNOSIS — E11.69 HYPERLIPIDEMIA ASSOCIATED WITH TYPE 2 DIABETES MELLITUS: ICD-10-CM

## 2022-07-25 RX ORDER — ROSUVASTATIN CALCIUM 10 MG/1
TABLET, COATED ORAL
Qty: 90 TABLET | Refills: 3 | Status: SHIPPED | OUTPATIENT
Start: 2022-07-25 | End: 2023-08-11 | Stop reason: SDUPTHER

## 2022-07-25 NOTE — TELEPHONE ENCOUNTER
No new care gaps identified.  [facility] Embedded Care Gaps. Reference number: [id number]. 7/25/2022   10:01:42 AM MINALT

## 2022-07-25 NOTE — TELEPHONE ENCOUNTER
Refill Decision Note   Chelsea Gould  is requesting a refill authorization.  Brief Assessment and Rationale for Refill:  Approve     Medication Therapy Plan:       Medication Reconciliation Completed: No   Comments:     No Care Gaps recommended.     Note composed:10:48 AM 07/25/2022

## 2022-08-08 ENCOUNTER — PES CALL (OUTPATIENT)
Dept: ADMINISTRATIVE | Facility: CLINIC | Age: 82
End: 2022-08-08
Payer: MEDICARE

## 2022-08-26 ENCOUNTER — PES CALL (OUTPATIENT)
Dept: ADMINISTRATIVE | Facility: CLINIC | Age: 82
End: 2022-08-26
Payer: MEDICARE

## 2022-09-01 ENCOUNTER — LAB VISIT (OUTPATIENT)
Dept: LAB | Facility: HOSPITAL | Age: 82
End: 2022-09-01
Attending: HOSPITALIST
Payer: MEDICARE

## 2022-09-01 DIAGNOSIS — E11.65 TYPE 2 DIABETES MELLITUS WITH HYPERGLYCEMIA, WITH LONG-TERM CURRENT USE OF INSULIN: ICD-10-CM

## 2022-09-01 DIAGNOSIS — Z79.4 TYPE 2 DIABETES MELLITUS WITH HYPERGLYCEMIA, WITH LONG-TERM CURRENT USE OF INSULIN: ICD-10-CM

## 2022-09-01 DIAGNOSIS — E03.9 HYPOTHYROIDISM, UNSPECIFIED TYPE: ICD-10-CM

## 2022-09-01 LAB
ANION GAP SERPL CALC-SCNC: 8 MMOL/L (ref 8–16)
BUN SERPL-MCNC: 27 MG/DL (ref 8–23)
CALCIUM SERPL-MCNC: 9.9 MG/DL (ref 8.7–10.5)
CHLORIDE SERPL-SCNC: 103 MMOL/L (ref 95–110)
CO2 SERPL-SCNC: 26 MMOL/L (ref 23–29)
CREAT SERPL-MCNC: 1 MG/DL (ref 0.5–1.4)
EST. GFR  (NO RACE VARIABLE): 57 ML/MIN/1.73 M^2
ESTIMATED AVG GLUCOSE: 194 MG/DL (ref 68–131)
GLUCOSE SERPL-MCNC: 196 MG/DL (ref 70–110)
HBA1C MFR BLD: 8.4 % (ref 4–5.6)
POTASSIUM SERPL-SCNC: 4.4 MMOL/L (ref 3.5–5.1)
SODIUM SERPL-SCNC: 137 MMOL/L (ref 136–145)
TSH SERPL DL<=0.005 MIU/L-ACNC: 0.99 UIU/ML (ref 0.4–4)

## 2022-09-01 PROCEDURE — 36415 COLL VENOUS BLD VENIPUNCTURE: CPT | Mod: PO | Performed by: HOSPITALIST

## 2022-09-01 PROCEDURE — 84443 ASSAY THYROID STIM HORMONE: CPT | Performed by: HOSPITALIST

## 2022-09-01 PROCEDURE — 83036 HEMOGLOBIN GLYCOSYLATED A1C: CPT | Performed by: HOSPITALIST

## 2022-09-01 PROCEDURE — 80048 BASIC METABOLIC PNL TOTAL CA: CPT | Performed by: HOSPITALIST

## 2022-09-08 ENCOUNTER — OFFICE VISIT (OUTPATIENT)
Dept: ENDOCRINOLOGY | Facility: CLINIC | Age: 82
End: 2022-09-08
Payer: MEDICARE

## 2022-09-08 VITALS
HEART RATE: 72 BPM | BODY MASS INDEX: 25.3 KG/M2 | DIASTOLIC BLOOD PRESSURE: 81 MMHG | TEMPERATURE: 98 F | WEIGHT: 138.31 LBS | SYSTOLIC BLOOD PRESSURE: 160 MMHG

## 2022-09-08 DIAGNOSIS — E78.2 MIXED HYPERLIPIDEMIA: ICD-10-CM

## 2022-09-08 DIAGNOSIS — E11.39 VISUAL IMPAIRMENT DUE TO DIABETES MELLITUS: ICD-10-CM

## 2022-09-08 DIAGNOSIS — Z79.4 TYPE 2 DIABETES MELLITUS WITH HYPERGLYCEMIA, WITH LONG-TERM CURRENT USE OF INSULIN: Primary | ICD-10-CM

## 2022-09-08 DIAGNOSIS — H54.7 VISUAL IMPAIRMENT DUE TO DIABETES MELLITUS: ICD-10-CM

## 2022-09-08 DIAGNOSIS — N18.31 CHRONIC RENAL FAILURE, STAGE 3A: ICD-10-CM

## 2022-09-08 DIAGNOSIS — M81.0 AGE-RELATED OSTEOPOROSIS WITHOUT CURRENT PATHOLOGICAL FRACTURE: ICD-10-CM

## 2022-09-08 DIAGNOSIS — E11.65 TYPE 2 DIABETES MELLITUS WITH HYPERGLYCEMIA, WITH LONG-TERM CURRENT USE OF INSULIN: Primary | ICD-10-CM

## 2022-09-08 DIAGNOSIS — E03.9 HYPOTHYROIDISM, UNSPECIFIED TYPE: ICD-10-CM

## 2022-09-08 DIAGNOSIS — R41.89 COGNITIVE DEFICITS: ICD-10-CM

## 2022-09-08 PROCEDURE — 99215 OFFICE O/P EST HI 40 MIN: CPT | Mod: PBBFAC | Performed by: HOSPITALIST

## 2022-09-08 PROCEDURE — 99999 PR PBB SHADOW E&M-EST. PATIENT-LVL V: CPT | Mod: PBBFAC,,, | Performed by: HOSPITALIST

## 2022-09-08 PROCEDURE — 99214 OFFICE O/P EST MOD 30 MIN: CPT | Mod: S$PBB,,, | Performed by: HOSPITALIST

## 2022-09-08 PROCEDURE — 99214 PR OFFICE/OUTPT VISIT, EST, LEVL IV, 30-39 MIN: ICD-10-PCS | Mod: S$PBB,,, | Performed by: HOSPITALIST

## 2022-09-08 PROCEDURE — 99999 PR PBB SHADOW E&M-EST. PATIENT-LVL V: ICD-10-PCS | Mod: PBBFAC,,, | Performed by: HOSPITALIST

## 2022-09-08 RX ORDER — INSULIN ASPART 100 [IU]/ML
INJECTION, SOLUTION INTRAVENOUS; SUBCUTANEOUS
Qty: 15 ML | Refills: 11 | Status: SHIPPED | OUTPATIENT
Start: 2022-09-08 | End: 2022-10-27

## 2022-09-08 NOTE — PATIENT INSTRUCTIONS
Thank you for completing the visit with me    Increase NovoLog 12 units three times a day. BEFORE MEALS, BREAKFAST, LUNCH AND DINNER    IF YOU DO NOT EAT, YOU DO NOT GIVE INSULIN    Especially glucose greater than 150     Scan/Check your sugar often with Freestyle Yoav before each meals.     Goal blood sugar in the morning, before breakfast: 100-140  Glucose goal AFTER MEALS:    2 Hour after: less than 140  Before going to bed: 100-140  Do not go to bed with glucose less than 100  Have a small snack if glucose is lower than 100       Please CALL MY OFFICE and leave a message if your insurance does not cover the medications I prescribed, so I can prescribed alternative medications.     We will plan an in-clinic visit in 3 months, with labs prior to that appointment.    Dominick Rahman M.D  [facility] [address], [address]  [place] [address]    Office:  [phone]  Fax:  [phone]

## 2022-09-08 NOTE — ASSESSMENT & PLAN NOTE
- ASCVD Risk below: Statin: Taking  The ASCVD Risk score (Jose DK, et al., 2019) failed to calculate for the following reasons:    The 2019 ASCVD risk score is only valid for ages 40 to 79

## 2022-09-08 NOTE — ASSESSMENT & PLAN NOTE
- Diabetes is not at goal, given worsening of recent A1c, A1C goal for this patient is 7-7.5%.   - diabetes management is poor due to poor historian, vision impairment, noncompliant with regimen  - Complicated by CKD3, dietary indiscretion, visual impairment   - Reviewed goals of therapy are to get the best control we can without hypoglycemia  - Routine health maintenance/screening: reviewed  - Freestyle Yoav download review showed worsening  hyperglycemia postprandially.  No hypoglycemia  - Diabetic supplies/medications:  Refills given    Plan:  - I offered to change regimen, patient declined.  After long discussion she is hesitant but was willing to increase NovoLog to 12 units t.i.d. before each meal, especially if glucose greater than 150  - Patient refused any other diabetes medication:  Previously tried oral regimen as well as GLP1  - patient poor insight to diabetes care as well as resistant to changes to regimen, will lead to further issues with hyperglycemia.  - family not present for this visit.  Further limiting diabetes care  - clear rate instruction given  - continue Freestyle Yoav, follow-up in 4-5 months to re-evaluate data and make further adjust  - continue follow-up with PCP for routine health maintenance

## 2022-09-08 NOTE — ASSESSMENT & PLAN NOTE
- TSH is at goal, continue levothyroxine as prescribed  - TSH stable on recent blood work  - continue LT4 112 mcg daily

## 2022-09-08 NOTE — PROGRESS NOTES
"     Subjective:      Patient ID: Chelsea Gould is a 82 y.o. female presented to [facility] on 9/8/2022.  Chief Complaint:  Diabetes      History of Present Illness: Chelsea Gould is a 82 y.o. female here for management of type 2 diabetes  Significant past medical history:  CAD, CKD stage IIIA, visual impairment (seen Ophthalmology for injections/may need surgery), hypothyroidism, osteoporosis (on Fosamax given by PCP)      Interval history:  Patient is here for management of diabetes.  A1c 8.4%   Previously seen with family friend.  Interestingly did no-show Diabetes Education appointment after it was requested by her and family friend  Patient continues to require frequent redirection  Not on actos, due to her concern of having low glucose.  Patient quite hesitant on insulin increase due to concern of hypoglycemia.  On Freestyle Yoav scanning regularly.  With global hyperglycemia.    When discussed about changing regimen, patient very resistant to change    1) Diabetes Mellitus Type 2, Follow up Visit  - Patient here for an evaluation of diabetes, initially diagnosed at age  44  - Known diabetic complications: nephropathy, peripheral neuropathy and cardiovascular disease  - Cardiovascular risk factors: advanced age (older than 55 for men, 65 for women), diabetes mellitus, dyslipidemia, hypertension and obesity (BMI >= 30 kg/m2), poor historian   - Previously seen Esperanza Gonzalez, AARON 10/2020  - According to family friend:  Patient is , has , has daughter and son.  But according to patient she is "by herself".  Does have family support  - In previous visit she did stop insulin injection, stop Ozempic.3/26/22.      Current meds:     NovoLog as needed 10 units t.i.d. before meal if glucose >200    Previous meds:              Metformin:  Due to diarrhea              Lantus 12 units in the morning   Ozempic:  Was stopped due to patient reported side effects, weight loss   Actos 30 mg " daily  Home glucose checks:  On Freestyle Yoav,  a scanning regularly    CGM download and interpretation: Data was downloaded and personally reviewed by myself  CGM type: Freestyle yoav 14 day device is sensor  Number of Day CGM worn:  14 days, percentage of time CGM is active: 89%  Average blood glucose: 211 , Glucose variability: 29.2%  , Glucose management indicator (GMI): 8.6%  Time in Range:  32% very high, 39% High, 39% Target Range, 0% low, 0% very low>> reviewed and discussed, goal TIR discussed with patient    CGM data downloaded review show persistent hyperglycemia postprandial, no overnight hypoglycemia.  Hyperglycemia worsen for lunch and dinner.       Diet/Exercise:               Eatin- 3 meals per day, often eat out especially at night, suspect dietary indiscretion as she often eat out at night              Snacking               Drink:  Sugar free              Current exercise: aerobics:  Dancing  Weight trend: decreasing steadily  Diabetes Education:  Yes, 19 - Liliam Chow RN, CDE   Diabetes Related Hospitalization:  denies  Hx of pancreatitis, hx of thyroid cancer:  Denies  Family history of diabetes:  Yes  Occupation:  Retired    Eye exam current (within one year): yes, DR: no, outside of [facility]  Reports cuts or ulcers on feet:  Denies    Statin: Taking  ACE/ARB: Not taking  Statin: Taking, ACE/ARB: Not taking    Diabetes Management Status: Reviewed   Lab Results   Component Value Date    HGBA1C 8.4 (H) 2022    HGBA1C 8.3 (H) 2022    HGBA1C 8.4 (H) 2022    HGBA1C 7.1 (H) 2022     Lab Results   Component Value Date    CPEPTIDE 3.29 2020      No results found for: FRUCTOSAMINE    Lab Results   Component Value Date    MICALBCREAT 9.1 2019     No results found for: [id number]    Screening or Prevention Patient's value Goal Complete/Controlled?   Lipid profile : 2022 Annually Yes     Dilated retinal exam : 2022 Annually Yes      Foot exam   Most Recent Foot Exam Date: Not Found Annually Yes          2) With regards to hypothyroidism  - On levothyroxine 112 mcg daily  - Taking as directed, reports compliance  - Reports compliant with medication, chronic     Lab Results   Component Value Date    TSH 0.986 09/01/2022    TSH 0.704 06/29/2022    TSH 0.359 (L) 05/26/2022    FREET4 1.35 05/26/2022    FREET4 1.11 01/26/2022    FREET4 1.05 10/26/2021       3) With regards to osteoporosis/osteopenia  - No recent falls  - On Fosamax per PCP, started on 7/2019  - Most recent bone density done review:  10/21 osteopenia with high FRAX score    DXA 10/26/21  Lumbar spine (L1-L4): BMD is 1.107 g/cm2, T-score is 0.5, and Z-score is 3.2.  Total hip: BMD is 0.794 g/cm2, T-score is -1.2, and Z-score is 0.8  Femoral neck: BMD is 0.608 g/cm2, T-score is -2.2, and Z-score is 0.1.  Distal 1/3 radius: Not applicable     COMPARISON:  Comparison study done on 07/08/2019.  Lumbar spine: BMD 0.974 g/cm2 and T-score -0.7.  Total Hip: BMD 0.786 g/cm2 and T-score -1.3.  Distal 1/3 radius: Not applicable    FRAX: 23% risk of a major osteoporotic fracture in the next 10 years.  6.3% risk of hip fracture in the next 10 years.     Impression:  LOW BONE MASS WITH A SIGNIFICANT INCREASE OF 13.6% IN THE LUMBAR SPINE BMD COMPARED TO THE PRIOR STUDY.      Reviewed past surgical, medical, family, social history and updated as appropriate.    Review of SystemsSee above    Objective:   BP (!) 160/81 (BP Location: Left arm)   Pulse 72   Temp 98.1 °F (36.7 °C) (Oral)   Wt 62.7 kg (138 lb 4.8 oz)   BMI 25.30 kg/m²     Body mass index is 25.3 kg/m².  Vital signs reviewed    Physical Exam  Vitals and nursing note reviewed.   Constitutional:       General: She is not in acute distress.     Appearance: Normal appearance. She is well-developed. She is not toxic-appearing.   Neck:      Thyroid: No thyromegaly.   Pulmonary:      Effort: Pulmonary effort is normal.   Musculoskeletal:          General: Normal range of motion.      Cervical back: Normal range of motion.   Skin:     Findings: No bruising.   Neurological:      Mental Status: She is alert. She is disoriented.   Psychiatric:      Comments: Chronic mood issues noted, similar to previous visit     Lab Reviewed:   Lab Results   Component Value Date    HGBA1C 8.4 (H) 09/01/2022     Lab Results   Component Value Date    CHOL 147 07/13/2022    HDL 66 07/13/2022    LDLCALC 64.8 07/13/2022    TRIG 81 07/13/2022    CHOLHDL 44.9 07/13/2022     Lab Results   Component Value Date     09/01/2022    K 4.4 09/01/2022     09/01/2022    CO2 26 09/01/2022     (H) 09/01/2022    BUN 27 (H) 09/01/2022    CREATININE 1.0 09/01/2022    CALCIUM 9.9 09/01/2022    PROT 7.0 05/26/2022    ALBUMIN 4.0 05/26/2022    BILITOT 0.9 05/26/2022    ALKPHOS 73 05/26/2022    AST 18 05/26/2022    ALT 19 05/26/2022    ANIONGAP 8 09/01/2022    ESTGFRAFRICA >60.0 05/26/2022    ESTGFRAFRICA >60.0 05/26/2022    EGFRNONAA 53.7 (A) 05/26/2022    EGFRNONAA 53.7 (A) 05/26/2022    TSH 0.986 09/01/2022     Lab Results   Component Value Date    .3 (H) 10/26/2021    CBJXDGIO80UQ 31 10/26/2021    XHJAUBMA28HV 24 (L) 10/05/2020    MJYFUMHV09XN 32 06/30/2020    CALCIUM 9.9 09/01/2022    CALCIUM 10.2 05/26/2022    CALCIUM 10.2 05/26/2022    ALKPHOS 73 05/26/2022    ALKPHOS 76 01/26/2022    ALKPHOS 70 10/26/2021    TSH 0.986 09/01/2022       Assessment     1. Type 2 diabetes mellitus with hyperglycemia, with long-term current use of insulin  insulin aspart U-100 (NOVOLOG FLEXPEN U-100 INSULIN) 100 unit/mL (3 mL) InPn pen    Hemoglobin A1C    Basic Metabolic Panel      2. Age-related osteoporosis without current pathological fracture        3. Hypothyroidism, unspecified type        4. Chronic renal failure, stage 3a        5. 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 Cognitive deficits        7. Mixed hyperlipidemia               Plan     Type 2 diabetes  mellitus with hyperglycemia, with long-term current use of insulin  - Diabetes is not at goal, given worsening of recent A1c, A1C goal for this patient is 7-7.5%.   - diabetes management is poor due to poor historian, vision impairment, noncompliant with regimen  - Complicated by CKD3, dietary indiscretion, visual impairment   - Reviewed goals of therapy are to get the best control we can without hypoglycemia  - Routine health maintenance/screening: reviewed  - Freestyle Yoav download review showed worsening  hyperglycemia postprandially.  No hypoglycemia  - Diabetic supplies/medications:  Refills given    Plan:  - I offered to change regimen, patient declined.  After long discussion she is hesitant but was willing to increase NovoLog to 12 units t.i.d. before each meal, especially if glucose greater than 150  - Patient refused any other diabetes medication:  Previously tried oral regimen as well as GLP1  - patient poor insight to diabetes care as well as resistant to changes to regimen, will lead to further issues with hyperglycemia.  - family not present for this visit.  Further limiting diabetes care  - clear rate instruction given  - continue Freestyle Yoav, follow-up in 4-5 months to re-evaluate data and make further adjust  - continue follow-up with PCP for routine health maintenance    Age-related osteoporosis without current pathological fracture  - bone density for 10/2021 completed, Stable BMD, high frax score  - On Fosamax per PCP since 7/2019  - Advised patient to continue to be active   - Continue calcium and vitamin-D supplementation    Hypothyroidism  - TSH is at goal, continue levothyroxine as prescribed  - TSH stable on recent blood work  - continue LT4 112 mcg daily        Visual impairment due to diabetes mellitus  - poor vision, continue follow-up with Ophthalmology  - continues to benefit from CGM given prevention of hypoglycemia    Chronic renal failure, stage 3a  - Kidney function stable   -  Renally Adjust diabetes medication  - routine monitor    Cognitive deficits  - patient with poor memory  - may affect diabetes care    Mixed hyperlipidemia  - ASCVD Risk below: Statin: Taking  The ASCVD Risk score (Jose DK, et al., 2019) failed to calculate for the following reasons:    The 2019 ASCVD risk score is only valid for ages 40 to 79       Given the patient age and other morbidities, goals of therapy are to get the best control we can without hypoglycemia  - Endocrine Society recommend Goal A1C is 7-7.5%, fasting glucose goal of            Advised patient to follow up with PCP for routine health maintenance care.   RTC in 4-5 months    Dominick Rahman M.D  Endocrinology  [facility]  9/8/2022      Disclaimer: This note has been generated using voice-recognition software. There may be typographical errors that have been missed during proof-reading.  ------------------------------------------------------------    Indications for CGMs:    Patent with diagnosed:  Type 2 Diabetes Mellitus that required frequent glucose monitoring (4 + times a day and 4x/day testing), frequent adjustments to insulin regiment based on BG or CGM results. Patent requires a therapeutic CGM and is willing to use therapeutic CGM/SMBG for Necessary frequent adjustments in insulin therapy, patient demonstrated an understanding of the technology (can use and recognize alerts and alarms). I, the physician, have assessed adherence to CGM regimen and to the plan, patient will have close follow up in clinic as indicated, every 3 months to 6 months.     Patient experiences (including but not limited to):  [x]   Discrepancies between records and A1C, at risk severe hypoglycemia episode and hospitalization  [x]   Recurrent, unexplained, severe hypoglycemic episodes  [x]   Postprandial hyperglycemia  [x]   Unexplained blood glucose excursions  []   Impaired awareness of hypoglycemia    []   Patient uses insulin pump  for diabetes management: will need frequent adjustments to insulin regiment based on BG: including adjustment to  insulin pump setting, sliding scale, correction factor, additional bolusing/correction

## 2022-09-30 ENCOUNTER — PES CALL (OUTPATIENT)
Dept: ADMINISTRATIVE | Facility: CLINIC | Age: 82
End: 2022-09-30
Payer: MEDICARE

## 2022-10-21 ENCOUNTER — TELEPHONE (OUTPATIENT)
Dept: ENDOCRINOLOGY | Facility: CLINIC | Age: 82
End: 2022-10-21
Payer: MEDICARE

## 2022-10-21 DIAGNOSIS — Z79.4 TYPE 2 DIABETES MELLITUS WITH HYPERGLYCEMIA, WITH LONG-TERM CURRENT USE OF INSULIN: Primary | ICD-10-CM

## 2022-10-21 DIAGNOSIS — E11.65 TYPE 2 DIABETES MELLITUS WITH HYPERGLYCEMIA, WITH LONG-TERM CURRENT USE OF INSULIN: Primary | ICD-10-CM

## 2022-10-21 NOTE — TELEPHONE ENCOUNTER
----- Message from Lilima Madrid sent at 10/21/2022 11:33 AM CDT -----  Regarding: Briana/ [facility]/  [phone]  Type: Patient Call Back    Who called:  Briana    What is the request in detail:  Patients insulin aspart U-100 (NOVOLOG FLEXPEN U-100 INSULIN) 100 unit/mL (3 mL) InPn pen is not covered by her insurance anymore.  Pharmacy will need something else called in for patient.  Thank you    Would the patient rather a call back or a response via [facility]?  Call back    Best call back number:   [phone]          Thank you

## 2022-10-21 NOTE — TELEPHONE ENCOUNTER
Spoke with Guilherme from [facility] pharmacy and she stated Humalog is covered. Informed that message will be sent to provider and Rx will be sent. Understanding verbalized

## 2022-10-26 ENCOUNTER — PES CALL (OUTPATIENT)
Dept: ADMINISTRATIVE | Facility: CLINIC | Age: 82
End: 2022-10-26
Payer: MEDICARE

## 2022-10-27 RX ORDER — INSULIN LISPRO 100 [IU]/ML
12 INJECTION, SOLUTION INTRAVENOUS; SUBCUTANEOUS
Qty: 15 ML | Refills: 4 | Status: SHIPPED | OUTPATIENT
Start: 2022-10-27 | End: 2023-08-08 | Stop reason: SDUPTHER

## 2022-11-03 ENCOUNTER — TELEPHONE (OUTPATIENT)
Dept: FAMILY MEDICINE | Facility: CLINIC | Age: 82
End: 2022-11-03
Payer: MEDICARE

## 2022-11-03 NOTE — TELEPHONE ENCOUNTER
Left a voicemail message to inform the Patient about the EAWV appointment and to schedule.  Requested the Patient to call the office back to be schedule.  Sent a detailed message thru [employer] as well.

## 2022-11-17 ENCOUNTER — PES CALL (OUTPATIENT)
Dept: ADMINISTRATIVE | Facility: CLINIC | Age: 82
End: 2022-11-17
Payer: MEDICARE

## 2022-11-29 ENCOUNTER — PES CALL (OUTPATIENT)
Dept: ADMINISTRATIVE | Facility: CLINIC | Age: 82
End: 2022-11-29
Payer: MEDICARE

## 2022-12-08 ENCOUNTER — PES CALL (OUTPATIENT)
Dept: ADMINISTRATIVE | Facility: CLINIC | Age: 82
End: 2022-12-08
Payer: MEDICARE

## 2023-01-25 DIAGNOSIS — E55.9 VITAMIN D DEFICIENCY: ICD-10-CM

## 2023-01-25 RX ORDER — ERGOCALCIFEROL 1.25 MG/1
CAPSULE ORAL
Qty: 12 CAPSULE | Refills: 1 | Status: SHIPPED | OUTPATIENT
Start: 2023-01-25 | End: 2023-01-25

## 2023-01-25 RX ORDER — ERGOCALCIFEROL 1.25 MG/1
50000 CAPSULE ORAL
Qty: 12 CAPSULE | Refills: 0 | Status: SHIPPED | OUTPATIENT
Start: 2023-01-25 | End: 2023-08-11 | Stop reason: SDUPTHER

## 2023-03-01 ENCOUNTER — PATIENT MESSAGE (OUTPATIENT)
Dept: ADMINISTRATIVE | Facility: HOSPITAL | Age: 83
End: 2023-03-01
Payer: MEDICARE

## 2023-03-09 NOTE — LETTER
February 9, 2020        Marcia Canseco MD  [address]  [address]  [facility] [address]             [address]  [address], [address]  [facility] [address]  Phone: [phone]  Fax: [phone]   Patient: Chelsea Gould   MR Number: [id number]   YOB: 1940   Date of Visit: 2/4/2020       Dear Dr. Canseco:    Thank you for referring Chelsea Gould to me for evaluation. Below are the relevant portions of my assessment and plan of care.            If you have questions, please do not hesitate to call me. I look forward to following Chelsea along with you.    Sincerely,      Fani Vieira MD           CC  No Recipients        Continue Regimen: tacrolimus 0.1 % topical BID prn Render In Strict Bullet Format?: No Detail Level: Zone

## 2023-05-10 NOTE — PROGRESS NOTES
Pt received on RA with adequate saturation. Educated and instructed pt on the use of an IS;pt understood. No changes were made. Will continue to monitor.   Render Post-Care Instructions In Note?: no Detail Level: Detailed Show Aperture Variable?: Yes Consent: The patient's consent was obtained including but not limited to risks of crusting, scabbing, blistering, scarring, darker or lighter pigmentary change, recurrence, incomplete removal and infection. Duration Of Freeze Thaw-Cycle (Seconds): 0 Post-Care Instructions: I reviewed with the patient in detail post-care instructions. Patient is to wear sunprotection, and avoid picking at any of the treated lesions. Pt may apply Vaseline to crusted or scabbing areas.

## 2023-05-23 ENCOUNTER — PATIENT MESSAGE (OUTPATIENT)
Dept: RESEARCH | Facility: HOSPITAL | Age: 83
End: 2023-05-23
Payer: MEDICARE

## 2023-05-31 ENCOUNTER — PATIENT MESSAGE (OUTPATIENT)
Dept: ADMINISTRATIVE | Facility: HOSPITAL | Age: 83
End: 2023-05-31
Payer: MEDICARE

## 2023-06-09 DIAGNOSIS — M81.0 AGE-RELATED OSTEOPOROSIS WITHOUT CURRENT PATHOLOGICAL FRACTURE: ICD-10-CM

## 2023-06-09 RX ORDER — ALENDRONATE SODIUM 70 MG/1
TABLET ORAL
Qty: 4 TABLET | Refills: 0 | Status: SHIPPED | OUTPATIENT
Start: 2023-06-09 | End: 2023-08-11 | Stop reason: SDUPTHER

## 2023-07-21 DIAGNOSIS — M81.0 AGE-RELATED OSTEOPOROSIS WITHOUT CURRENT PATHOLOGICAL FRACTURE: ICD-10-CM

## 2023-07-23 RX ORDER — ALENDRONATE SODIUM 70 MG/1
TABLET ORAL
Qty: 4 TABLET | Refills: 0 | OUTPATIENT
Start: 2023-07-23

## 2023-08-02 DIAGNOSIS — E11.649 HYPOGLYCEMIA ASSOCIATED WITH DIABETES: ICD-10-CM

## 2023-08-02 RX ORDER — FLASH GLUCOSE SENSOR
KIT MISCELLANEOUS
Qty: 2 KIT | Refills: 5 | Status: SHIPPED | OUTPATIENT
Start: 2023-08-02 | End: 2023-08-08 | Stop reason: SDUPTHER

## 2023-08-08 ENCOUNTER — TELEPHONE (OUTPATIENT)
Dept: FAMILY MEDICINE | Facility: CLINIC | Age: 83
End: 2023-08-08
Payer: MEDICARE

## 2023-08-08 ENCOUNTER — LAB VISIT (OUTPATIENT)
Dept: LAB | Facility: HOSPITAL | Age: 83
End: 2023-08-08
Attending: HOSPITALIST
Payer: MEDICARE

## 2023-08-08 ENCOUNTER — OFFICE VISIT (OUTPATIENT)
Dept: ENDOCRINOLOGY | Facility: CLINIC | Age: 83
End: 2023-08-08
Payer: MEDICARE

## 2023-08-08 VITALS
SYSTOLIC BLOOD PRESSURE: 176 MMHG | TEMPERATURE: 98 F | DIASTOLIC BLOOD PRESSURE: 88 MMHG | HEART RATE: 70 BPM | WEIGHT: 141.81 LBS | BODY MASS INDEX: 25.94 KG/M2

## 2023-08-08 DIAGNOSIS — E11.39 VISUAL IMPAIRMENT DUE TO DIABETES MELLITUS: ICD-10-CM

## 2023-08-08 DIAGNOSIS — Z79.4 UNCONTROLLED TYPE 2 DIABETES MELLITUS WITH HYPERGLYCEMIA, WITH LONG-TERM CURRENT USE OF INSULIN: ICD-10-CM

## 2023-08-08 DIAGNOSIS — Z79.4 TYPE 2 DIABETES MELLITUS WITH HYPERGLYCEMIA, WITH LONG-TERM CURRENT USE OF INSULIN: ICD-10-CM

## 2023-08-08 DIAGNOSIS — E11.65 UNCONTROLLED TYPE 2 DIABETES MELLITUS WITH HYPERGLYCEMIA, WITH LONG-TERM CURRENT USE OF INSULIN: Primary | ICD-10-CM

## 2023-08-08 DIAGNOSIS — E11.65 TYPE 2 DIABETES MELLITUS WITH HYPERGLYCEMIA, WITH LONG-TERM CURRENT USE OF INSULIN: ICD-10-CM

## 2023-08-08 DIAGNOSIS — E11.65 UNCONTROLLED TYPE 2 DIABETES MELLITUS WITH HYPERGLYCEMIA, WITH LONG-TERM CURRENT USE OF INSULIN: ICD-10-CM

## 2023-08-08 DIAGNOSIS — M81.0 AGE-RELATED OSTEOPOROSIS WITHOUT CURRENT PATHOLOGICAL FRACTURE: ICD-10-CM

## 2023-08-08 DIAGNOSIS — R41.89 COGNITIVE DEFICITS: ICD-10-CM

## 2023-08-08 DIAGNOSIS — E03.9 HYPOTHYROIDISM, UNSPECIFIED TYPE: ICD-10-CM

## 2023-08-08 DIAGNOSIS — E11.649 HYPOGLYCEMIA ASSOCIATED WITH DIABETES: ICD-10-CM

## 2023-08-08 DIAGNOSIS — H54.7 VISUAL IMPAIRMENT DUE TO DIABETES MELLITUS: ICD-10-CM

## 2023-08-08 DIAGNOSIS — Z79.4 UNCONTROLLED TYPE 2 DIABETES MELLITUS WITH HYPERGLYCEMIA, WITH LONG-TERM CURRENT USE OF INSULIN: Primary | ICD-10-CM

## 2023-08-08 LAB
ESTIMATED AVG GLUCOSE: 326 MG/DL (ref 68–131)
HBA1C MFR BLD: 13 % (ref 4–5.6)

## 2023-08-08 PROCEDURE — 99214 PR OFFICE/OUTPT VISIT, EST, LEVL IV, 30-39 MIN: ICD-10-PCS | Mod: S$PBB,,, | Performed by: HOSPITALIST

## 2023-08-08 PROCEDURE — 95251 CONT GLUC MNTR ANALYSIS I&R: CPT | Mod: ,,, | Performed by: HOSPITALIST

## 2023-08-08 PROCEDURE — 99213 OFFICE O/P EST LOW 20 MIN: CPT | Mod: PBBFAC | Performed by: HOSPITALIST

## 2023-08-08 PROCEDURE — 99999 PR PBB SHADOW E&M-EST. PATIENT-LVL III: CPT | Mod: PBBFAC,,, | Performed by: HOSPITALIST

## 2023-08-08 PROCEDURE — 36415 COLL VENOUS BLD VENIPUNCTURE: CPT | Mod: PN | Performed by: HOSPITALIST

## 2023-08-08 PROCEDURE — 95251 PR GLUCOSE MONITOR, 72 HOUR, PHYS INTERP: ICD-10-PCS | Mod: ,,, | Performed by: HOSPITALIST

## 2023-08-08 PROCEDURE — 99214 OFFICE O/P EST MOD 30 MIN: CPT | Mod: S$PBB,,, | Performed by: HOSPITALIST

## 2023-08-08 PROCEDURE — 83036 HEMOGLOBIN GLYCOSYLATED A1C: CPT | Performed by: HOSPITALIST

## 2023-08-08 PROCEDURE — 99999 PR PBB SHADOW E&M-EST. PATIENT-LVL III: ICD-10-PCS | Mod: PBBFAC,,, | Performed by: HOSPITALIST

## 2023-08-08 RX ORDER — PEN NEEDLE, DIABETIC 30 GX3/16"
NEEDLE, DISPOSABLE MISCELLANEOUS
Qty: 200 EACH | Refills: 6 | Status: SHIPPED | OUTPATIENT
Start: 2023-08-08 | End: 2023-11-21 | Stop reason: SDUPTHER

## 2023-08-08 RX ORDER — FLASH GLUCOSE SENSOR
KIT MISCELLANEOUS
Qty: 2 KIT | Refills: 5 | Status: SHIPPED | OUTPATIENT
Start: 2023-08-08

## 2023-08-08 RX ORDER — LEVOTHYROXINE SODIUM 112 UG/1
112 TABLET ORAL EVERY MORNING
Qty: 90 TABLET | Refills: 3 | Status: SHIPPED | OUTPATIENT
Start: 2023-08-08 | End: 2023-08-11 | Stop reason: SDUPTHER

## 2023-08-08 RX ORDER — INSULIN LISPRO 100 [IU]/ML
12 INJECTION, SOLUTION INTRAVENOUS; SUBCUTANEOUS
Qty: 15 ML | Refills: 6 | Status: SHIPPED | OUTPATIENT
Start: 2023-08-08 | End: 2023-11-21 | Stop reason: SDUPTHER

## 2023-08-08 RX ORDER — FLASH GLUCOSE SENSOR
KIT MISCELLANEOUS
Qty: 2 KIT | Refills: 5 | Status: SHIPPED | OUTPATIENT
Start: 2023-08-08 | End: 2023-08-08 | Stop reason: SDUPTHER

## 2023-08-08 NOTE — TELEPHONE ENCOUNTER
Call returned to patient's son, advised appointment is needed for medication refill. Patient scheduled for appointment with Vivian Monge DNP on 8/11/23 at 8am.

## 2023-08-08 NOTE — TELEPHONE ENCOUNTER
----- Message from Jazmine Francis sent at 8/8/2023 11:53 AM CDT -----  Regarding: pt called  Can the clinic reply in [facility]: NO            Please refill the medication(s) listed below. Please call the patient when the prescription(s) is ready for  at this phone number   [phone] MELVI JONES  (SON)            Medication #1 rosuvastatin (CRESTOR) 10 MG tablet         Medication #2 ANY OTHER MEDS SHE IS DUE            Preferred Pharmacy:   [facility] [id number] [place] [address]  Phone: [phone] Fax: [phone]

## 2023-08-08 NOTE — ASSESSMENT & PLAN NOTE
- patient with poor memory  - affecting diabetes management  - encourage patient family to be more involved in her care

## 2023-08-08 NOTE — PROGRESS NOTES
"Subjective:      Patient ID: Chelsea Gould is a 83 y.o. female presented to [facility] on 8/8/2023.  Chief Complaint:  Diabetes    History of Present Illness: Chelsea Gould is a 83 y.o. female here for management of type 2 diabetes  Significant past medical history:  CAD, CKD stage IIIA, visual impairment (seen Ophthalmology for injections/may need surgery), hypothyroidism, osteoporosis (on Fosamax given by PCP)    Interval history:  Patient is here for management of diabetes.  Lost to follow-up, no recent A1c, last A1c 09/2022 A1c 8.4%   Here with family, her grandson, who reports that patient is not compliant with her insulin injection. As "she get confused and does not want to do it"  she is concern about cramps. She lives with her son, often eat out, poor diet.  On Freestyle Yoav, scanning regularly with severe hyperglycemia  Mental status unchanged, still confused as previous visit  When discussed about changing regimen, patient very resistant to change  Patient quite hesitant on insulin increase due to concern of hypoglycemia.         1) Diabetes Mellitus Type 2, Follow up Visit  - Patient here for an evaluation of diabetes, initially diagnosed at age  44  - Known diabetic complications: nephropathy, peripheral neuropathy and cardiovascular disease  - Cardiovascular risk factors: advanced age (older than 55 for men, 65 for women), diabetes mellitus, dyslipidemia, hypertension and obesity (BMI >= 30 kg/m2), poor historian   - Previously seen Esperanza Gonzalez NP 10/2020  - According to family friend:  Patient is , has , has daughter and son.  But according to patient she is "by herself".  Does have family support  - In previous visit she did stop insulin injection, stop Ozempic.3/26/22.Previously seen with family friend.  Interestingly did no-show Diabetes Education appointment after it was requested by her and family friend  - Patient continues to require frequent redirection, with " questionable confusion/dementia  - Patient refused any other diabetes medication:  Previously tried oral regimen as well as GLP1    Current meds:     NovoLog to 12 units t.i.d. before each meal, especially if glucose greater than 150  Previous meds:              Metformin:  Due to diarrhea              Lantus 12 units in the morning   Ozempic:  Was stopped due to patient reported side effects, weight loss   Actos 30 mg daily  Home glucose checks:  On Freestyle Yoav,  a scanning regularly  CGM download and interpretation: Data was downloaded and personally reviewed by myself  CGM type: Freestyle yoav 14 day device is sensor  Number of Day CGM worn: 14 days, percentage of time CGM is active: 56%  Average blood glucose: 297, Glucose variability: 32.9%  , Glucose management indicator (GMI):10.4%  Time in Range:  67% very high, 24% High, 9% Target Range, 0% low, 0% very low>> reviewed and discussed, goal TIR discussed with patient    CGM data downloaded review show global and persistent hyperglycemia postprandial, no overnight hypoglycemia.  Hyperglycemia worsen for lunch and dinner.         Diet/Exercise:               Eatin- 3 meals per day, often eat out especially at night, suspect dietary indiscretion as she often eat out at night              Snacking               Drink:  Sugar free              Current exercise: aerobics:  Dancing  Weight trend: decreasing steadily  Diabetes Education:  Yes, 19 - Liliam Chow RN, CDE   Diabetes Related Hospitalization:  denies  Hx of pancreatitis, hx of thyroid cancer:  Denies  Family history of diabetes:  Yes  Occupation:  Retired    Eye exam current (within one year): yes, DR: no, outside of [facility]  Reports cuts or ulcers on feet:  Denies    Statin: Taking  ACE/ARB: Not taking  Statin: Taking, ACE/ARB: Not taking    Diabetes Management Status: Reviewed   Lab Results   Component Value Date    HGBA1C 13.0 (H) 2023    HGBA1C 8.4 (H) 2022     "HGBA1C 8.3 (H) 06/29/2022    HGBA1C 8.4 (H) 05/26/2022     Lab Results   Component Value Date    CPEPTIDE 3.29 04/27/2020      No results found for: "FRUCTOSAMINE"    Lab Results   Component Value Date    MICALBCREAT 9.1 05/01/2019     No results found for: "VARDRXYG03"    Screening or Prevention Patient's value Goal Complete/Controlled?   Lipid profile : 07/13/2022 Annually Yes     Dilated retinal exam : 12/17/2021 Annually Yes     Foot exam   Most Recent Foot Exam Date: Not Found Annually Yes          2) With regards to hypothyroidism  - On levothyroxine 112 mcg daily  - Taking as directed, reports compliance  - Reports compliant with medication, chronic     Lab Results   Component Value Date    TSH 0.986 09/01/2022    TSH 0.704 06/29/2022    TSH 0.359 (L) 05/26/2022    FREET4 1.35 05/26/2022    FREET4 1.11 01/26/2022    FREET4 1.05 10/26/2021       3) With regards to osteoporosis/osteopenia  - No recent falls  - On Fosamax per PCP, started on 7/2019  - Most recent bone density done review:  10/21 osteopenia with high FRAX score    DXA   10/26/21  Lumbar spine (L1-L4): BMD is 1.107 g/cm2, T-score is 0.5, and Z-score is 3.2.  Total hip: BMD is 0.794 g/cm2, T-score is -1.2, and Z-score is 0.8  Femoral neck: BMD is 0.608 g/cm2, T-score is -2.2, and Z-score is 0.1.  Distal 1/3 radius: Not applicable     COMPARISON:  Comparison study done on 07/08/2019.  Lumbar spine: BMD 0.974 g/cm2 and T-score -0.7.  Total Hip: BMD 0.786 g/cm2 and T-score -1.3.  Distal 1/3 radius: Not applicable    FRAX: 23% risk of a major osteoporotic fracture in the next 10 years.  6.3% risk of hip fracture in the next 10 years.     Impression:  LOW BONE MASS WITH A SIGNIFICANT INCREASE OF 13.6% IN THE LUMBAR SPINE BMD COMPARED TO THE PRIOR STUDY.      Reviewed past surgical, medical, family, social history and updated as appropriate.  Review of SystemsSee above    Objective:   BP (!) 176/88   Pulse 70   Temp 98 °F (36.7 °C) (Oral)   Wt 64.3 kg " (141 lb 12.8 oz)   BMI 25.94 kg/m²     Body mass index is 25.94 kg/m².  Vital signs reviewed    Physical Exam  Vitals and nursing note reviewed.   Constitutional:       General: She is not in acute distress.     Appearance: Normal appearance. She is well-developed. She is not toxic-appearing.   Neck:      Thyroid: No thyromegaly.   Pulmonary:      Effort: Pulmonary effort is normal.   Musculoskeletal:         General: Normal range of motion.      Cervical back: Normal range of motion.   Skin:     Findings: No bruising.   Neurological:      Mental Status: She is alert. She is disoriented.   Psychiatric:      Comments: Chronic mood issues noted, similar to previous visit     Lab Reviewed:  See results in subjective  Lab Results   Component Value Date    HGBA1C 13.0 (H) 08/08/2023     Lab Results   Component Value Date    CHOL 147 07/13/2022    HDL 66 07/13/2022    LDLCALC 64.8 07/13/2022    TRIG 81 07/13/2022    CHOLHDL 44.9 07/13/2022     Lab Results   Component Value Date     09/01/2022    K 4.4 09/01/2022     09/01/2022    CO2 26 09/01/2022     (H) 09/01/2022    BUN 27 (H) 09/01/2022    CREATININE 1.0 09/01/2022    CALCIUM 9.9 09/01/2022    PROT 7.0 05/26/2022    ALBUMIN 4.0 05/26/2022    BILITOT 0.9 05/26/2022    ALKPHOS 73 05/26/2022    AST 18 05/26/2022    ALT 19 05/26/2022    ANIONGAP 8 09/01/2022    ESTGFRAFRICA >60.0 05/26/2022    ESTGFRAFRICA >60.0 05/26/2022    EGFRNONAA 53.7 (A) 05/26/2022    EGFRNONAA 53.7 (A) 05/26/2022    TSH 0.986 09/01/2022    .3 (H) 10/26/2021    [id number] 31 10/26/2021     Assessment     1. Uncontrolled type 2 diabetes mellitus with hyperglycemia, with long-term current use of insulin  HEMOGLOBIN A1C    RENAL FUNCTION PANEL    FREESTYLE OSMAN 14 DAY SENSOR Kit    HEMOGLOBIN A1C    DISCONTINUED: FREESTYLE OSMAN 14 DAY SENSOR Kit      2. Type 2 diabetes mellitus with hyperglycemia, with long-term current use of insulin  HUMALOG KWIKPEN INSULIN 100 unit/mL  "pen    pen needle, diabetic (BD ULTRA-FINE MINI PEN NEEDLE) 31 gauge x 3/16" Ndle      3. Hypothyroidism, unspecified type  levothyroxine (SYNTHROID) 112 MCG tablet      4. Hypoglycemia associated with diabetes        5. 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 Cognitive deficits        7. Age-related osteoporosis without current pathological fracture          Plan     Uncontrolled type 2 diabetes mellitus with hyperglycemia, with long-term current use of insulin  - Diabetes is not at goal, given worsening of recent A1c, A1C goal for this patient is 7-7.5%.   - diabetes management is poor due to poor historian, vision impairment, noncompliant with regimen  - Complicated by CKD3, dietary indiscretion, visual impairment   - Reviewed goals of therapy are to get the best control we can without hypoglycemia  - Routine health maintenance/screening: reviewed  - Freestyle Yoav download review showed worsening  hyperglycemia postprandially.  No hypoglycemia  - Diabetic supplies/medications:  Refills given    Plan:  - Patient presents with family today for follow-up.  Once again I encouraged compliance with MDI insulin given severe hypoglycemia  - Continue NovoLog to 12 units t.i.d. before each meal 3 times a day, advised not skipping given severe hyperglycemia  - Patient refused any other diabetes medication:  Previously tried oral regimen as well as GLP1  - Patient poor insight to diabetes care as well as resistant to changes to regimen, will lead to further issues with hyperglycemia.  - Clear instruction given, family advise the be present with her at the next office visit to encourage continue compliance  - Continue Freestyle Yoav new prescription sent to be faxed to supply company, close follow-up 3 months to re-evaluate data and make further adjust  - Continue follow-up with PCP for routine health maintenance    Hypothyroidism  - TSH is at goal, continue levothyroxine as prescribed  - TSH stable on recent " blood work  - continue LT4 112 mcg daily  - check lab work in the future    Visual impairment due to diabetes mellitus  - poor vision, continue follow-up with Ophthalmology  - continues to benefit from CGM given prevention of hypoglycemia    Cognitive deficits  - patient with poor memory  - affecting diabetes management  - encourage patient family to be more involved in her care    Age-related osteoporosis without current pathological fracture  - bone density for 10/2021 completed, Stable BMD, high frax score  - On Fosamax per PCP since 7/2019  - Advised patient to continue to be active   - Continue calcium and vitamin-D supplementation      Given the patient age and other morbidities, goals of therapy are to get the best control we can without hypoglycemia  - Endocrine Society recommend Goal A1C is 7.5%, fasting glucose goal of            Advised patient to follow up with PCP for routine health maintenance care.   RTC in 3 months    Dominick Rahman M.D  Endocrinology  [facility]  8/8/2023      Disclaimer: This note has been generated using voice-recognition software. There may be typographical errors that have been missed during proof-reading.  ------------------------------------------------------------    Indications for CGMs:    Patent with diagnosed:  Type 2 Diabetes Mellitus that required frequent glucose monitoring (4 + times a day and 4x/day testing), frequent adjustments to insulin regiment based on BG or CGM results. Patent requires a therapeutic CGM and is willing to use therapeutic CGM/SMBG for Necessary frequent adjustments in insulin therapy, patient demonstrated an understanding of the technology (can use and recognize alerts and alarms). I, the physician, have assessed adherence to CGM regimen and to the plan, patient will have close follow up in clinic as indicated, every 3 months to 6 months.     Patient experiences (including but not limited to):  [x]   Discrepancies  between records and A1C, at risk severe hypoglycemia episode and hospitalization  [x]   Recurrent, unexplained, severe hypoglycemic episodes  [x]   Postprandial hyperglycemia  [x]   Unexplained blood glucose excursions  []   Impaired awareness of hypoglycemia    []   Patient uses insulin pump for diabetes management: will need frequent adjustments to insulin regiment based on BG: including adjustment to  insulin pump setting, sliding scale, correction factor, additional bolusing/correction

## 2023-08-08 NOTE — PATIENT INSTRUCTIONS
Thank you for completing the visit with me    Increase NovoLog 12 units three times a day. BEFORE MEALS, BREAKFAST, LUNCH AND DINNER    IF YOU DO NOT EAT, YOU DO NOT GIVE INSULIN    Especially glucose greater than 150     Scan/Check your sugar often with Freestyle Yoav before each meals.     Goal blood sugar in the morning, before breakfast: 100-140  Glucose goal AFTER MEALS:    2 Hour after: less than 140  Before going to bed: 100-140  Do not go to bed with glucose less than 100  Have a small snack if glucose is lower than 100       Please CALL MY OFFICE and leave a message if your insurance does not cover the medications I prescribed, so I can prescribed alternative medications.     We will plan an in-clinic visit in 3 months, with labs prior to that appointment.    Dominick Rahman M.D  [facility] [address] [facility] [address], [address]  [place] [address]    Office:  [phone]  Fax:  [phone]

## 2023-08-08 NOTE — ASSESSMENT & PLAN NOTE
- Diabetes is not at goal, given worsening of recent A1c, A1C goal for this patient is 7-7.5%.   - diabetes management is poor due to poor historian, vision impairment, noncompliant with regimen  - Complicated by CKD3, dietary indiscretion, visual impairment   - Reviewed goals of therapy are to get the best control we can without hypoglycemia  - Routine health maintenance/screening: reviewed  - Freestyle Yoav download review showed worsening  hyperglycemia postprandially.  No hypoglycemia  - Diabetic supplies/medications:  Refills given    Plan:  - Patient presents with family today for follow-up.  Once again I encouraged compliance with MDI insulin given severe hypoglycemia  - Continue NovoLog to 12 units t.i.d. before each meal 3 times a day, advised not skipping given severe hyperglycemia  - Patient refused any other diabetes medication:  Previously tried oral regimen as well as GLP1  - Patient poor insight to diabetes care as well as resistant to changes to regimen, will lead to further issues with hyperglycemia.  - Clear instruction given, family advise the be present with her at the next office visit to encourage continue compliance  - Continue Freestyle Yoav new prescription sent to be faxed to supply company, close follow-up 3 months to re-evaluate data and make further adjust  - Continue follow-up with PCP for routine health maintenance

## 2023-08-08 NOTE — ASSESSMENT & PLAN NOTE
- TSH is at goal, continue levothyroxine as prescribed  - TSH stable on recent blood work  - continue LT4 112 mcg daily  - check lab work in the future

## 2023-08-11 ENCOUNTER — TELEPHONE (OUTPATIENT)
Dept: ADMINISTRATIVE | Facility: HOSPITAL | Age: 83
End: 2023-08-11
Payer: MEDICARE

## 2023-08-11 ENCOUNTER — OFFICE VISIT (OUTPATIENT)
Dept: FAMILY MEDICINE | Facility: CLINIC | Age: 83
End: 2023-08-11
Payer: MEDICARE

## 2023-08-11 VITALS
HEIGHT: 62 IN | BODY MASS INDEX: 26.21 KG/M2 | TEMPERATURE: 98 F | DIASTOLIC BLOOD PRESSURE: 72 MMHG | SYSTOLIC BLOOD PRESSURE: 122 MMHG | HEART RATE: 67 BPM | OXYGEN SATURATION: 97 % | WEIGHT: 142.44 LBS

## 2023-08-11 DIAGNOSIS — F41.9 ANXIETY AND DEPRESSION: ICD-10-CM

## 2023-08-11 DIAGNOSIS — E78.5 HYPERLIPIDEMIA ASSOCIATED WITH TYPE 2 DIABETES MELLITUS: ICD-10-CM

## 2023-08-11 DIAGNOSIS — Z85.41 HISTORY OF CERVICAL CANCER: ICD-10-CM

## 2023-08-11 DIAGNOSIS — H61.21 IMPACTED CERUMEN OF RIGHT EAR: ICD-10-CM

## 2023-08-11 DIAGNOSIS — I70.0 ATHEROSCLEROSIS OF AORTA: ICD-10-CM

## 2023-08-11 DIAGNOSIS — M81.0 AGE-RELATED OSTEOPOROSIS WITHOUT CURRENT PATHOLOGICAL FRACTURE: ICD-10-CM

## 2023-08-11 DIAGNOSIS — R79.9 ABNORMAL FINDING OF BLOOD CHEMISTRY, UNSPECIFIED: ICD-10-CM

## 2023-08-11 DIAGNOSIS — E11.65 TYPE 2 DIABETES MELLITUS WITH HYPERGLYCEMIA, WITH LONG-TERM CURRENT USE OF INSULIN: ICD-10-CM

## 2023-08-11 DIAGNOSIS — E11.69 HYPERLIPIDEMIA ASSOCIATED WITH TYPE 2 DIABETES MELLITUS: ICD-10-CM

## 2023-08-11 DIAGNOSIS — E03.9 HYPOTHYROIDISM, UNSPECIFIED TYPE: ICD-10-CM

## 2023-08-11 DIAGNOSIS — N18.32 STAGE 3B CHRONIC KIDNEY DISEASE: ICD-10-CM

## 2023-08-11 DIAGNOSIS — Z00.00 ANNUAL PHYSICAL EXAM: Primary | ICD-10-CM

## 2023-08-11 DIAGNOSIS — F32.A ANXIETY AND DEPRESSION: ICD-10-CM

## 2023-08-11 DIAGNOSIS — E55.9 VITAMIN D DEFICIENCY: ICD-10-CM

## 2023-08-11 DIAGNOSIS — Z79.4 TYPE 2 DIABETES MELLITUS WITH HYPERGLYCEMIA, WITH LONG-TERM CURRENT USE OF INSULIN: ICD-10-CM

## 2023-08-11 PROBLEM — E11.9 DM2 (DIABETES MELLITUS, TYPE 2): Status: ACTIVE | Noted: 2019-04-15

## 2023-08-11 PROCEDURE — 99215 OFFICE O/P EST HI 40 MIN: CPT | Mod: PBBFAC,PO | Performed by: NURSE PRACTITIONER

## 2023-08-11 PROCEDURE — 99999 PR PBB SHADOW E&M-EST. PATIENT-LVL V: CPT | Mod: PBBFAC,,, | Performed by: NURSE PRACTITIONER

## 2023-08-11 PROCEDURE — 99387 INIT PM E/M NEW PAT 65+ YRS: CPT | Mod: S$PBB,GZ,, | Performed by: NURSE PRACTITIONER

## 2023-08-11 PROCEDURE — 99387 PR PREVENTIVE VISIT,NEW,65 & OVER: ICD-10-PCS | Mod: S$PBB,GZ,, | Performed by: NURSE PRACTITIONER

## 2023-08-11 PROCEDURE — 99999 PR PBB SHADOW E&M-EST. PATIENT-LVL V: ICD-10-PCS | Mod: PBBFAC,,, | Performed by: NURSE PRACTITIONER

## 2023-08-11 RX ORDER — ROSUVASTATIN CALCIUM 10 MG/1
10 TABLET, COATED ORAL DAILY
Qty: 90 TABLET | Refills: 3 | Status: SHIPPED | OUTPATIENT
Start: 2023-08-11

## 2023-08-11 RX ORDER — ALENDRONATE SODIUM 70 MG/1
70 TABLET ORAL
Qty: 4 TABLET | Refills: 3 | Status: SHIPPED | OUTPATIENT
Start: 2023-08-11 | End: 2023-12-29

## 2023-08-11 RX ORDER — ERGOCALCIFEROL 1.25 MG/1
50000 CAPSULE ORAL
Qty: 12 CAPSULE | Refills: 0 | Status: SHIPPED | OUTPATIENT
Start: 2023-08-11

## 2023-08-11 RX ORDER — LEVOTHYROXINE SODIUM 112 UG/1
112 TABLET ORAL EVERY MORNING
Qty: 90 TABLET | Refills: 3 | Status: SHIPPED | OUTPATIENT
Start: 2023-08-11

## 2023-08-11 NOTE — PROGRESS NOTES
Called the hospital lab and spoke with the . They confirmed the TSH order will be added once the blood arrives.

## 2023-08-11 NOTE — PROGRESS NOTES
SUBJECTIVE     Chief Complaint   Patient presents with    Annual Exam     HPI  Chelsea Gould is a 83 y.o. female with multiple medical diagnoses as listed in the medical history and problem list that presents for annual exam. She is accompanied by her daughter. Pt has been doing well since her last visit. She has a good appetite and eats well. Tries to avoid sugars and sweets.  She does not exercise. She sleeps for ~8-10 hours nightly. Pt does take OTC supplements, takes a daily MVI.  She does have any current stressors, worries often. Pt is UTD on age appropriate CA screening. Dental exam is UTD. Eye exam is UTD.     Diabetes- followed by Endocrinology. A1c 13.0. Non compliant with diet and medication/insulin regimen.     ROS as listed.   PAST MEDICAL HISTORY:  Past Medical History:   Diagnosis Date    Cervical cancer, FIGO stage IA1 12/17/2019    Concern about heart attack without diagnosis     silent heart attack    Diabetes mellitus     Eye problems     Urticaria        PAST SURGICAL HISTORY:  Past Surgical History:   Procedure Laterality Date    COLD KNIFE CONIZATION OF CERVIX N/A 11/26/2019    Procedure: CONE BIOPSY, CERVIX, USING COLD KNIFE;  Surgeon: Fani Vieira MD;  Location: [facility];  Service: OB/GYN;  Laterality: N/A;    EYE SURGERY      HYSTEROSCOPY WITH DILATION AND CURETTAGE OF UTERUS N/A 10/25/2019    Procedure: HYSTEROSCOPY, WITH DILATION AND CURETTAGE OF UTERUS;  Surgeon: Marcia Canseco MD;  Location: [facility];  Service: OB/GYN;  Laterality: N/A;    JOINT REPLACEMENT Right     hip    ROBOT-ASSISTED LAPAROSCOPIC ABDOMINAL HYSTERECTOMY USING DA DENIS XI N/A 1/17/2020    Procedure: XI ROBOTIC HYSTERECTOMY;  Surgeon: Fani Vieira MD;  Location: [facility];  Service: OB/GYN;  Laterality: N/A;    ROBOT-ASSISTED LAPAROSCOPIC SALPINGO-OOPHORECTOMY USING DA DENIS XI Bilateral 1/17/2020    Procedure: XI ROBOTIC SALPINGO-OOPHORECTOMY;  Surgeon: Fani Vieira MD;  Location: [facility];  Service: OB/GYN;   Laterality: Bilateral;    TUBAL LIGATION         SOCIAL HISTORY:  Social History     Socioeconomic History    Marital status:    Tobacco Use    Smoking status: Never    Smokeless tobacco: Never   Substance and Sexual Activity    Alcohol use: No    Drug use: No    Sexual activity: Not Currently     Partners: Male     Social Determinants of Health     Financial Resource Strain: Medium Risk (4/30/2019)    Overall Financial Resource Strain (CARDIA)     Difficulty of Paying Living Expenses: Somewhat hard   Food Insecurity: No Food Insecurity (4/30/2019)    Hunger Vital Sign     Worried About Running Out of Food in the Last Year: Never true     Ran Out of Food in the Last Year: Never true   Transportation Needs: Unmet Transportation Needs (4/30/2019)    PRAPARE - Transportation     Lack of Transportation (Medical): Yes     Lack of Transportation (Non-Medical): Yes   Physical Activity: Insufficiently Active (4/30/2019)    Exercise Vital Sign     Days of Exercise per Week: 1 day     Minutes of Exercise per Session: 20 min   Stress: Stress Concern Present (4/30/2019)    [employer] [employer] - Occupational Stress Questionnaire     Feeling of Stress : To some extent   Social Connections: Unknown (4/30/2019)    Social Connection and Isolation Panel [NHANES]     Frequency of Communication with Friends and Family: More than three times a week     Frequency of Social Gatherings with Friends and Family: Three times a week     Active Member of Clubs or Organizations: No     Attends Club or Organization Meetings: Patient refused     Marital Status:        FAMILY HISTORY:  Family History   Problem Relation Age of Onset    Breast cancer Daughter 44    Colon cancer Neg Hx        ALLERGIES AND MEDICATIONS: updated and reviewed.  Review of patient's allergies indicates:  No Known Allergies  Current Outpatient Medications   Medication Sig Dispense Refill    blood sugar diagnostic Strp 1 strip by  "Misc.(Non-Drug; Combo Route) route 4 (four) times daily with meals and nightly. 100 strip 6    FREESTYLE OSMAN 14 DAY SENSOR Kit Use every 14 days, ICD10: 11.65 2 kit 5    HUMALOG KWIKPEN INSULIN 100 unit/mL pen Inject 12 Units into the skin 3 (three) times daily before meals. 15 mL 6    Lactobacillus rhamnosus GG (CULTURELLE ORAL) Take 1 capsule by mouth once daily.      pen needle, diabetic (BD ULTRA-FINE MINI PEN NEEDLE) 31 gauge x 3/16" Ndle Use with insulin pen, injection 3 times a day 200 each 6    PRODIGY NO CODING Strp       triamcinolone acetonide 0.1% (KENALOG) 0.1 % cream APPLY TWICE DAILY TO ITCHY SKIN UP TO 2 WEEKS/MONTH AS NEEDED  3    alendronate (FOSAMAX) 70 MG tablet Take 1 tablet (70 mg total) by mouth every 7 days. 4 tablet 3    brimonidine-timolol (COMBIGAN) 0.2-0.5 % Drop       bromfenac (PROLENSA) 0.07 % Drop Apply 1 drop to eye.      cycloSPORINE (RESTASIS) 0.05 % ophthalmic emulsion Apply 1 drop to eye.      ergocalciferol (ERGOCALCIFEROL) 50,000 unit Cap Take 1 capsule (50,000 Units total) by mouth every 30 days. 12 capsule 0    fluorometholone 0.1% (FML) 0.1 % DrpS       ketorolac 0.4% (ACULAR) 0.4 % Drop Place 1 drop into both eyes 2 (two) times daily.      ketorolac 0.5% (ACULAR) 0.5 % Drop       levothyroxine (SYNTHROID) 112 MCG tablet Take 1 tablet (112 mcg total) by mouth every morning. 90 tablet 3    LOTEMAX SM 0.38 % DrpG       loteprednol (LOTEMAX) 0.5 % ophthalmic suspension       MAXITROL 3.5 mg/g-10,000 unit/g-0.1 % Oint 0.25 inch in injected eye 4 times a day      mupirocin (BACTROBAN) 2 % ointment APPLY 1 APPLICATION TOPICALLY TO RED AREA ON CHEST TWICE DAILY      neomycin-polymyxin-dexamethasone (MAXITROL) 3.5mg/mL-10,000 unit/mL-0.1 % DrpS       ofloxacin (OCUFLOX) 0.3 % ophthalmic solution INSTILL 1 DROP INTO AFFECTED EYE 4 TIMES A DAY  4    RESTASIS 0.05 % ophthalmic emulsion Place 1 drop into both eyes 2 (two) times daily.  3    RHOPRESSA 0.02 % ophthalmic solution Place 1 " "drop into both eyes once daily.      rosuvastatin (CRESTOR) 10 MG tablet Take 1 tablet (10 mg total) by mouth once daily. 90 tablet 3     No current facility-administered medications for this visit.       ROS  Review of Systems   Cardiovascular:  Negative for chest pain, palpitations and leg swelling.   Psychiatric/Behavioral:          Excessive worry         OBJECTIVE     Physical Exam  Vitals:    08/11/23 0811   BP: 122/72   Pulse: 67   Temp: 97.8 °F (36.6 °C)    Body mass index is 26.05 kg/m².  Weight: 64.6 kg (142 lb 6.7 oz)   Height: 5' 2" (157.5 cm)     Physical Exam  Vitals and nursing note reviewed.   Constitutional:       General: She is not in acute distress.     Appearance: Normal appearance.   HENT:      Head: Normocephalic and atraumatic.      Right Ear: There is impacted cerumen.      Left Ear: Tympanic membrane normal.      Nose: Nose normal.      Mouth/Throat:      Mouth: Mucous membranes are moist.      Pharynx: No posterior oropharyngeal erythema.   Eyes:      General: Visual field deficit present.      Extraocular Movements: Extraocular movements intact.      Conjunctiva/sclera: Conjunctivae normal.      Pupils: Pupils are equal, round, and reactive to light.   Cardiovascular:      Rate and Rhythm: Normal rate and regular rhythm.      Heart sounds: Normal heart sounds. No murmur heard.  Pulmonary:      Effort: Pulmonary effort is normal. No respiratory distress.      Breath sounds: Normal breath sounds.   Abdominal:      General: Bowel sounds are normal.      Palpations: Abdomen is soft.   Musculoskeletal:      Cervical back: Normal range of motion and neck supple. No tenderness.      Right lower leg: No edema.      Left lower leg: No edema.   Skin:     General: Skin is warm and dry.      Capillary Refill: Capillary refill takes less than 2 seconds.      Findings: No rash.   Neurological:      Mental Status: She is alert and oriented to person, place, and time.      Gait: Gait normal. "   Psychiatric:         Mood and Affect: Mood normal.         Behavior: Behavior normal.       Health Maintenance         Date Due Completion Date    DEXA Scan 10/26/2023 10/26/2021    TETANUS VACCINE 08/11/2024 (Originally 7/23/1958) ---    Shingles Vaccine (1 of 2) 08/11/2024 (Originally 7/23/1959) ---    COVID-19 Vaccine (3 - Pfizer series) 08/11/2024 (Originally 3/28/2021) 1/31/2021    Pneumococcal Vaccines (Age 65+) (1 - PCV) 08/11/2024 (Originally 7/23/1946) ---    Influenza Vaccine (1) 09/01/2023 ---    Hemoglobin A1c 11/08/2023 8/8/2023            ASSESSMENT     83 y.o. female with     1. Annual physical exam    2. Hyperlipidemia associated with type 2 diabetes mellitus    3. Hypothyroidism, unspecified type    4. Vitamin D deficiency    5. Atherosclerosis of aorta    6. Age-related osteoporosis without current pathological fracture    7. Abnormal finding of blood chemistry, unspecified    8. Type 2 diabetes mellitus with hyperglycemia, with long-term current use of insulin    9. Anxiety and depression    10. History of cervical cancer    11. Impacted cerumen of right ear        PLAN:     1. Annual physical exam  Counseled patient on importance of health prevention screening, immunizations, and overall wellness.   Recommend well balanced, healthy, low sugar diet and daily aerobic exercise.  - CBC W/ AUTO DIFFERENTIAL; Future  - COMPREHENSIVE METABOLIC PANEL; Future    2. Hyperlipidemia associated with type 2 diabetes mellitus  Stable.   - rosuvastatin (CRESTOR) 10 MG tablet; Take 1 tablet (10 mg total) by mouth once daily.  Dispense: 90 tablet; Refill: 3  - LIPID PANEL; Future    3. Hypothyroidism, unspecified type  Stable.   - levothyroxine (SYNTHROID) 112 MCG tablet; Take 1 tablet (112 mcg total) by mouth every morning.  Dispense: 90 tablet; Refill: 3    4. Vitamin D deficiency  Stable.  - ergocalciferol (ERGOCALCIFEROL) 50,000 unit Cap; Take 1 capsule (50,000 Units total) by mouth every 30 days.  Dispense:  12 capsule; Refill: 0    5. Atherosclerosis of aorta  Stable on statin therapy.     6. Age-related osteoporosis without current pathological fracture  Stable.   The current medical regimen is effective;  continue present plan and medications.   I recommend continuing weight bearing exercises.  Examples include walking, dancing, low-impact aerobics, elliptical training machines, stair climbing and gardening.   - alendronate (FOSAMAX) 70 MG tablet; Take 1 tablet (70 mg total) by mouth every 7 days.  Dispense: 4 tablet; Refill: 3  - Vitamin D; Future  - DXA Bone Density Axial Skeleton 1 or more sites; Future    7. Abnormal finding of blood chemistry, unspecified  - CBC W/ AUTO DIFFERENTIAL; Future    8. Type 2 diabetes mellitus with hyperglycemia, with long-term current use of insulin  Uncontrolled, continue present plan and medications.   Follow up with endocrinology as scheduled.  Diet and exercise.     9. Anxiety and depression  Provided emotional support.    10. History of cervical cancer  Stable.     11. Impacted cerumen of right ear  Removed with curette.   Recommend Debrox.         RTC in 6 months with PCP.      Vivian Monge, ZOFIA, APRN, FNP-C  Family Medicine  [facility]  08/11/2023 8:24 AM        No follow-ups on file.

## 2023-11-14 ENCOUNTER — LAB VISIT (OUTPATIENT)
Dept: LAB | Facility: HOSPITAL | Age: 83
End: 2023-11-14
Attending: HOSPITALIST
Payer: MEDICARE

## 2023-11-14 DIAGNOSIS — E11.65 UNCONTROLLED TYPE 2 DIABETES MELLITUS WITH HYPERGLYCEMIA, WITH LONG-TERM CURRENT USE OF INSULIN: ICD-10-CM

## 2023-11-14 DIAGNOSIS — Z79.4 UNCONTROLLED TYPE 2 DIABETES MELLITUS WITH HYPERGLYCEMIA, WITH LONG-TERM CURRENT USE OF INSULIN: ICD-10-CM

## 2023-11-14 LAB
ALBUMIN SERPL BCP-MCNC: 3.9 G/DL (ref 3.5–5.2)
ANION GAP SERPL CALC-SCNC: 7 MMOL/L (ref 8–16)
BUN SERPL-MCNC: 26 MG/DL (ref 8–23)
CALCIUM SERPL-MCNC: 10.1 MG/DL (ref 8.7–10.5)
CHLORIDE SERPL-SCNC: 106 MMOL/L (ref 95–110)
CO2 SERPL-SCNC: 28 MMOL/L (ref 23–29)
CREAT SERPL-MCNC: 1 MG/DL (ref 0.5–1.4)
EST. GFR  (NO RACE VARIABLE): 57 ML/MIN/1.73 M^2
ESTIMATED AVG GLUCOSE: 194 MG/DL (ref 68–131)
GLUCOSE SERPL-MCNC: 219 MG/DL (ref 70–110)
HBA1C MFR BLD: 8.4 % (ref 4–5.6)
PHOSPHATE SERPL-MCNC: 3 MG/DL (ref 2.7–4.5)
POTASSIUM SERPL-SCNC: 5.3 MMOL/L (ref 3.5–5.1)
SODIUM SERPL-SCNC: 141 MMOL/L (ref 136–145)

## 2023-11-14 PROCEDURE — 36415 COLL VENOUS BLD VENIPUNCTURE: CPT | Mod: PN | Performed by: HOSPITALIST

## 2023-11-14 PROCEDURE — 83036 HEMOGLOBIN GLYCOSYLATED A1C: CPT | Performed by: HOSPITALIST

## 2023-11-14 PROCEDURE — 80069 RENAL FUNCTION PANEL: CPT | Performed by: HOSPITALIST

## 2023-11-21 ENCOUNTER — OFFICE VISIT (OUTPATIENT)
Dept: ENDOCRINOLOGY | Facility: CLINIC | Age: 83
End: 2023-11-21
Payer: MEDICARE

## 2023-11-21 VITALS
TEMPERATURE: 98 F | DIASTOLIC BLOOD PRESSURE: 69 MMHG | SYSTOLIC BLOOD PRESSURE: 147 MMHG | WEIGHT: 149.38 LBS | HEART RATE: 77 BPM | BODY MASS INDEX: 27.33 KG/M2

## 2023-11-21 DIAGNOSIS — Z79.4 UNCONTROLLED TYPE 2 DIABETES MELLITUS WITH HYPERGLYCEMIA, WITH LONG-TERM CURRENT USE OF INSULIN: ICD-10-CM

## 2023-11-21 DIAGNOSIS — N18.31 CHRONIC RENAL FAILURE, STAGE 3A: ICD-10-CM

## 2023-11-21 DIAGNOSIS — E11.65 TYPE 2 DIABETES MELLITUS WITH HYPERGLYCEMIA, WITH LONG-TERM CURRENT USE OF INSULIN: Primary | ICD-10-CM

## 2023-11-21 DIAGNOSIS — R41.89 COGNITIVE DEFICITS: ICD-10-CM

## 2023-11-21 DIAGNOSIS — E03.9 HYPOTHYROIDISM, UNSPECIFIED TYPE: ICD-10-CM

## 2023-11-21 DIAGNOSIS — Z79.4 TYPE 2 DIABETES MELLITUS WITH HYPERGLYCEMIA, WITH LONG-TERM CURRENT USE OF INSULIN: Primary | ICD-10-CM

## 2023-11-21 DIAGNOSIS — E11.65 UNCONTROLLED TYPE 2 DIABETES MELLITUS WITH HYPERGLYCEMIA, WITH LONG-TERM CURRENT USE OF INSULIN: ICD-10-CM

## 2023-11-21 DIAGNOSIS — M81.0 AGE-RELATED OSTEOPOROSIS WITHOUT CURRENT PATHOLOGICAL FRACTURE: ICD-10-CM

## 2023-11-21 PROCEDURE — 99999 PR PBB SHADOW E&M-EST. PATIENT-LVL IV: CPT | Mod: PBBFAC,,, | Performed by: HOSPITALIST

## 2023-11-21 PROCEDURE — 99214 PR OFFICE/OUTPT VISIT, EST, LEVL IV, 30-39 MIN: ICD-10-PCS | Mod: S$PBB,,, | Performed by: HOSPITALIST

## 2023-11-21 PROCEDURE — 99214 OFFICE O/P EST MOD 30 MIN: CPT | Mod: S$PBB,,, | Performed by: HOSPITALIST

## 2023-11-21 PROCEDURE — 95251 CONT GLUC MNTR ANALYSIS I&R: CPT | Mod: ,,, | Performed by: HOSPITALIST

## 2023-11-21 PROCEDURE — 99214 OFFICE O/P EST MOD 30 MIN: CPT | Mod: PBBFAC | Performed by: HOSPITALIST

## 2023-11-21 PROCEDURE — 95251 PR GLUCOSE MONITOR, 72 HOUR, PHYS INTERP: ICD-10-PCS | Mod: ,,, | Performed by: HOSPITALIST

## 2023-11-21 PROCEDURE — 99999 PR PBB SHADOW E&M-EST. PATIENT-LVL IV: ICD-10-PCS | Mod: PBBFAC,,, | Performed by: HOSPITALIST

## 2023-11-21 RX ORDER — PEN NEEDLE, DIABETIC 30 GX3/16"
NEEDLE, DISPOSABLE MISCELLANEOUS
Qty: 200 EACH | Refills: 6 | Status: SHIPPED | OUTPATIENT
Start: 2023-11-21

## 2023-11-21 RX ORDER — INSULIN LISPRO 100 [IU]/ML
12 INJECTION, SOLUTION INTRAVENOUS; SUBCUTANEOUS
Qty: 15 ML | Refills: 6 | Status: SHIPPED | OUTPATIENT
Start: 2023-11-21 | End: 2024-11-20

## 2023-11-21 NOTE — PROGRESS NOTES
"Subjective:      Patient ID: Chelsea Gould is a 83 y.o. female presented to [facility] on 11/21/2023.  Chief Complaint:  No chief complaint on file.    History of Present Illness: Chelsea Gould is a 83 y.o. female here for management of type 2 diabetes  Significant past medical history:  CAD, CKD stage IIIA, visual impairment (seen Ophthalmology for injections/may need surgery), hypothyroidism, osteoporosis (on Fosamax given by PCP)    Interval history:  Patient is here for management of diabetes.  A1c recently showed improvement, decreased to 8.4%.    Patient presents with family today:  Granddaughter, previously seen with her grandson.  Both of of them have been more involved in her insulin and diabetes care.  Better glycemic control.  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  Currently on Humalog 12 units before each meal 3 times a day  Occasional skipping of medication due to concern for hypoglycemia   On Freestyle Yoav, scanning  When discussed about changing regimen, patient very resistant to change  Patient quite hesitant on insulin increase due to concern of hypoglycemia.   Mental status unchanged, still confused    Levothyroxine 112 mcg daily  On Fosamax for osteoporosis      1) Diabetes Mellitus Type 2, Follow up Visit  - Patient here for an evaluation of diabetes, initially diagnosed at age  44  - Known diabetic complications: nephropathy, peripheral neuropathy and cardiovascular disease  - Cardiovascular risk factors: advanced age (older than 55 for men, 65 for women), diabetes mellitus, dyslipidemia, hypertension and obesity (BMI >= 30 kg/m2), poor historian   - Previously seen Esperanza Gonzalez NP 10/2020  - According to family friend:  Patient is , has , has daughter and son.  But according to patient she is "by herself".  Does have family support  - In previous visit she did stop insulin injection, stop Ozempic.3/26/22.Previously seen with " family friend.  Interestingly did no-show Diabetes Education appointment after it was requested by her and family friend  - Patient continues to require frequent redirection, with questionable confusion/dementia  - Patient refused any other diabetes medication:  Previously tried oral regimen as well as GLP1  - Patient poor insight to diabetes care as well as resistant to changes to regimen, will lead to further issues with hyperglycemia.    Current meds:     Humalog to 12 units t.i.d. before each meal, especially if glucose greater than 150  Previous meds:              Metformin:  Due to diarrhea              Lantus 12 units in the morning   Ozempic:  Was stopped due to patient reported side effects, weight loss   Actos 30 mg daily  Home glucose checks:  On Freestyle Yoav,  a scanning regularly  CGM download and interpretation: Data was downloaded and personally reviewed by myself  CGM type: Freestyle yoav 14 day device is sensor  Number of Day CGM worn: 14 days, percentage of time CGM is active: 56%  Average blood glucose: 199, Glucose variability: 34.8%  , Glucose management indicator (GMI): 8.1%  Time in Range:  23% very high, 32% High, 45% Target Range, 0% low, 0% very low>> reviewed and discussed, goal TIR discussed with patient    CGM data downloaded review better Time-in-range and GMI, average glucose 199.  Previous Time-in-range show severe hyperglycemia, still there but better.    Hyperglycemia worsening with meals in particular afternoon around 3:00 p.m..  With severe hyperglycemia        Diet/Exercise:               Eatin- 3 meals per day, often eat out especially at night, suspect dietary indiscretion as she often eat out at night              Snacking               Drink:  Sugar free              Current exercise: aerobics:  Dancing  Weight trend: decreasing steadily  Diabetes Education:  Yes, 19 - Liliam Chow RN, CDE   Diabetes Related Hospitalization:  denies  Hx of pancreatitis, hx of  "thyroid cancer:  Denies  Family history of diabetes:  Yes  Occupation:  Retired    Eye exam current (within one year): yes, DR: no, outside of [facility]  Reports cuts or ulcers on feet:  Denies    Statin: Taking  ACE/ARB: Not taking  Statin: Taking, ACE/ARB: Not taking    Diabetes Management Status: Reviewed   Lab Results   Component Value Date    HGBA1C 8.4 (H) 11/14/2023    HGBA1C 13.0 (H) 08/08/2023    HGBA1C 8.4 (H) 09/01/2022    HGBA1C 8.3 (H) 06/29/2022     Lab Results   Component Value Date    CPEPTIDE 3.29 04/27/2020      No results found for: "FRUCTOSAMINE"    Lab Results   Component Value Date    MICALBCREAT 9.1 05/01/2019     No results found for: "ITFELUEF91"    Screening or Prevention Patient's value Goal Complete/Controlled?   Lipid profile : 08/11/2023 Annually Yes     Dilated retinal exam : 04/26/2022 Annually Yes     Foot exam   Most Recent Foot Exam Date: Not Found Annually Yes          2) With regards to hypothyroidism  - On levothyroxine 112 mcg daily  - Taking as directed, reports compliance  - Reports compliant with medication, chronic     Lab Results   Component Value Date    TSH 1.785 08/11/2023    TSH 0.986 09/01/2022    TSH 0.704 06/29/2022    FREET4 1.35 05/26/2022    FREET4 1.11 01/26/2022    FREET4 1.05 10/26/2021       3) With regards to osteoporosis/osteopenia  - No recent falls  - On Fosamax per PCP, started on 7/2019  - Most recent bone density done review:  10/21 osteopenia with high FRAX score    DXA   10/26/21  Lumbar spine (L1-L4): BMD is 1.107 g/cm2, T-score is 0.5, and Z-score is 3.2.  Total hip: BMD is 0.794 g/cm2, T-score is -1.2, and Z-score is 0.8  Femoral neck: BMD is 0.608 g/cm2, T-score is -2.2, and Z-score is 0.1.  Distal 1/3 radius: Not applicable     COMPARISON:  Comparison study done on 07/08/2019.  Lumbar spine: BMD 0.974 g/cm2 and T-score -0.7.  Total Hip: BMD 0.786 g/cm2 and T-score -1.3.  Distal 1/3 radius: Not applicable    FRAX: 23% risk of a major " osteoporotic fracture in the next 10 years.  6.3% risk of hip fracture in the next 10 years.     Impression:  LOW BONE MASS WITH A SIGNIFICANT INCREASE OF 13.6% IN THE LUMBAR SPINE BMD COMPARED TO THE PRIOR STUDY.      Reviewed past surgical, medical, family, social history and updated as appropriate.  Review of SystemsSee above    Objective:   BP (!) 147/69   Pulse 77   Temp 97.8 °F (36.6 °C) (Oral)   Wt 67.8 kg (149 lb 6.4 oz)   BMI 27.33 kg/m²     Body mass index is 27.33 kg/m².  Vital signs reviewed    Physical Exam  Vitals and nursing note reviewed.   Constitutional:       General: She is not in acute distress.     Appearance: Normal appearance. She is well-developed. She is not toxic-appearing.   Neck:      Thyroid: No thyromegaly.   Pulmonary:      Effort: Pulmonary effort is normal.   Musculoskeletal:         General: Normal range of motion.      Cervical back: Normal range of motion.   Skin:     Findings: No bruising.   Neurological:      Mental Status: She is alert. She is disoriented.   Psychiatric:      Comments: Chronic mood issues noted, similar to previous visit     Lab Reviewed:  See results in subjective  Lab Results   Component Value Date    HGBA1C 8.4 (H) 11/14/2023     Lab Results   Component Value Date    CHOL 165 08/11/2023    HDL 70 08/11/2023    LDLCALC 83.4 08/11/2023    TRIG 58 08/11/2023    CHOLHDL 42.4 08/11/2023     Lab Results   Component Value Date     11/14/2023    K 5.3 (H) 11/14/2023     11/14/2023    CO2 28 11/14/2023     (H) 11/14/2023    BUN 26 (H) 11/14/2023    CREATININE 1.0 11/14/2023    CALCIUM 10.1 11/14/2023    PHOS 3.0 11/14/2023    PROT 7.4 08/11/2023    ALBUMIN 3.9 11/14/2023    BILITOT 0.6 08/11/2023    ALKPHOS 60 08/11/2023    AST 18 08/11/2023    ALT 17 08/11/2023    ANIONGAP 7 (L) 11/14/2023    ESTGFRAFRICA >60.0 05/26/2022    ESTGFRAFRICA >60.0 05/26/2022    EGFRNONAA 53.7 (A) 05/26/2022    EGFRNONAA 53.7 (A) 05/26/2022    TSH 1.785 08/11/2023  "   .3 (H) 10/26/2021    [id number] 40 08/11/2023     Assessment     1. Type 2 diabetes mellitus with hyperglycemia, with long-term current use of insulin  Renal Function Panel    Hemoglobin A1C    HUMALOG KWIKPEN INSULIN 100 unit/mL pen    pen needle, diabetic (BD ULTRA-FINE MINI PEN NEEDLE) 31 gauge x 3/16" Ndle      2. Uncontrolled type 2 diabetes mellitus with hyperglycemia, with long-term current use of insulin        3. Age-related osteoporosis without current pathological fracture        4. Hypothyroidism, unspecified type        5. Chronic renal failure, stage 3a        6. Cognitive deficits          Plan     Uncontrolled type 2 diabetes mellitus with hyperglycemia, with long-term current use of insulin  - Diabetes is improving, while still at goal, A1C goal for this patient is 7-7.5%.   - Diabetes management is poor due to poor historian, vision impairment, noncompliant with regimen  - Complicated by CKD3, dietary indiscretion, visual impairment   - Reviewed goals of therapy are to get the best control we can without hypoglycemia  - Freestyle Yoav download review showed acceptable improvement in Time-in-range and GMI given previous history of severe hyperglycemia  - Previously tried oral regimen as well as GLP1, which failed  - since patient has been accompanied by family, glucose have been much better.  An encouraging sign    Plan:  - I encourage patient's family to continue to play active role in her glucose management. As it is working  - Once again I encouraged compliance with MDI insulin to help treat mealtime hyperglycemia.  No need for long-acting given overnight normal/lower glucose.  - Humalog 12 units t.i.d. before each meal 3 times a day, advised not skipping given severe hyperglycemia  - Clear instruction given, family advise the be present with her at the next office visit to encourage continue compliance  - Continue Freestyle Yoav, close follow-up 3 months to re-evaluate data and " make further adjust  - Continue follow-up with PCP for routine health maintenance    Age-related osteoporosis without current pathological fracture  - bone density for 10/2021 completed, Stable BMD, high frax score  - On Fosamax per PCP since 7/2019  - Advised patient to continue to be active   - Continue calcium and vitamin-D supplementation    Hypothyroidism  - TSH is at goal, continue levothyroxine as prescribed  - TSH stable on recent blood work  - continue LT4 112 mcg daily  - check lab work in the future    Chronic renal failure, stage 3a  - Kidney function stable   - Renally Adjust diabetes medication  - routine monitor    Cognitive deficits  - patient with poor memory  - affecting diabetes management  - encourage patient family to be more involved in her care      Given the patient age and other morbidities, goals of therapy are to get the best control we can without hypoglycemia  - Endocrine Society recommend Goal A1C is 7.5%, fasting glucose goal of            Advised patient to follow up with PCP for routine health maintenance care.   RTC in 3 months    Dominick Rahman M.D  Endocrinology  [facility]  11/21/2023      Disclaimer: This note has been generated using voice-recognition software. There may be typographical errors that have been missed during proof-reading.  ------------------------------------------------------------    Indications for CGMs:    Patent with diagnosed:  Type 2 Diabetes Mellitus that required frequent glucose monitoring (4 + times a day and 4x/day testing), frequent adjustments to insulin regiment based on BG or CGM results. Patent requires a therapeutic CGM and is willing to use therapeutic CGM/SMBG for Necessary frequent adjustments in insulin therapy, patient demonstrated an understanding of the technology (can use and recognize alerts and alarms). I, the physician, have assessed adherence to CGM regimen and to the plan, patient will have close follow  up in clinic as indicated, every 3 months to 6 months.     Patient experiences (including but not limited to):  [x]   Discrepancies between records and A1C, at risk severe hypoglycemia episode and hospitalization  [x]   Recurrent, unexplained, severe hypoglycemic episodes  [x]   Postprandial hyperglycemia  [x]   Unexplained blood glucose excursions  []   Impaired awareness of hypoglycemia    []   Patient uses insulin pump for diabetes management: will need frequent adjustments to insulin regiment based on BG: including adjustment to  insulin pump setting, sliding scale, correction factor, additional bolusing/correction

## 2023-11-21 NOTE — ASSESSMENT & PLAN NOTE
- Diabetes is improving, while still at goal, A1C goal for this patient is 7-7.5%.   - Diabetes management is poor due to poor historian, vision impairment, noncompliant with regimen  - Complicated by CKD3, dietary indiscretion, visual impairment   - Reviewed goals of therapy are to get the best control we can without hypoglycemia  - Freestyle Yoav download review showed acceptable improvement in Time-in-range and GMI given previous history of severe hyperglycemia  - Previously tried oral regimen as well as GLP1, which failed  - since patient has been accompanied by family, glucose have been much better.  An encouraging sign    Plan:  - I encourage patient's family to continue to play active role in her glucose management. As it is working  - Once again I encouraged compliance with MDI insulin to help treat mealtime hyperglycemia.  No need for long-acting given overnight normal/lower glucose.  - Humalog 12 units t.i.d. before each meal 3 times a day, advised not skipping given severe hyperglycemia  - Clear instruction given, family advise the be present with her at the next office visit to encourage continue compliance  - Continue Freestyle Yoav, close follow-up 3 months to re-evaluate data and make further adjust  - Continue follow-up with PCP for routine health maintenance

## 2023-12-05 ENCOUNTER — HOSPITAL ENCOUNTER (OUTPATIENT)
Dept: RADIOLOGY | Facility: CLINIC | Age: 83
Discharge: HOME | End: 2023-12-05
Attending: NURSE PRACTITIONER
Payer: MEDICARE

## 2023-12-05 DIAGNOSIS — M81.0 AGE-RELATED OSTEOPOROSIS WITHOUT CURRENT PATHOLOGICAL FRACTURE: ICD-10-CM

## 2023-12-05 PROCEDURE — 77080 DXA BONE DENSITY AXIAL SKELETON 1 OR MORE SITES: ICD-10-PCS | Mod: 26,,, | Performed by: INTERNAL MEDICINE

## 2023-12-05 PROCEDURE — 77080 DXA BONE DENSITY AXIAL: CPT | Mod: 26,,, | Performed by: INTERNAL MEDICINE

## 2023-12-05 PROCEDURE — 77080 DXA BONE DENSITY AXIAL: CPT | Mod: TC,PO

## 2023-12-09 ENCOUNTER — NURSE TRIAGE (OUTPATIENT)
Dept: ADMINISTRATIVE | Facility: CLINIC | Age: 83
End: 2023-12-09
Payer: MEDICARE

## 2023-12-09 RX ORDER — INSULIN LISPRO 100 [IU]/ML
12 INJECTION, SOLUTION INTRAVENOUS; SUBCUTANEOUS 3 TIMES DAILY
Qty: 1 EACH | Refills: 0 | Status: SHIPPED | OUTPATIENT
Start: 2023-12-09 | End: 2024-12-08

## 2023-12-09 NOTE — TELEPHONE ENCOUNTER
Son calling on behalf of patient who is present. Reports she will run out of insulin tomorrow and the pharmacy is closed until Monday. I have called the provider on call in this regard and Dr. Blackburn has given me a verbal order for insulin. Called [facility] at [address] [place] [address] per patient's request. Updated son.    Reason for Disposition   [1] Prescription refill request for ESSENTIAL medicine (i.e., likelihood of harm to patient if not taken) AND [2] triager unable to refill per department policy    Protocols used: Medication Refill and Renewal Call-A-

## 2023-12-29 DIAGNOSIS — M81.0 AGE-RELATED OSTEOPOROSIS WITHOUT CURRENT PATHOLOGICAL FRACTURE: ICD-10-CM

## 2023-12-29 RX ORDER — ALENDRONATE SODIUM 70 MG/1
70 TABLET ORAL
Qty: 12 TABLET | Refills: 3 | Status: SHIPPED | OUTPATIENT
Start: 2023-12-29

## 2024-02-27 DIAGNOSIS — Z00.00 ENCOUNTER FOR MEDICARE ANNUAL WELLNESS EXAM: ICD-10-CM

## 2024-03-11 ENCOUNTER — LAB VISIT (OUTPATIENT)
Dept: LAB | Facility: HOSPITAL | Age: 84
End: 2024-03-11
Attending: HOSPITALIST
Payer: MEDICARE

## 2024-03-11 DIAGNOSIS — Z79.4 TYPE 2 DIABETES MELLITUS WITH HYPERGLYCEMIA, WITH LONG-TERM CURRENT USE OF INSULIN: ICD-10-CM

## 2024-03-11 DIAGNOSIS — E11.65 TYPE 2 DIABETES MELLITUS WITH HYPERGLYCEMIA, WITH LONG-TERM CURRENT USE OF INSULIN: ICD-10-CM

## 2024-03-11 LAB
ALBUMIN SERPL BCP-MCNC: 3.9 G/DL (ref 3.5–5.2)
ANION GAP SERPL CALC-SCNC: 7 MMOL/L (ref 8–16)
BUN SERPL-MCNC: 35 MG/DL (ref 8–23)
CALCIUM SERPL-MCNC: 9.9 MG/DL (ref 8.7–10.5)
CHLORIDE SERPL-SCNC: 107 MMOL/L (ref 95–110)
CO2 SERPL-SCNC: 27 MMOL/L (ref 23–29)
CREAT SERPL-MCNC: 1.3 MG/DL (ref 0.5–1.4)
EST. GFR  (NO RACE VARIABLE): 41 ML/MIN/1.73 M^2
GLUCOSE SERPL-MCNC: 191 MG/DL (ref 70–110)
PHOSPHATE SERPL-MCNC: 2.9 MG/DL (ref 2.7–4.5)
POTASSIUM SERPL-SCNC: 4.2 MMOL/L (ref 3.5–5.1)
SODIUM SERPL-SCNC: 141 MMOL/L (ref 136–145)

## 2024-03-11 PROCEDURE — 80069 RENAL FUNCTION PANEL: CPT | Performed by: HOSPITALIST

## 2024-03-11 PROCEDURE — 36415 COLL VENOUS BLD VENIPUNCTURE: CPT | Mod: PN | Performed by: HOSPITALIST

## 2024-03-11 PROCEDURE — 83036 HEMOGLOBIN GLYCOSYLATED A1C: CPT | Performed by: HOSPITALIST

## 2024-03-12 LAB
ESTIMATED AVG GLUCOSE: 194 MG/DL (ref 68–131)
HBA1C MFR BLD: 8.4 % (ref 4–5.6)

## 2024-03-18 ENCOUNTER — OFFICE VISIT (OUTPATIENT)
Dept: ENDOCRINOLOGY | Facility: CLINIC | Age: 84
End: 2024-03-18
Payer: MEDICARE

## 2024-03-18 VITALS
WEIGHT: 154.19 LBS | SYSTOLIC BLOOD PRESSURE: 140 MMHG | BODY MASS INDEX: 28.2 KG/M2 | DIASTOLIC BLOOD PRESSURE: 68 MMHG | HEART RATE: 71 BPM

## 2024-03-18 DIAGNOSIS — M81.0 AGE-RELATED OSTEOPOROSIS WITHOUT CURRENT PATHOLOGICAL FRACTURE: ICD-10-CM

## 2024-03-18 DIAGNOSIS — Z79.4 TYPE 2 DIABETES MELLITUS WITH HYPERGLYCEMIA, WITH LONG-TERM CURRENT USE OF INSULIN: Primary | ICD-10-CM

## 2024-03-18 DIAGNOSIS — E03.9 HYPOTHYROIDISM, UNSPECIFIED TYPE: ICD-10-CM

## 2024-03-18 DIAGNOSIS — E11.649 HYPOGLYCEMIA ASSOCIATED WITH DIABETES: ICD-10-CM

## 2024-03-18 DIAGNOSIS — E11.65 TYPE 2 DIABETES MELLITUS WITH HYPERGLYCEMIA, WITH LONG-TERM CURRENT USE OF INSULIN: Primary | ICD-10-CM

## 2024-03-18 DIAGNOSIS — E78.2 MIXED HYPERLIPIDEMIA: ICD-10-CM

## 2024-03-18 DIAGNOSIS — N18.31 CHRONIC RENAL FAILURE, STAGE 3A: ICD-10-CM

## 2024-03-18 DIAGNOSIS — R41.89 COGNITIVE DEFICITS: ICD-10-CM

## 2024-03-18 DIAGNOSIS — E55.9 VITAMIN D DEFICIENCY: ICD-10-CM

## 2024-03-18 PROCEDURE — 99214 OFFICE O/P EST MOD 30 MIN: CPT | Mod: PBBFAC | Performed by: HOSPITALIST

## 2024-03-18 PROCEDURE — 99214 OFFICE O/P EST MOD 30 MIN: CPT | Mod: 25,S$PBB,, | Performed by: HOSPITALIST

## 2024-03-18 PROCEDURE — 95251 CONT GLUC MNTR ANALYSIS I&R: CPT | Mod: ,,, | Performed by: HOSPITALIST

## 2024-03-18 PROCEDURE — 99999 PR PBB SHADOW E&M-EST. PATIENT-LVL IV: CPT | Mod: PBBFAC,,, | Performed by: HOSPITALIST

## 2024-03-18 RX ORDER — BLOOD-GLUCOSE SENSOR
EACH MISCELLANEOUS
Qty: 3 EACH | Refills: 11 | Status: SHIPPED | OUTPATIENT
Start: 2024-03-18 | End: 2024-03-18 | Stop reason: SDUPTHER

## 2024-03-18 RX ORDER — BLOOD-GLUCOSE,RECEIVER,CONT
EACH MISCELLANEOUS
Qty: 1 EACH | Refills: 0 | Status: SHIPPED | OUTPATIENT
Start: 2024-03-18 | End: 2024-03-18 | Stop reason: SDUPTHER

## 2024-03-18 RX ORDER — BLOOD-GLUCOSE,RECEIVER,CONT
EACH MISCELLANEOUS
Qty: 1 EACH | Refills: 0 | Status: SHIPPED | OUTPATIENT
Start: 2024-03-18

## 2024-03-18 RX ORDER — BLOOD-GLUCOSE SENSOR
EACH MISCELLANEOUS
Qty: 3 EACH | Refills: 11 | Status: SHIPPED | OUTPATIENT
Start: 2024-03-18

## 2024-03-18 NOTE — ASSESSMENT & PLAN NOTE
- bone density for 10/2021 completed, Stable BMD, high frax score  - On Fosamax per PCP since 7/2019  - Advised patient to continue to be active   - DXA 2023 showed decrease in fem neck  - discuss with family, and with patient.  She does not want any making changes.  Advised patient to continue Fosamax

## 2024-03-18 NOTE — PROGRESS NOTES
"Subjective:      Patient ID: Chelsea Gould is a 83 y.o. female presented to [facility] on 3/18/2024.  Chief Complaint:  Diabetes    History of Present Illness: Chelsea Gould is a 83 y.o. female here for management of type 2 diabetes  Significant past medical history:  CAD, CKD stage IIIA, visual impairment (seen Ophthalmology for injections/may need surgery), hypothyroidism, osteoporosis (on Fosamax given by PCP)    Interval history:  Patient is here for management of diabetes.  A1c recently showed improvement, decreased to 8.4%.    Patient presents with family today.  Family has been more involved in her insulin and diabetes care.  Better glycemic control.  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  Currently on Humalog 12 units before each meal 3 times a day  Occasional skipping of medication due to concern for hypoglycemia   On Freestyle Yoav, scanning  Patient quite hesitant on insulin increase due to concern of hypoglycemia.   Mental status unchanged, still confused  Levothyroxine 112 mcg daily  On Fosamax for osteoporosis      1) Diabetes Mellitus Type 2, Follow up Visit  - Patient here for an evaluation of diabetes, initially diagnosed at age  44  - Known diabetic complications: nephropathy, peripheral neuropathy and cardiovascular disease  - Cardiovascular risk factors: advanced age (older than 55 for men, 65 for women), diabetes mellitus, dyslipidemia, hypertension and obesity (BMI >= 30 kg/m2), poor historian   - Previously seen Esperanza Gonzalez NP 10/2020  - According to family friend:  Patient is , has , has daughter and son.  But according to patient she is "by herself".  Does have family support  - In previous visit she did stop insulin injection, stop Ozempic.3/26/22.Previously seen with family friend.  Interestingly did no-show Diabetes Education appointment after it was requested by her and family friend  - Patient continues to require " frequent redirection, with questionable confusion/dementia  - Patient refused any other diabetes medication:  Previously tried oral regimen as well as GLP1  - Patient poor insight to diabetes care as well as resistant to changes to regimen, will lead to further issues with hyperglycemia.  - diabetes has improved since she has been coming to see us  with her family, they have helped getting/monitoring her diabetes.    Current meds:     Humalog 12 units t.i.d. before each meal, especially if glucose greater than 150  Previous meds:              Metformin:  Due to diarrhea              Lantus 12 units in the morning   Ozempic:  Was stopped due to patient reported side effects, weight loss   Actos 30 mg daily    Home glucose checks:  On Freestyle Yoav,  a scanning regularly  CGM download and interpretation: Data was downloaded and personally reviewed by myself  CGM type: Freestyle yoav 14 day device is sensor  Number of Day CGM worn: 14 days, percentage of time CGM is active: 90%  Average blood glucose: 199, Glucose variability: 34.8%  , Glucose management indicator (GMI): 8.1%  Time in Range:  23% very high, 32% High, 45% Target Range, 0% low, 0% very low>> reviewed and discussed, goal TIR discussed with patient    CGM data downloaded review better Time-in-range and GMI, average glucose declined from 199 down to 184.  Better Time-in-range and GMI.  Now with 54% in target range.  Previous Time-in-range show severe hyperglycemia, still there but better.    Hyperglycemia worsening with meals in particular afternoon around 3:00 p.m..  With severe hyperglycemia          Diet/Exercise:               Eatin- 3 meals per day, often eat out especially at night, suspect dietary indiscretion as she often eat out at night              Snacking               Drink:  Sugar free              Current exercise: aerobics:  Dancing  Weight trend: decreasing steadily  Diabetes Education:  Yes, 19 - Liliam Chow RN, CDE  "  Diabetes Related Hospitalization:  denies  Hx of pancreatitis, hx of thyroid cancer:  Denies  Family history of diabetes:  Yes  Occupation:  Retired    Eye exam current (within one year): yes, DR: no, outside of [facility]  Reports cuts or ulcers on feet:  Denies    Statin: Taking  ACE/ARB: Not taking  Statin: Taking, ACE/ARB: Not taking    Diabetes Management Status: Reviewed   Lab Results   Component Value Date    HGBA1C 8.4 (H) 03/11/2024    HGBA1C 8.4 (H) 11/14/2023    HGBA1C 13.0 (H) 08/08/2023    HGBA1C 8.4 (H) 09/01/2022     Lab Results   Component Value Date    CPEPTIDE 3.29 04/27/2020      No results found for: "FRUCTOSAMINE"    Lab Results   Component Value Date    MICALBCREAT 9.1 05/01/2019     No results found for: "XMTUODXO34"    Screening or Prevention Patient's value Goal Complete/Controlled?   Lipid profile : 08/11/2023 Annually Yes     Dilated retinal exam : 04/26/2022 Annually Yes     Foot exam   Most Recent Foot Exam Date: Not Found Annually Yes          2) With regards to hypothyroidism  - On levothyroxine 112 mcg daily  - Taking as directed, reports compliance  - Reports compliant with medication, chronic     Lab Results   Component Value Date    TSH 1.785 08/11/2023    TSH 0.986 09/01/2022    TSH 0.704 06/29/2022    FREET4 1.35 05/26/2022    FREET4 1.11 01/26/2022    FREET4 1.05 10/26/2021       3) With regards to osteoporosis/osteopenia  - No recent falls  - On Fosamax per PCP, started on 7/2019  - Most recent bone density done review:  10/21 osteopenia with high FRAX score    DXA   12/05/2023  Lumbar spine (L1-L4): BMD is 1.166 g/cm2, T-score is 1.1, and Z-score is 3.8.  Total hip: BMD is 0.697 g/cm2, T-score is -2.0, and Z-score is 0.1.  Femoral neck: BMD is 0.578 g/cm2, T-score is -2.4, and Z-score is -0.1.  Distal 1/3 radius: Not applicable     FRAX:  25% risk of a major osteoporotic fracture in the next 10 years.  7.8% risk of hip fracture in the next 10 years.   "   Impression:  *Osteoporosis on treatment with Alendronate.  *Fracture risk is very high due to calculated 10 year risk of hip fracture >4.5% (FRAX)  *Compared with previous DXA, BMD at the lumbar spine has increased by 5.3%, and BMD at the total hip has declined by 12.2%.    10/26/2021  Lumbar spine (L1-L4): BMD is 1.107 g/cm2, T-score is 0.5, and Z-score is 3.2.  Total hip: BMD is 0.794 g/cm2, T-score is -1.2, and Z-score is 0.8  Femoral neck: BMD is 0.608 g/cm2, T-score is -2.2, and Z-score is 0.1.  Distal 1/3 radius: Not applicable     COMPARISON:  Comparison study done on 07/08/2019.  Lumbar spine: BMD 0.974 g/cm2 and T-score -0.7.  Total Hip: BMD 0.786 g/cm2 and T-score -1.3.  Distal 1/3 radius: Not applicable    FRAX: 23% risk of a major osteoporotic fracture in the next 10 years.  6.3% risk of hip fracture in the next 10 years.     Impression:  LOW BONE MASS WITH A SIGNIFICANT INCREASE OF 13.6% IN THE LUMBAR SPINE BMD COMPARED TO THE PRIOR STUDY.      Lab work reviewed  Lab Results   Component Value Date    .3 (H) 10/26/2021    EEGCQEDY74YX 40 08/11/2023    TJFGRKLM06VV 31 10/26/2021    XDZJLTFH19HI 24 (L) 10/05/2020    CALCIUM 9.9 03/11/2024    CALCIUM 10.1 11/14/2023    CALCIUM 9.7 08/11/2023    PHOS 2.9 03/11/2024    PHOS 3.0 11/14/2023    ALKPHOS 60 08/11/2023    ALKPHOS 73 05/26/2022    ALKPHOS 76 01/26/2022    EGFRNORACEVR 41 (A) 03/11/2024     Lab Results   Component Value Date    TSH 1.785 08/11/2023      Reviewed past surgical, medical, family, social history and updated as appropriate.  Review of SystemsSee above    Objective:   BP (!) 140/68   Pulse 71   Wt 69.9 kg (154 lb 3.2 oz)   BMI 28.20 kg/m²     Body mass index is 28.2 kg/m².  Vital signs reviewed    Physical Exam  Vitals and nursing note reviewed.   Constitutional:       General: She is not in acute distress.     Appearance: Normal appearance. She is well-developed. She is not toxic-appearing.   Neck:      Thyroid: No  thyromegaly.   Pulmonary:      Effort: Pulmonary effort is normal.   Musculoskeletal:         General: Normal range of motion.      Cervical back: Normal range of motion.   Skin:     Findings: No bruising.   Neurological:      Mental Status: She is alert. She is disoriented.   Psychiatric:      Comments: Chronic mood issues noted, similar to previous visit     Lab Reviewed:  See results in subjective  Lab Results   Component Value Date    HGBA1C 8.4 (H) 03/11/2024     Lab Results   Component Value Date    CHOL 165 08/11/2023    HDL 70 08/11/2023    LDLCALC 83.4 08/11/2023    TRIG 58 08/11/2023    CHOLHDL 42.4 08/11/2023     Lab Results   Component Value Date     03/11/2024    K 4.2 03/11/2024     03/11/2024    CO2 27 03/11/2024     (H) 03/11/2024    BUN 35 (H) 03/11/2024    CREATININE 1.3 03/11/2024    CALCIUM 9.9 03/11/2024    PHOS 2.9 03/11/2024    PROT 7.4 08/11/2023    ALBUMIN 3.9 03/11/2024    BILITOT 0.6 08/11/2023    ALKPHOS 60 08/11/2023    AST 18 08/11/2023    ALT 17 08/11/2023    ANIONGAP 7 (L) 03/11/2024    ESTGFRAFRICA >60.0 05/26/2022    ESTGFRAFRICA >60.0 05/26/2022    EGFRNONAA 53.7 (A) 05/26/2022    EGFRNONAA 53.7 (A) 05/26/2022    TSH 1.785 08/11/2023    .3 (H) 10/26/2021    YIWXTWUZ27JA 40 08/11/2023     Assessment     1. Type 2 diabetes mellitus with hyperglycemia, with long-term current use of insulin  DEXCOM G7 SENSOR [place]    DEXCOM G7  Misc    HEMOGLOBIN A1C    Basic Metabolic Panel    DISCONTINUED: DEXCOM G7 SENSOR [place]    DISCONTINUED: DEXCOM G7  Misc      2. Chronic renal failure, stage 3a        3. Vitamin D deficiency  Vitamin D      4. Hypoglycemia associated with diabetes        5. Hypothyroidism, unspecified type  TSH      6. Age-related osteoporosis without current pathological fracture        7. Cognitive deficits        8. Mixed hyperlipidemia          Plan     Type 2 diabetes mellitus with hyperglycemia, with long-term current use of  insulin  - Diabetes is nearing age/comorbidity goal at goal, given worsening of recent A1c, A1C goal for this patient is 7-7.5%.   - diabetes management is poor due to poor historian, vision impairment, noncompliant with regimen  - Complicated by CKD3, dietary indiscretion, visual impairment   - Reviewed goals of therapy are to get the best control we can without hypoglycemia  - Routine health maintenance/screening: reviewed  - Freestyle Yoav download review showed better Time-in-range/GMI  - Diabetic supplies/medications:  Refills given  - resistant to changes to regimen, will lead to further issues with hyperglycemia.    Plan:  - Encourage patient's family to be active in diabetes management, better Time-in-range and GMI on CGM.  Will attempt to switch patient to Dexcom G7  - Continue NovoLog to 12 units t.i.d. before each meal, especially if glucose greater than 150  - Patient refused any other diabetes medication:  Previously tried oral regimen as well as GLP1  - Continue Freestyle Yoav, follow-up in 4 months to re-evaluate data and make further adjust  - Continue follow-up with PCP for routine health maintenance    Age-related osteoporosis without current pathological fracture  - bone density for 10/2021 completed, Stable BMD, high frax score  - On Fosamax per PCP since 7/2019  - Advised patient to continue to be active   - DXA 2023 showed decrease in fem neck  - discuss with family, and with patient.  She does not want any making changes.  Advised patient to continue Fosamax    Hypothyroidism  - TSH is at goal, continue levothyroxine as prescribed  - TSH stable on recent blood work  - continue LT4 112 mcg daily  - check lab work in the future    Chronic renal failure, stage 3a  - Kidney function stable   - Renally Adjust diabetes medication  - routine monitor    Cognitive deficits  - patient with poor memory  - affecting diabetes management  - encourage patient family to be more involved in her care    Mixed  hyperlipidemia  - ASCVD Risk below: Statin: Taking  The ASCVD Risk score (Jose DK, et al., 2019) failed to calculate for the following reasons:    The 2019 ASCVD risk score is only valid for ages 40 to 79      Given the patient age and other morbidities, goals of therapy are to get the best control we can without hypoglycemia  - Endocrine Society recommend Goal A1C is 7.5%, fasting glucose goal of            Advised patient to follow up with PCP for routine health maintenance care.   RTC in 4 months    Dominick Rahman M.D  Endocrinology  [facility]  3/18/2024      Disclaimer: This note has been generated using voice-recognition software. There may be typographical errors that have been missed during proof-reading.  ------------------------------------------------------------    Indications for CGMs:    Patent with diagnosed:  Type 2 Diabetes Mellitus that required frequent glucose monitoring (4 + times a day and 4x/day testing), frequent adjustments to insulin regiment based on BG or CGM results. Patent requires a therapeutic CGM and is willing to use therapeutic CGM/SMBG for Necessary frequent adjustments in insulin therapy, patient demonstrated an understanding of the technology (can use and recognize alerts and alarms). I, the physician, have assessed adherence to CGM regimen and to the plan, patient will have close follow up in clinic as indicated, every 3 months to 6 months.     Patient experiences (including but not limited to):  [x]   Discrepancies between records and A1C, at risk severe hypoglycemia episode and hospitalization  [x]   Recurrent, unexplained, severe hypoglycemic episodes  [x]   Postprandial hyperglycemia  [x]   Unexplained blood glucose excursions  []   Impaired awareness of hypoglycemia    []   Patient uses insulin pump for diabetes management: will need frequent adjustments to insulin regiment based on BG: including adjustment to  insulin pump setting, sliding  scale, correction factor, additional bolusing/correction

## 2024-03-18 NOTE — ASSESSMENT & PLAN NOTE
- Diabetes is nearing age/comorbidity goal at goal, given worsening of recent A1c, A1C goal for this patient is 7-7.5%.   - diabetes management is poor due to poor historian, vision impairment, noncompliant with regimen  - Complicated by CKD3, dietary indiscretion, visual impairment   - Reviewed goals of therapy are to get the best control we can without hypoglycemia  - Routine health maintenance/screening: reviewed  - Freestyle Yoav download review showed better Time-in-range/GMI  - Diabetic supplies/medications:  Refills given  - resistant to changes to regimen, will lead to further issues with hyperglycemia.    Plan:  - Encourage patient's family to be active in diabetes management, better Time-in-range and GMI on CGM.  Will attempt to switch patient to Dexcom G7  - Continue NovoLog to 12 units t.i.d. before each meal, especially if glucose greater than 150  - Patient refused any other diabetes medication:  Previously tried oral regimen as well as GLP1  - Continue Freestyle Yoav, follow-up in 4 months to re-evaluate data and make further adjust  - Continue follow-up with PCP for routine health maintenance

## 2024-03-18 NOTE — PATIENT INSTRUCTIONS
A1c 8.4%.    1) HUMALOG 12 units 3 times a day before meals do not skip.  Unless not eating    Change to Dexcom G7    2) Levothyroxine 112 mcg daily for your thyroid      Goal blood sugar in the morning, before breakfast:   Glucose goal AFTER MEALS:    2 Hour after: less than 140  Before going to bed: 100-140  Do not go to bed with glucose less than 100  Have a small snack if glucose is lower than 100    Continue glucose monitor  Freestyle Yoav monitor  Remember to scan to check your blood sugar regularly 4 to 5  times a day  When you wake up and before bed and every 4-6 hours while awake (before each meals)  THE MORE YOU SCAN MORE INFORMATION I GET.      We will plan an in-clinic visit in 4 months, with labs prior to that appointment.    Contact information:  Dominick Rahman M.D  [facility] [address] [facility] [address], [address]  [place] [address]    Office:  [phone]  Fax:  [phone]     ----------------------------------------------------------

## 2024-04-10 ENCOUNTER — TELEPHONE (OUTPATIENT)
Dept: ENDOCRINOLOGY | Facility: CLINIC | Age: 84
End: 2024-04-10
Payer: MEDICARE

## 2024-04-10 DIAGNOSIS — Z79.4 UNCONTROLLED TYPE 2 DIABETES MELLITUS WITH HYPERGLYCEMIA, WITH LONG-TERM CURRENT USE OF INSULIN: ICD-10-CM

## 2024-04-10 DIAGNOSIS — E11.65 UNCONTROLLED TYPE 2 DIABETES MELLITUS WITH HYPERGLYCEMIA, WITH LONG-TERM CURRENT USE OF INSULIN: ICD-10-CM

## 2024-04-10 RX ORDER — FLASH GLUCOSE SENSOR
KIT MISCELLANEOUS
Qty: 2 KIT | Refills: 5 | Status: SHIPPED | OUTPATIENT
Start: 2024-04-10 | End: 2024-06-05 | Stop reason: SDUPTHER

## 2024-04-10 NOTE — TELEPHONE ENCOUNTER
Pt daughter stated the pt would rather stay on the hair cgm since she is comfortable with it. Asked would they still want to do the dexcom as it would be covered through a supply company, daughter says they will think about it. Also explained to daughter the reason was pt wasn't scanning her hair enough daughter will make sure she scans at least every 6 hours per Dr Rahman. Understanding verbalized.

## 2024-04-10 NOTE — TELEPHONE ENCOUNTER
Spoke to daughter Data and she informed me that the pt was supposed to be upgraded to the dexcom. They spoke to the pharmacy and was told that the dexcom isn't covered by pt insurance. The hair isn't either but the pharmacy was using a coupon. Data is requesting a Rx for the hair sensors to be sent to [facility] pharmacy. Message will be sent to provider, understanding verbalized.

## 2024-04-10 NOTE — TELEPHONE ENCOUNTER
----- Message from Debbie Fan sent at 4/10/2024  3:41 PM CDT -----  Regarding: Edilma morales [phone]  .Type: Patient Call Back    Who called: Edilma morales    What is the request in detail: Edilma is requesting a new prescription for sensors to take medication.     [facility] [place] [facility] - [address]  [address]  [place] [address]  Phone: [phone] Fax: [phone]    Can the clinic reply by [facility]?no    Would the patient rather a call back or a response via [facility]?call back    Best call back number [phone]           no

## 2024-04-10 NOTE — TELEPHONE ENCOUNTER
----- Message from Yamilet Luz sent at 4/10/2024  8:20 AM CDT -----  .Type: Patient Call Back    Who called: Data - daughter     What is the request in detail: Have questions about meter     Can the clinic reply by [facility]? No     Would the patient rather a call back or a response via [facility]? Call Back     Best call back number: [phone]    Additional Information:

## 2024-06-05 ENCOUNTER — NURSE TRIAGE (OUTPATIENT)
Dept: ADMINISTRATIVE | Facility: CLINIC | Age: 84
End: 2024-06-05
Payer: MEDICARE

## 2024-06-05 DIAGNOSIS — E11.65 UNCONTROLLED TYPE 2 DIABETES MELLITUS WITH HYPERGLYCEMIA, WITH LONG-TERM CURRENT USE OF INSULIN: ICD-10-CM

## 2024-06-05 DIAGNOSIS — Z79.4 UNCONTROLLED TYPE 2 DIABETES MELLITUS WITH HYPERGLYCEMIA, WITH LONG-TERM CURRENT USE OF INSULIN: ICD-10-CM

## 2024-06-05 RX ORDER — FLASH GLUCOSE SENSOR
KIT MISCELLANEOUS
Qty: 2 KIT | Refills: 5 | Status: SHIPPED | OUTPATIENT
Start: 2024-06-05

## 2024-06-05 RX ORDER — FLASH GLUCOSE SENSOR
KIT MISCELLANEOUS
Qty: 2 KIT | Refills: 5 | Status: SHIPPED | OUTPATIENT
Start: 2024-06-05 | End: 2024-06-05

## 2024-06-05 NOTE — TELEPHONE ENCOUNTER
Reason for Disposition   [1] Prescription refill request for ESSENTIAL medicine (i.e., likelihood of harm to patient if not taken) AND [2] triager unable to refill per department policy    Protocols used: Medication Refill and Renewal Call-A-AH  Pt's daughter called in because she was attempting to apply pt's Freestyle Yoav 14 day sensor and it malfunctioned. Pt was only able to have 1 from pharmacy today because they are out of stock. Pt requesting a new prescription sent to [facility]. Reached out to OCP, Dr Rajan Bustamante who gave verbal order to call in new prescription to [facility] for Free style yoav 14 day sensor. Called [facility] at [phone]. Prescription will be ready today. Updated Pt's daughter and instructed her to call back with any additional questions or concerns.Pt's daughter verbalized understanding.   *addendum-pt's daughter called back to verify prescription was ready. Contacted [facility] who stated it will be ready within 1 hour. Updated pt's daughter.

## 2024-06-06 NOTE — TELEPHONE ENCOUNTER
Pt's daughter, Data, reports she previously spoke to another triage nurse that was to call in pt's medicine, but pharmacy is denying having received anything as of yet. Reached out to other triage nurse and she plans to call daughter back as soon as she is off of a current call. Daughter informed and encouraged to call back with any worsening symptoms or questions. She verbalized understanding.    Reason for Disposition   [1] Follow-up call to recent contact AND [2] information only call, no triage required    Protocols used: Information Only Call - No Triage-A-

## 2024-07-15 ENCOUNTER — LAB VISIT (OUTPATIENT)
Dept: LAB | Facility: HOSPITAL | Age: 84
End: 2024-07-15
Attending: HOSPITALIST
Payer: MEDICARE

## 2024-07-15 DIAGNOSIS — E11.65 TYPE 2 DIABETES MELLITUS WITH HYPERGLYCEMIA, WITH LONG-TERM CURRENT USE OF INSULIN: ICD-10-CM

## 2024-07-15 DIAGNOSIS — E03.9 HYPOTHYROIDISM, UNSPECIFIED TYPE: ICD-10-CM

## 2024-07-15 DIAGNOSIS — Z79.4 TYPE 2 DIABETES MELLITUS WITH HYPERGLYCEMIA, WITH LONG-TERM CURRENT USE OF INSULIN: ICD-10-CM

## 2024-07-15 DIAGNOSIS — E55.9 VITAMIN D DEFICIENCY: ICD-10-CM

## 2024-07-15 LAB
25(OH)D3+25(OH)D2 SERPL-MCNC: 34 NG/ML (ref 30–96)
ANION GAP SERPL CALC-SCNC: 10 MMOL/L (ref 8–16)
BUN SERPL-MCNC: 35 MG/DL (ref 8–23)
CALCIUM SERPL-MCNC: 10.1 MG/DL (ref 8.7–10.5)
CHLORIDE SERPL-SCNC: 106 MMOL/L (ref 95–110)
CO2 SERPL-SCNC: 23 MMOL/L (ref 23–29)
CREAT SERPL-MCNC: 1.4 MG/DL (ref 0.5–1.4)
EST. GFR  (NO RACE VARIABLE): 37.8 ML/MIN/1.73 M^2
ESTIMATED AVG GLUCOSE: 194 MG/DL (ref 68–131)
GLUCOSE SERPL-MCNC: 193 MG/DL (ref 70–110)
HBA1C MFR BLD: 8.4 % (ref 4–5.6)
POTASSIUM SERPL-SCNC: 4.6 MMOL/L (ref 3.5–5.1)
SODIUM SERPL-SCNC: 139 MMOL/L (ref 136–145)
TSH SERPL DL<=0.005 MIU/L-ACNC: 3.69 UIU/ML (ref 0.4–4)

## 2024-07-15 PROCEDURE — 83036 HEMOGLOBIN GLYCOSYLATED A1C: CPT | Performed by: HOSPITALIST

## 2024-07-15 PROCEDURE — 84443 ASSAY THYROID STIM HORMONE: CPT | Performed by: HOSPITALIST

## 2024-07-15 PROCEDURE — 82306 VITAMIN D 25 HYDROXY: CPT | Performed by: HOSPITALIST

## 2024-07-15 PROCEDURE — 36415 COLL VENOUS BLD VENIPUNCTURE: CPT | Mod: PO | Performed by: HOSPITALIST

## 2024-07-15 PROCEDURE — 80048 BASIC METABOLIC PNL TOTAL CA: CPT | Performed by: HOSPITALIST

## 2024-07-22 ENCOUNTER — OFFICE VISIT (OUTPATIENT)
Dept: ENDOCRINOLOGY | Facility: CLINIC | Age: 84
End: 2024-07-22
Payer: MEDICARE

## 2024-07-22 VITALS
BODY MASS INDEX: 28.42 KG/M2 | HEART RATE: 71 BPM | DIASTOLIC BLOOD PRESSURE: 70 MMHG | SYSTOLIC BLOOD PRESSURE: 132 MMHG | WEIGHT: 155.38 LBS

## 2024-07-22 DIAGNOSIS — N18.31 CHRONIC RENAL FAILURE, STAGE 3A: ICD-10-CM

## 2024-07-22 DIAGNOSIS — E78.2 MIXED HYPERLIPIDEMIA: ICD-10-CM

## 2024-07-22 DIAGNOSIS — Z79.4 UNCONTROLLED TYPE 2 DIABETES MELLITUS WITH HYPERGLYCEMIA, WITH LONG-TERM CURRENT USE OF INSULIN: ICD-10-CM

## 2024-07-22 DIAGNOSIS — M81.0 AGE-RELATED OSTEOPOROSIS WITHOUT CURRENT PATHOLOGICAL FRACTURE: ICD-10-CM

## 2024-07-22 DIAGNOSIS — E11.39 VISUAL IMPAIRMENT DUE TO DIABETES MELLITUS: ICD-10-CM

## 2024-07-22 DIAGNOSIS — E11.65 TYPE 2 DIABETES MELLITUS WITH HYPERGLYCEMIA, WITH LONG-TERM CURRENT USE OF INSULIN: Primary | ICD-10-CM

## 2024-07-22 DIAGNOSIS — E03.9 HYPOTHYROIDISM, UNSPECIFIED TYPE: ICD-10-CM

## 2024-07-22 DIAGNOSIS — E11.65 UNCONTROLLED TYPE 2 DIABETES MELLITUS WITH HYPERGLYCEMIA, WITH LONG-TERM CURRENT USE OF INSULIN: ICD-10-CM

## 2024-07-22 DIAGNOSIS — Z79.4 TYPE 2 DIABETES MELLITUS WITH HYPERGLYCEMIA, WITH LONG-TERM CURRENT USE OF INSULIN: Primary | ICD-10-CM

## 2024-07-22 DIAGNOSIS — H54.7 VISUAL IMPAIRMENT DUE TO DIABETES MELLITUS: ICD-10-CM

## 2024-07-22 PROCEDURE — 99214 OFFICE O/P EST MOD 30 MIN: CPT | Mod: S$PBB,,, | Performed by: HOSPITALIST

## 2024-07-22 PROCEDURE — 99214 OFFICE O/P EST MOD 30 MIN: CPT | Mod: PBBFAC | Performed by: HOSPITALIST

## 2024-07-22 PROCEDURE — 99999 PR PBB SHADOW E&M-EST. PATIENT-LVL IV: CPT | Mod: PBBFAC,,, | Performed by: HOSPITALIST

## 2024-07-22 PROCEDURE — 95251 CONT GLUC MNTR ANALYSIS I&R: CPT | Mod: ,,, | Performed by: HOSPITALIST

## 2024-07-22 RX ORDER — PEN NEEDLE, DIABETIC 30 GX3/16"
NEEDLE, DISPOSABLE MISCELLANEOUS
Qty: 200 EACH | Refills: 6 | Status: SHIPPED | OUTPATIENT
Start: 2024-07-22

## 2024-07-22 RX ORDER — INSULIN LISPRO 100 [IU]/ML
12 INJECTION, SOLUTION INTRAVENOUS; SUBCUTANEOUS
Qty: 15 ML | Refills: 6 | Status: SHIPPED | OUTPATIENT
Start: 2024-07-22 | End: 2025-07-22

## 2024-07-22 RX ORDER — FLASH GLUCOSE SENSOR
KIT MISCELLANEOUS
Qty: 2 KIT | Refills: 11 | Status: SHIPPED | OUTPATIENT
Start: 2024-07-22

## 2024-07-22 NOTE — PATIENT INSTRUCTIONS
A1c 8.4%.    1) HUMALOG 12 units 3 times a day before meals do not skip    Change to Dexcom G7    2) Levothyroxine 112 mcg daily for your thyroid      Goal blood sugar in the morning, before breakfast:   Glucose goal AFTER MEALS:    2 Hour after: less than 140  Before going to bed: 100-140  Do not go to bed with glucose less than 100  Have a small snack if glucose is lower than 100    Continue glucose monitor  Freestyle Yoav monitor  Remember to scan to check your blood sugar regularly 4 to 5  times a day  When you wake up and before bed and every 4-6 hours while awake (before each meals)  THE MORE YOU SCAN MORE INFORMATION I GET.      We will plan an in-clinic visit in 4 months, with labs prior to that appointment.    Contact information:  Dominick Rahman M.D  [facility] [address] [facility], [address]  [place] [address]    Office:  [phone]  Fax:  [phone]     ----------------------------------------------------------

## 2024-07-22 NOTE — PROGRESS NOTES
"Subjective:      Patient ID: Chelsea Gould is a 83 y.o. female presented to [facility] on 7/22/2024.  Chief Complaint:  Diabetes    History of Present Illness: Chelsea Gould is a 83 y.o. female here for management of type 2 diabetes  Significant past medical history:  CAD, CKD stage IIIA, visual impairment (seen Ophthalmology for injections/may need surgery), hypothyroidism, osteoporosis (on Fosamax given by PCP)    Interval history:  Patient is here for management of diabetes.  A1c stable at 8.4%.    Patient presents with family today.  Family has been more involved in her insulin and diabetes care.  Daughter has help remind her to give her insulin injection with meals leading to better glycemic control.  Does miss insulin when eating out leading to hyperglycemia.  Per daughter and per patient no problem with Humalog 12 units injection before each meal.  In the past patient has skip insulin due to concern for hypoglycemia.  Freestyle Yoav does not show any hypoglycemia at this time   Daughter reports that patient is not interested in Dexcom due to not having a compatible cell phone.  Mental status unchanged, needs frequent redirection.  Thyroid:  Levothyroxine 112 mcg daily  Osteoporosis:  On Fosamax for osteoporosis, no recent falls or fracture.        1) Diabetes Mellitus Type 2, Follow up Visit  - Patient here for an evaluation of diabetes, initially diagnosed at age  44  - Known diabetic complications: nephropathy, peripheral neuropathy and cardiovascular disease  - Cardiovascular risk factors: advanced age (older than 55 for men, 65 for women), diabetes mellitus, dyslipidemia, hypertension and obesity (BMI >= 30 kg/m2), poor historian   - Previously seen Esperanza Gonzalez NP 10/2020  - According to family friend:  Patient is , has , has daughter and son.  But according to patient she is "by herself".  Does have family support  - In previous visit she did stop insulin injection, " stop Ozempic.3/26/22.Previously seen with family friend.  Interestingly did no-show Diabetes Education appointment after it was requested by her and family friend  - Patient continues to require frequent redirection, with questionable confusion/dementia  - Patient refused any other diabetes medication:  Previously tried oral regimen as well as GLP1  - Patient poor insight to diabetes care as well as resistant to changes to regimen, will lead to further issues with hyperglycemia.  - diabetes has improved since she has been coming to see us in  2023 with her family, they have helped getting/monitoring her diabetes.  - diabetes regimen difficult to manage due to patient's dietary indiscretion, miss insulin doses suspected given postprandial hyperglycemia regularly    Current meds:     Humalog 12 units t.i.d. before each meals  Previous meds:              Metformin:  Due to diarrhea              Lantus 12 units in the morning   Ozempic:  Was stopped due to patient reported side effects, weight loss   Actos 30 mg daily    Home glucose checks:  On Freestyle Yoav,  a scanning regularly  CGM download and interpretation: Data was downloaded and personally reviewed by myself  CGM type: Freestyle yoav 14 day   Number of Day CGM worn: 14 days, percentage of time CGM is active: 83%  Average blood glucose: 198, Glucose variability: 31.7%  , Glucose management indicator (GMI): 8.0%  Time in Range: 20% very high, 36% High, 44% Target Range, 0% low, 0% very low>> reviewed and discussed, goal TIR discussed with patient    CGM data downloaded review still with the same Time-in-range and GMI, average glucose mildly elevated when compared to previous, now at 198, Time-in-range: Very high 20%, high:  36%, target range: Decrease to 44%.  Patient with postprandial hyperglycemia noted with supper/dinner late night.  Does have occasional after breakfast hyperglycemia.  Depending on the food intake.    Of note no more episode of  "postprandial hypoglycemia or nocturnal hypoglycemia.    When patient gets her insulin and eats correctly glucose very well-controlled, see , , ,         Diet/Exercise:               Eatin- 3 meals per day, often eat out especially at night, suspect dietary indiscretion as she often eat out at night              Snacking               Drink:  Sugar free              Current exercise: aerobics:  Dancing  Weight trend: decreasing steadily  Diabetes Education:  Yes, 19 - Liliam Chow RN, CDE   Diabetes Related Hospitalization:  denies  Hx of pancreatitis, hx of thyroid cancer:  Denies  Family history of diabetes:  Yes  Occupation:  Retired    Eye exam current (within one year): yes, DR: no, outside of [facility]  Reports cuts or ulcers on feet:  Denies    Statin: Taking  ACE/ARB: Not taking  Statin: Taking, ACE/ARB: Not taking    Diabetes Management Status: Reviewed   Lab Results   Component Value Date    HGBA1C 8.4 (H) 07/15/2024    HGBA1C 8.4 (H) 2024    HGBA1C 8.4 (H) 2023    HGBA1C 13.0 (H) 2023     Lab Results   Component Value Date    CPEPTIDE 3.29 2020      No results found for: "FRUCTOSAMINE"    Lab Results   Component Value Date    MICALBCREAT 9.1 2019     No results found for: "TXDHADSS66"    Screening or Prevention Patient's value Goal Complete/Controlled?   Lipid profile : 2023 Annually Yes     Dilated retinal exam : 2022 Annually Yes     Foot exam   Most Recent Foot Exam Date: Not Found Annually Yes          2) With regards to hypothyroidism  - On levothyroxine 112 mcg daily  - Taking as directed, reports compliance  - Reports compliant with medication, chronic     Lab Results   Component Value Date    TSH 3.693 07/15/2024    TSH 1.785 2023    TSH 0.986 2022    FREET4 1.35 2022    FREET4 1.11 2022    FREET4 1.05 10/26/2021       3) With regards to osteoporosis/osteopenia  - No recent falls  - " On Fosamax per PCP, started on 7/2019  - Most recent bone density done review:  10/21 osteopenia with high FRAX score    DXA   12/05/2023  Lumbar spine (L1-L4): BMD is 1.166 g/cm2, T-score is 1.1, and Z-score is 3.8.  Total hip: BMD is 0.697 g/cm2, T-score is -2.0, and Z-score is 0.1.  Femoral neck: BMD is 0.578 g/cm2, T-score is -2.4, and Z-score is -0.1.  Distal 1/3 radius: Not applicable     FRAX:  25% risk of a major osteoporotic fracture in the next 10 years.  7.8% risk of hip fracture in the next 10 years.     Impression:  *Osteoporosis on treatment with Alendronate.  *Fracture risk is very high due to calculated 10 year risk of hip fracture >4.5% (FRAX)  *Compared with previous DXA, BMD at the lumbar spine has increased by 5.3%, and BMD at the total hip has declined by 12.2%.    10/26/2021  Lumbar spine (L1-L4): BMD is 1.107 g/cm2, T-score is 0.5, and Z-score is 3.2.  Total hip: BMD is 0.794 g/cm2, T-score is -1.2, and Z-score is 0.8  Femoral neck: BMD is 0.608 g/cm2, T-score is -2.2, and Z-score is 0.1.  Distal 1/3 radius: Not applicable     COMPARISON:  Comparison study done on 07/08/2019.  Lumbar spine: BMD 0.974 g/cm2 and T-score -0.7.  Total Hip: BMD 0.786 g/cm2 and T-score -1.3.  Distal 1/3 radius: Not applicable    FRAX: 23% risk of a major osteoporotic fracture in the next 10 years.  6.3% risk of hip fracture in the next 10 years.     Impression:  LOW BONE MASS WITH A SIGNIFICANT INCREASE OF 13.6% IN THE LUMBAR SPINE BMD COMPARED TO THE PRIOR STUDY.      Lab work reviewed  Lab Results   Component Value Date    .3 (H) 10/26/2021    QRZGSXCO34RC 34 07/15/2024    QNNSEBPB97ZS 40 08/11/2023    DJZRNHVL82SQ 31 10/26/2021    CALCIUM 10.1 07/15/2024    CALCIUM 9.9 03/11/2024    CALCIUM 10.1 11/14/2023    PHOS 2.9 03/11/2024    PHOS 3.0 11/14/2023    ALKPHOS 60 08/11/2023    ALKPHOS 73 05/26/2022    ALKPHOS 76 01/26/2022    EGFRNORACEVR 37.8 (A) 07/15/2024       Reviewed past surgical, medical, family,  "social history and updated as appropriate.  Review of SystemsSee above    Objective:   /70   Pulse 71   Wt 70.5 kg (155 lb 6.4 oz)   BMI 28.42 kg/m²     Body mass index is 28.42 kg/m².  Vital signs reviewed    Physical Exam  Vitals and nursing note reviewed.   Constitutional:       General: She is not in acute distress.     Appearance: Normal appearance. She is well-developed. She is not toxic-appearing.   Neck:      Thyroid: No thyromegaly.   Pulmonary:      Effort: Pulmonary effort is normal.   Musculoskeletal:         General: Normal range of motion.      Cervical back: Normal range of motion.   Skin:     Findings: No bruising.   Neurological:      Mental Status: She is alert. She is disoriented.   Psychiatric:      Comments: Chronic mood issues noted, similar to previous visit     Lab Reviewed:  See results in subjective  Lab Results   Component Value Date    HGBA1C 8.4 (H) 07/15/2024     Lab Results   Component Value Date    CHOL 165 08/11/2023    HDL 70 08/11/2023    LDLCALC 83.4 08/11/2023    TRIG 58 08/11/2023    CHOLHDL 42.4 08/11/2023     Lab Results   Component Value Date     07/15/2024    K 4.6 07/15/2024     07/15/2024    CO2 23 07/15/2024     (H) 07/15/2024    BUN 35 (H) 07/15/2024    CREATININE 1.4 07/15/2024    CALCIUM 10.1 07/15/2024    PHOS 2.9 03/11/2024    PROT 7.4 08/11/2023    ALBUMIN 3.9 03/11/2024    BILITOT 0.6 08/11/2023    ALKPHOS 60 08/11/2023    AST 18 08/11/2023    ALT 17 08/11/2023    ANIONGAP 10 07/15/2024    EGFRNORACEVR 37.8 (A) 07/15/2024    TSH 3.693 07/15/2024    .3 (H) 10/26/2021    IIXQSNRV15WB 34 07/15/2024     Assessment     1. Type 2 diabetes mellitus with hyperglycemia, with long-term current use of insulin  HUMALOG KWIKPEN INSULIN 100 unit/mL pen    pen needle, diabetic (BD ULTRA-FINE MINI PEN NEEDLE) 31 gauge x 3/16" Ndle    FREESTYLE OSMAN 14 DAY SENSOR Kit    Basic Metabolic Panel    Hemoglobin A1C      2. Age-related osteoporosis " without current pathological fracture        3. Hypothyroidism, unspecified type        4. Uncontrolled type 2 diabetes mellitus with hyperglycemia, with long-term current use of insulin        5. 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 Chronic renal failure, stage 3a        7. Mixed hyperlipidemia            Plan     Type 2 diabetes mellitus with hyperglycemia, with long-term current use of insulin  - Diabetes is nearing age/comorbidity goal at goal, given worsening of recent A1c, A1C goal for this patient is 7.5%.   - diabetes management is poor due to poor historian, vision impairment, previously noncompliant with diabetes regimen.  Require frequent encouragement and family involved for better compliance.  - Complicated by CKD3, dietary indiscretion, visual impairment   - Reviewed goals of therapy are to get the best control we can without hypoglycemia  - Routine health maintenance/screening: reviewed  - Freestyle Yoav download review showed stable: Time-in-range and GMI, 0% hypoglycemia  - Diabetic supplies/medications:  Refills given  - resistant to changes to regimen, will lead to further issues with hyperglycemia.    Plan:  - Encourage patient's family to be continue to be involved in her diabetes care, encourage patient to give insulin injection before all meals.  To treat postprandial hyperglycemia.  Daughter verbalized understanding.  - Continue NovoLog to 12 units t.i.d. before each meal  - Patient refused any other diabetes medication  - Continue Freestyle Yoav as prefer by family, follow-up in 4 months to re-evaluate data and make further adjust  - Continue follow-up with PCP for routine health maintenance    Age-related osteoporosis without current pathological fracture  - Bone density for 10/2021 completed, Stable BMD, high frax score  - On Fosamax per PCP since 7/2019  - Advised patient to continue to be active   - DXA 2023 showed decrease in fem neck  - Discuss with family, and with  patient.  She does not want any making changes.  Advised patient to continue Fosamax    Hypothyroidism  - TSH is at goal, continue levothyroxine as prescribed  - TSH stable on recent blood work  - continue LT4 112 mcg daily  - check lab work in the future    Visual impairment due to diabetes mellitus  - poor vision, continue follow-up with Ophthalmology  - continues to benefit from CGM given prevention of hypoglycemia    Chronic renal failure, stage 3a  - Kidney function stable   - Renally Adjust diabetes medication  - routine monitor    Mixed hyperlipidemia  - ASCVD Risk below: Statin: Taking  The ASCVD Risk score (Jose SCHMIDT, et al., 2019) failed to calculate for the following reasons:    The 2019 ASCVD risk score is only valid for ages 40 to 79        Given the patient age and other morbidities, goals of therapy are to get the best control we can without hypoglycemia  - Endocrine Society recommend Goal A1C is <8% given chronic illnesses, cognitive impairment  - fasting glucose goal of 100-150  - bedtime glucose 150-180           Advised patient to follow up with PCP for routine health maintenance care.   RTC in 4 months    Dominick Rahman M.D  Endocrinology  [facility]  7/22/2024      Disclaimer: This note has been generated using voice-recognition software. There may be typographical errors that have been missed during proof-reading.  ------------------------------------------------------------    Indications for CGMs:    Patent with diagnosed:  Type 2 Diabetes Mellitus that required frequent glucose monitoring (4 + times a day and 4x/day testing), frequent adjustments to insulin regiment based on BG or CGM results. Patent requires a therapeutic CGM and is willing to use therapeutic CGM/SMBG for Necessary frequent adjustments in insulin therapy, patient demonstrated an understanding of the technology (can use and recognize alerts and alarms). I, the physician, have assessed adherence to CGM  regimen and to the plan, patient will have close follow up in clinic as indicated, every 3 months to 6 months.     Patient experiences (including but not limited to):  [x]   Discrepancies between records and A1C, at risk severe hypoglycemia episode and hospitalization  [x]   Recurrent, unexplained, severe hypoglycemic episodes  [x]   Postprandial hyperglycemia  [x]   Unexplained blood glucose excursions  []   Impaired awareness of hypoglycemia    []   Patient uses insulin pump for diabetes management: will need frequent adjustments to insulin regiment based on BG: including adjustment to  insulin pump setting, sliding scale, correction factor, additional bolusing/correction

## 2024-07-22 NOTE — ASSESSMENT & PLAN NOTE
- Diabetes is nearing age/comorbidity goal at goal, given worsening of recent A1c, A1C goal for this patient is 7.5%.   - diabetes management is poor due to poor historian, vision impairment, previously noncompliant with diabetes regimen.  Require frequent encouragement and family involved for better compliance.  - Complicated by CKD3, dietary indiscretion, visual impairment   - Reviewed goals of therapy are to get the best control we can without hypoglycemia  - Routine health maintenance/screening: reviewed  - Freestyle Yoav download review showed stable: Time-in-range and GMI, 0% hypoglycemia  - Diabetic supplies/medications:  Refills given  - resistant to changes to regimen, will lead to further issues with hyperglycemia.    Plan:  - Encourage patient's family to be continue to be involved in her diabetes care, encourage patient to give insulin injection before all meals.  To treat postprandial hyperglycemia.  Daughter verbalized understanding.  - Continue NovoLog to 12 units t.i.d. before each meal  - Patient refused any other diabetes medication  - Continue Freestyle Yoav as prefer by family, follow-up in 4 months to re-evaluate data and make further adjust  - Continue follow-up with PCP for routine health maintenance

## 2024-08-13 DIAGNOSIS — E78.5 HYPERLIPIDEMIA ASSOCIATED WITH TYPE 2 DIABETES MELLITUS: ICD-10-CM

## 2024-08-13 DIAGNOSIS — E11.69 HYPERLIPIDEMIA ASSOCIATED WITH TYPE 2 DIABETES MELLITUS: ICD-10-CM

## 2024-08-13 RX ORDER — ROSUVASTATIN CALCIUM 10 MG/1
10 TABLET, COATED ORAL
Qty: 30 TABLET | Refills: 0 | Status: SHIPPED | OUTPATIENT
Start: 2024-08-13

## 2024-08-13 NOTE — TELEPHONE ENCOUNTER
Spoke w/ PT to schedule annual visit. PT asked that I schedule with her daughter, Data. Attempted contact with daughter. No answer, left voicemail on cell. Attempted call to daughter's home number. No answer, no availability to leave a message.

## 2024-08-13 NOTE — TELEPHONE ENCOUNTER
Last Office Visit Info:   The patient's last visit with Vivian Monge, ZOFIA 8/11/2023.  Last lipid profile 8/11/2023.

## 2024-09-05 ENCOUNTER — OFFICE VISIT (OUTPATIENT)
Dept: FAMILY MEDICINE | Facility: CLINIC | Age: 84
End: 2024-09-05
Payer: MEDICARE

## 2024-09-05 VITALS
OXYGEN SATURATION: 97 % | HEIGHT: 62 IN | BODY MASS INDEX: 29.08 KG/M2 | SYSTOLIC BLOOD PRESSURE: 130 MMHG | WEIGHT: 158.06 LBS | HEART RATE: 77 BPM | DIASTOLIC BLOOD PRESSURE: 64 MMHG | TEMPERATURE: 98 F

## 2024-09-05 DIAGNOSIS — E03.9 HYPOTHYROIDISM, UNSPECIFIED TYPE: ICD-10-CM

## 2024-09-05 DIAGNOSIS — E78.5 HYPERLIPIDEMIA ASSOCIATED WITH TYPE 2 DIABETES MELLITUS: ICD-10-CM

## 2024-09-05 DIAGNOSIS — E11.69 HYPERLIPIDEMIA ASSOCIATED WITH TYPE 2 DIABETES MELLITUS: ICD-10-CM

## 2024-09-05 DIAGNOSIS — Z00.00 ANNUAL PHYSICAL EXAM: Primary | ICD-10-CM

## 2024-09-05 DIAGNOSIS — M81.0 AGE-RELATED OSTEOPOROSIS WITHOUT CURRENT PATHOLOGICAL FRACTURE: ICD-10-CM

## 2024-09-05 PROBLEM — E78.2 MIXED HYPERLIPIDEMIA: Status: RESOLVED | Noted: 2021-07-29 | Resolved: 2024-09-05

## 2024-09-05 PROCEDURE — 99999 PR PBB SHADOW E&M-EST. PATIENT-LVL IV: CPT | Mod: PBBFAC,,, | Performed by: INTERNAL MEDICINE

## 2024-09-05 PROCEDURE — 99397 PER PM REEVAL EST PAT 65+ YR: CPT | Mod: GZ,S$PBB,, | Performed by: INTERNAL MEDICINE

## 2024-09-05 PROCEDURE — 99214 OFFICE O/P EST MOD 30 MIN: CPT | Mod: PBBFAC,PO | Performed by: INTERNAL MEDICINE

## 2024-09-05 RX ORDER — ALENDRONATE SODIUM 70 MG/1
70 TABLET ORAL
Qty: 12 TABLET | Refills: 3 | Status: SHIPPED | OUTPATIENT
Start: 2024-09-05

## 2024-09-05 RX ORDER — ROSUVASTATIN CALCIUM 10 MG/1
10 TABLET, COATED ORAL DAILY
Qty: 90 TABLET | Refills: 3 | Status: SHIPPED | OUTPATIENT
Start: 2024-09-05 | End: 2024-09-05 | Stop reason: SDUPTHER

## 2024-09-05 RX ORDER — LEVOTHYROXINE SODIUM 112 UG/1
112 TABLET ORAL EVERY MORNING
Qty: 90 TABLET | Refills: 3 | Status: SHIPPED | OUTPATIENT
Start: 2024-09-05

## 2024-09-05 RX ORDER — ROSUVASTATIN CALCIUM 10 MG/1
10 TABLET, COATED ORAL DAILY
Qty: 90 TABLET | Refills: 3 | Status: SHIPPED | OUTPATIENT
Start: 2024-09-05

## 2024-09-05 NOTE — TELEPHONE ENCOUNTER
Outpatient Medication Detail     Disp Refills Start End    rosuvastatin (CRESTOR) 10 MG tablet 90 tablet 3 9/5/2024 --    Sig - Route: Take 1 tablet (10 mg total) by mouth once daily. - Oral    Sent to pharmacy as: rosuvastatin (CRESTOR) 10 MG tablet    E-Prescribing Status: Transmission to pharmacy failed (9/5/2024 10:14 AM CDT)

## 2024-09-05 NOTE — TELEPHONE ENCOUNTER
Care Due:                  Date            Visit Type   Department     Provider  --------------------------------------------------------------------------------                                [facility] FAMILY                              PRIMARY      MEDICINE/INTERN  Last Visit: 09-      CARE ([facility])   AL ALFREDO Gupta  Next Visit: None Scheduled  None         None Found                                                            Last  Test          Frequency    Reason                     Performed    Due Date  --------------------------------------------------------------------------------    CMP.........  12 months..  rosuvastatin.............  08- 08-    Lipid Panel.  12 months..  rosuvastatin.............  08- 08-    Mg Level....  12 months..  alendronate..............  Not Found    Overdue    [employer] Embedded Care Due Messages. Reference number: [id number].   9/05/2024 10:24:27 AM CDT

## 2024-09-05 NOTE — PROGRESS NOTES
SUBJECTIVE     Chief Complaint   Patient presents with    Annual Exam       HPI  Chelsea Gould is a 84 y.o. female with multiple medical diagnoses as listed in the medical history and problem list that presents for annual exam. Pt has been doing well since her last visit. She has a good appetite and eats well. She does exercise by walking. She sleeps for ~8 hours nightly. Pt does take OTC supplements, which is a MVI. She does not have any current stressors. Pt is UTD on age appropriate CA screening.    PAST MEDICAL HISTORY:  Past Medical History:   Diagnosis Date    Cervical cancer, FIGO stage IA1 12/17/2019    Concern about heart attack without diagnosis     silent heart attack    Diabetes mellitus     Eye problems     Urticaria        PAST SURGICAL HISTORY:  Past Surgical History:   Procedure Laterality Date    COLD KNIFE CONIZATION OF CERVIX N/A 11/26/2019    Procedure: CONE BIOPSY, CERVIX, USING COLD KNIFE;  Surgeon: Fani Vieira MD;  Location: [facility];  Service: OB/GYN;  Laterality: N/A;    EYE SURGERY      HYSTEROSCOPY WITH DILATION AND CURETTAGE OF UTERUS N/A 10/25/2019    Procedure: HYSTEROSCOPY, WITH DILATION AND CURETTAGE OF UTERUS;  Surgeon: Marcia Canseco MD;  Location: [facility];  Service: OB/GYN;  Laterality: N/A;    JOINT REPLACEMENT Right     hip    ROBOT-ASSISTED LAPAROSCOPIC ABDOMINAL HYSTERECTOMY USING DA DENIS XI N/A 1/17/2020    Procedure: XI ROBOTIC HYSTERECTOMY;  Surgeon: Fani Vieira MD;  Location: [facility];  Service: OB/GYN;  Laterality: N/A;    ROBOT-ASSISTED LAPAROSCOPIC SALPINGO-OOPHORECTOMY USING DA DENIS XI Bilateral 1/17/2020    Procedure: XI ROBOTIC SALPINGO-OOPHORECTOMY;  Surgeon: Fani Vieira MD;  Location: [facility];  Service: OB/GYN;  Laterality: Bilateral;    TUBAL LIGATION         SOCIAL HISTORY:  Social History     Socioeconomic History    Marital status:    Tobacco Use    Smoking status: Never    Smokeless tobacco: Never   Substance and Sexual Activity     Alcohol use: No    Drug use: No    Sexual activity: Not Currently     Partners: Male     Social Determinants of Health     Financial Resource Strain: Medium Risk (4/30/2019)    Overall Financial Resource Strain (CARDIA)     Difficulty of Paying Living Expenses: Somewhat hard   Food Insecurity: No Food Insecurity (4/30/2019)    Hunger Vital Sign     Worried About Running Out of Food in the Last Year: Never true     Ran Out of Food in the Last Year: Never true   Transportation Needs: Unmet Transportation Needs (4/30/2019)    PRAPARE - Transportation     Lack of Transportation (Medical): Yes     Lack of Transportation (Non-Medical): Yes   Physical Activity: Insufficiently Active (4/30/2019)    Exercise Vital Sign     Days of Exercise per Week: 1 day     Minutes of Exercise per Session: 20 min   Stress: Stress Concern Present (4/30/2019)    [employer] - Occupational Stress Questionnaire     Feeling of Stress : To some extent       FAMILY HISTORY:  Family History   Problem Relation Name Age of Onset    Breast cancer Daughter  44    Colon cancer Neg Hx         ALLERGIES AND MEDICATIONS: updated and reviewed.  Review of patient's allergies indicates:  No Known Allergies  Current Outpatient Medications   Medication Sig Dispense Refill    blood sugar diagnostic Strp 1 strip by Misc.(Non-Drug; Combo Route) route 4 (four) times daily with meals and nightly. 100 strip 6    brimonidine-timolol (COMBIGAN) 0.2-0.5 % Drop       bromfenac (PROLENSA) 0.07 % Drop Apply 1 drop to eye.      cycloSPORINE (RESTASIS) 0.05 % ophthalmic emulsion Apply 1 drop to eye.      fluorometholone 0.1% (FML) 0.1 % DrpS       FREESTYLE OSMAN 14 DAY SENSOR Kit Use every 14 days, ICD10: 11.65 2 kit 11    HUMALOG KWIKPEN INSULIN 100 unit/mL pen Inject 12 Units into the skin 3 (three) times daily before meals. 15 mL 6    insulin lispro (HUMALOG KWIKPEN INSULIN) 100 unit/mL pen Inject 12 Units into the skin 3 (three) times  "daily. 1 each 0    ketorolac 0.4% (ACULAR) 0.4 % Drop Place 1 drop into both eyes 2 (two) times daily.      ketorolac 0.5% (ACULAR) 0.5 % Drop       Lactobacillus rhamnosus GG (CULTURELLE ORAL) Take 1 capsule by mouth once daily.      LOTEMAX SM 0.38 % DrpG       loteprednol (LOTEMAX) 0.5 % ophthalmic suspension       MAXITROL 3.5 mg/g-10,000 unit/g-0.1 % Oint 0.25 inch in injected eye 4 times a day      mupirocin (BACTROBAN) 2 % ointment APPLY 1 APPLICATION TOPICALLY TO RED AREA ON CHEST TWICE DAILY      neomycin-polymyxin-dexamethasone (MAXITROL) 3.5mg/mL-10,000 unit/mL-0.1 % DrpS       ofloxacin (OCUFLOX) 0.3 % ophthalmic solution INSTILL 1 DROP INTO AFFECTED EYE 4 TIMES A DAY  4    pen needle, diabetic (BD ULTRA-FINE MINI PEN NEEDLE) 31 gauge x 3/16" Ndle Use with insulin pen, injection 3 times a day 200 each 6    PRODIGY NO CODING Strp       RESTASIS 0.05 % ophthalmic emulsion Place 1 drop into both eyes 2 (two) times daily.  3    RHOPRESSA 0.02 % ophthalmic solution Place 1 drop into both eyes once daily.      triamcinolone acetonide 0.1% (KENALOG) 0.1 % cream APPLY TWICE DAILY TO ITCHY SKIN UP TO 2 WEEKS/MONTH AS NEEDED  3    alendronate (FOSAMAX) 70 MG tablet Take 1 tablet (70 mg total) by mouth every 7 days. 12 tablet 3    levothyroxine (SYNTHROID) 112 MCG tablet Take 1 tablet (112 mcg total) by mouth every morning. 90 tablet 3    rosuvastatin (CRESTOR) 10 MG tablet Take 1 tablet (10 mg total) by mouth once daily. 90 tablet 3     No current facility-administered medications for this visit.       ROS  Review of Systems   Constitutional:  Negative for chills and fever.   HENT:  Negative for hearing loss and sore throat.    Eyes:  Negative for visual disturbance.   Respiratory:  Negative for cough and shortness of breath.    Cardiovascular:  Negative for chest pain, palpitations and leg swelling.   Gastrointestinal:  Negative for abdominal pain, constipation, diarrhea, nausea and vomiting.   Genitourinary:  " "Negative for dysuria, frequency and urgency.   Musculoskeletal:  Negative for arthralgias, joint swelling and myalgias.   Skin:  Negative for rash and wound.   Neurological:  Negative for headaches.   Psychiatric/Behavioral:  Negative for agitation and confusion. The patient is not nervous/anxious.          OBJECTIVE     Physical Exam  Vitals:    09/05/24 0939   BP: 130/64   Pulse: 77   Temp: 97.6 °F (36.4 °C)    Body mass index is 28.91 kg/m².  Weight: 71.7 kg (158 lb 1.1 oz)   Height: 5' 2" (157.5 cm)     Physical Exam  Constitutional:       General: She is not in acute distress.     Appearance: She is well-developed.   HENT:      Head: Normocephalic and atraumatic.      Right Ear: Tympanic membrane, ear canal and external ear normal.      Left Ear: Tympanic membrane, ear canal and external ear normal.      Nose: Nose normal.   Eyes:      General: No scleral icterus.        Right eye: No discharge.         Left eye: No discharge.      Conjunctiva/sclera: Conjunctivae normal.   Neck:      Vascular: No JVD.      Trachea: No tracheal deviation.   Cardiovascular:      Rate and Rhythm: Normal rate and regular rhythm.      Heart sounds: No murmur heard.     No friction rub. No gallop.   Pulmonary:      Effort: Pulmonary effort is normal. No respiratory distress.      Breath sounds: Normal breath sounds. No wheezing.   Abdominal:      General: Bowel sounds are normal. There is no distension.      Palpations: Abdomen is soft. There is no mass.      Tenderness: There is no abdominal tenderness. There is no guarding or rebound.   Musculoskeletal:         General: No tenderness or deformity. Normal range of motion.      Cervical back: Normal range of motion and neck supple.   Skin:     General: Skin is warm and dry.      Findings: No erythema or rash.   Neurological:      Mental Status: She is alert and oriented to person, place, and time.      Motor: No abnormal muscle tone.      Coordination: Coordination normal. "   Psychiatric:         Behavior: Behavior normal.         Thought Content: Thought content normal.         Judgment: Judgment normal.           Health Maintenance         Date Due Completion Date    Pneumococcal Vaccines (Age 65+) (1 of 2 - PCV) Never done ---    TETANUS VACCINE Never done ---    Shingles Vaccine (1 of 2) Never done ---    RSV Vaccine (Age 60+ and Pregnant patients) (1 - 1-dose 60+ series) Never done ---    COVID-19 Vaccine (3 - Pfizer risk series) 02/28/2021 1/31/2021    Influenza Vaccine (1) Never done ---    Hemoglobin A1c 10/15/2024 7/15/2024    DEXA Scan 12/05/2025 12/5/2023              ASSESSMENT     84 y.o. female with     1. Annual physical exam    2. Hyperlipidemia associated with type 2 diabetes mellitus    3. Hypothyroidism, unspecified type    4. Age-related osteoporosis without current pathological fracture        PLAN:     1. Annual physical exam  - Counseled on age appropriate medical preventative services, including age appropriate cancer screenings, over all nutritional health, need for a consistent exercise regimen and an over all push towards maintaining a vigorous and active lifestyle.  Counseled on age appropriate vaccines and discussed upcoming health care needs based on age/gender.  Spent time with patient counseling on need for a good patient/doctor relationship moving forward.  Discussed use of common OTC medications and supplements.  Discussed common dietary aids and use of caffeine and the need for good sleep hygiene and stress management.  - CBC Auto Differential; Future  - Lipid Panel; Future    2. Hyperlipidemia associated with type 2 diabetes mellitus  - Check labs  - rosuvastatin (CRESTOR) 10 MG tablet; Take 1 tablet (10 mg total) by mouth once daily.  Dispense: 90 tablet; Refill: 3  - CBC Auto Differential; Future  - Lipid Panel; Future    3. Hypothyroidism, unspecified type  - Stable; no acute issues  - The current medical regimen is effective;  continue present  plan and medications.  - levothyroxine (SYNTHROID) 112 MCG tablet; Take 1 tablet (112 mcg total) by mouth every morning.  Dispense: 90 tablet; Refill: 3    4. Age-related osteoporosis without current pathological fracture  - Stable; no acute issues  - The current medical regimen is effective;  continue present plan and medications.  - alendronate (FOSAMAX) 70 MG tablet; Take 1 tablet (70 mg total) by mouth every 7 days.  Dispense: 12 tablet; Refill: 3        RTC in 1 year     Sari Gupta MD  09/05/2024 9:55 AM        No follow-ups on file.

## 2024-11-14 ENCOUNTER — LAB VISIT (OUTPATIENT)
Dept: LAB | Facility: HOSPITAL | Age: 84
End: 2024-11-14
Attending: HOSPITALIST
Payer: MEDICARE

## 2024-11-14 DIAGNOSIS — E11.65 TYPE 2 DIABETES MELLITUS WITH HYPERGLYCEMIA, WITH LONG-TERM CURRENT USE OF INSULIN: ICD-10-CM

## 2024-11-14 DIAGNOSIS — Z79.4 TYPE 2 DIABETES MELLITUS WITH HYPERGLYCEMIA, WITH LONG-TERM CURRENT USE OF INSULIN: ICD-10-CM

## 2024-11-14 LAB
ANION GAP SERPL CALC-SCNC: 9 MMOL/L (ref 8–16)
BUN SERPL-MCNC: 31 MG/DL (ref 8–23)
CALCIUM SERPL-MCNC: 9.8 MG/DL (ref 8.7–10.5)
CHLORIDE SERPL-SCNC: 103 MMOL/L (ref 95–110)
CO2 SERPL-SCNC: 26 MMOL/L (ref 23–29)
CREAT SERPL-MCNC: 1.4 MG/DL (ref 0.5–1.4)
EST. GFR  (NO RACE VARIABLE): 37.6 ML/MIN/1.73 M^2
ESTIMATED AVG GLUCOSE: 194 MG/DL (ref 68–131)
GLUCOSE SERPL-MCNC: 154 MG/DL (ref 70–110)
HBA1C MFR BLD: 8.4 % (ref 4–5.6)
POTASSIUM SERPL-SCNC: 4.8 MMOL/L (ref 3.5–5.1)
SODIUM SERPL-SCNC: 138 MMOL/L (ref 136–145)

## 2024-11-14 PROCEDURE — 83036 HEMOGLOBIN GLYCOSYLATED A1C: CPT | Performed by: HOSPITALIST

## 2024-11-14 PROCEDURE — 80048 BASIC METABOLIC PNL TOTAL CA: CPT | Performed by: HOSPITALIST

## 2024-11-14 PROCEDURE — 36415 COLL VENOUS BLD VENIPUNCTURE: CPT | Mod: PO | Performed by: HOSPITALIST

## 2024-11-21 ENCOUNTER — OFFICE VISIT (OUTPATIENT)
Dept: ENDOCRINOLOGY | Facility: CLINIC | Age: 84
End: 2024-11-21
Payer: MEDICARE

## 2024-11-21 VITALS
BODY MASS INDEX: 28.46 KG/M2 | WEIGHT: 155.63 LBS | SYSTOLIC BLOOD PRESSURE: 140 MMHG | HEART RATE: 60 BPM | DIASTOLIC BLOOD PRESSURE: 72 MMHG

## 2024-11-21 DIAGNOSIS — H54.7 VISUAL IMPAIRMENT DUE TO DIABETES MELLITUS: ICD-10-CM

## 2024-11-21 DIAGNOSIS — N18.32 CHRONIC RENAL FAILURE, STAGE 3B: ICD-10-CM

## 2024-11-21 DIAGNOSIS — Z79.4 TYPE 2 DIABETES MELLITUS WITH HYPERGLYCEMIA, WITH LONG-TERM CURRENT USE OF INSULIN: Primary | ICD-10-CM

## 2024-11-21 DIAGNOSIS — E11.39 VISUAL IMPAIRMENT DUE TO DIABETES MELLITUS: ICD-10-CM

## 2024-11-21 DIAGNOSIS — M81.0 AGE-RELATED OSTEOPOROSIS WITHOUT CURRENT PATHOLOGICAL FRACTURE: ICD-10-CM

## 2024-11-21 DIAGNOSIS — E11.65 TYPE 2 DIABETES MELLITUS WITH HYPERGLYCEMIA, WITH LONG-TERM CURRENT USE OF INSULIN: Primary | ICD-10-CM

## 2024-11-21 DIAGNOSIS — E03.9 HYPOTHYROIDISM, UNSPECIFIED TYPE: ICD-10-CM

## 2024-11-21 PROCEDURE — 99214 OFFICE O/P EST MOD 30 MIN: CPT | Mod: PBBFAC | Performed by: HOSPITALIST

## 2024-11-21 PROCEDURE — 99999 PR PBB SHADOW E&M-EST. PATIENT-LVL IV: CPT | Mod: PBBFAC,,, | Performed by: HOSPITALIST

## 2024-11-21 RX ORDER — INSULIN LISPRO 100 [IU]/ML
12 INJECTION, SOLUTION INTRAVENOUS; SUBCUTANEOUS
Qty: 15 ML | Refills: 6 | Status: SHIPPED | OUTPATIENT
Start: 2024-11-21 | End: 2025-11-21

## 2024-11-21 RX ORDER — PEN NEEDLE, DIABETIC 30 GX3/16"
NEEDLE, DISPOSABLE MISCELLANEOUS
Qty: 200 EACH | Refills: 6 | Status: SHIPPED | OUTPATIENT
Start: 2024-11-21

## 2024-11-21 NOTE — PROGRESS NOTES
Subjective:      Patient ID: Chelsea Gould is a 84 y.o. female presented to [facility] on 11/21/2024.  Chief Complaint:  Diabetes    History of Present Illness: Chelsea Gould is a 84 y.o. female here for management of type 2 diabetes  Significant past medical history:  CAD, CKD stage IIIB, visual impairment (seen Ophthalmology for injections/may need surgery), hypothyroidism, osteoporosis (on Fosamax given by PCP)    Interval history:  Patient is here for management of diabetes.  A1c unchanged at 8.4%.  Mental status unchanged, needs frequent redirection.  Patient presents with her daughter, family does monitor her diabetes in the afternoon and evening leading to better glycemic control.    Patient and  still on their own in morning and lunch.  Therefore she has hyperglycemia on CGM   Thyroid:  Levothyroxine 112 mcg daily  Osteoporosis:  On Fosamax for osteoporosis, no recent falls or fracture.      1) Diabetes Mellitus Type 2, Follow up Visit  - Patient here for an evaluation of diabetes, initially diagnosed at age  44  - Known diabetic complications: nephropathy, peripheral neuropathy and cardiovascular disease  - Cardiovascular risk factors: advanced age (older than 55 for men, 65 for women), diabetes mellitus, dyslipidemia, hypertension and obesity (BMI >= 30 kg/m2), poor historian   - Previously seen Esperanza Gonzalez NP 10/2020  - In previous visit she did stop insulin injection, stop Ozempic.3/26/22. Patient continues to require frequent redirection, with questionable confusion/dementia  - Diabetes has stabilize 2023 she has been coming to the office with her family, they have helped getting/monitoring her diabetes.  A1c prior to family coming to visit was 13%, now at 8.4%  - DIABETES MANAGEMENT STILL DIFFICULT due to patient's dietary indiscretion, missed insulin doses.   - Patient continues to refuse a lot of diabetes medication, limiting my ability to care for her, family is aware  -  Previously tried oral regimen as well as GLP1  - Daughter reports that patient is not interested in Dexcom due to not having a compatible cell phone.  - Patient is , lives with her .  Does have family: Daughter who checks in on her and monitor her diabetes in the evening    Current meds:     Humalog 12 units t.i.d. before each meals  Previous meds:              Metformin:  Due to diarrhea              Lantus 12 units in the morning   Ozempic:  Was stopped due to patient reported side effects, weight loss   Actos 30 mg daily    Home glucose checks:  On Freestyle Yoav 14d   CGM download and interpretation: Data was downloaded and personally reviewed by myself  CGM type: Freestyle yoav 14 day   Number of Day CGM worn: 14 days, percentage of time CGM is active: 77%  Average blood glucose: 186, Glucose variability: 33.4%  , Glucose management indicator (GMI): 7.8%  Time in Range: 15% very high, 35% High, 50% Target Range, 0% low, 0% very low>> reviewed and discussed, goal TIR discussed with patient    Time-in-range and GMI, continue persistent postprandial hyperglycemia notably severe hyperglycemia at lunch: Noon.  With glucose above 200 consistently.  Yesterday glucose greater than 400.  Per patient she did not recall what she ate nor if she gave insulin or not.    Does have better glycemic it overnight.  Still with hyperglycemia.  Resistant to my recommendation similar to previous office visit  No obvious hypoglycemic events  When she gets her insulin  her glucose very stable        Diet/Exercise:               Eatin- 3 meals per day, often eat out especially at night, suspect dietary indiscretion as she often eat out at night              Snacking               Drink:  Sugar free              Current exercise: aerobics:  Dancing  Weight trend: decreasing steadily  Diabetes Education:  Yes, 19 - Liliam Chow RN, CDE   Diabetes Related Hospitalization:  denies  Hx of pancreatitis, hx of  "thyroid cancer:  Denies  Family history of diabetes:  Yes  Occupation:  Retired    Eye exam current (within one year): yes, DR: no, outside of [facility]  Reports cuts or ulcers on feet:  Denies    Statin: Taking  ACE/ARB: Not taking  Statin: Taking, ACE/ARB: Not taking    Diabetes Management Status: Reviewed   Lab Results   Component Value Date    HGBA1C 8.4 (H) 11/14/2024    HGBA1C 8.4 (H) 07/15/2024    HGBA1C 8.4 (H) 03/11/2024    HGBA1C 8.4 (H) 11/14/2023     Lab Results   Component Value Date    CPEPTIDE 3.29 04/27/2020      No results found for: "FRUCTOSAMINE"    Lab Results   Component Value Date    MICALBCREAT 9.1 05/01/2019     No results found for: "EGDWWLIF51"    Screening or Prevention Patient's value Goal Complete/Controlled?   Lipid profile : 09/05/2024 Annually Yes     Dilated retinal exam : 09/27/2022 Annually Yes     Foot exam   Most Recent Foot Exam Date: Not Found Annually Yes          2) With regards to hypothyroidism  - On levothyroxine 112 mcg daily  - Taking as directed, reports compliance  - Reports compliant with medication, chronic     Lab Results   Component Value Date    TSH 3.693 07/15/2024    TSH 1.785 08/11/2023    TSH 0.986 09/01/2022    FREET4 1.35 05/26/2022    FREET4 1.11 01/26/2022    FREET4 1.05 10/26/2021       3) With regards to osteoporosis/osteopenia  - No recent falls  - On Fosamax per PCP, started on 7/2019  - Most recent bone density done review:  10/21 osteopenia with high FRAX score    DXA   12/05/2023  Lumbar spine (L1-L4): BMD is 1.166 g/cm2, T-score is 1.1, and Z-score is 3.8.  Total hip: BMD is 0.697 g/cm2, T-score is -2.0, and Z-score is 0.1.  Femoral neck: BMD is 0.578 g/cm2, T-score is -2.4, and Z-score is -0.1.  Distal 1/3 radius: Not applicable     FRAX:  25% risk of a major osteoporotic fracture in the next 10 years.  7.8% risk of hip fracture in the next 10 years.     Impression:  *Osteoporosis on treatment with Alendronate.  *Fracture risk is very " high due to calculated 10 year risk of hip fracture >4.5% (FRAX)  *Compared with previous DXA, BMD at the lumbar spine has increased by 5.3%, and BMD at the total hip has declined by 12.2%.    10/26/2021  Lumbar spine (L1-L4): BMD is 1.107 g/cm2, T-score is 0.5, and Z-score is 3.2.  Total hip: BMD is 0.794 g/cm2, T-score is -1.2, and Z-score is 0.8  Femoral neck: BMD is 0.608 g/cm2, T-score is -2.2, and Z-score is 0.1.  Distal 1/3 radius: Not applicable     COMPARISON:  Comparison study done on 07/08/2019.  Lumbar spine: BMD 0.974 g/cm2 and T-score -0.7.  Total Hip: BMD 0.786 g/cm2 and T-score -1.3.  Distal 1/3 radius: Not applicable    FRAX: 23% risk of a major osteoporotic fracture in the next 10 years.  6.3% risk of hip fracture in the next 10 years.     Impression:  LOW BONE MASS WITH A SIGNIFICANT INCREASE OF 13.6% IN THE LUMBAR SPINE BMD COMPARED TO THE PRIOR STUDY.      Lab work reviewed  Lab Results   Component Value Date    .3 (H) 10/26/2021    [id number] 34 07/15/2024    EZHAXNZY91GO 40 08/11/2023    APZFZDQX36YJ 31 10/26/2021    CALCIUM 9.8 11/14/2024    CALCIUM 10.1 07/15/2024    CALCIUM 9.9 03/11/2024    PHOS 2.9 03/11/2024    PHOS 3.0 11/14/2023    ALKPHOS 60 08/11/2023    ALKPHOS 73 05/26/2022    ALKPHOS 76 01/26/2022    EGFRNORACEVR 37.6 (A) 11/14/2024       Reviewed past surgical, medical, family, social history and updated as appropriate.  Review of Systems See above    Objective:   BP (!) 140/72   Pulse 60   Wt 70.6 kg (155 lb 9.6 oz)   BMI 28.46 kg/m²     Body mass index is 28.46 kg/m².  Vital signs reviewed    Physical Exam  Vitals and nursing note reviewed.   Constitutional:       General: She is not in acute distress.     Appearance: Normal appearance. She is well-developed. She is not toxic-appearing.   Neck:      Thyroid: No thyromegaly.   Pulmonary:      Effort: Pulmonary effort is normal.   Musculoskeletal:         General: Normal range of motion.      Cervical back: Normal  "range of motion.   Skin:     Findings: No bruising.   Neurological:      Mental Status: She is alert. She is disoriented.   Psychiatric:      Comments: Chronic mood issues noted, similar to previous visit     Lab Reviewed:  See results in subjective  Lab Results   Component Value Date    HGBA1C 8.4 (H) 11/14/2024     Lab Results   Component Value Date    CHOL 150 09/05/2024    HDL 60 09/05/2024    LDLCALC 74.8 09/05/2024    TRIG 76 09/05/2024    CHOLHDL 40.0 09/05/2024     Lab Results   Component Value Date     11/14/2024    K 4.8 11/14/2024     11/14/2024    CO2 26 11/14/2024     (H) 11/14/2024    BUN 31 (H) 11/14/2024    CREATININE 1.4 11/14/2024    CALCIUM 9.8 11/14/2024    PHOS 2.9 03/11/2024    PROT 7.4 08/11/2023    ALBUMIN 3.9 03/11/2024    BILITOT 0.6 08/11/2023    ALKPHOS 60 08/11/2023    AST 18 08/11/2023    ALT 17 08/11/2023    ANIONGAP 9 11/14/2024    EGFRNORACEVR 37.6 (A) 11/14/2024    TSH 3.693 07/15/2024    .3 (H) 10/26/2021    [id number] 34 07/15/2024     Assessment     1. Type 2 diabetes mellitus with hyperglycemia, with long-term current use of insulin  Basic Metabolic Panel    Hemoglobin A1C    HUMALOG KWIKPEN INSULIN 100 unit/mL pen    pen needle, diabetic (BD ULTRA-FINE MINI PEN NEEDLE) 31 gauge x 3/16" Ndle      2. Hypothyroidism, unspecified type  TSH      3. Age-related osteoporosis without current pathological fracture        4. 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 Chronic renal failure, stage 3b          Plan     Type 2 diabetes mellitus with hyperglycemia, with long-term current use of insulin  - Diabetes is nearing age/comorbidity goal at goal, given worsening of recent A1c, A1C goal for this patient is <8% given age and comorbidities  - Diabetes management is poor due to poor historian, vision impairment, previously noncompliant with diabetes regimen.  Require frequent encouragement and family involved for better compliance.  - Patient " continues to refuse a lot of diabetes medication, limiting my ability to care for her, family is aware  - Previously tried oral regimen as well as GLP1  - Complicated by CKD3, dietary indiscretion, visual impairment   - CGM: Freestyle Yoav data was downloaded, personally interpreted, and reviewed with the patient during this visit. See noted below.  - Diabetic Supplies and Medications: Reviewed to ensure continued refills and compliance.  - Preventive Care: Advised the patient to schedule periodic eye exams and maintain regular foot care monitoring.    Plan  - Changes:  Patient refused to make any adjustment.  will continue NovoLog 12 units before each meal 3 times a day, advised patient daughter to contact her to make sure she gives her insulin at lunch, as not being supervised by family at that time  - I recommend restarting GLP1: Low-dose Mounjaro 2.5 mg once a week to help with hyperglycemia, patient declined  - Dietary Modifications: Encouraged portion size control and reduction of carbohydrate intake to better manage diabetes.  - Continue use given regular insulin injections daily:  Freestyle Yoav  - Clear written instructions provided on AVS. Follow-up scheduled within the next 4 months with lab work prior.   - Appointment date and time were provided to the patient today.    Age-related osteoporosis without current pathological fracture  - Bone density for 10/2021 completed, Stable BMD, high frax score  - On Fosamax per PCP since 7/2019  - Advised patient to continue to be active   - DXA 2023 showed decrease in fem neck  - Discuss with family, and with patient.  She does not want any making changes.  Advised patient to continue Fosamax  - continue monitoring and managed with PCP    Hypothyroidism  - TSH is at goal, continue levothyroxine as prescribed  - TSH stable on recent blood work  - continue LT4 112 mcg daily  - managed by PCP    Visual impairment due to diabetes mellitus  - poor vision, continue  follow-up with Ophthalmology  - continues to benefit from CGM given prevention of hypoglycemia    Chronic renal failure, stage 3b  - Kidney function stable   - Renally Adjust diabetes medication  - routine monitor        Given the patient age and other morbidities, goals of therapy are to get the best control we can without hypoglycemia  - Endocrine Society recommend Goal A1C is <8% given chronic illnesses, cognitive impairment  - fasting glucose goal of 100-150  - bedtime glucose 150-180           Advised patient to follow up with PCP for routine health maintenance care.   RTC in 4 months    Dominick Rahman M.D  Endocrinology  [facility]  11/21/2024      Disclaimer: This note has been generated using voice-recognition software. There may be typographical errors that have been missed during proof-reading.  ------------------------------------------------------------    Indications for CGMs:    Patent with diagnosed:  Type 2 Diabetes Mellitus that required frequent glucose monitoring (4 + times a day and 4x/day testing), frequent adjustments to insulin regiment based on BG or CGM results. Patent requires a therapeutic CGM and is willing to use therapeutic CGM/SMBG for Necessary frequent adjustments in insulin therapy, patient demonstrated an understanding of the technology (can use and recognize alerts and alarms). I, the physician, have assessed adherence to CGM regimen and to the plan, patient will have close follow up in clinic as indicated, every 3 months to 6 months.     Patient experiences (including but not limited to):  [x]   Discrepancies between records and A1C, at risk severe hypoglycemia episode and hospitalization  [x]   Recurrent, unexplained, severe hypoglycemic episodes  [x]   Postprandial hyperglycemia  [x]   Unexplained blood glucose excursions  []   Impaired awareness of hypoglycemia    []   Patient uses insulin pump for diabetes management: will need frequent adjustments to  insulin regiment based on BG: including adjustment to  insulin pump setting, sliding scale, correction factor, additional bolusing/correction

## 2024-11-21 NOTE — PATIENT INSTRUCTIONS
A1c 8.4%.    1) HUMALOG 12 units 3 times a day before meals do not skip    Change to Dexcom G7    2) Levothyroxine 112 mcg daily for your thyroid      Goal blood sugar in the morning, before breakfast:   Glucose goal AFTER MEALS:    2 Hour after: less than 140  Before going to bed: 100-140  Do not go to bed with glucose less than 100  Have a small snack if glucose is lower than 100    Continue glucose monitor  Freestyle Yoav monitor  Remember to scan to check your blood sugar regularly 4 to 5  times a day  When you wake up and before bed and every 4-6 hours while awake (before each meals)  THE MORE YOU SCAN MORE INFORMATION I GET.      We will plan an in-clinic visit in 4 months, with labs prior to that appointment.    Contact information:  Dominick Rahman M.D  [facility] [address] [facility] [address], [address]  [place] [address]    Office:  [phone]  Fax:  [phone]     ----------------------------------------------------------

## 2024-11-21 NOTE — ASSESSMENT & PLAN NOTE
- Diabetes is nearing age/comorbidity goal at goal, given worsening of recent A1c, A1C goal for this patient is <8% given age and comorbidities  - Diabetes management is poor due to poor historian, vision impairment, previously noncompliant with diabetes regimen.  Require frequent encouragement and family involved for better compliance.  - Patient continues to refuse a lot of diabetes medication, limiting my ability to care for her, family is aware  - Previously tried oral regimen as well as GLP1  - Complicated by CKD3, dietary indiscretion, visual impairment   - CGM: Freestyle Oyav data was downloaded, personally interpreted, and reviewed with the patient during this visit. See noted below.  - Diabetic Supplies and Medications: Reviewed to ensure continued refills and compliance.  - Preventive Care: Advised the patient to schedule periodic eye exams and maintain regular foot care monitoring.    Plan  - Changes:  Patient refused to make any adjustment.  will continue NovoLog 12 units before each meal 3 times a day, advised patient daughter to contact her to make sure she gives her insulin at lunch, as not being supervised by family at that time  - I recommend restarting GLP1: Low-dose Mounjaro 2.5 mg once a week to help with hyperglycemia, patient declined  - Dietary Modifications: Encouraged portion size control and reduction of carbohydrate intake to better manage diabetes.  - Continue use given regular insulin injections daily:  Freestyle Yoav  - Clear written instructions provided on AVS. Follow-up scheduled within the next 4 months with lab work prior.   - Appointment date and time were provided to the patient today.

## 2024-11-21 NOTE — ASSESSMENT & PLAN NOTE
- Bone density for 10/2021 completed, Stable BMD, high frax score  - On Fosamax per PCP since 7/2019  - Advised patient to continue to be active   - DXA 2023 showed decrease in fem neck  - Discuss with family, and with patient.  She does not want any making changes.  Advised patient to continue Fosamax  - continue monitoring and managed with PCP

## 2024-11-21 NOTE — ASSESSMENT & PLAN NOTE
- TSH is at goal, continue levothyroxine as prescribed  - TSH stable on recent blood work  - continue LT4 112 mcg daily  - managed by PCP

## 2025-01-13 DIAGNOSIS — Z00.00 ENCOUNTER FOR MEDICARE ANNUAL WELLNESS EXAM: ICD-10-CM

## 2025-02-19 ENCOUNTER — TELEPHONE (OUTPATIENT)
Dept: ENDOCRINOLOGY | Facility: CLINIC | Age: 85
End: 2025-02-19
Payer: MEDICARE

## 2025-02-19 DIAGNOSIS — Z79.4 TYPE 2 DIABETES MELLITUS WITH HYPERGLYCEMIA, WITH LONG-TERM CURRENT USE OF INSULIN: Primary | ICD-10-CM

## 2025-02-19 DIAGNOSIS — E11.65 TYPE 2 DIABETES MELLITUS WITH HYPERGLYCEMIA, WITH LONG-TERM CURRENT USE OF INSULIN: Primary | ICD-10-CM

## 2025-02-19 RX ORDER — INSULIN ASPART 100 [IU]/ML
12 INJECTION, SOLUTION INTRAVENOUS; SUBCUTANEOUS
Qty: 15 ML | Refills: 6 | Status: SHIPPED | OUTPATIENT
Start: 2025-02-19 | End: 2026-02-19

## 2025-02-19 NOTE — TELEPHONE ENCOUNTER
----- Message from Med Assistant Soco sent at 2/19/2025  9:00 AM CST -----  Type: Patient Call BackWho called: Daughter Tiarra Larry is the request in detail: states her medication needs to be changed to alternative medication (NOVOLOG) she is currently on HUMOLOG .. They filled one prescription for 30 days for her ..  Can the clinic reply by [facility]?NOWould the patient rather a call back or a response via [facility]? Yes, call Best call back number: [phone]

## 2025-03-25 ENCOUNTER — LAB VISIT (OUTPATIENT)
Dept: LAB | Facility: HOSPITAL | Age: 85
End: 2025-03-25
Attending: HOSPITALIST
Payer: MEDICARE

## 2025-03-25 DIAGNOSIS — E03.9 HYPOTHYROIDISM, UNSPECIFIED TYPE: ICD-10-CM

## 2025-03-25 DIAGNOSIS — E11.65 TYPE 2 DIABETES MELLITUS WITH HYPERGLYCEMIA, WITH LONG-TERM CURRENT USE OF INSULIN: ICD-10-CM

## 2025-03-25 DIAGNOSIS — Z79.4 TYPE 2 DIABETES MELLITUS WITH HYPERGLYCEMIA, WITH LONG-TERM CURRENT USE OF INSULIN: ICD-10-CM

## 2025-03-25 LAB
ANION GAP (OHS): 11 MMOL/L (ref 8–16)
BUN SERPL-MCNC: 25 MG/DL (ref 8–23)
CALCIUM SERPL-MCNC: 10.5 MG/DL (ref 8.7–10.5)
CHLORIDE SERPL-SCNC: 104 MMOL/L (ref 95–110)
CO2 SERPL-SCNC: 22 MMOL/L (ref 23–29)
CREAT SERPL-MCNC: 1.1 MG/DL (ref 0.5–1.4)
EAG (OHS): 174 MG/DL (ref 68–131)
GFR SERPLBLD CREATININE-BSD FMLA CKD-EPI: 50 ML/MIN/1.73/M2
GLUCOSE SERPL-MCNC: 197 MG/DL (ref 70–110)
HBA1C MFR BLD: 7.7 % (ref 4–5.6)
POTASSIUM SERPL-SCNC: 4.9 MMOL/L (ref 3.5–5.1)
SODIUM SERPL-SCNC: 137 MMOL/L (ref 136–145)
T4 FREE SERPL-MCNC: 1.17 NG/DL (ref 0.71–1.51)
TSH SERPL-ACNC: 6.09 UIU/ML (ref 0.4–4)

## 2025-03-25 PROCEDURE — 84439 ASSAY OF FREE THYROXINE: CPT

## 2025-03-25 PROCEDURE — 83036 HEMOGLOBIN GLYCOSYLATED A1C: CPT

## 2025-03-25 PROCEDURE — 84443 ASSAY THYROID STIM HORMONE: CPT

## 2025-03-25 PROCEDURE — 80048 BASIC METABOLIC PNL TOTAL CA: CPT

## 2025-03-25 PROCEDURE — 36415 COLL VENOUS BLD VENIPUNCTURE: CPT | Mod: PO

## 2025-04-01 ENCOUNTER — OFFICE VISIT (OUTPATIENT)
Dept: ENDOCRINOLOGY | Facility: CLINIC | Age: 85
End: 2025-04-01
Payer: MEDICARE

## 2025-04-01 VITALS
HEART RATE: 68 BPM | BODY MASS INDEX: 27.47 KG/M2 | WEIGHT: 150.19 LBS | DIASTOLIC BLOOD PRESSURE: 80 MMHG | SYSTOLIC BLOOD PRESSURE: 132 MMHG

## 2025-04-01 DIAGNOSIS — N18.32 CHRONIC RENAL FAILURE, STAGE 3B: ICD-10-CM

## 2025-04-01 DIAGNOSIS — Z79.4 TYPE 2 DIABETES MELLITUS WITH HYPERGLYCEMIA, WITH LONG-TERM CURRENT USE OF INSULIN: Primary | ICD-10-CM

## 2025-04-01 DIAGNOSIS — E03.9 HYPOTHYROIDISM, UNSPECIFIED TYPE: ICD-10-CM

## 2025-04-01 DIAGNOSIS — R41.89 COGNITIVE DEFICITS: ICD-10-CM

## 2025-04-01 DIAGNOSIS — M81.0 AGE-RELATED OSTEOPOROSIS WITHOUT CURRENT PATHOLOGICAL FRACTURE: ICD-10-CM

## 2025-04-01 DIAGNOSIS — E11.65 TYPE 2 DIABETES MELLITUS WITH HYPERGLYCEMIA, WITH LONG-TERM CURRENT USE OF INSULIN: Primary | ICD-10-CM

## 2025-04-01 DIAGNOSIS — E78.5 HYPERLIPIDEMIA ASSOCIATED WITH TYPE 2 DIABETES MELLITUS: ICD-10-CM

## 2025-04-01 DIAGNOSIS — E55.9 VITAMIN D DEFICIENCY: ICD-10-CM

## 2025-04-01 DIAGNOSIS — E11.69 HYPERLIPIDEMIA ASSOCIATED WITH TYPE 2 DIABETES MELLITUS: ICD-10-CM

## 2025-04-01 PROCEDURE — 99214 OFFICE O/P EST MOD 30 MIN: CPT | Mod: PBBFAC | Performed by: HOSPITALIST

## 2025-04-01 PROCEDURE — 99999 PR PBB SHADOW E&M-EST. PATIENT-LVL IV: CPT | Mod: PBBFAC,,, | Performed by: HOSPITALIST

## 2025-04-01 RX ORDER — BLOOD-GLUCOSE SENSOR
EACH MISCELLANEOUS
Qty: 2 EACH | Refills: 11 | Status: SHIPPED | OUTPATIENT
Start: 2025-04-01 | End: 2025-04-01 | Stop reason: SDUPTHER

## 2025-04-01 RX ORDER — BLOOD-GLUCOSE,RECEIVER,CONT
EACH MISCELLANEOUS
Qty: 1 EACH | Refills: 0 | Status: SHIPPED | OUTPATIENT
Start: 2025-04-01 | End: 2025-04-01 | Stop reason: SDUPTHER

## 2025-04-01 RX ORDER — BLOOD-GLUCOSE SENSOR
EACH MISCELLANEOUS
Qty: 2 EACH | Refills: 11 | Status: SHIPPED | OUTPATIENT
Start: 2025-04-01

## 2025-04-01 RX ORDER — PEN NEEDLE, DIABETIC 30 GX3/16"
NEEDLE, DISPOSABLE MISCELLANEOUS
Qty: 200 EACH | Refills: 6 | Status: SHIPPED | OUTPATIENT
Start: 2025-04-01

## 2025-04-01 RX ORDER — BLOOD-GLUCOSE,RECEIVER,CONT
EACH MISCELLANEOUS
Qty: 1 EACH | Refills: 0 | Status: SHIPPED | OUTPATIENT
Start: 2025-04-01

## 2025-04-01 RX ORDER — INSULIN ASPART 100 [IU]/ML
12 INJECTION, SOLUTION INTRAVENOUS; SUBCUTANEOUS
Qty: 15 ML | Refills: 6 | Status: SHIPPED | OUTPATIENT
Start: 2025-04-01 | End: 2026-04-01

## 2025-04-01 NOTE — PATIENT INSTRUCTIONS
A1c 7.7%.      1) HUMALOG 12 units 3 times a day before meals do not skip    Change to Dexcom G7    2) Levothyroxine 112 mcg daily for your thyroid      Goal blood sugar in the morning, before breakfast:   Glucose goal AFTER MEALS:    2 Hour after: less than 140  Before going to bed: 100-140  Do not go to bed with glucose less than 100  Have a small snack if glucose is lower than 100    Continue glucose monitor  Freestyle Yoav monitor  Remember to scan to check your blood sugar regularly 4 to 5  times a day  When you wake up and before bed and every 4-6 hours while awake (before each meals)  THE MORE YOU SCAN MORE INFORMATION I GET.      We will plan an in-clinic visit in 4 months, with labs prior to that appointment.    Contact information:  Dominick Rahman M.D  [facility] [address] [facility], [address]  [place] [address]    Office:  [phone]  Fax:  [phone]     ----------------------------------------------------------

## 2025-04-01 NOTE — ASSESSMENT & PLAN NOTE
- lipid panel review today  - ASCVD Risk below: Statin: Taking  The ASCVD Risk score (Jose SCHMIDT, et al., 2019) failed to calculate for the following reasons:    The 2019 ASCVD risk score is only valid for ages 40 to 79    Risk score cannot be calculated because patient has a medical history suggesting prior/existing ASCVD

## 2025-04-01 NOTE — ASSESSMENT & PLAN NOTE
- Diabetes is nearing age/comorbidity goal at goal, given worsening of recent A1c, A1C goal for this patient is <8% given age and comorbidities  - Diabetes management is poor due to poor historian, vision impairment, previously noncompliant with diabetes regimen.  Require frequent encouragement and family involved for better compliance.  - Patient continues to refuse a lot of diabetes medication, limiting my ability to care for her, family is aware  - Previously tried oral regimen as well as GLP1, recommend restarting GLP 2025, patient declined  - Complicated by CKD3, dietary indiscretion, visual impairment   - CGM: Freestyle Yoav data was downloaded, personally interpreted, and reviewed with the patient during this visit. See noted below.  - Diabetic Supplies and Medications: Reviewed to ensure continued refills and compliance.  - Preventive Care: Advised the patient to schedule periodic eye exams and maintain regular foot care monitoring.    Plan  - Changes:  Patient refused to make any adjustment.  will continue NovoLog 12 units before each meal 3 times a day, advised patient daughter to contact her to make sure she gives her insulin at lunch, as not being supervised by family at that time  - Dietary Modifications: Encouraged portion size control and reduction of carbohydrate intake to better manage diabetes.  - Continue use given regular insulin injections daily:  Switch patient to Freestyle Yoav 3 CGM  - Clear written instructions provided on AVS. Follow-up scheduled within the next 4 months with lab work prior.   - Appointment date and time were provided to the patient today.

## 2025-04-01 NOTE — PROGRESS NOTES
Subjective:      Patient ID: Chelsea Goudl is a 84 y.o. female presented to [facility] on 4/1/2025.  Chief Complaint:  Diabetes    History of Present Illness: Chelsea Gould is a 84 y.o. female here for management of type 2 diabetes  Significant past medical history:  CAD, CKD stage IIIB, visual impairment (seen Ophthalmology for injections/may need surgery), hypothyroidism, osteoporosis (on Fosamax given by PCP)      Interval history:  Patient is here for management of diabetes.  A1c improved down to 7.7 from previously 8.4%  Patient presents with her daughter, family does monitor her diabetes in the afternoon and evening leading to better glycemic control.    Patient and  still on their own in morning and lunch.  Therefore she has hyperglycemia on CGM   Thyroid:  Levothyroxine 112 mcg daily  Osteoporosis:  On Fosamax for osteoporosis, no recent falls or fracture.      1) Diabetes Mellitus Type 2, Follow up Visit  - Patient here for an evaluation of diabetes, initially diagnosed at age  44  - Known diabetic complications: nephropathy, peripheral neuropathy and cardiovascular disease  - Cardiovascular risk factors: advanced age (older than 55 for men, 65 for women), diabetes mellitus, dyslipidemia, hypertension and obesity (BMI >= 30 kg/m2), poor historian   - Previously seen Esperanza Gonzalez NP 10/2020  - In previous visit she did stop insulin injection, stop Ozempic.3/26/22. Patient continues to require frequent redirection, with questionable confusion/dementia  - Diabetes has stabilize 2023 she has been coming to the office with her family, they have helped getting/monitoring her diabetes.  A1c prior to family coming to visit was 13%, now at 8.4%  - DIABETES MANAGEMENT STILL DIFFICULT due to patient's dietary indiscretion, missed insulin doses.   - Patient continues to refuse a lot of diabetes medications, limiting my ability to care for her, family is aware  - Previously tried oral regimen  as well as GLP1  - Daughter reports that patient is not interested in Dexcom due to not having a compatible cell phone.  - Patient is , lives with her .  Does have family: Daughter who checks in on her and monitor her diabetes in the evening    Current meds:     Humalog 12 units t.i.d. before each meals  Previous meds:              Metformin:  Due to diarrhea              Lantus 12 units in the morning   Ozempic:  Was stopped due to patient reported side effects, weight loss   Actos 30 mg daily    Home glucose checks:  On Freestyle Yoav 14d   CGM download and interpretation: Data was downloaded and personally reviewed by myself  CGM type: Freestyle yoav 14 day   Number of Day CGM worn: 14 days, percentage of time CGM is active: 77%  Average blood glucose: 175, Glucose variability: 28.1%  , Glucose management indicator (GMI): 7.5%  Time in Range: 15% very high, 35% High, 50% Target Range, 0% low, 0% very low>> reviewed and discussed, goal TIR discussed with patient    Patient continues to have severe postprandial hyperglycemia, with 1 episode of postprandial hypoglycemia on 3/30/2025 in the evening.  Average glucose did improve, now 175 average glucose, previously 186.  No obvious hypoglycemic events overnight which is reassuring.  Needs to scan more on her Freestyle Yoav      Diet/Exercise:               Eatin- 3 meals per day, often eat out especially at night, suspect dietary indiscretion as she often eat out at night              Snacking               Drink:  Sugar free              Current exercise: aerobics:  Dancing  Weight trend: decreasing steadily  Diabetes Education:  Yes, 19 - Liliam Chow RN, CDE   Diabetes Related Hospitalization:  denies  Hx of pancreatitis, hx of thyroid cancer:  Denies  Family history of diabetes:  Yes  Occupation:  Retired    Eye exam current (within one year): yes, DR: no, outside of [facility]  Reports cuts or ulcers on feet:  Denies    Statin:  "Taking  ACE/ARB: Not taking  Statin: Taking, ACE/ARB: Not taking    Diabetes Management Status: Reviewed   Lab Results   Component Value Date    HGBA1C 7.7 (H) 03/25/2025    HGBA1C 8.4 (H) 11/14/2024    HGBA1C 8.4 (H) 07/15/2024    HGBA1C 8.4 (H) 03/11/2024     Lab Results   Component Value Date    CPEPTIDE 3.29 04/27/2020      No results found for: "FRUCTOSAMINE"    Lab Results   Component Value Date    MICALBCREAT 9.1 05/01/2019     No results found for: "BDAXPOKE95"    Screening or Prevention Patient's value Goal Complete/Controlled?   Lipid profile : 09/05/2024 Annually Yes     Dilated retinal exam : 09/27/2022 Annually Yes     Foot exam   Most Recent Foot Exam Date: Not Found Annually Yes          2) With regards to hypothyroidism  - On levothyroxine 112 mcg daily  - Taking as directed, reports compliance  - Reports compliant with medication, chronic     Lab Results   Component Value Date    TSH 6.090 (H) 03/25/2025    TSH 3.693 07/15/2024    TSH 1.785 08/11/2023    FREET4 1.17 03/25/2025    FREET4 1.35 05/26/2022    FREET4 1.11 01/26/2022       3) With regards to osteoporosis/osteopenia  - No recent falls  - On Fosamax per PCP, started on 7/2019  - Most recent bone density done review:  BMD at the lumbar spine has increased by 5.3%, and BMD at the total hip has declined by 12.2%.  - I discussed with family, and with patient in 2024.  She does not want any making changes.  Advised patient to continue Fosamax  - continue monitoring and managed with PCP    DXA   12/05/2023  Lumbar spine (L1-L4): BMD is 1.166 g/cm2, T-score is 1.1, and Z-score is 3.8.  Total hip: BMD is 0.697 g/cm2, T-score is -2.0, and Z-score is 0.1.  Femoral neck: BMD is 0.578 g/cm2, T-score is -2.4, and Z-score is -0.1.  Distal 1/3 radius: Not applicable     FRAX:  25% risk of a major osteoporotic fracture in the next 10 years.  7.8% risk of hip fracture in the next 10 years.     Impression:  *Osteoporosis on treatment with " Alendronate.  *Fracture risk is very high due to calculated 10 year risk of hip fracture >4.5% (FRAX)  *Compared with previous DXA, BMD at the lumbar spine has increased by 5.3%, and BMD at the total hip has declined by 12.2%.    10/26/2021  Lumbar spine (L1-L4): BMD is 1.107 g/cm2, T-score is 0.5, and Z-score is 3.2.  Total hip: BMD is 0.794 g/cm2, T-score is -1.2, and Z-score is 0.8  Femoral neck: BMD is 0.608 g/cm2, T-score is -2.2, and Z-score is 0.1.  Distal 1/3 radius: Not applicable     COMPARISON:  Comparison study done on 07/08/2019.  Lumbar spine: BMD 0.974 g/cm2 and T-score -0.7.  Total Hip: BMD 0.786 g/cm2 and T-score -1.3.  Distal 1/3 radius: Not applicable    FRAX: 23% risk of a major osteoporotic fracture in the next 10 years.  6.3% risk of hip fracture in the next 10 years.     Impression:  LOW BONE MASS WITH A SIGNIFICANT INCREASE OF 13.6% IN THE LUMBAR SPINE BMD COMPARED TO THE PRIOR STUDY.      Lab work reviewed  Lab Results   Component Value Date    .3 (H) 10/26/2021    KYRRXZEP88IA 34 07/15/2024    DPJNUILW34FF 40 08/11/2023    QSZWZQDK87XA 31 10/26/2021    CALCIUM 10.5 03/25/2025    CALCIUM 9.8 11/14/2024    CALCIUM 10.1 07/15/2024    PHOS 2.9 03/11/2024    PHOS 3.0 11/14/2023    ALKPHOS 60 08/11/2023    ALKPHOS 73 05/26/2022    ALKPHOS 76 01/26/2022    EGFRNORACEVR 50 (L) 03/25/2025     Reviewed past surgical, medical, family, social history and updated as appropriate.  Review of Systems See above    Objective:   /80   Pulse 68   Wt 68.1 kg (150 lb 3.2 oz)   BMI 27.47 kg/m²     Body mass index is 27.47 kg/m².  Vital signs reviewed    Physical Exam  Vitals and nursing note reviewed.   Constitutional:       General: She is not in acute distress.     Appearance: Normal appearance. She is well-developed. She is not toxic-appearing.   Neck:      Thyroid: No thyromegaly.   Pulmonary:      Effort: Pulmonary effort is normal.   Musculoskeletal:         General: Normal range of motion.  "     Cervical back: Normal range of motion.   Skin:     Findings: No bruising.   Neurological:      Mental Status: She is alert. She is disoriented.   Psychiatric:      Comments: Chronic mood issues noted, similar to previous visit     Lab Reviewed:  See results in subjective  Lab Results   Component Value Date    HGBA1C 7.7 (H) 03/25/2025     Lab Results   Component Value Date    CHOL 150 09/05/2024    HDL 60 09/05/2024    LDLCALC 74.8 09/05/2024    TRIG 76 09/05/2024    CHOLHDL 40.0 09/05/2024     Lab Results   Component Value Date     03/25/2025    K 4.9 03/25/2025     03/25/2025    CO2 22 (L) 03/25/2025     (H) 11/14/2024    BUN 25 (H) 03/25/2025    CREATININE 1.1 03/25/2025    CALCIUM 10.5 03/25/2025    PHOS 2.9 03/11/2024    PROT 7.4 08/11/2023    ALBUMIN 3.9 03/11/2024    BILITOT 0.6 08/11/2023    ALKPHOS 60 08/11/2023    AST 18 08/11/2023    ALT 17 08/11/2023    ANIONGAP 11 03/25/2025    EGFRNORACEVR 50 (L) 03/25/2025    TSH 6.090 (H) 03/25/2025    .3 (H) 10/26/2021    EBOQBBQP02CT 34 07/15/2024     Assessment     1. Type 2 diabetes mellitus with hyperglycemia, with long-term current use of insulin  Hemoglobin A1C    Basic Metabolic Panel    NOVOLOG FLEXPEN U-100 INSULIN 100 unit/mL (3 mL) InPn pen    pen needle, diabetic (BD ULTRA-FINE MINI PEN NEEDLE) 31 gauge x 3/16" Ndle    FREESTYLE OSMAN 3 PLUS SENSOR [place]    FREESTYLE OSMAN 3 READER Misc    DISCONTINUED: FREESTYLE OSMAN 3 READER Misc    DISCONTINUED: FREESTYLE OSMAN 3 PLUS SENSOR [place]      2. Vitamin D deficiency  Vitamin D      3. Age-related osteoporosis without current pathological fracture        4. Hypothyroidism, unspecified type        5. Chronic renal failure, stage 3b        6. Hyperlipidemia associated with type 2 diabetes mellitus        7. Cognitive deficits          Plan     Type 2 diabetes mellitus with hyperglycemia, with long-term current use of insulin  - Diabetes is nearing age/comorbidity goal at goal, " given worsening of recent A1c, A1C goal for this patient is <8% given age and comorbidities  - Diabetes management is poor due to poor historian, vision impairment, previously noncompliant with diabetes regimen.  Require frequent encouragement and family involved for better compliance.  - Patient continues to refuse a lot of diabetes medication, limiting my ability to care for her, family is aware  - Previously tried oral regimen as well as GLP1, recommend restarting GLP 2025, patient declined  - Complicated by CKD3, dietary indiscretion, visual impairment   - CGM: Freestyle Yoav data was downloaded, personally interpreted, and reviewed with the patient during this visit. See noted below.  - Diabetic Supplies and Medications: Reviewed to ensure continued refills and compliance.  - Preventive Care: Advised the patient to schedule periodic eye exams and maintain regular foot care monitoring.    Plan  - Changes:  Patient refused to make any adjustment.  will continue NovoLog 12 units before each meal 3 times a day, advised patient daughter to contact her to make sure she gives her insulin at lunch, as not being supervised by family at that time  - Dietary Modifications: Encouraged portion size control and reduction of carbohydrate intake to better manage diabetes.  - Continue use given regular insulin injections daily:  Switch patient to Freestyle Yoav 3 CGM  - Clear written instructions provided on AVS. Follow-up scheduled within the next 4 months with lab work prior.   - Appointment date and time were provided to the patient today.    Age-related osteoporosis without current pathological fracture  - Bone density for 10/2021 completed, Stable BMD, high frax score  - On Fosamax per PCP since 7/2019  - Advised patient to continue to be active   - DXA 2023 showed decrease in fem neck  - Discuss with family, and with patient.  She does not want any making changes.  Advised patient to continue Fosamax  - continue  monitoring and managed with PCP    Hypothyroidism  - TSH is at goal, continue levothyroxine as prescribed  - TSH stable on recent blood work  - continue LT4 112 mcg daily  - managed by PCP    Chronic renal failure, stage 3b  - Kidney function stable   - Renally Adjust diabetes medication  - routine monitor    Hyperlipidemia associated with type 2 diabetes mellitus  - lipid panel review today  - ASCVD Risk below: Statin: Taking  The ASCVD Risk score (Jose DK, et al., 2019) failed to calculate for the following reasons:    The 2019 ASCVD risk score is only valid for ages 40 to 79    Risk score cannot be calculated because patient has a medical history suggesting prior/existing ASCVD    Cognitive deficits  - patient with poor memory  - affecting diabetes management  - encourage patient family to be more involved in her care      Given the patient age and other morbidities, goals of therapy are to get the best control we can without hypoglycemia  - Endocrine Society recommend Goal A1C is <8% given chronic illnesses, cognitive impairment  - fasting glucose goal of 100-150  - bedtime glucose 150-180           Advised patient to follow up with PCP for routine health maintenance care.   RTC in 4 months    Visit today included increased complexity associated with the care of the episodic problem addressed and managing the longitudinal care of the patient due to the serious and/or complex managed problem(s).   Including: Type 2 diabetes, hypothyroidism,  CKD3b, osteoporosis, vitamin-D deficiency, hyperlipidemia.    Dominick Rahman M.D  Endocrinology  [facility]  4/1/2025      Disclaimer: This note has been generated using voice-recognition software. There may be typographical errors that have been missed during proof-reading.  ------------------------------------------------------------    Indications for CGMs:    Patent with diagnosed:  Type 2 Diabetes Mellitus that required frequent glucose monitoring  (4 + times a day and 4x/day testing), frequent adjustments to insulin regiment based on BG or CGM results. Patent requires a therapeutic CGM and is willing to use therapeutic CGM/SMBG for Necessary frequent adjustments in insulin therapy, patient demonstrated an understanding of the technology (can use and recognize alerts and alarms). I, the physician, have assessed adherence to CGM regimen and to the plan, patient will have close follow up in clinic as indicated, every 3 months to 6 months.     Patient experiences (including but not limited to):  [x]   Discrepancies between records and A1C, at risk severe hypoglycemia episode and hospitalization  [x]   Recurrent, unexplained, severe hypoglycemic episodes  [x]   Postprandial hyperglycemia  [x]   Unexplained blood glucose excursions  []   Impaired awareness of hypoglycemia    []   Patient uses insulin pump for diabetes management: will need frequent adjustments to insulin regiment based on BG: including adjustment to  insulin pump setting, sliding scale, correction factor, additional bolusing/correction

## 2025-05-07 ENCOUNTER — NURSE TRIAGE (OUTPATIENT)
Dept: ADMINISTRATIVE | Facility: CLINIC | Age: 85
End: 2025-05-07
Payer: MEDICARE

## 2025-05-07 DIAGNOSIS — E11.65 TYPE 2 DIABETES MELLITUS WITH HYPERGLYCEMIA, WITH LONG-TERM CURRENT USE OF INSULIN: ICD-10-CM

## 2025-05-07 DIAGNOSIS — Z79.4 TYPE 2 DIABETES MELLITUS WITH HYPERGLYCEMIA, WITH LONG-TERM CURRENT USE OF INSULIN: ICD-10-CM

## 2025-05-07 NOTE — TELEPHONE ENCOUNTER
"Daughter Data calling on behalf of pt. States that monitor states that "system is lost" when changed sensor on arm. Spoke to company and will send another machine in 3 days. Requesting new prescription for Freestyle Yoav 3 system for replacement now. Pt does not having any to monitor BS with now. Pt has not check BS all day. Denies s/s for triage now. Advise to call PCP now. Call placed to Dr. Myron Sheth per protocol- no answer-VM left. Secure chat sent to provider. Dr. Sheth advised "Not something they need urgently. Can they make sure to call pcp first thing in morning?" Data made aware and advised to buy a temp glucometer from [employer] for tonight. VU. ODVV also offered and accepted. Encounter routed to provider for f/u call.   Reason for Disposition   [1] Prescription refill request for ESSENTIAL medicine (i.e., likelihood of harm to patient if not taken) AND [2] triager unable to refill per department policy    Protocols used: Medication Refill and Renewal Call-A-AH    "

## 2025-05-08 ENCOUNTER — TELEPHONE (OUTPATIENT)
Dept: ENDOCRINOLOGY | Facility: CLINIC | Age: 85
End: 2025-05-08
Payer: MEDICARE

## 2025-05-08 DIAGNOSIS — E11.65 TYPE 2 DIABETES MELLITUS WITH HYPERGLYCEMIA, WITH LONG-TERM CURRENT USE OF INSULIN: ICD-10-CM

## 2025-05-08 DIAGNOSIS — Z79.4 TYPE 2 DIABETES MELLITUS WITH HYPERGLYCEMIA, WITH LONG-TERM CURRENT USE OF INSULIN: ICD-10-CM

## 2025-05-08 RX ORDER — BLOOD-GLUCOSE SENSOR
EACH MISCELLANEOUS
Qty: 2 EACH | Refills: 11 | Status: SHIPPED | OUTPATIENT
Start: 2025-05-08

## 2025-05-08 RX ORDER — BLOOD-GLUCOSE,RECEIVER,CONT
EACH MISCELLANEOUS
Qty: 1 EACH | Refills: 0 | Status: CANCELLED | OUTPATIENT
Start: 2025-05-08

## 2025-05-08 RX ORDER — BLOOD-GLUCOSE,RECEIVER,CONT
EACH MISCELLANEOUS
Qty: 1 EACH | Refills: 0 | Status: SHIPPED | OUTPATIENT
Start: 2025-05-08

## 2025-05-08 NOTE — TELEPHONE ENCOUNTER
----- Message from Yamilet sent at 5/8/2025  8:47 AM CDT -----  .Type: Patient Call BackWho called: Jae - daughter What is the request in detail: Stated her mom hair monitor malfunctioned and was informed to call the doctor. Ask that the nurse give her a call Can the clinic reply by [facility]? No Would the patient rather a call back or a response via [facility]? Call [facility] call back number: [phone] Information:

## 2025-05-08 NOTE — TELEPHONE ENCOUNTER
Pt daughter was informed by [employer] support that the reader had malfunctioned. They are sending pt a new reader and sensor can take up to 3-5 days to come. Pt daughter was asked are they sticking her finger, she replied yes but the pt doesn't like change. Daughter is requesting a Rx for [employer] 3 reader to be sent to [facility] on [address]. Message will be sent to provider, understanding verbalized.

## 2025-05-08 NOTE — TELEPHONE ENCOUNTER
Copied from CRM #[id number]. Topic: Medications - Medication Status Check   >> May 8, 2025  4:28 PM Carmen wrote:  .Type: Patient Call Back    Who called: Data- daughter     What is the request in detail: asked for a call back to get a status update for her monitor. The pharmacy said that they don't have it. It was not sent over yet.     Can the clinic reply by [facility]? No     Would the patient rather a call back or a response via [facility]? Call     Best call back number: [phone]    Additional Information:

## 2025-05-09 ENCOUNTER — TELEPHONE (OUTPATIENT)
Dept: ENDOCRINOLOGY | Facility: CLINIC | Age: 85
End: 2025-05-09
Payer: MEDICARE

## 2025-05-09 NOTE — TELEPHONE ENCOUNTER
T daughter was informed that provider sent hair reader to pharmacy on yesterday. Understanding verbalized.

## 2025-05-09 NOTE — TELEPHONE ENCOUNTER
----- Message from Yamilet sent at 5/9/2025  8:14 AM CDT -----  .Type:  Patient Returning CallWho Called: Data - daughter Who Left Message for Patient: Claudia Dawson MADoes the patient know what this is regarding?: Yes Would the patient rather a call back or a response via [facility]? Call [facility] Call Back Number:[phone] Information:

## 2025-05-27 DIAGNOSIS — E11.69 HYPERLIPIDEMIA ASSOCIATED WITH TYPE 2 DIABETES MELLITUS: ICD-10-CM

## 2025-05-27 DIAGNOSIS — Z79.4 TYPE 2 DIABETES MELLITUS WITH HYPERGLYCEMIA, WITH LONG-TERM CURRENT USE OF INSULIN: ICD-10-CM

## 2025-05-27 DIAGNOSIS — E11.65 TYPE 2 DIABETES MELLITUS WITH HYPERGLYCEMIA, WITH LONG-TERM CURRENT USE OF INSULIN: ICD-10-CM

## 2025-05-27 DIAGNOSIS — E78.5 HYPERLIPIDEMIA ASSOCIATED WITH TYPE 2 DIABETES MELLITUS: ICD-10-CM

## 2025-05-27 RX ORDER — ROSUVASTATIN CALCIUM 10 MG/1
10 TABLET, COATED ORAL DAILY
Qty: 90 TABLET | Refills: 0 | Status: SHIPPED | OUTPATIENT
Start: 2025-05-27

## 2025-05-27 NOTE — TELEPHONE ENCOUNTER
"----- Message from Jonna sent at 5/27/2025  1:11 PM CDT -----  Regarding: daughter  Who Called: daughterMauricio you contacted your pharmacy:Refill pen needle, diabetic (BD ULTRA-FINE MINI PEN NEEDLE) 31 gauge x 3/16" NdleRX Name and Strength:Preferred Pharmacy with phone number: [facility] ([facility] #[id number] - [place] - [address] [place] [address] AT [address] [place] [address] [id number] [facility] [id number] [address]Dlydu: [phone] Fax: [phone] or Mail Order: localWould the patient rather a call back or a response via [facility]?Best Call Back Number:  [phone] Information:Thank you.  "

## 2025-05-27 NOTE — TELEPHONE ENCOUNTER
Care Due:                  Date            Visit Type   Department     Provider  --------------------------------------------------------------------------------                                [facility]                              PRIMARY      MEDICINE /  Last Visit: 09-      CARE ([facility])   INTERNAL MED   Sari Gupta  Next Visit: None Scheduled  None         None Found                                                            Last  Test          Frequency    Reason                     Performed    Due Date  --------------------------------------------------------------------------------    CMP.........  12 months..  alendronate, rosuvastatin  08- 08-    Mg Level....  12 months..  alendronate..............  Not Found    Overdue    Phosphate...  12 months..  alendronate..............  03- 03-    [facility] Embedded Care Due Messages. Reference number: [id number].   5/27/2025 1:31:07 PM CDT

## 2025-05-28 RX ORDER — PEN NEEDLE, DIABETIC 30 GX3/16"
NEEDLE, DISPOSABLE MISCELLANEOUS
Qty: 200 EACH | Refills: 6 | Status: SHIPPED | OUTPATIENT
Start: 2025-05-28

## 2025-05-28 NOTE — TELEPHONE ENCOUNTER
T daughter was informed that the pen needle request was sent to the provider. Once he's done with his pt we will have him send it. Daughter verbalized understanding

## 2025-05-28 NOTE — TELEPHONE ENCOUNTER
----- Message from Seferino sent at 5/28/2025 10:47 AM CDT -----  Regarding: self  Type: Patient Call BackWho called:Data-DaughterWhat is the request in detail: Requesting a phone call from Claudia Dawson MACan the clinic reply by [facility]? NoWould the patient rather a call back or a response via [facility]? Call [facility] call back number:[phone] Information:Thank you.

## 2025-08-20 DIAGNOSIS — Z79.4 TYPE 2 DIABETES MELLITUS WITH HYPERGLYCEMIA, WITH LONG-TERM CURRENT USE OF INSULIN: ICD-10-CM

## 2025-08-20 DIAGNOSIS — E11.65 TYPE 2 DIABETES MELLITUS WITH HYPERGLYCEMIA, WITH LONG-TERM CURRENT USE OF INSULIN: ICD-10-CM

## 2025-08-20 RX ORDER — BLOOD-GLUCOSE,RECEIVER,CONT
EACH MISCELLANEOUS
Qty: 1 EACH | Refills: 0 | Status: SHIPPED | OUTPATIENT
Start: 2025-08-20

## 2025-08-22 DIAGNOSIS — E11.69 HYPERLIPIDEMIA ASSOCIATED WITH TYPE 2 DIABETES MELLITUS: ICD-10-CM

## 2025-08-22 DIAGNOSIS — E78.5 HYPERLIPIDEMIA ASSOCIATED WITH TYPE 2 DIABETES MELLITUS: ICD-10-CM

## 2025-08-27 RX ORDER — ROSUVASTATIN CALCIUM 10 MG/1
10 TABLET, COATED ORAL DAILY
Qty: 90 TABLET | Refills: 0 | Status: SHIPPED | OUTPATIENT
Start: 2025-08-27

## (undated) DEVICE — SUT PROLENE 0 CT1 30IN BLUE

## (undated) DEVICE — GLOVE BIOGEL SKINSENSE PI 6.5

## (undated) DEVICE — NDL INSUF ULTRA VERESS 120MM

## (undated) DEVICE — JELLY SURGILUBE 5GR

## (undated) DEVICE — SOL ELECTROLUBE ANTI-STIC

## (undated) DEVICE — SET BASIN 48X48IN 6000ML RING

## (undated) DEVICE — SYR SAFETY 3CC 22G X 1.5 ECLIP

## (undated) DEVICE — DRAPE STERI INSTRUMENT 1018

## (undated) DEVICE — ADHESIVE DERMABOND ADVANCED

## (undated) DEVICE — NDL SPINAL 20GX3.5 HUB

## (undated) DEVICE — TRAY DRY SKIN SCRUB PREP

## (undated) DEVICE — SEE MEDLINE ITEM 146313

## (undated) DEVICE — SEE L#120831

## (undated) DEVICE — UNDERGLOVES BIOGEL PI SZ 7 LF

## (undated) DEVICE — PAD ABD 8X10 STERILE

## (undated) DEVICE — BLADE SURG STAINLESS STEEL #11

## (undated) DEVICE — TRAY FOLEY 16FR INFECTION CONT

## (undated) DEVICE — SEE MEDLINE ITEM 157110

## (undated) DEVICE — SET TRI-LUMEN FILTERED TUBE

## (undated) DEVICE — SOL 9P NACL IRR PIC IL

## (undated) DEVICE — SOL CLEARIFY VISUALIZATION LAP

## (undated) DEVICE — OBTURATOR BLADELESS 8MM XI CLR

## (undated) DEVICE — Device

## (undated) DEVICE — SUT CHROME GUT 1 CT-2 27

## (undated) DEVICE — SWAB PROCTO 16 X 1 1/2 ST 2/PK

## (undated) DEVICE — PORT ACCESS 8MM W/120MM LOW

## (undated) DEVICE — SOL PVP-I SCRUB 7.5% 4OZ

## (undated) DEVICE — ELECTRODE REM PLYHSV RETURN 9

## (undated) DEVICE — DEVICE ANC SW STAT FOLEY 6-24

## (undated) DEVICE — SEE MEDLINE ITEM 153151

## (undated) DEVICE — PAD PERI POST REPLACEMNT

## (undated) DEVICE — DRAPE COLUMN DAVINCI XI

## (undated) DEVICE — SOL NS 1000CC

## (undated) DEVICE — IRRIGATOR ENDOSCOPY DISP.

## (undated) DEVICE — COVER TIP CURVED SCISSORS XI

## (undated) DEVICE — SEE MEDLINE ITEM 156930

## (undated) DEVICE — MANIPULATOR VCARE PLUS 37MM LG

## (undated) DEVICE — DRAPE UNDER BUTTOCKS WITH POUCH

## (undated) DEVICE — CATH URETHRAL 16FR RED

## (undated) DEVICE — SUT 2/0 30IN SILK BLK BRAI

## (undated) DEVICE — INSERT CUSHIONPRONE VIEW LARGE

## (undated) DEVICE — SOL IRR NACL .9% 3000ML

## (undated) DEVICE — SUT VICRYL CTD 2-0 GI 27 SH

## (undated) DEVICE — SYR B-D DISP CONTROL 10CC100/C

## (undated) DEVICE — ELECTRODE BALL RED 5MM

## (undated) DEVICE — POSITIONER HEAD ADULT

## (undated) DEVICE — SEE MEDLINE ITEM 156923

## (undated) DEVICE — SCRUB 10% POVIDONE IODINE 4OZ

## (undated) DEVICE — HYSTEROSCOPE TRUCLEAR ELITE

## (undated) DEVICE — SYR 10CC LUER LOCK

## (undated) DEVICE — SEE MEDLINE ITEM 157196

## (undated) DEVICE — DRAPE ARM DAVINCI XI

## (undated) DEVICE — SUT VICRYL 2-0 CT-2 VCP269H

## (undated) DEVICE — SEAL UNIVERSAL 5MM-8MM XI

## (undated) DEVICE — DRESSING TELFA N ADH 3X8

## (undated) DEVICE — SEE MEDLINE ITEM 157150

## (undated) DEVICE — SUT V-LOC 180 ABD 2/0 GS-21

## (undated) DEVICE — KIT WING PAD POSITIONING

## (undated) DEVICE — SOL WATER STRL IRR 1000ML

## (undated) DEVICE — CONTAINER SPECIMEN STRL 4OZ

## (undated) DEVICE — SUT MCRYL PLUS 4-0 PS2 27IN